# Patient Record
Sex: FEMALE | Race: WHITE | NOT HISPANIC OR LATINO | Employment: OTHER | URBAN - METROPOLITAN AREA
[De-identification: names, ages, dates, MRNs, and addresses within clinical notes are randomized per-mention and may not be internally consistent; named-entity substitution may affect disease eponyms.]

---

## 2021-12-13 ENCOUNTER — HOSPITAL ENCOUNTER (OUTPATIENT)
Dept: RADIOLOGY | Facility: MEDICAL CENTER | Age: 71
DRG: 460 | End: 2021-12-13
Attending: NEUROLOGICAL SURGERY | Admitting: NEUROLOGICAL SURGERY
Payer: MEDICARE

## 2021-12-13 ENCOUNTER — PRE-ADMISSION TESTING (OUTPATIENT)
Dept: ADMISSIONS | Facility: MEDICAL CENTER | Age: 71
DRG: 460 | End: 2021-12-13
Attending: NEUROLOGICAL SURGERY
Payer: MEDICARE

## 2021-12-13 ENCOUNTER — HOSPITAL ENCOUNTER (OUTPATIENT)
Dept: RADIOLOGY | Facility: MEDICAL CENTER | Age: 71
DRG: 460 | End: 2021-12-13
Attending: NEUROLOGICAL SURGERY
Payer: MEDICARE

## 2021-12-13 DIAGNOSIS — Z01.812 PRE-OPERATIVE LABORATORY EXAMINATION: ICD-10-CM

## 2021-12-13 DIAGNOSIS — Z01.810 PRE-OPERATIVE CARDIOVASCULAR EXAMINATION: ICD-10-CM

## 2021-12-13 DIAGNOSIS — M48.062 SPINAL STENOSIS, LUMBAR REGION, WITH NEUROGENIC CLAUDICATION: ICD-10-CM

## 2021-12-13 DIAGNOSIS — Z01.811 PRE-OPERATIVE RESPIRATORY EXAMINATION: ICD-10-CM

## 2021-12-13 LAB
ANION GAP SERPL CALC-SCNC: 12 MMOL/L (ref 7–16)
APTT PPP: 32.8 SEC (ref 24.7–36)
BASOPHILS # BLD AUTO: 0.6 % (ref 0–1.8)
BASOPHILS # BLD: 0.06 K/UL (ref 0–0.12)
BUN SERPL-MCNC: 16 MG/DL (ref 8–22)
CALCIUM SERPL-MCNC: 10.1 MG/DL (ref 8.5–10.5)
CHLORIDE SERPL-SCNC: 100 MMOL/L (ref 96–112)
CO2 SERPL-SCNC: 27 MMOL/L (ref 20–33)
CREAT SERPL-MCNC: 1 MG/DL (ref 0.5–1.4)
EKG IMPRESSION: NORMAL
EOSINOPHIL # BLD AUTO: 0.63 K/UL (ref 0–0.51)
EOSINOPHIL NFR BLD: 6.8 % (ref 0–6.9)
ERYTHROCYTE [DISTWIDTH] IN BLOOD BY AUTOMATED COUNT: 44.8 FL (ref 35.9–50)
GLUCOSE SERPL-MCNC: 110 MG/DL (ref 65–99)
HCT VFR BLD AUTO: 41.7 % (ref 37–47)
HGB BLD-MCNC: 14 G/DL (ref 12–16)
IMM GRANULOCYTES # BLD AUTO: 0.02 K/UL (ref 0–0.11)
IMM GRANULOCYTES NFR BLD AUTO: 0.2 % (ref 0–0.9)
INR PPP: 0.96 (ref 0.87–1.13)
LYMPHOCYTES # BLD AUTO: 3.02 K/UL (ref 1–4.8)
LYMPHOCYTES NFR BLD: 32.6 % (ref 22–41)
MCH RBC QN AUTO: 30.4 PG (ref 27–33)
MCHC RBC AUTO-ENTMCNC: 33.6 G/DL (ref 33.6–35)
MCV RBC AUTO: 90.7 FL (ref 81.4–97.8)
MONOCYTES # BLD AUTO: 0.57 K/UL (ref 0–0.85)
MONOCYTES NFR BLD AUTO: 6.1 % (ref 0–13.4)
NEUTROPHILS # BLD AUTO: 4.97 K/UL (ref 2–7.15)
NEUTROPHILS NFR BLD: 53.7 % (ref 44–72)
NRBC # BLD AUTO: 0 K/UL
NRBC BLD-RTO: 0 /100 WBC
PLATELET # BLD AUTO: 304 K/UL (ref 164–446)
PMV BLD AUTO: 9.7 FL (ref 9–12.9)
POTASSIUM SERPL-SCNC: 3.2 MMOL/L (ref 3.6–5.5)
PROTHROMBIN TIME: 12.5 SEC (ref 12–14.6)
RBC # BLD AUTO: 4.6 M/UL (ref 4.2–5.4)
SARS-COV-2 RNA RESP QL NAA+PROBE: NOTDETECTED
SODIUM SERPL-SCNC: 139 MMOL/L (ref 135–145)
SPECIMEN SOURCE: NORMAL
WBC # BLD AUTO: 9.3 K/UL (ref 4.8–10.8)

## 2021-12-13 PROCEDURE — U0003 INFECTIOUS AGENT DETECTION BY NUCLEIC ACID (DNA OR RNA); SEVERE ACUTE RESPIRATORY SYNDROME CORONAVIRUS 2 (SARS-COV-2) (CORONAVIRUS DISEASE [COVID-19]), AMPLIFIED PROBE TECHNIQUE, MAKING USE OF HIGH THROUGHPUT TECHNOLOGIES AS DESCRIBED BY CMS-2020-01-R: HCPCS

## 2021-12-13 PROCEDURE — 36415 COLL VENOUS BLD VENIPUNCTURE: CPT

## 2021-12-13 PROCEDURE — 80048 BASIC METABOLIC PNL TOTAL CA: CPT

## 2021-12-13 PROCEDURE — 85610 PROTHROMBIN TIME: CPT

## 2021-12-13 PROCEDURE — 71046 X-RAY EXAM CHEST 2 VIEWS: CPT

## 2021-12-13 PROCEDURE — 85730 THROMBOPLASTIN TIME PARTIAL: CPT

## 2021-12-13 PROCEDURE — 85025 COMPLETE CBC W/AUTO DIFF WBC: CPT

## 2021-12-13 PROCEDURE — 93010 ELECTROCARDIOGRAM REPORT: CPT | Performed by: INTERNAL MEDICINE

## 2021-12-13 PROCEDURE — 72131 CT LUMBAR SPINE W/O DYE: CPT | Mod: MH

## 2021-12-13 PROCEDURE — 93005 ELECTROCARDIOGRAM TRACING: CPT

## 2021-12-13 PROCEDURE — C9803 HOPD COVID-19 SPEC COLLECT: HCPCS

## 2021-12-13 PROCEDURE — U0005 INFEC AGEN DETEC AMPLI PROBE: HCPCS

## 2021-12-13 RX ORDER — GABAPENTIN 300 MG/1
300 CAPSULE ORAL 2 TIMES DAILY
Status: ON HOLD | COMMUNITY
Start: 2021-12-01 | End: 2023-02-07 | Stop reason: SDUPTHER

## 2021-12-13 RX ORDER — ATORVASTATIN CALCIUM 10 MG/1
10 TABLET, FILM COATED ORAL EVERY MORNING
Status: ON HOLD | COMMUNITY
Start: 2021-11-24 | End: 2023-08-11 | Stop reason: SDUPTHER

## 2021-12-13 RX ORDER — OMEPRAZOLE 40 MG/1
40 CAPSULE, DELAYED RELEASE ORAL EVERY MORNING
Status: ON HOLD | COMMUNITY
Start: 2021-10-23 | End: 2023-08-11 | Stop reason: SDUPTHER

## 2021-12-13 RX ORDER — HYDROCHLOROTHIAZIDE 25 MG/1
25 TABLET ORAL EVERY MORNING
Status: ON HOLD | COMMUNITY
Start: 2021-10-17 | End: 2023-08-11

## 2021-12-20 ENCOUNTER — APPOINTMENT (OUTPATIENT)
Dept: RADIOLOGY | Facility: MEDICAL CENTER | Age: 71
DRG: 460 | End: 2021-12-20
Attending: NEUROLOGICAL SURGERY
Payer: MEDICARE

## 2021-12-20 ENCOUNTER — HOSPITAL ENCOUNTER (INPATIENT)
Facility: MEDICAL CENTER | Age: 71
LOS: 1 days | DRG: 460 | End: 2021-12-28
Attending: NEUROLOGICAL SURGERY | Admitting: HOSPITALIST
Payer: MEDICARE

## 2021-12-20 ENCOUNTER — ANESTHESIA (OUTPATIENT)
Dept: SURGERY | Facility: MEDICAL CENTER | Age: 71
DRG: 460 | End: 2021-12-20
Payer: MEDICARE

## 2021-12-20 ENCOUNTER — ANESTHESIA EVENT (OUTPATIENT)
Dept: SURGERY | Facility: MEDICAL CENTER | Age: 71
DRG: 460 | End: 2021-12-20
Payer: MEDICARE

## 2021-12-20 DIAGNOSIS — M48.061 LUMBAR STENOSIS WITHOUT NEUROGENIC CLAUDICATION: ICD-10-CM

## 2021-12-20 DIAGNOSIS — G89.18 POSTOPERATIVE PAIN: ICD-10-CM

## 2021-12-20 PROCEDURE — 0QB20ZZ EXCISION OF RIGHT PELVIC BONE, OPEN APPROACH: ICD-10-PCS | Performed by: NEUROLOGICAL SURGERY

## 2021-12-20 PROCEDURE — 72100 X-RAY EXAM L-S SPINE 2/3 VWS: CPT

## 2021-12-20 PROCEDURE — 700105 HCHG RX REV CODE 258: Performed by: NEUROLOGICAL SURGERY

## 2021-12-20 PROCEDURE — 110454 HCHG SHELL REV 250: Performed by: NEUROLOGICAL SURGERY

## 2021-12-20 PROCEDURE — 01NB0ZZ RELEASE LUMBAR NERVE, OPEN APPROACH: ICD-10-PCS | Performed by: NEUROLOGICAL SURGERY

## 2021-12-20 PROCEDURE — 501838 HCHG SUTURE GENERAL: Performed by: NEUROLOGICAL SURGERY

## 2021-12-20 PROCEDURE — 700102 HCHG RX REV CODE 250 W/ 637 OVERRIDE(OP): Performed by: ANESTHESIOLOGY

## 2021-12-20 PROCEDURE — 700101 HCHG RX REV CODE 250: Performed by: ANESTHESIOLOGY

## 2021-12-20 PROCEDURE — C1713 ANCHOR/SCREW BN/BN,TIS/BN: HCPCS | Performed by: NEUROLOGICAL SURGERY

## 2021-12-20 PROCEDURE — 700111 HCHG RX REV CODE 636 W/ 250 OVERRIDE (IP): Performed by: NEUROLOGICAL SURGERY

## 2021-12-20 PROCEDURE — 700102 HCHG RX REV CODE 250 W/ 637 OVERRIDE(OP): Performed by: NURSE PRACTITIONER

## 2021-12-20 PROCEDURE — 160042 HCHG SURGERY MINUTES - EA ADDL 1 MIN LEVEL 5: Performed by: NEUROLOGICAL SURGERY

## 2021-12-20 PROCEDURE — 160035 HCHG PACU - 1ST 60 MINS PHASE I: Performed by: NEUROLOGICAL SURGERY

## 2021-12-20 PROCEDURE — 160009 HCHG ANES TIME/MIN: Performed by: NEUROLOGICAL SURGERY

## 2021-12-20 PROCEDURE — A9270 NON-COVERED ITEM OR SERVICE: HCPCS | Performed by: ANESTHESIOLOGY

## 2021-12-20 PROCEDURE — 700101 HCHG RX REV CODE 250: Performed by: NURSE PRACTITIONER

## 2021-12-20 PROCEDURE — A9270 NON-COVERED ITEM OR SERVICE: HCPCS | Performed by: NURSE PRACTITIONER

## 2021-12-20 PROCEDURE — 700111 HCHG RX REV CODE 636 W/ 250 OVERRIDE (IP): Performed by: NURSE PRACTITIONER

## 2021-12-20 PROCEDURE — 700111 HCHG RX REV CODE 636 W/ 250 OVERRIDE (IP): Performed by: ANESTHESIOLOGY

## 2021-12-20 PROCEDURE — 160002 HCHG RECOVERY MINUTES (STAT): Performed by: NEUROLOGICAL SURGERY

## 2021-12-20 PROCEDURE — 502714 HCHG ROBOTIC SURGERY SERVICES: Performed by: NEUROLOGICAL SURGERY

## 2021-12-20 PROCEDURE — 160036 HCHG PACU - EA ADDL 30 MINS PHASE I: Performed by: NEUROLOGICAL SURGERY

## 2021-12-20 PROCEDURE — 700105 HCHG RX REV CODE 258: Performed by: ANESTHESIOLOGY

## 2021-12-20 PROCEDURE — 0SG10AJ FUSION OF 2 OR MORE LUMBAR VERTEBRAL JOINTS WITH INTERBODY FUSION DEVICE, POSTERIOR APPROACH, ANTERIOR COLUMN, OPEN APPROACH: ICD-10-PCS | Performed by: NEUROLOGICAL SURGERY

## 2021-12-20 PROCEDURE — 160048 HCHG OR STATISTICAL LEVEL 1-5: Performed by: NEUROLOGICAL SURGERY

## 2021-12-20 PROCEDURE — 110371 HCHG SHELL REV 272: Performed by: NEUROLOGICAL SURGERY

## 2021-12-20 PROCEDURE — 160031 HCHG SURGERY MINUTES - 1ST 30 MINS LEVEL 5: Performed by: NEUROLOGICAL SURGERY

## 2021-12-20 PROCEDURE — G0378 HOSPITAL OBSERVATION PER HR: HCPCS

## 2021-12-20 PROCEDURE — 500367 HCHG DRAIN KIT, HEMOVAC: Performed by: NEUROLOGICAL SURGERY

## 2021-12-20 PROCEDURE — 0SB20ZZ EXCISION OF LUMBAR VERTEBRAL DISC, OPEN APPROACH: ICD-10-PCS | Performed by: NEUROLOGICAL SURGERY

## 2021-12-20 PROCEDURE — 700101 HCHG RX REV CODE 250: Performed by: NEUROLOGICAL SURGERY

## 2021-12-20 DEVICE — SCREW SOLERA SET SCREW (1TCX40+3TCX21+2TCX10=123): Type: IMPLANTABLE DEVICE | Site: BACK | Status: FUNCTIONAL

## 2021-12-20 DEVICE — CAGE EXPANDABLE ELEVATE STANDARD 10X28: Type: IMPLANTABLE DEVICE | Site: BACK | Status: FUNCTIONAL

## 2021-12-20 DEVICE — SCREW MAS  SOLERA 5.5 X 40MM (1TCX8+3TCX8=32): Type: IMPLANTABLE DEVICE | Site: BACK | Status: FUNCTIONAL

## 2021-12-20 DEVICE — SCREW MAS  SOLERA 6.5 X 45MM (1TCX16+3TCX8=40): Type: IMPLANTABLE DEVICE | Site: BACK | Status: FUNCTIONAL

## 2021-12-20 DEVICE — SCREW MAS  SOLERA 6.5 X 40MM (1TCX16+3TCX8=40): Type: IMPLANTABLE DEVICE | Site: BACK | Status: FUNCTIONAL

## 2021-12-20 DEVICE — DBM PUTTY PROGENIX 10.CC: Type: IMPLANTABLE DEVICE | Site: BACK | Status: FUNCTIONAL

## 2021-12-20 DEVICE — ORTHOBLEND 5CC: Type: IMPLANTABLE DEVICE | Site: BACK | Status: FUNCTIONAL

## 2021-12-20 DEVICE — CAGE EXPANDABLE ELEVATE STANDARD 9X28: Type: IMPLANTABLE DEVICE | Site: BACK | Status: FUNCTIONAL

## 2021-12-20 DEVICE — ROD PREBENT TITANIUM 5.5 X 100MM (2TCONX2=4): Type: IMPLANTABLE DEVICE | Site: BACK | Status: FUNCTIONAL

## 2021-12-20 RX ORDER — HYDRALAZINE HYDROCHLORIDE 20 MG/ML
10 INJECTION INTRAMUSCULAR; INTRAVENOUS
Status: DISCONTINUED | OUTPATIENT
Start: 2021-12-20 | End: 2021-12-28 | Stop reason: HOSPADM

## 2021-12-20 RX ORDER — DOCUSATE SODIUM 100 MG/1
100 CAPSULE, LIQUID FILLED ORAL 2 TIMES DAILY
Status: DISCONTINUED | OUTPATIENT
Start: 2021-12-20 | End: 2021-12-28 | Stop reason: HOSPADM

## 2021-12-20 RX ORDER — ACETAMINOPHEN 500 MG
1000 TABLET ORAL ONCE
Status: DISCONTINUED | OUTPATIENT
Start: 2021-12-20 | End: 2021-12-20 | Stop reason: HOSPADM

## 2021-12-20 RX ORDER — BUPIVACAINE HYDROCHLORIDE AND EPINEPHRINE 5; 5 MG/ML; UG/ML
INJECTION, SOLUTION PERINEURAL
Status: DISCONTINUED | OUTPATIENT
Start: 2021-12-20 | End: 2021-12-20 | Stop reason: HOSPADM

## 2021-12-20 RX ORDER — CYCLOBENZAPRINE HCL 10 MG
10 TABLET ORAL EVERY 8 HOURS PRN
Status: DISCONTINUED | OUTPATIENT
Start: 2021-12-20 | End: 2021-12-28 | Stop reason: HOSPADM

## 2021-12-20 RX ORDER — BISACODYL 10 MG
10 SUPPOSITORY, RECTAL RECTAL
Status: DISCONTINUED | OUTPATIENT
Start: 2021-12-20 | End: 2021-12-28 | Stop reason: HOSPADM

## 2021-12-20 RX ORDER — CEFAZOLIN SODIUM 1 G/3ML
INJECTION, POWDER, FOR SOLUTION INTRAMUSCULAR; INTRAVENOUS PRN
Status: DISCONTINUED | OUTPATIENT
Start: 2021-12-20 | End: 2021-12-20 | Stop reason: SURG

## 2021-12-20 RX ORDER — HALOPERIDOL 5 MG/ML
1 INJECTION INTRAMUSCULAR
Status: DISCONTINUED | OUTPATIENT
Start: 2021-12-20 | End: 2021-12-20 | Stop reason: HOSPADM

## 2021-12-20 RX ORDER — AMOXICILLIN 250 MG
1 CAPSULE ORAL
Status: DISCONTINUED | OUTPATIENT
Start: 2021-12-20 | End: 2021-12-28 | Stop reason: HOSPADM

## 2021-12-20 RX ORDER — ROCURONIUM BROMIDE 10 MG/ML
INJECTION, SOLUTION INTRAVENOUS PRN
Status: DISCONTINUED | OUTPATIENT
Start: 2021-12-20 | End: 2021-12-20 | Stop reason: SURG

## 2021-12-20 RX ORDER — HYDROMORPHONE HYDROCHLORIDE 1 MG/ML
0.1 INJECTION, SOLUTION INTRAMUSCULAR; INTRAVENOUS; SUBCUTANEOUS
Status: DISCONTINUED | OUTPATIENT
Start: 2021-12-20 | End: 2021-12-20 | Stop reason: HOSPADM

## 2021-12-20 RX ORDER — IBUPROFEN, ACETAMINOPHEN 125; 250 MG/1; MG/1
1 TABLET, FILM COATED ORAL EVERY 6 HOURS PRN
Status: ON HOLD | COMMUNITY
End: 2021-12-22

## 2021-12-20 RX ORDER — ACETAMINOPHEN 325 MG/1
TABLET ORAL PRN
Status: DISCONTINUED | OUTPATIENT
Start: 2021-12-20 | End: 2021-12-20 | Stop reason: SURG

## 2021-12-20 RX ORDER — ONDANSETRON 2 MG/ML
INJECTION INTRAMUSCULAR; INTRAVENOUS PRN
Status: DISCONTINUED | OUTPATIENT
Start: 2021-12-20 | End: 2021-12-20 | Stop reason: SURG

## 2021-12-20 RX ORDER — SODIUM CHLORIDE, SODIUM LACTATE, POTASSIUM CHLORIDE, CALCIUM CHLORIDE 600; 310; 30; 20 MG/100ML; MG/100ML; MG/100ML; MG/100ML
INJECTION, SOLUTION INTRAVENOUS CONTINUOUS
Status: DISCONTINUED | OUTPATIENT
Start: 2021-12-20 | End: 2021-12-20 | Stop reason: HOSPADM

## 2021-12-20 RX ORDER — SODIUM CHLORIDE, SODIUM LACTATE, POTASSIUM CHLORIDE, CALCIUM CHLORIDE 600; 310; 30; 20 MG/100ML; MG/100ML; MG/100ML; MG/100ML
INJECTION, SOLUTION INTRAVENOUS CONTINUOUS
Status: ACTIVE | OUTPATIENT
Start: 2021-12-20 | End: 2021-12-20

## 2021-12-20 RX ORDER — HYDROMORPHONE HYDROCHLORIDE 1 MG/ML
0.2 INJECTION, SOLUTION INTRAMUSCULAR; INTRAVENOUS; SUBCUTANEOUS
Status: DISCONTINUED | OUTPATIENT
Start: 2021-12-20 | End: 2021-12-20 | Stop reason: HOSPADM

## 2021-12-20 RX ORDER — ATORVASTATIN CALCIUM 10 MG/1
10 TABLET, FILM COATED ORAL DAILY
Status: DISCONTINUED | OUTPATIENT
Start: 2021-12-20 | End: 2021-12-28 | Stop reason: HOSPADM

## 2021-12-20 RX ORDER — AMOXICILLIN 250 MG
1 CAPSULE ORAL NIGHTLY
Status: DISCONTINUED | OUTPATIENT
Start: 2021-12-20 | End: 2021-12-28 | Stop reason: HOSPADM

## 2021-12-20 RX ORDER — LACTOBACILLUS RHAMNOSUS GG 10B CELL
1 CAPSULE ORAL DAILY
Status: DISCONTINUED | OUTPATIENT
Start: 2021-12-21 | End: 2021-12-28 | Stop reason: HOSPADM

## 2021-12-20 RX ORDER — ONDANSETRON 4 MG/1
4 TABLET, ORALLY DISINTEGRATING ORAL EVERY 4 HOURS PRN
Status: DISCONTINUED | OUTPATIENT
Start: 2021-12-20 | End: 2021-12-28 | Stop reason: HOSPADM

## 2021-12-20 RX ORDER — HYDROMORPHONE HYDROCHLORIDE 1 MG/ML
0.4 INJECTION, SOLUTION INTRAMUSCULAR; INTRAVENOUS; SUBCUTANEOUS
Status: DISCONTINUED | OUTPATIENT
Start: 2021-12-20 | End: 2021-12-20 | Stop reason: HOSPADM

## 2021-12-20 RX ORDER — POLYETHYLENE GLYCOL 3350 17 G/17G
1 POWDER, FOR SOLUTION ORAL 2 TIMES DAILY PRN
Status: DISCONTINUED | OUTPATIENT
Start: 2021-12-20 | End: 2021-12-28 | Stop reason: HOSPADM

## 2021-12-20 RX ORDER — SODIUM CHLORIDE AND POTASSIUM CHLORIDE 150; 900 MG/100ML; MG/100ML
INJECTION, SOLUTION INTRAVENOUS CONTINUOUS
Status: DISCONTINUED | OUTPATIENT
Start: 2021-12-20 | End: 2021-12-21

## 2021-12-20 RX ORDER — CEFAZOLIN SODIUM 1 G/3ML
INJECTION, POWDER, FOR SOLUTION INTRAMUSCULAR; INTRAVENOUS
Status: DISCONTINUED | OUTPATIENT
Start: 2021-12-20 | End: 2021-12-20 | Stop reason: HOSPADM

## 2021-12-20 RX ORDER — METHOCARBAMOL 750 MG/1
750 TABLET, FILM COATED ORAL EVERY 8 HOURS PRN
Status: DISCONTINUED | OUTPATIENT
Start: 2021-12-20 | End: 2021-12-28 | Stop reason: HOSPADM

## 2021-12-20 RX ORDER — VANCOMYCIN HYDROCHLORIDE 1 G/20ML
INJECTION, POWDER, LYOPHILIZED, FOR SOLUTION INTRAVENOUS
Status: COMPLETED | OUTPATIENT
Start: 2021-12-20 | End: 2021-12-20

## 2021-12-20 RX ORDER — ONDANSETRON 2 MG/ML
4 INJECTION INTRAMUSCULAR; INTRAVENOUS
Status: DISCONTINUED | OUTPATIENT
Start: 2021-12-20 | End: 2021-12-20 | Stop reason: HOSPADM

## 2021-12-20 RX ORDER — OXYCODONE HCL 10 MG/1
10 TABLET, FILM COATED, EXTENDED RELEASE ORAL ONCE
Status: COMPLETED | OUTPATIENT
Start: 2021-12-20 | End: 2021-12-20

## 2021-12-20 RX ORDER — OMEPRAZOLE 20 MG/1
40 CAPSULE, DELAYED RELEASE ORAL DAILY
Status: DISCONTINUED | OUTPATIENT
Start: 2021-12-21 | End: 2021-12-28 | Stop reason: HOSPADM

## 2021-12-20 RX ORDER — DEXAMETHASONE SODIUM PHOSPHATE 4 MG/ML
INJECTION, SOLUTION INTRA-ARTICULAR; INTRALESIONAL; INTRAMUSCULAR; INTRAVENOUS; SOFT TISSUE PRN
Status: DISCONTINUED | OUTPATIENT
Start: 2021-12-20 | End: 2021-12-20 | Stop reason: SURG

## 2021-12-20 RX ORDER — LABETALOL HYDROCHLORIDE 5 MG/ML
10 INJECTION, SOLUTION INTRAVENOUS
Status: DISCONTINUED | OUTPATIENT
Start: 2021-12-20 | End: 2021-12-28 | Stop reason: HOSPADM

## 2021-12-20 RX ORDER — ACETAMINOPHEN 500 MG
500-1000 TABLET ORAL EVERY 6 HOURS PRN
Status: ON HOLD | COMMUNITY
End: 2023-01-19

## 2021-12-20 RX ORDER — DIPHENHYDRAMINE HYDROCHLORIDE 50 MG/ML
12.5 INJECTION INTRAMUSCULAR; INTRAVENOUS
Status: DISCONTINUED | OUTPATIENT
Start: 2021-12-20 | End: 2021-12-20 | Stop reason: HOSPADM

## 2021-12-20 RX ORDER — GABAPENTIN 300 MG/1
300 CAPSULE ORAL 3 TIMES DAILY
Status: DISCONTINUED | OUTPATIENT
Start: 2021-12-20 | End: 2021-12-22

## 2021-12-20 RX ORDER — DIAZEPAM 5 MG/1
5 TABLET ORAL EVERY 4 HOURS PRN
Status: DISCONTINUED | OUTPATIENT
Start: 2021-12-20 | End: 2021-12-28 | Stop reason: HOSPADM

## 2021-12-20 RX ORDER — CEFAZOLIN SODIUM 2 G/100ML
2 INJECTION, SOLUTION INTRAVENOUS EVERY 8 HOURS
Status: COMPLETED | OUTPATIENT
Start: 2021-12-20 | End: 2021-12-21

## 2021-12-20 RX ORDER — ONDANSETRON 2 MG/ML
4 INJECTION INTRAMUSCULAR; INTRAVENOUS EVERY 4 HOURS PRN
Status: DISCONTINUED | OUTPATIENT
Start: 2021-12-20 | End: 2021-12-28 | Stop reason: HOSPADM

## 2021-12-20 RX ORDER — ENEMA 19; 7 G/133ML; G/133ML
1 ENEMA RECTAL
Status: DISCONTINUED | OUTPATIENT
Start: 2021-12-20 | End: 2021-12-28 | Stop reason: HOSPADM

## 2021-12-20 RX ORDER — HYDROCHLOROTHIAZIDE 25 MG/1
25 TABLET ORAL DAILY
Status: DISCONTINUED | OUTPATIENT
Start: 2021-12-21 | End: 2021-12-28 | Stop reason: HOSPADM

## 2021-12-20 RX ADMIN — PROPOFOL 200 MG: 10 INJECTION, EMULSION INTRAVENOUS at 11:33

## 2021-12-20 RX ADMIN — CEFAZOLIN SODIUM 2 G: 2 INJECTION, SOLUTION INTRAVENOUS at 23:00

## 2021-12-20 RX ADMIN — LIDOCAINE HYDROCHLORIDE 0.5 ML: 10 INJECTION, SOLUTION EPIDURAL; INFILTRATION; INTRACAUDAL; PERINEURAL at 09:48

## 2021-12-20 RX ADMIN — ROCURONIUM BROMIDE 10 MG: 10 INJECTION, SOLUTION INTRAVENOUS at 13:50

## 2021-12-20 RX ADMIN — ROCURONIUM BROMIDE 10 MG: 10 INJECTION, SOLUTION INTRAVENOUS at 13:18

## 2021-12-20 RX ADMIN — ATORVASTATIN CALCIUM 10 MG: 10 TABLET, FILM COATED ORAL at 22:05

## 2021-12-20 RX ADMIN — DOCUSATE SODIUM 100 MG: 100 CAPSULE ORAL at 22:06

## 2021-12-20 RX ADMIN — HYDROMORPHONE HYDROCHLORIDE 0.4 MG: 1 INJECTION, SOLUTION INTRAMUSCULAR; INTRAVENOUS; SUBCUTANEOUS at 20:59

## 2021-12-20 RX ADMIN — FENTANYL CITRATE 150 MCG: 50 INJECTION, SOLUTION INTRAMUSCULAR; INTRAVENOUS at 12:56

## 2021-12-20 RX ADMIN — FENTANYL CITRATE 50 MCG: 50 INJECTION, SOLUTION INTRAMUSCULAR; INTRAVENOUS at 16:46

## 2021-12-20 RX ADMIN — FENTANYL CITRATE 100 MCG: 50 INJECTION, SOLUTION INTRAMUSCULAR; INTRAVENOUS at 11:33

## 2021-12-20 RX ADMIN — HYDROMORPHONE HYDROCHLORIDE 0.2 MG: 1 INJECTION, SOLUTION INTRAMUSCULAR; INTRAVENOUS; SUBCUTANEOUS at 17:13

## 2021-12-20 RX ADMIN — HYDROMORPHONE HYDROCHLORIDE 0.4 MG: 1 INJECTION, SOLUTION INTRAMUSCULAR; INTRAVENOUS; SUBCUTANEOUS at 17:09

## 2021-12-20 RX ADMIN — POTASSIUM CHLORIDE AND SODIUM CHLORIDE: 900; 150 INJECTION, SOLUTION INTRAVENOUS at 22:19

## 2021-12-20 RX ADMIN — Medication: at 22:25

## 2021-12-20 RX ADMIN — SODIUM CHLORIDE, POTASSIUM CHLORIDE, SODIUM LACTATE AND CALCIUM CHLORIDE: 600; 310; 30; 20 INJECTION, SOLUTION INTRAVENOUS at 17:03

## 2021-12-20 RX ADMIN — ONDANSETRON 4 MG: 2 INJECTION INTRAMUSCULAR; INTRAVENOUS at 22:04

## 2021-12-20 RX ADMIN — OXYCODONE HYDROCHLORIDE 10 MG: 10 TABLET, FILM COATED, EXTENDED RELEASE ORAL at 10:53

## 2021-12-20 RX ADMIN — HYDROMORPHONE HYDROCHLORIDE 0.4 MG: 1 INJECTION, SOLUTION INTRAMUSCULAR; INTRAVENOUS; SUBCUTANEOUS at 16:58

## 2021-12-20 RX ADMIN — GABAPENTIN 300 MG: 300 CAPSULE ORAL at 22:05

## 2021-12-20 RX ADMIN — SODIUM CHLORIDE, POTASSIUM CHLORIDE, SODIUM LACTATE AND CALCIUM CHLORIDE: 600; 310; 30; 20 INJECTION, SOLUTION INTRAVENOUS at 09:48

## 2021-12-20 RX ADMIN — EPHEDRINE SULFATE 10 MG: 50 INJECTION, SOLUTION INTRAVENOUS at 11:46

## 2021-12-20 RX ADMIN — CYCLOBENZAPRINE 10 MG: 10 TABLET, FILM COATED ORAL at 18:29

## 2021-12-20 RX ADMIN — ROCURONIUM BROMIDE 10 MG: 10 INJECTION, SOLUTION INTRAVENOUS at 14:52

## 2021-12-20 RX ADMIN — ROCURONIUM BROMIDE 40 MG: 10 INJECTION, SOLUTION INTRAVENOUS at 11:33

## 2021-12-20 RX ADMIN — DEXAMETHASONE SODIUM PHOSPHATE 8 MG: 4 INJECTION, SOLUTION INTRA-ARTICULAR; INTRALESIONAL; INTRAMUSCULAR; INTRAVENOUS; SOFT TISSUE at 11:33

## 2021-12-20 RX ADMIN — ROCURONIUM BROMIDE 10 MG: 10 INJECTION, SOLUTION INTRAVENOUS at 14:22

## 2021-12-20 RX ADMIN — ACETAMINOPHEN 1000 MG: 325 TABLET, FILM COATED ORAL at 10:53

## 2021-12-20 RX ADMIN — CEFAZOLIN 2 G: 330 INJECTION, POWDER, FOR SOLUTION INTRAMUSCULAR; INTRAVENOUS at 15:23

## 2021-12-20 RX ADMIN — METHOCARBAMOL 750 MG: 750 TABLET ORAL at 17:10

## 2021-12-20 RX ADMIN — CEFAZOLIN 2 G: 330 INJECTION, POWDER, FOR SOLUTION INTRAMUSCULAR; INTRAVENOUS at 11:37

## 2021-12-20 RX ADMIN — ONDANSETRON 4 MG: 2 INJECTION INTRAMUSCULAR; INTRAVENOUS at 11:33

## 2021-12-20 RX ADMIN — DOCUSATE SODIUM 50 MG AND SENNOSIDES 8.6 MG 1 TABLET: 8.6; 5 TABLET, FILM COATED ORAL at 22:05

## 2021-12-20 RX ADMIN — FENTANYL CITRATE 50 MCG: 50 INJECTION, SOLUTION INTRAMUSCULAR; INTRAVENOUS at 16:30

## 2021-12-20 RX ADMIN — HYDROMORPHONE HYDROCHLORIDE 0.4 MG: 1 INJECTION, SOLUTION INTRAMUSCULAR; INTRAVENOUS; SUBCUTANEOUS at 18:31

## 2021-12-20 ASSESSMENT — PAIN DESCRIPTION - PAIN TYPE
TYPE: SURGICAL PAIN

## 2021-12-20 ASSESSMENT — PAIN SCALES - GENERAL: PAIN_LEVEL: 3

## 2021-12-20 NOTE — ANESTHESIA PREPROCEDURE EVALUATION
Case: [id number] Date/Time: 12/20/21 1045    Procedure: FUSION, SPINE, LUMBAR, ROBOT-ASSISTED WITH IMAGING GUIDANCE - L1-4 TRANSFORAMINAL LUMBAR INTERBODY FUSION WITH TOTAL FACTECTOMY (Back)    Pre-op diagnosis: SPINAL STENOSIS OF LUMBAR REGION    Location: [facility] OR 08 / SURGERY [facility]    Surgeons: Aman Cottrell M.D.          Relevant Problems   No relevant active problems       Physical Exam    Airway   Mallampati: II  TM distance: >3 FB  Neck ROM: full       Cardiovascular   Rhythm: regular  Rate: normal     Dental - normal exam           Pulmonary   Breath sounds clear to auscultation     Abdominal    Neurological - normal exam                 Anesthesia Plan    ASA 2       Plan - general       Airway plan will be ETT        Plan Factors:   Patient was previously instructed to abstain from smoking on day of procedure.  Patient smoked on day of procedure.      Induction: intravenous          Informed Consent:    Anesthetic plan and risks discussed with patient.    Use of blood products discussed with: patient whom.

## 2021-12-20 NOTE — ANESTHESIA PROCEDURE NOTES
Airway    Date/Time: 12/20/2021 11:35 AM  Performed by: Bandar Charles M.D.  Authorized by: Bandar Charles M.D.     Location:  OR  Urgency:  Elective  Difficult Airway: No    Indications for Airway Management:  Anesthesia      Spontaneous Ventilation: present    Sedation Level:  Deep  Preoxygenated: Yes    Patient Position:  Sniffing  MILS Maintained Throughout: No    Mask Difficulty Assessment:  0 - not attempted  Final Airway Type:  Endotracheal airway  Final Endotracheal Airway:  ETT  Cuffed: Yes    Technique Used for Successful ETT Placement:  Direct laryngoscopy  Devices/Methods Used in Placement:  Cricoid pressure and intubating stylet    Blade Type:  Akbar  Laryngoscope Blade/Videolaryngoscope Blade Size:  4  ETT Size (mm):  7.0  Placement Verified by: auscultation and capnometry    Cormack-Lehane Classification:  Grade IIb - view of arytenoids or posterior of glottis only  Number of Attempts at Approach:  1

## 2021-12-21 LAB
ANION GAP SERPL CALC-SCNC: 8 MMOL/L (ref 7–16)
BUN SERPL-MCNC: 17 MG/DL (ref 8–22)
CALCIUM SERPL-MCNC: 8.4 MG/DL (ref 8.5–10.5)
CHLORIDE SERPL-SCNC: 104 MMOL/L (ref 96–112)
CO2 SERPL-SCNC: 28 MMOL/L (ref 20–33)
CREAT SERPL-MCNC: 0.78 MG/DL (ref 0.5–1.4)
ERYTHROCYTE [DISTWIDTH] IN BLOOD BY AUTOMATED COUNT: 47.5 FL (ref 35.9–50)
GLUCOSE SERPL-MCNC: 119 MG/DL (ref 65–99)
HCT VFR BLD AUTO: 30.9 % (ref 37–47)
HGB BLD-MCNC: 10.1 G/DL (ref 12–16)
MCH RBC QN AUTO: 30.9 PG (ref 27–33)
MCHC RBC AUTO-ENTMCNC: 32.7 G/DL (ref 33.6–35)
MCV RBC AUTO: 94.5 FL (ref 81.4–97.8)
PLATELET # BLD AUTO: 240 K/UL (ref 164–446)
PMV BLD AUTO: 9.7 FL (ref 9–12.9)
POTASSIUM SERPL-SCNC: 4.2 MMOL/L (ref 3.6–5.5)
RBC # BLD AUTO: 3.27 M/UL (ref 4.2–5.4)
SODIUM SERPL-SCNC: 140 MMOL/L (ref 135–145)
WBC # BLD AUTO: 11.9 K/UL (ref 4.8–10.8)

## 2021-12-21 PROCEDURE — 97535 SELF CARE MNGMENT TRAINING: CPT

## 2021-12-21 PROCEDURE — 80048 BASIC METABOLIC PNL TOTAL CA: CPT

## 2021-12-21 PROCEDURE — 97166 OT EVAL MOD COMPLEX 45 MIN: CPT

## 2021-12-21 PROCEDURE — A9270 NON-COVERED ITEM OR SERVICE: HCPCS | Performed by: NEUROLOGICAL SURGERY

## 2021-12-21 PROCEDURE — 700111 HCHG RX REV CODE 636 W/ 250 OVERRIDE (IP): Performed by: NURSE PRACTITIONER

## 2021-12-21 PROCEDURE — 700111 HCHG RX REV CODE 636 W/ 250 OVERRIDE (IP): Performed by: NEUROLOGICAL SURGERY

## 2021-12-21 PROCEDURE — 700102 HCHG RX REV CODE 250 W/ 637 OVERRIDE(OP): Performed by: NURSE PRACTITIONER

## 2021-12-21 PROCEDURE — 51798 US URINE CAPACITY MEASURE: CPT

## 2021-12-21 PROCEDURE — 97162 PT EVAL MOD COMPLEX 30 MIN: CPT

## 2021-12-21 PROCEDURE — G0378 HOSPITAL OBSERVATION PER HR: HCPCS

## 2021-12-21 PROCEDURE — 700101 HCHG RX REV CODE 250: Performed by: NURSE PRACTITIONER

## 2021-12-21 PROCEDURE — 700102 HCHG RX REV CODE 250 W/ 637 OVERRIDE(OP): Performed by: NEUROLOGICAL SURGERY

## 2021-12-21 PROCEDURE — 36415 COLL VENOUS BLD VENIPUNCTURE: CPT

## 2021-12-21 PROCEDURE — 85027 COMPLETE CBC AUTOMATED: CPT

## 2021-12-21 PROCEDURE — A9270 NON-COVERED ITEM OR SERVICE: HCPCS | Performed by: NURSE PRACTITIONER

## 2021-12-21 RX ORDER — MORPHINE SULFATE 4 MG/ML
2-4 INJECTION INTRAVENOUS
Status: DISCONTINUED | OUTPATIENT
Start: 2021-12-21 | End: 2021-12-28 | Stop reason: HOSPADM

## 2021-12-21 RX ORDER — OXYCODONE HYDROCHLORIDE 5 MG/1
5 TABLET ORAL EVERY 4 HOURS PRN
Status: DISCONTINUED | OUTPATIENT
Start: 2021-12-21 | End: 2021-12-22

## 2021-12-21 RX ORDER — ACETAMINOPHEN 325 MG/1
650 TABLET ORAL EVERY 4 HOURS PRN
Status: DISCONTINUED | OUTPATIENT
Start: 2021-12-21 | End: 2021-12-28 | Stop reason: HOSPADM

## 2021-12-21 RX ADMIN — POTASSIUM CHLORIDE AND SODIUM CHLORIDE: 900; 150 INJECTION, SOLUTION INTRAVENOUS at 08:49

## 2021-12-21 RX ADMIN — CYCLOBENZAPRINE 10 MG: 10 TABLET, FILM COATED ORAL at 22:18

## 2021-12-21 RX ADMIN — GABAPENTIN 300 MG: 300 CAPSULE ORAL at 22:16

## 2021-12-21 RX ADMIN — MORPHINE SULFATE 2 MG: 4 INJECTION INTRAVENOUS at 23:17

## 2021-12-21 RX ADMIN — GABAPENTIN 300 MG: 300 CAPSULE ORAL at 15:12

## 2021-12-21 RX ADMIN — Medication 1 CAPSULE: at 05:05

## 2021-12-21 RX ADMIN — HYDROCHLOROTHIAZIDE 25 MG: 25 TABLET ORAL at 05:05

## 2021-12-21 RX ADMIN — OMEPRAZOLE 40 MG: 20 CAPSULE, DELAYED RELEASE ORAL at 05:05

## 2021-12-21 RX ADMIN — OXYCODONE 5 MG: 5 TABLET ORAL at 15:12

## 2021-12-21 RX ADMIN — GABAPENTIN 300 MG: 300 CAPSULE ORAL at 05:04

## 2021-12-21 RX ADMIN — DOCUSATE SODIUM 100 MG: 100 CAPSULE ORAL at 16:58

## 2021-12-21 RX ADMIN — DOCUSATE SODIUM 50 MG AND SENNOSIDES 8.6 MG 1 TABLET: 8.6; 5 TABLET, FILM COATED ORAL at 20:02

## 2021-12-21 RX ADMIN — DOCUSATE SODIUM 100 MG: 100 CAPSULE ORAL at 05:05

## 2021-12-21 RX ADMIN — OXYCODONE 5 MG: 5 TABLET ORAL at 20:11

## 2021-12-21 RX ADMIN — CEFAZOLIN SODIUM 2 G: 2 INJECTION, SOLUTION INTRAVENOUS at 08:43

## 2021-12-21 ASSESSMENT — COGNITIVE AND FUNCTIONAL STATUS - GENERAL
CLIMB 3 TO 5 STEPS WITH RAILING: A LITTLE
SUGGESTED CMS G CODE MODIFIER DAILY ACTIVITY: CK
TOILETING: A LOT
SUGGESTED CMS G CODE MODIFIER MOBILITY: CL
DRESSING REGULAR UPPER BODY CLOTHING: A LITTLE
MOVING TO AND FROM BED TO CHAIR: UNABLE
DRESSING REGULAR LOWER BODY CLOTHING: A LOT
MOBILITY SCORE: 12
PERSONAL GROOMING: A LITTLE
WALKING IN HOSPITAL ROOM: A LITTLE
TURNING FROM BACK TO SIDE WHILE IN FLAT BAD: UNABLE
STANDING UP FROM CHAIR USING ARMS: A LITTLE
HELP NEEDED FOR BATHING: A LOT
DAILY ACTIVITIY SCORE: 15
EATING MEALS: A LITTLE
MOVING FROM LYING ON BACK TO SITTING ON SIDE OF FLAT BED: UNABLE

## 2021-12-21 ASSESSMENT — PAIN DESCRIPTION - PAIN TYPE
TYPE: SURGICAL PAIN

## 2021-12-21 ASSESSMENT — ACTIVITIES OF DAILY LIVING (ADL): TOILETING: INDEPENDENT

## 2021-12-21 ASSESSMENT — GAIT ASSESSMENTS: GAIT LEVEL OF ASSIST: UNABLE TO PARTICIPATE

## 2021-12-21 NOTE — DISCHARGE PLANNING
Current documentation reveals a potential for acute inpatient rehabilitation, please consider a PMR referral to assist with discharge planning. Msg placed to MONICO Padilla.

## 2021-12-21 NOTE — PROGRESS NOTES
4 Eyes Skin Assessment Completed by Sanju RN and Maris RN.    Head WDL  Ears WDL  Nose WDL  Mouth WDL  Neck WDL  Breast/Chest WDL  Shoulder Blades WDL  Spine WDL, surgical site  (R) Arm/Elbow/Hand WDL  (L) Arm/Elbow/Hand WDL  Abdomen WDL  Groin WDL  Scrotum/Coccyx/Buttocks WDL  (R) Leg WDL  (L) Leg WDL  (R) Heel/Foot/Toe WDL  (L) Heel/Foot/Toe WDL          Devices In Places Pulse Ox, SCD's and Nasal Cannula      Interventions In Place Gray Ear Foams, Pillows and Pressure Redistribution Mattress    Possible Skin Injury No    Pictures Uploaded Into Epic N/A  Wound Consult Placed N/A  RN Wound Prevention Protocol Ordered No

## 2021-12-21 NOTE — PROGRESS NOTES
Neurosurgery Progress Note    Subjective:  No events overnight   Sleeping, easily arousable  Improved c/o of right LBP, radicular pain since surgery     Exam:  AAOx3, fluent speech   PERRL, EOMI   BUE 5/5, sensation intact  RLE IP/quad 5/5 pf, df 5/5 sensation intact   LLE IP/quad/pf/df 5/5 Sensation intact   Voiding   Pain control, PCA   HVAC 110/8hr dark serosang     BP  Min: 95/48  Max: 130/55  Pulse  Av.8  Min: 79  Max: 105  Resp  Avg: 15.7  Min: 12  Max: 20  Temp  Av.4 °C (97.5 °F)  Min: 36.1 °C (97 °F)  Max: 37.1 °C (98.8 °F)  SpO2  Av %  Min: 93 %  Max: 98 %    No data recorded    Recent Labs     21  0445   WBC 11.9*   RBC 3.27*   HEMOGLOBIN 10.1*   HEMATOCRIT 30.9*   MCV 94.5   MCH 30.9   MCHC 32.7*   RDW 47.5   PLATELETCT 240   MPV 9.7     Recent Labs     21  0445   SODIUM 140   POTASSIUM 4.2   CHLORIDE 104   CO2 28   GLUCOSE 119*   BUN 17   CREATININE 0.78   CALCIUM 8.4*               Intake/Output                       21 0700 - 21 0659 21 07 - 21 0659     [id number] Total [id number] 3309-7440 Total                 Intake    I.V.  2500  -- 2500  --  -- --    Volume (mL) (lactated ringers infusion) 2500 -- 2500 -- -- --    Total Intake 2500 -- 2500 -- -- --       Output    Urine  100  300 400  --  -- --    Urine 100 -- 100 -- -- --    Output (mL) ([REMOVED] Urethral Catheter) -- 300 300 -- -- --    Drains  100  110 210  --  -- --    Output (mL) (Closed/Suction Drain 1 Back Hemovac 10 Fr.) 100 110 210 -- -- --    Blood  50  -- 50  --  -- --    Est. Blood Loss 50 -- 50 -- -- --    Total Output 250 410 660 -- -- --       Net I/O     2250 -410 1840 -- -- --            Intake/Output Summary (Last 24 hours) at 2021 0938  Last data filed at 2021 0500  Gross per 24 hour   Intake 2500 ml   Output 660 ml   Net 1840 ml            • atorvastatin  10 mg DAILY   • gabapentin  300 mg TID   • hydroCHLOROthiazide  25 mg DAILY   • lactobacillus  rhamnosus  1 Capsule DAILY   • omeprazole  40 mg DAILY   • Pharmacy Consult Request  1 Each PHARMACY TO DOSE   • MD ALERT...DO NOT ADMINISTER NSAIDS or ASPIRIN unless ORDERED By Neurosurgery  1 Each PRN   • docusate sodium  100 mg BID   • senna-docusate  1 Tablet Nightly   • senna-docusate  1 Tablet Q24HRS PRN   • polyethylene glycol/lytes  1 Packet BID PRN   • magnesium hydroxide  30 mL QDAY PRN   • bisacodyl  10 mg Q24HRS PRN   • sodium phosphate  1 Each Once PRN   • 0.9 % NaCl with KCl 20 mEq 1,000 mL   Continuous   • morphine   Continuous   • ondansetron  4 mg Q4HRS PRN   • ondansetron  4 mg Q4HRS PRN   • methocarbamol  750 mg Q8HRS PRN    Or   • cyclobenzaprine  10 mg Q8HRS PRN    Or   • diazePAM  5 mg Q4HRS PRN   • labetalol  10 mg Q HOUR PRN   • hydrALAZINE  10 mg Q HOUR PRN       Assessment and Plan:  Hospital day #1  POD #1 Right L1-L4 partial lami, L1-4 fusion   Prophylactic anticoagulation: No       Start date/time: TBD     Plan:  Neuro stable   HVAC remains, record output Q8   DC PCA, transition to oral pain meds (oxy 5, MS IV breakthrough)  PT/OT eval treat  Chair for all meals, mobilize

## 2021-12-21 NOTE — CARE PLAN
The patient is Stable - Low risk of patient condition declining or worsening    Shift Goals  Clinical Goals: PT/OT  Patient Goals: rest, comfort  Family Goals: not present    Progress made toward(s) clinical / shift goals: Yes      Problem: Pain - Standard  Goal: Alleviation of pain or a reduction in pain to the patient’s comfort goal  Outcome: Progressing     Problem: Fall Risk  Goal: Patient will remain free from falls  Outcome: Progressing     Problem: Knowledge Deficit - Standard  Goal: Patient and family/care givers will demonstrate understanding of plan of care, disease process/condition, diagnostic tests and medications  Outcome: Progressing

## 2021-12-21 NOTE — DISCHARGE PLANNING
Received Choice form at [phone]  Agency/Facility Name: [facility]  Referral sent per Choice form @ [phone]

## 2021-12-21 NOTE — ANESTHESIA POSTPROCEDURE EVALUATION
Patient: Ariana Drake    Procedure Summary     Date: 12/20/21 Room / Location: [facility] 08 / SURGERY [facility]    Anesthesia Start: 1130 Anesthesia Stop: 1609    Procedure: FUSION, SPINE, LUMBAR, ROBOT-ASSISTED WITH IMAGING GUIDANCE - L1-4 TRANSFORAMINAL LUMBAR INTERBODY FUSION WITH TOTAL FACTECTOMY (N/A Back) Diagnosis: (SPINAL STENOSIS OF LUMBAR REGION)    Surgeons: Aman Cottrell M.D. Responsible Provider: Bandar Charles M.D.    Anesthesia Type: general ASA Status: 2          Final Anesthesia Type: general  Last vitals  BP   Blood Pressure : 132/69    Temp   36.4 °C (97.6 °F)    Pulse   78   Resp   16    SpO2   96 %      Anesthesia Post Evaluation    Patient location during evaluation: PACU  Patient participation: complete - patient participated  Level of consciousness: awake and alert  Pain score: 3    Airway patency: patent  Anesthetic complications: no  Cardiovascular status: adequate  Respiratory status: acceptable and oral airway  Hydration status: acceptable    PONV: none          No complications documented.     Nurse Pain Score: 4 (NPRS)

## 2021-12-21 NOTE — FACE TO FACE
Face to Face Supporting Documentation - Home Health    The encounter with this patient was in whole or in part the primary reason for home health admission.    Date of encounter:   Patient:                    MRN:                       YOB: 2022  Ariana Drake  [id number]  1950     Home health to see patient for:  Physical Therapy evaluation and treatment    Skilled need for:  Surgical aftercare     Skilled nursing interventions to include:  Comment: na    Homebound status evidenced by:  Needs the assistance of another person in order to leave the home. Leaving home requires a considerable and taxing effort. There is a normal inability to leave the home.    Community Physician to provide follow up care: Felton Waldrop M.D.     Optional Interventions? No      I certify the face to face encounter for this home health care referral meets the CMS requirements and the encounter/clinical assessment with the patient was, in whole, or in part, for the medical condition(s) listed above, which is the primary reason for home health care. Based on my clinical findings: the service(s) are medically necessary, support the need for home health care, and the homebound criteria are met.  I certify that this patient has had a face to face encounter by myself.  KARIME Milian. - NPI: [id number]

## 2021-12-21 NOTE — DISCHARGE PLANNING
Anticipated Discharge Disposition: Home with home health.    Action: Patient will require HH for safe discharge home. LSW met with patient at bedside to discuss discharge plan. Patient agreeable to sending HH referral to [facility]. DPA tasked with sending out HH referral.    Barriers to Discharge: Medical clearance; HH acceptance.    Plan: Home with [facility]; CM to continue to follow for discharge needs.

## 2021-12-21 NOTE — CARE PLAN
The patient is Stable - Low risk of patient condition declining or worsening    Shift Goals  Clinical Goals: Pain Control, comfort  Patient Goals: Comfort  Family Goals: N/A    Progress made toward(s) clinical / shift goals:    Problem: Pain - Standard  Goal: Alleviation of pain or a reduction in pain to the patient’s comfort goal  Outcome: Progressing     Problem: Fall Risk  Goal: Patient will remain free from falls  Outcome: Progressing     Problem: Knowledge Deficit - Standard  Goal: Patient and family/care givers will demonstrate understanding of plan of care, disease process/condition, diagnostic tests and medications  Outcome: Progressing     Patient is able to communicate needs effectively. Uses call light appropriately. Updated patient on plan of care and medications. Fall precautions in place. All patient's questions answered at this time. Call light in reach.     Patient is not progressing towards the following goals:

## 2021-12-21 NOTE — THERAPY
"Physical Therapy   Initial Evaluation     Patient Name: Ariana Drake  Age:  71 y.o., Sex:  female  Medical Record #: [id number]  Today's Date: 12/21/2021     Precautions  Precautions: Fall Risk;Spinal / Back Precautions   Comments: no brace    Assessment  Patient is 71 y.o. female s/p L1-L4 lami and fusion. Pt presents with pain, decreased activity tolerance, balance, and BLE weakness. Pt required Cedrick for bed mobility, and CGA to stand with FWW. Pt with lightheadedness/dizziness with slight drop in BP (see vitals below), only able to tolerate standing marching before requesting to return to supine. Anticipate pt will be able to return home with HH in 1-2 more PT visits once dizziness with OOB mobility improves as pt with good family support and 1SH with a ramp to enter. Will continue to follow.     Plan    Recommend Physical Therapy 4 times per week until therapy goals are met for the following treatments:  Bed Mobility, Equipment, Gait Training, Manual Therapy, Neuro Re-Education / Balance, Self Care/Home Evaluation, Stair Training, Therapeutic Activities and Therapeutic Exercises    DC Equipment Recommendations: None  Discharge Recommendations: Recommend home health for continued physical therapy services (likely in 1-2 more visits)       Subjective    \"Oh cira, keep coming to see me.\"      Objective     12/21/21 1149   Total Time Spent   Total Time Spent (Mins) 25   Charge Group   PT Evaluation PT Evaluation Mod   PT Self Care / Home Evaluation  1   Initial Contact Note    Initial Contact Note Order Received and Verified, Physical Therapy Evaluation in Progress with Full Report to Follow.   Precautions   Precautions Fall Risk;Spinal / Back Precautions    Comments no brace   Vitals   O2 (LPM) 3   O2 Delivery Device Nasal Cannula   Vitals Comments Pt with s/s of OH with sitting/standing. BP only slightly dropped with standing (100/50 to 91/54), however, unable to get supine resting BP so unsure of baseline. Pt " returned to supine with resolution of sxs - RN updated   Pain 0 - 10 Group   Therapist Pain Assessment Post Activity Pain Same as Prior to Activity;Nurse Notified  (back pain not rated, agreeable to mobility)   Prior Living Situation   Prior Services Home-Independent   Housing / Facility 1 Story House   Steps Into Home   (ramp)   Bathroom Set up Walk In Shower;Grab Bars;Shower Chair   Equipment Owned 4-Wheel Walker   Lives with - Patient's Self Care Capacity Spouse   Prior Level of Functional Mobility   Bed Mobility Independent   Transfer Status Independent   Ambulation Independent   Distance Ambulation (Feet)   (community)   Assistive Devices Used 4-Wheel Walker   Stairs Independent   Cognition    Cognition / Consciousness WDL   Level of Consciousness Alert   Comments pleasant and cooperative   Passive ROM Lower Body   Passive ROM Lower Body WDL   Active ROM Lower Body    Active ROM Lower Body  WDL   Strength Lower Body   Lower Body Strength  X   Comments grossly 3+/5 BLE   Sensation Lower Body   Lower Extremity Sensation   WDL   Comments denies N/T   Strength Upper Body   Upper Body Strength  WDL   Coordination Upper Body   Coordination WDL   Coordination Lower Body    Coordination Lower Body  WDL   Balance Assessment   Sitting Balance (Static) Fair   Sitting Balance (Dynamic) Fair -   Standing Balance (Static) Fair -   Standing Balance (Dynamic) Fair -   Weight Shift Sitting Fair   Weight Shift Standing Fair   Comments w/ FWW, standing marching only   Gait Analysis   Gait Level Of Assist Unable to Participate   Weight Bearing Status no restrictions   Comments Standing marching in place only w/ FWW. Limited 2/2 s/s of OH   Bed Mobility    Supine to Sit Minimal Assist   Sit to Supine Minimal Assist   Scooting Supervised   Rolling Minimum Assist to Lt.   Comments HOB raised, has adjustable bed frame at home   Functional Mobility   Sit to Stand   (CGA)   Bed, Chair, Wheelchair Transfer Unable to Participate   How  much difficulty does the patient currently have...   Turning over in bed (including adjusting bedclothes, sheets and blankets)? 1   Sitting down on and standing up from a chair with arms (e.g., wheelchair, bedside commode, etc.) 1   Moving from lying on back to sitting on the side of the bed? 1   How much help from another person does the patient currently need...   Moving to and from a bed to a chair (including a wheelchair)? 3   Need to walk in a hospital room? 3   Climbing 3-5 steps with a railing? 3   6 clicks Mobility Score 12   Activity Tolerance   Sitting in Chair unable    Sitting Edge of Bed 5 min   Standing 2 min   Comments limited 2/2 s/s OH   Edema / Skin Assessment   Edema / Skin  WDL   Patient / Family Goals    Patient / Family Goal #1 To get better   Short Term Goals    Short Term Goal # 1 Pt will perform supine <> sit with SPV in 6 visits in order to improve functinoal independence   Short Term Goal # 2 Pt will perform STS with FWW and SPV in 6 visits in order to initiate OOB mobility   Short Term Goal # 3 Pt will ambulate 150ft with FWW and SPV in 6 visits in order to ambulate household distances   Education Group   Education Provided Role of Physical Therapist;Spine Precautions;Use of Assistive Device   Spine Precautions Patient Response Patient;Acceptance;Explanation;Action Demonstration   Role of Physical Therapist Patient Response Patient;Acceptance;Explanation;Verbal Demonstration   Use of Assistive Device Patient Response Patient;Acceptance;Explanation;Action Demonstration   Additional Comments Pt and 2 family members present and receptive to edu   Problem List    Problems Pain;Impaired Bed Mobility;Impaired Transfers;Impaired Ambulation;Functional Strength Deficit;Impaired Balance;Decreased Activity Tolerance   Anticipated Discharge Equipment and Recommendations   DC Equipment Recommendations None   Discharge Recommendations Recommend home health for continued physical therapy  services  (likely in 1-2 more visits)   Interdisciplinary Plan of Care Collaboration   IDT Collaboration with  Family / Caregiver;Nursing;Occupational Therapist   Patient Position at End of Therapy In Bed;Call Light within Reach;Tray Table within Reach;Phone within Reach;Family / Friend in Room   Collaboration Comments RN updated   Session Information   Date / Session Number  12/21 1 (1/4, 12/27)

## 2021-12-21 NOTE — THERAPY
"Occupational Therapy   Initial Evaluation     Patient Name: Ariana Drake  Age:  71 y.o., Sex:  female  Medical Record #: [id number]  Today's Date: 12/21/2021     Precautions  Precautions: Fall Risk,Spinal / Back Precautions   Comments: no brace    Assessment  Patient is 71 y.o. female s/p elective lumbar spine surgery. Full eval limited by slight drop in BP and dizziness/nausea. Began edu on spinal precautions, compensatory strategies during ADLs as well as appropriate AE/DME to maximize independence and safety.   Additional factors influencing patient status / progress: weakness, fatigue, impaired balance, pain.      Plan    Recommend Occupational Therapy 4 times per week until therapy goals are met for the following treatments:  Adaptive Equipment, Neuro Re-Education / Balance, Self Care/Activities of Daily Living, Therapeutic Activities and Therapeutic Exercises.    DC Equipment Recommendations: Unable to determine at this time  Discharge Recommendations: Likely Recommend home health for continued occupational therapy services in 1-2 days     Subjective    \"I think I'm going to throw up if I sit in that chair.\"     Objective       12/21/21 1150   Prior Living Situation   Prior Services Home-Independent   Housing / Facility 1 Story House   Bathroom Set up Walk In Shower;Grab Bars;Shower Chair   Equipment Owned 4-Wheel Walker;Tub / Shower Seat;Grab Bar(s) In Tub / Shower   Lives with - Patient's Self Care Capacity Spouse   Comments  is home to assist if needed   Prior Level of ADL Function   Self Feeding Independent   Grooming / Hygiene Independent   Bathing Independent   Dressing Independent   Toileting Independent   Prior Level of IADL Function   Medication Management Independent   Laundry Independent   Kitchen Mobility Independent   Finances Independent   Home Management Independent   Shopping Independent   Prior Level Of Mobility Independent With Device in Community;Independent With Device in Home "   Driving / Transportation Driving Independent   Occupation (Pre-Hospital Vocational) Retired Due To Age   Precautions   Precautions Fall Risk;Spinal / Back Precautions    Comments no brace   Pain 0 - 10 Group   Therapist Pain Assessment Nurse Notified;During Activity  (min c/o back pain)   Cognition    Cognition / Consciousness WDL   Level of Consciousness Alert   Comments pleasant and cooperative   Active ROM Upper Body   Active ROM Upper Body  WDL   Strength Upper Body   Upper Body Strength  WDL   Balance Assessment   Sitting Balance (Static) Fair   Sitting Balance (Dynamic) Fair -   Standing Balance (Static) Fair -   Standing Balance (Dynamic) Fair -   Weight Shift Sitting Fair   Weight Shift Standing Fair   Comments w/ fww   Bed Mobility    Supine to Sit Minimal Assist   Sit to Supine Minimal Assist   Scooting Supervised   Rolling Minimum Assist to Lt.   ADL Assessment   Grooming Supervision;Seated   Lower Body Dressing Maximal Assist  (don underwear)   Comments further ADL assessment limited by dizziness/drop in BP   How much help from another person does the patient currently need...   Putting on and taking off regular lower body clothing? 2   Bathing (including washing, rinsing, and drying)? 2   Toileting, which includes using a toilet, bedpan, or urinal? 2   Putting on and taking off regular upper body clothing? 3   Taking care of personal grooming such as brushing teeth? 3   Eating meals? 3   6 Clicks Daily Activity Score 15   Functional Mobility   Sit to Stand Minimal Assist   Bed, Chair, Wheelchair Transfer Unable to Participate   Mobility EOB>STS x2>BTB   Comments w/ FWW, pt started to feel nauseous, did not think she could tolerate the chair   Patient / Family Goals   Patient / Family Goal #1 to go home   Short Term Goals   Short Term Goal # 1 pt will demo toilet txf with supv   Short Term Goal # 2 pt will dress LB with supv and AE prn   Short Term Goal # 3 pt will groom in stance at the sink with supv

## 2021-12-21 NOTE — OP REPORT
DATE OF SERVICE:  12/20/2021     PREOPERATIVE DIAGNOSES:  L1-L4 foraminal stenosis right-sided with right-sided   radiculopathy, scoliosis concave to the right.     POSTOPERATIVE DIAGNOSES:  L1-L4 foraminal stenosis right-sided with   right-sided radiculopathy, scoliosis concave to the right.     OPERATIONS:  1.  Right L1-L2 laminectomy, total facetectomy, extensive decompression of   right L1 nerve root.  2.  Right L2-L3 partial laminectomy, total facetectomy, extensive   decompression of right L2 nerve root.  3.  Right L3-L4 partial laminectomy, total facetectomy, extensive   decompression of right L3 nerve root.  4.  Fusion L1-L2, L2-L3 and L3-L4 with bilateral posterolateral fusion plus   translumbar interbody fusions.  5.  Translumbar interbody cages x3 (Medtronic Elevate cage).  6.  Harvesting of right iliac crest autograft.  7.  Harvesting and preparation, local bone graft.  8.  Placement of right-sided L1 through L4 segmental pedicle screw   instrumentation, (Medtronic Solera system).  9.  Mazor robotic stereotaxy.     SURGEON:  Aman Cottrell MD     ASSISTANT:  VINCE Whalen     ANESTHESIA:  General endotracheal.     ANESTHESIOLOGIST:  Bandar Charles MD     PREPARATION:  ChloraPrep.     MEDICATIONS:  The patient given Ancef prior to incision.     INDICATIONS:  This woman with right-sided radicular pain in her thigh.  It did   not go below the knee.  She had significant foraminal narrowing for the right   L1, L2 and L3 nerve roots.  Due to foraminal stenosis, collapsed   interpedicular distance on the concave side of the scoliosis.  The patient has   not had significant problems with back pain over the years.  The patient was   felt to be a candidate for the proposed procedure to attempt to improve her   radicular pain.  The patient understood major risks and complications,   paralysis exceedingly rare, biggest risk of surgery is nonresponse, which is   10%, the chance to require another operation  in rest of her life 15%, small   risk of wound infection, spinal fluid leak.  The patient is understanding of   these and signed consent.     NEED FOR SURGICAL ASSISTANT:  Surgical assistant required for retraction,   suction, irrigation, cleaning of instruments, keep the case moving forward to   minimize operative time.     PROCEDURE:  The patient was brought to the operating room.  Peripheral venous   lines in place.  General anesthesia was induced.  The patient was intubated.    Brandt catheter was placed.  The patient laid prone on the OSI table using 6   posts.  Pressure points were carefully padded.  I doubly prepped the back with   ChloraPrep by myself and draped.  Planned incision was marked from L1-L4.    Skin was infiltrated with local and incised with scalpel using subperiosteal   dissection.  Paravertebral muscles dissected off spinous processes, lamina of   1 through 4.  Levels confirmed with intraoperative fluoroscopy.  We removed   the spinous processes of 2 and 3, superior 4, did a laminectomy just off the   midline and all the way to the left to the medial aspect of the pedicles.  I   drilled across the pars interarticularis, removed the inferior facets of L1, 2   and 3, tips of the superior facets of 2, 3 and 4, doing total facetectomies   with extensive decompression of the foraminal nerve roots of 1, 2 and 3 on the   right side.  All local bone graft was collected using the Hensler Bone Press.    Larger piece was cleansed of soft tissue and placed through the bone mill.    Next, through a separate skin and fascial incision, I harvested bone from the   right posterior superior iliac spine.  Cortical cancellous bone was obtained   until sufficient quantity was obtained and this was back filled with 10 mL of   Medtronic allograft putty.  Retractors were placed.  The patient was then   registered with the pin in the left posterior superior iliac spine and fixed   to the [employer] robot.  Once registration  was completed, pedicle screws were   directed through the right-sided pedicles L1, 2, 3 and 4.  Initial hole begun   with the specialized Midas drill, the 5.0 tap.  We placed 6.5x45 mm screws at   the 3 lower levels, 5.5x40 mm screw at L1.  A 100 mm lordosis fatemeh was placed   in the heads of the screws.  Partial tightening was carried out inferiorly   started at 3-4 for wide distraction.  The thecal sac was retracted medially.    The disk was incised with the 15 blade.  Disk space prepared with the disk   space scrapers, serrated curettes, pituitary rongeurs.  Once the disk was   debrided, bony endplates lightly decorticated.  The cancellous bone was   obtained from the iliac crest was divided into 3.  A portion was placed in the   Elevate cage.  At the L3-L4 level, I placed a 10x28 mm cage.  This was tamped   into place after bone was placed down into the depths of the disk space and   then fully expanded.  We then went up to the 2-3 level and then the 1-2 level   repeating the same procedure.  At the upper 2 levels, I placed 9x28 mm cages,   carefully placed with the thecal sac and nerve root being protected and then   the cages fully expanded.  Local bone graft was placed along the decorticated   transverse processes of lateral facet joints on the right side L1-L4.  Then, 5   mL of MasterGraft putty most posterolaterally remaining bone was placed along   the posterior aspect of the lamina on the left side and the decorticated   facet joints L1-L2, L2-L3 and L3-L4 on the left side.  Throughout the case,   the wound was irrigated with antibiotic irrigation.  X-ray showed satisfactory   appearance of the hardware and cages.  A medium Hemovac drain was placed in   depth of the wound, brought out through separate stab incision.  Fascial   layers were closed with #1 Vicryl, subcutaneous fascia with 2-0 Vicryl,   subcuticular closed with 3-0 Vicryl.  Drain was sutured in place at the exit   site with a 2-0 Vicryl, the  pin site on the left posterior superior iliac   spine was closed with a single 3-0 Vicryl, and skin edges approximated with   quarter-inch Steri-Strips.  Sterile dressing was placed.  The patient laid   supine on the bed, extubated, and taken to recovery room in satisfactory   condition.  The patient tolerated the procedure well without apparent   complication.     ESTIMATED BLOOD LOSS:  500 mL.        ______________________________  MD TALHA DURÁN/BRIDGER    DD:  12/20/2021 16:26  DT:  12/20/2021 18:11    Job#:  [id number]    CC:Bandar Charles MD(User)

## 2021-12-21 NOTE — OR NURSING
1610- Pt arrives to PACU from OR on 8L of oxygen via mask. Report received. Dressing CDI. OPA in place.     1617- OPA removed.     1642- Pt friend Kei called and updated on pt status and POC.     2006- Attempt to call Kei for update, no answer at this time. Pt resting comfortably at this time.     2036- Report called to braeden, RN to call back.     2100- Report called to Sanju HANSON. POC discussed.     2116- Pt taken to room by transport on 3L of oxygen (tank 3/4 full). Dressing CDI and pt comfortable upon leaving PACU.

## 2021-12-21 NOTE — ANESTHESIA TIME REPORT
Anesthesia Start and Stop Event Times     Date Time Event    12/20/2021 1025 Ready for Procedure     1130 Anesthesia Start     1609 Anesthesia Stop        Responsible Staff  12/20/21    Name Role Begin End    Bandar Charles M.D. Anesth 1130 1609        Preop Diagnosis (Free Text):  Pre-op Diagnosis     SPINAL STENOSIS OF LUMBAR REGION        Preop Diagnosis (Codes):    Premium Reason  A. 3PM - 7AM    Comments:

## 2021-12-22 ENCOUNTER — HOSPITAL ENCOUNTER (INPATIENT)
Facility: REHABILITATION | Age: 71
End: 2021-12-22
Attending: PHYSICAL MEDICINE & REHABILITATION | Admitting: PHYSICAL MEDICINE & REHABILITATION
Payer: MEDICARE

## 2021-12-22 ENCOUNTER — APPOINTMENT (OUTPATIENT)
Dept: RADIOLOGY | Facility: MEDICAL CENTER | Age: 71
DRG: 460 | End: 2021-12-22
Attending: PHYSICAL MEDICINE & REHABILITATION
Payer: MEDICARE

## 2021-12-22 LAB
ANION GAP SERPL CALC-SCNC: 8 MMOL/L (ref 7–16)
BUN SERPL-MCNC: 11 MG/DL (ref 8–22)
CALCIUM SERPL-MCNC: 8.3 MG/DL (ref 8.5–10.5)
CHLORIDE SERPL-SCNC: 98 MMOL/L (ref 96–112)
CO2 SERPL-SCNC: 28 MMOL/L (ref 20–33)
CREAT SERPL-MCNC: 0.63 MG/DL (ref 0.5–1.4)
ERYTHROCYTE [DISTWIDTH] IN BLOOD BY AUTOMATED COUNT: 45.9 FL (ref 35.9–50)
GLUCOSE SERPL-MCNC: 133 MG/DL (ref 65–99)
HCT VFR BLD AUTO: 27.3 % (ref 37–47)
HGB BLD-MCNC: 9.1 G/DL (ref 12–16)
MCH RBC QN AUTO: 31.1 PG (ref 27–33)
MCHC RBC AUTO-ENTMCNC: 33.3 G/DL (ref 33.6–35)
MCV RBC AUTO: 93.2 FL (ref 81.4–97.8)
PLATELET # BLD AUTO: 210 K/UL (ref 164–446)
PMV BLD AUTO: 9.5 FL (ref 9–12.9)
POTASSIUM SERPL-SCNC: 3.3 MMOL/L (ref 3.6–5.5)
RBC # BLD AUTO: 2.93 M/UL (ref 4.2–5.4)
SODIUM SERPL-SCNC: 134 MMOL/L (ref 135–145)
WBC # BLD AUTO: 10.9 K/UL (ref 4.8–10.8)

## 2021-12-22 PROCEDURE — 80048 BASIC METABOLIC PNL TOTAL CA: CPT

## 2021-12-22 PROCEDURE — 700102 HCHG RX REV CODE 250 W/ 637 OVERRIDE(OP): Performed by: NURSE PRACTITIONER

## 2021-12-22 PROCEDURE — 36415 COLL VENOUS BLD VENIPUNCTURE: CPT

## 2021-12-22 PROCEDURE — 74018 RADEX ABDOMEN 1 VIEW: CPT

## 2021-12-22 PROCEDURE — G0378 HOSPITAL OBSERVATION PER HR: HCPCS

## 2021-12-22 PROCEDURE — 700102 HCHG RX REV CODE 250 W/ 637 OVERRIDE(OP): Performed by: NEUROLOGICAL SURGERY

## 2021-12-22 PROCEDURE — A9270 NON-COVERED ITEM OR SERVICE: HCPCS | Performed by: PHYSICAL MEDICINE & REHABILITATION

## 2021-12-22 PROCEDURE — A9270 NON-COVERED ITEM OR SERVICE: HCPCS | Performed by: NEUROLOGICAL SURGERY

## 2021-12-22 PROCEDURE — A9270 NON-COVERED ITEM OR SERVICE: HCPCS | Performed by: NURSE PRACTITIONER

## 2021-12-22 PROCEDURE — 99223 1ST HOSP IP/OBS HIGH 75: CPT | Performed by: PHYSICAL MEDICINE & REHABILITATION

## 2021-12-22 PROCEDURE — 85027 COMPLETE CBC AUTOMATED: CPT

## 2021-12-22 PROCEDURE — 700102 HCHG RX REV CODE 250 W/ 637 OVERRIDE(OP): Performed by: PHYSICAL MEDICINE & REHABILITATION

## 2021-12-22 RX ORDER — TIZANIDINE 4 MG/1
4 TABLET ORAL EVERY 6 HOURS PRN
Qty: 30 TABLET | Refills: 3
Start: 2021-12-22 | End: 2021-12-23 | Stop reason: SDUPTHER

## 2021-12-22 RX ORDER — OXYCODONE HYDROCHLORIDE 5 MG/1
5-10 TABLET ORAL EVERY 4 HOURS PRN
Status: DISCONTINUED | OUTPATIENT
Start: 2021-12-22 | End: 2021-12-28 | Stop reason: HOSPADM

## 2021-12-22 RX ORDER — OXYCODONE HYDROCHLORIDE AND ACETAMINOPHEN 5; 325 MG/1; MG/1
1 TABLET ORAL EVERY 4 HOURS PRN
Qty: 15 TABLET | Refills: 0
Start: 2021-12-22 | End: 2021-12-23 | Stop reason: SDUPTHER

## 2021-12-22 RX ORDER — TAMSULOSIN HYDROCHLORIDE 0.4 MG/1
0.4 CAPSULE ORAL
Status: DISCONTINUED | OUTPATIENT
Start: 2021-12-22 | End: 2021-12-28 | Stop reason: HOSPADM

## 2021-12-22 RX ORDER — GABAPENTIN 400 MG/1
400 CAPSULE ORAL 3 TIMES DAILY
Status: DISCONTINUED | OUTPATIENT
Start: 2021-12-22 | End: 2021-12-23

## 2021-12-22 RX ADMIN — OXYCODONE 5 MG: 5 TABLET ORAL at 00:12

## 2021-12-22 RX ADMIN — Medication 1 CAPSULE: at 04:28

## 2021-12-22 RX ADMIN — OXYCODONE 5 MG: 5 TABLET ORAL at 04:28

## 2021-12-22 RX ADMIN — ATORVASTATIN CALCIUM 10 MG: 10 TABLET, FILM COATED ORAL at 04:28

## 2021-12-22 RX ADMIN — DOCUSATE SODIUM 100 MG: 100 CAPSULE ORAL at 06:00

## 2021-12-22 RX ADMIN — OXYCODONE 5 MG: 5 TABLET ORAL at 11:06

## 2021-12-22 RX ADMIN — OXYCODONE 10 MG: 5 TABLET ORAL at 16:12

## 2021-12-22 RX ADMIN — DOCUSATE SODIUM 50 MG AND SENNOSIDES 8.6 MG 1 TABLET: 8.6; 5 TABLET, FILM COATED ORAL at 20:30

## 2021-12-22 RX ADMIN — GABAPENTIN 400 MG: 400 CAPSULE ORAL at 18:32

## 2021-12-22 RX ADMIN — GABAPENTIN 300 MG: 300 CAPSULE ORAL at 11:06

## 2021-12-22 RX ADMIN — OMEPRAZOLE 40 MG: 20 CAPSULE, DELAYED RELEASE ORAL at 04:27

## 2021-12-22 RX ADMIN — OXYCODONE 10 MG: 5 TABLET ORAL at 20:30

## 2021-12-22 RX ADMIN — HYDROCHLOROTHIAZIDE 25 MG: 25 TABLET ORAL at 04:28

## 2021-12-22 RX ADMIN — GABAPENTIN 300 MG: 300 CAPSULE ORAL at 04:28

## 2021-12-22 ASSESSMENT — PAIN DESCRIPTION - PAIN TYPE
TYPE: ACUTE PAIN;SURGICAL PAIN
TYPE: ACUTE PAIN;SURGICAL PAIN
TYPE: SURGICAL PAIN
TYPE: ACUTE PAIN;SURGICAL PAIN
TYPE: SURGICAL PAIN

## 2021-12-22 ASSESSMENT — COGNITIVE AND FUNCTIONAL STATUS - GENERAL
MOVING TO AND FROM BED TO CHAIR: A LOT
SUGGESTED CMS G CODE MODIFIER MOBILITY: CK
WALKING IN HOSPITAL ROOM: A LITTLE
CLIMB 3 TO 5 STEPS WITH RAILING: A LITTLE
STANDING UP FROM CHAIR USING ARMS: A LITTLE
TURNING FROM BACK TO SIDE WHILE IN FLAT BAD: A LOT
MOBILITY SCORE: 15
MOVING FROM LYING ON BACK TO SITTING ON SIDE OF FLAT BED: A LOT

## 2021-12-22 NOTE — DISCHARGE PLANNING
[facility] Transitional Care Coordination    Referral from:  MONICO Padilla  Insurance Provider on Facesheet: Medicare  Potential Rehab Diagnosis: Ortho    Chart review indicates patient may need on going medical management and may have therapy needs to possibly meet inpatient rehab facility criteria with the goal of returning to community.    D/C support: Spouse     Physiatry consultation forwarded per protocol.     Last Covid test:  12/13 Not detected    S/p elective lumbar spine surgery - physiatry to consult, TCC will continue to follow.     Thank you for the referral.

## 2021-12-22 NOTE — PROGRESS NOTES
Neurosurgery Progress Note    Subjective:  No acute events  Persistent right ant thigh pain  Mobilizing better  Straight cath'd yest x1, voiding independently now    Exam:  AAOx3, fluent speech   PERRL, EOMI   BUE 5/5, sensation intact  RLE IP/quad 5/5 pf, df 5/5 sensation intact   LLE IP/quad/pf/df 5/5 Sensation intact   hvac removed    BP  Min: 105/63  Max: 121/51  Pulse  Av.5  Min: 83  Max: 92  Resp  Av  Min: 15  Max: 17  Temp  Av.9 °C (98.5 °F)  Min: 36.4 °C (97.5 °F)  Max: 37.6 °C (99.6 °F)  SpO2  Av.6 %  Min: 91 %  Max: 96 %    No data recorded    Recent Labs     21  0445 21  0257   WBC 11.9* 10.9*   RBC 3.27* 2.93*   HEMOGLOBIN 10.1* 9.1*   HEMATOCRIT 30.9* 27.3*   MCV 94.5 93.2   MCH 30.9 31.1   MCHC 32.7* 33.3*   RDW 47.5 45.9   PLATELETCT 240 210   MPV 9.7 9.5     Recent Labs     21  0445 21  0257   SODIUM 140 134*   POTASSIUM 4.2 3.3*   CHLORIDE 104 98   CO2 28 28   GLUCOSE 119* 133*   BUN 17 11   CREATININE 0.78 0.63   CALCIUM 8.4* 8.3*               Intake/Output                       21 07 - 21 0659 21 07 - 21 0659      Total  Total                 Intake    Total Intake -- -- -- -- -- --       Output    Urine  550  -- 550  --  -- --    Number of Times Voided 1 x 4 x 5 x 1 x -- 1 x    Straight Catheter 550 -- 550 -- -- --    Drains  90  50 140  --  -- --    Output (mL) ([REMOVED] Closed/Suction Drain 1 Back Hemovac 10 Fr.) 90 50 140 -- -- --    Total Output 640 27 690 -- -- --       Net I/O     -640 -50 -690 -- -- --            Intake/Output Summary (Last 24 hours) at 2021 0813  Last data filed at 2021  Gross per 24 hour   Intake --   Output 690 ml   Net -690 ml       $ Bladder Scan Results (mL): 637    • acetaminophen  650 mg Q4HRS PRN   • oxyCODONE immediate-release  5 mg Q4HRS PRN   • morphine injection  2-4 mg Q3HRS PRN   • atorvastatin  10 mg DAILY   • gabapentin  300 mg  TID   • hydroCHLOROthiazide  25 mg DAILY   • lactobacillus rhamnosus  1 Capsule DAILY   • omeprazole  40 mg DAILY   • Pharmacy Consult Request  1 Each PHARMACY TO DOSE   • MD ALERT...DO NOT ADMINISTER NSAIDS or ASPIRIN unless ORDERED By Neurosurgery  1 Each PRN   • docusate sodium  100 mg BID   • senna-docusate  1 Tablet Nightly   • senna-docusate  1 Tablet Q24HRS PRN   • polyethylene glycol/lytes  1 Packet BID PRN   • magnesium hydroxide  30 mL QDAY PRN   • bisacodyl  10 mg Q24HRS PRN   • sodium phosphate  1 Each Once PRN   • ondansetron  4 mg Q4HRS PRN   • ondansetron  4 mg Q4HRS PRN   • methocarbamol  750 mg Q8HRS PRN    Or   • cyclobenzaprine  10 mg Q8HRS PRN    Or   • diazePAM  5 mg Q4HRS PRN   • labetalol  10 mg Q HOUR PRN   • hydrALAZINE  10 mg Q HOUR PRN       Assessment and Plan:  Hospital day #2  POD #2 Right L1-L4 partial lami, L1-4 fusion   Prophylactic anticoagulation: No       Start date/time: TBD     Plan:  Neuro stable   Ok to shower starting 24hr after drain removed  PT/OT eval treat  Chair for all meals, mobilize    HH in the works, PMR consult placed for inpt rehab eligibility  Anticipate that pt will be cleared for DC home with HH vs  rehab tomorrow

## 2021-12-22 NOTE — CARE PLAN
The patient is Stable - Low risk of patient condition declining or worsening    Shift Goals  Clinical Goals: Pain management, mobilize, monitor drain output  Patient Goals: Pain control, rest  Family Goals: not present    Progress made toward(s) clinical / shift goals:  pain control, ambulate     Patient is not progressing towards the following goals:

## 2021-12-22 NOTE — DISCHARGE SUMMARY
Discharge Summary    CHIEF COMPLAINT ON ADMISSION  No chief complaint on file.      Reason for Admission  SPINAL STENOSIS OF LUMBAR REGION     Admission Date  12/20/2021    CODE STATUS  Full Code    HPI & HOSPITAL COURSE  This is a 71 y.o. female here with neurogenic claudication, lumbar radiculopathy. Elective spine surgery proceeded without complication.  Postoperatively she was mobilized with therapy & PMR, who recommended SNF. She will be discharged to SNF when accepted.   No notes on file    Therefore, she is discharged in good and stable condition SNF.     The patient met 2-midnight criteria for an inpatient stay at the time of discharge.    Discharge Date  12/24/21 if accepted    FOLLOW UP ITEMS POST DISCHARGE  Keep scheduled apt at [facility] neurosurgery    DISCHARGE DIAGNOSES  Principal Problem:    Lumbar stenosis without neurogenic claudication POA: Yes  Resolved Problems:    * No resolved hospital problems. *      FOLLOW UP  No future appointments.  No follow-up provider specified.    MEDICATIONS ON DISCHARGE     Medication List      START taking these medications      Instructions   oxyCODONE-acetaminophen 5-325 MG Tabs  Commonly known as: PERCOCET   Take 1 Tablet by mouth every four hours as needed for up to 7 days.  Dose: 1 Tablet     tizanidine 4 MG Tabs  Commonly known as: ZANAFLEX   Take 1 Tablet by mouth every 6 hours as needed.  Dose: 4 mg        CONTINUE taking these medications      Instructions   acetaminophen 500 MG Tabs  Commonly known as: TYLENOL   Take 500-1,000 mg by mouth every 6 hours as needed. Indications: Pain  Dose: 500-1,000 mg     atorvastatin 10 MG Tabs  Commonly known as: LIPITOR   Take 10 mg by mouth every day.  Dose: 10 mg     CALCIUM-VITAMIN D PO   Take 1 Tablet by mouth every day.  Dose: 1 Tablet     gabapentin 300 MG Caps  Commonly known as: NEURONTIN   Take 300 mg by mouth in the morning, at noon, and at bedtime.  Dose: 300 mg     hydroCHLOROthiazide 25 MG Tabs  Commonly known  as: HYDRODIURIL   Take 25 mg by mouth every day.  Dose: 25 mg     omeprazole 40 MG delayed-release capsule  Commonly known as: PRILOSEC   Take 40 mg by mouth every day.  Dose: 40 mg     PROBIOTIC PO   Take 1 Tablet by mouth every day.  Dose: 1 Tablet     CONRADO-C PO   Take 1 Tablet by mouth every day.  Dose: 1 Tablet        STOP taking these medications    Advil Dual Action 125-250 MG Tabs  Generic drug: Ibuprofen-Acetaminophen     WOMENS 50+ MULTI VITAMIN/MIN PO          Rx for percocet 5/325mg 1 tab q4-6hr prn x7d & tizanidine 4mg TID prn x7d were e-[facility] [address] in [place]    Addendum: Rx for percocet, tizanidine, gabapentin were provided to charge RN for SNF transfer     reviewed. Low risk per ort, informed consent obtained    Allergies  No Known Allergies    DIET  Orders Placed This Encounter   Procedures   • Diet Order Diet: Regular     Standing Status:   Standing     Number of Occurrences:   1     Order Specific Question:   Diet:     Answer:   Regular [1]       ACTIVITY  Light duty.  Weight bearing as tolerated    CONSULTATIONS       PROCEDURES   OPERATIONS:  1.  Right L1-L2 laminectomy, total facetectomy, extensive decompression of   right L1 nerve root.  2.  Right L2-L3 partial laminectomy, total facetectomy, extensive   decompression of right L2 nerve root.  3.  Right L3-L4 partial laminectomy, total facetectomy, extensive   decompression of right L3 nerve root.  4.  Fusion L1-L2, L2-L3 and L3-L4 with bilateral posterolateral fusion plus   translumbar interbody fusions.  5.  Translumbar interbody cages x3 (Medtronic Elevate cage).  6.  Harvesting of right iliac crest autograft.  7.  Harvesting and preparation, local bone graft.  8.  Placement of right-sided L1 through L4 segmental pedicle screw   instrumentation, (Medtronic Solera system).  9.  Mazor robotic stereotaxy.     SURGEON:  Aman Cottrell MD       12/20/21    LABORATORY  Lab Results   Component Value Date    SODIUM 134 (L)  12/22/2021    POTASSIUM 3.3 (L) 12/22/2021    CHLORIDE 98 12/22/2021    CO2 28 12/22/2021    GLUCOSE 133 (H) 12/22/2021    BUN 11 12/22/2021    CREATININE 0.63 12/22/2021        Lab Results   Component Value Date    WBC 10.9 (H) 12/22/2021    HEMOGLOBIN 9.1 (L) 12/22/2021    HEMATOCRIT 27.3 (L) 12/22/2021    PLATELETCT 210 12/22/2021        Total time of the discharge process exceeds 30 minutes.

## 2021-12-22 NOTE — CONSULTS
Physical Medicine and Rehabilitation Consultation              Date of initial consultation: 12/22/2021  Consulting provider: VINCE Yin   Reason for consultation: assess for acute inpatient rehab appropriateness  LOS: 0 Day(s)    Chief complaint: LBP    HPI: The patient is a 71 y.o. right hand dominant female with a past medical history of low back pain, hypertension, hypercholesterolemia;  who presented on 12/20/2021  7:52 AM for a planned procedure with Dr. Ronit ROSS who performed an L1-4 laminectomy and fusion for right-sided foraminal stenosis and radiculopathy.    The patient currently reports extreme back pain radiating into the legs.  She is concerned she has a UTI, endorses incomplete bladder emptying, endorses constipation, endorses nausea, endorses shortness of breath currently on 1 L.  Patient states that she does not think she is safe to return home because her boyfriend is Manoj and is enrolled in cardiac rehab.  Patient will need to be 100% to return home as her boyfriend Kei can only provide supervision.    ROS  Pertinent positives are mentioned in the HPI, all others reviewed and are negative.    Social Hx:  1 SH  Ramp  With: BoyfriendKei, 89.     THERAPY:  Restrictions: Spinal/back  PT: Functional mobility   12/21: Unable to participate in gait, contact-guard assist for sit to stand    OT: ADLs  12/21: Max assist lower body dressing    SLP:   None    IMAGING:  CT L-spine 12/13/2021  1.  Moderate lumbar levoconvex scoliosis.  2.  Multilevel disc and facet degeneration and mild degenerative subluxations as detailed above.    PROCEDURES:  Aman Cottrell MD 12/20/2021  L1-4 laminectomy and fusion    PMH:  Past Medical History:   Diagnosis Date   • Asthma    • Bowel habit changes     diarrhea   • Heart burn    • High cholesterol    • Hypertension    • Other acute back pain        PSH:  Past Surgical History:   Procedure Laterality Date   • FUSION, SPINE, LUMBAR, ROBOT-ASSISTED WITH IMAGING  GUIDANCE N/A 12/20/2021    Procedure: FUSION, SPINE, LUMBAR, ROBOT-ASSISTED WITH IMAGING GUIDANCE - L1-4 TRANSFORAMINAL LUMBAR INTERBODY FUSION WITH TOTAL FACTECTOMY;  Surgeon: Aman Cottrell M.D.;  Location: SURGERY [facility];  Service: Neurosurgery   • VAGINAL HYSTERECTOMY TOTAL  1985       FHX:  Non-pertinent to today's issues    Medications:  Current Facility-Administered Medications   Medication Dose   • acetaminophen (Tylenol) tablet 650 mg  650 mg   • oxyCODONE immediate-release (ROXICODONE) tablet 5 mg  5 mg   • morphine 4 MG/ML injection 2-4 mg  2-4 mg   • atorvastatin (LIPITOR) tablet 10 mg  10 mg   • gabapentin (NEURONTIN) capsule 300 mg  300 mg   • hydroCHLOROthiazide (HYDRODIURIL) tablet 25 mg  25 mg   • lactobacillus rhamnosus (CULTURELLE) capsule 1 Capsule  1 Capsule   • omeprazole (PRILOSEC) capsule 40 mg  40 mg   • Pharmacy Consult Request ...Pain Management Review 1 Each  1 Each   • MD ALERT...DO NOT ADMINISTER NSAIDS or ASPIRIN unless ORDERED By Neurosurgery 1 Each  1 Each   • docusate sodium (COLACE) capsule 100 mg  100 mg   • senna-docusate (PERICOLACE or SENOKOT S) 8.6-50 MG per tablet 1 Tablet  1 Tablet   • senna-docusate (PERICOLACE or SENOKOT S) 8.6-50 MG per tablet 1 Tablet  1 Tablet   • polyethylene glycol/lytes (MIRALAX) PACKET 1 Packet  1 Packet   • magnesium hydroxide (MILK OF MAGNESIA) suspension 30 mL  30 mL   • bisacodyl (DULCOLAX) suppository 10 mg  10 mg   • sodium phosphate (Fleet) enema 133 mL  1 Each   • ondansetron (ZOFRAN) syringe/vial injection 4 mg  4 mg   • ondansetron (ZOFRAN ODT) dispertab 4 mg  4 mg   • methocarbamol (ROBAXIN) tablet 750 mg  750 mg    Or   • cyclobenzaprine (Flexeril) tablet 10 mg  10 mg    Or   • diazePAM (VALIUM) tablet 5 mg  5 mg   • labetalol (NORMODYNE/TRANDATE) injection 10 mg  10 mg   • hydrALAZINE (APRESOLINE) injection 10 mg  10 mg       Allergies:  No Known Allergies      Physical Exam:  Vitals: /51   Pulse 86   Temp 36.7 °C (98.1 °F)  remainder of the review of systems was reviewed and negative.        PAST MEDICAL HISTORY     Past Medical History:   Diagnosis Date    Arthritis     Back pain     bulging disc    CAD (coronary artery disease)     BMS to RCA    Cerebral artery occlusion with cerebral infarction ([facility] [address]     small strokes,balance issues    Cystitis     Depression     Dizziness     Dysuria     Edema     ankles,mild pedal edema    Falls frequently     H/O blood clots     left leg and lungs    Head injury     Headache     Heartburn     [place] (hard of hearing[place]     Hyperlipidemia     Hypertension     Hypothyroidism     Hypothyroidism     Memory problem     Neuropathy     Shingles     TIA (transient ischemic attack)     Urinary incontinence     UTI (urinary tract infection)          SURGICAL HISTORY       Past Surgical History:   Procedure Laterality Date    APPENDECTOMY  1947    CHOLECYSTECTOMY      EYE SURGERY  2009    bilateral cataract     HYSTERECTOMY, TOTAL ABDOMINAL      JOINT REPLACEMENT      Bilateral Knee    KNEE ARTHROPLASTY Right     LOBECTOMY  10/2015    sx--bilateral    PTCA  2002    with stent placement/bms to rca         CURRENT MEDICATIONS       Discharge Medication List as of 4/24/2020  9:33 PM      CONTINUE these medications which have NOT CHANGED    Details   memantine (NAMENDA) 10 MG tablet Take 1 tablet by mouth 2 times daily, Disp-60 tablet, R-2Normal      warfarin (COUMADIN) 5 MG tablet Take 1 tablet by mouth daily, Disp-30 tablet, R-5Normal      pantoprazole (PROTONIX) 20 MG tablet Take 1 tablet by mouth daily, Disp-30 tablet, R-3Normal      amLODIPine (NORVASC) 5 MG tablet Take 1 tablet by mouth daily, Disp-90 tablet, R-3Normal      phenytoin (DILANTIN) 100 MG ER capsule Take 2 capsules by mouth 2 times daily, Disp-180 capsule, R-3Normal      sertraline (ZOLOFT) 100 MG tablet Take 1 tablet by mouth daily, Disp-90 tablet, R-3Normal      nitrofurantoin (MACRODANTIN) 100 MG capsule Take 1 "(Temporal)   Resp 16   Ht 1.575 m (5' 2\")   Wt 60.5 kg (133 lb 6.1 oz)   SpO2 91%   Gen: NAD  Head: NC/AT  Eyes/ Nose/ Mouth: moist mucous membranes  Cardio: RRR, good distal perfusion, warm extremities  Pulm: normal respiratory effort, no cyanosis   Abd: Soft NTND, negative borborygmi   Ext: No peripheral edema. No calf tenderness. No clubbing.    Mental status: answers questions appropriately  Speech: fluent, no aphasia or dysarthria    Motor:      Upper Extremity  Myotome R L   Shoulder flexion C5 5 5   Elbow flexion C5 5 5   Wrist extension C6 5 5   Elbow extension C7 5 5   Finger flexion C8 5 5   Finger abduction T1 5 5     Lower Extremity Myotome R L   Hip flexion L2 2/5 3/5   Knee extension L3 3/5 3/5   Ankle dorsiflexion L4 5 5   Toe extension L5 5 5   Ankle plantarflexion S1 5 5     Sensory:   intact to light touch through out    Labs: Reviewed and significant for   Recent Labs     12/21/21  0445 12/22/21  0257   RBC 3.27* 2.93*   HEMOGLOBIN 10.1* 9.1*   HEMATOCRIT 30.9* 27.3*   PLATELETCT 240 210     Recent Labs     12/21/21  0445 12/22/21  0257   SODIUM 140 134*   POTASSIUM 4.2 3.3*   CHLORIDE 104 98   CO2 28 28   GLUCOSE 119* 133*   BUN 17 11   CREATININE 0.78 0.63   CALCIUM 8.4* 8.3*     Recent Results (from the past 24 hour(s))   Basic Metabolic Panel (BMP)    Collection Time: 12/22/21  2:57 AM   Result Value Ref Range    Sodium 134 (L) 135 - 145 mmol/L    Potassium 3.3 (L) 3.6 - 5.5 mmol/L    Chloride 98 96 - 112 mmol/L    Co2 28 20 - 33 mmol/L    Glucose 133 (H) 65 - 99 mg/dL    Bun 11 8 - 22 mg/dL    Creatinine 0.63 0.50 - 1.40 mg/dL    Calcium 8.3 (L) 8.5 - 10.5 mg/dL    Anion Gap 8.0 7.0 - 16.0   CBC without Differential    Collection Time: 12/22/21  2:57 AM   Result Value Ref Range    WBC 10.9 (H) 4.8 - 10.8 K/uL    RBC 2.93 (L) 4.20 - 5.40 M/uL    Hemoglobin 9.1 (L) 12.0 - 16.0 g/dL    Hematocrit 27.3 (L) 37.0 - 47.0 %    MCV 93.2 81.4 - 97.8 fL    MCH 31.1 27.0 - 33.0 pg    MCHC 33.3 (L) " 33.6 - 35.0 g/dL    RDW 45.9 35.9 - 50.0 fL    Platelet Count 210 164 - 446 K/uL    MPV 9.5 9.0 - 12.9 fL   ESTIMATED GFR    Collection Time: 12/22/21  2:57 AM   Result Value Ref Range    GFR If African American >60 >60 mL/min/1.73 m 2    GFR If Non African American >60 >60 mL/min/1.73 m 2         ASSESSMENT:  Patient is a 71 y.o. female admitted with radiculopathy now s/p L1-4 laminectomy and fusion    [facility] Code / Diagnosis to Support: 0008.9 - Orthopaedic Disorders: Other Orthopaedic    Rehabilitation: Impaired ADLs and mobility  Patient is preferring SNF placement due to limited support at home      Additional Recommendations:  -Recommend SNF placement in accordance with patient's concerns of lack of discharge support.  Patient's boyfriend is 91 years old, and will only be able to provide supervision.  Currently patient is unable to participate in gait and is requiring max assist for lower body dressing.  -Patient is currently having a lot of pain, changes will be made today, then recommend repeating therapy evaluations to see if patient still needs SNF placement or could be a better candidate for IPR or home with home health.  -Continue PT OT while in-house    Neurogenic bowel  - Patient is constipated, recommend KUB (ordered today) then aggressive stool management  -Increase use of as needed stool softeners to achieve 1 soft stool per day     Neurogenic bladder  -Bladder scan every 4 hours after voiding trial with intermittent cath as needed for volumes over 250 mL  - Starting Flomax today    Pain Management  -Patient's weakness is at least in part contributed to her pain  -Increasing Roxicodone to 5-10 mg every 3 hours as needed  -Increasing gabapentin to 400 mg 3 times daily.  High risk medication, labs reviewed, creatinine clearance 69.2, GFR greater than 60.  -Continue with methocarbamol/cyclobenzaprine/diazepam for muscle relaxer  -Continue with Tylenol 650 mg every 4 hours as needed      -PMR will sign  off, please reconsult or reach out via Voalte if further evaluation or medical management is requested      Medical Complexity:  Neurogenic bowel and bladder  Neuropathic pain      DVT PPX: SCDs      Thank you for allowing us to participate in the care of this patient.       Lan Briceño, DO   Physical Medicine and Rehabilitation     Please note that this dictation was created using voice recognition software. I have made every reasonable attempt to correct obvious errors, but there may be errors of grammar and possibly content that I did not discover before finalizing the note.         98

## 2021-12-22 NOTE — CARE PLAN
The patient is Stable - Low risk of patient condition declining or worsening    Shift Goals  Clinical Goals: Pain management, mobilize, monitor drain output  Patient Goals: Pain control, rest  Family Goals: not present    Progress made toward(s) clinical / shift goals:    Problem: Fall Risk  Goal: Patient will remain free from falls  Outcome: Progressing   Fall precautions in place, including bed alarm. Patient calls appropriately for assistance.     Problem: Lumbar/Thoracic Spine Surgery  Goal: Post-Operative Lumbar/Thoracic Spine Surgery: Patient will achieve optimal post-surgical outcomes  Outcome: Progressing  Discussed POC with patient, including multimodal pain management, drain removal in the morning and monitoring of urinary output. The patient indicated understanding and has been voiding successfully.

## 2021-12-23 PROCEDURE — 700105 HCHG RX REV CODE 258: Performed by: PHYSICIAN ASSISTANT

## 2021-12-23 PROCEDURE — 700111 HCHG RX REV CODE 636 W/ 250 OVERRIDE (IP): Performed by: NURSE PRACTITIONER

## 2021-12-23 PROCEDURE — A9270 NON-COVERED ITEM OR SERVICE: HCPCS | Performed by: NURSE PRACTITIONER

## 2021-12-23 PROCEDURE — 97530 THERAPEUTIC ACTIVITIES: CPT

## 2021-12-23 PROCEDURE — G0378 HOSPITAL OBSERVATION PER HR: HCPCS

## 2021-12-23 PROCEDURE — A9270 NON-COVERED ITEM OR SERVICE: HCPCS | Performed by: PHYSICIAN ASSISTANT

## 2021-12-23 PROCEDURE — A9270 NON-COVERED ITEM OR SERVICE: HCPCS | Performed by: PHYSICAL MEDICINE & REHABILITATION

## 2021-12-23 PROCEDURE — 97116 GAIT TRAINING THERAPY: CPT

## 2021-12-23 PROCEDURE — 700102 HCHG RX REV CODE 250 W/ 637 OVERRIDE(OP): Performed by: NURSE PRACTITIONER

## 2021-12-23 PROCEDURE — 700102 HCHG RX REV CODE 250 W/ 637 OVERRIDE(OP): Performed by: PHYSICIAN ASSISTANT

## 2021-12-23 PROCEDURE — 97535 SELF CARE MNGMENT TRAINING: CPT

## 2021-12-23 PROCEDURE — 700102 HCHG RX REV CODE 250 W/ 637 OVERRIDE(OP): Performed by: PHYSICAL MEDICINE & REHABILITATION

## 2021-12-23 RX ORDER — OXYCODONE HYDROCHLORIDE AND ACETAMINOPHEN 5; 325 MG/1; MG/1
1 TABLET ORAL EVERY 4 HOURS PRN
Qty: 15 TABLET | Refills: 0 | Status: SHIPPED | OUTPATIENT
Start: 2021-12-23 | End: 2021-12-30

## 2021-12-23 RX ORDER — TIZANIDINE 4 MG/1
4 TABLET ORAL EVERY 6 HOURS PRN
Qty: 30 TABLET | Refills: 3 | Status: ON HOLD | OUTPATIENT
Start: 2021-12-23 | End: 2023-01-19

## 2021-12-23 RX ORDER — SODIUM CHLORIDE 9 MG/ML
1000 INJECTION, SOLUTION INTRAVENOUS ONCE
Status: COMPLETED | OUTPATIENT
Start: 2021-12-23 | End: 2021-12-23

## 2021-12-23 RX ORDER — GABAPENTIN 400 MG/1
400 CAPSULE ORAL 4 TIMES DAILY
Qty: 120 CAPSULE | Refills: 0 | Status: SHIPPED | OUTPATIENT
Start: 2021-12-23 | End: 2022-01-22

## 2021-12-23 RX ORDER — GABAPENTIN 400 MG/1
400 CAPSULE ORAL 4 TIMES DAILY
Status: DISCONTINUED | OUTPATIENT
Start: 2021-12-23 | End: 2021-12-28 | Stop reason: HOSPADM

## 2021-12-23 RX ADMIN — OXYCODONE 10 MG: 5 TABLET ORAL at 01:47

## 2021-12-23 RX ADMIN — GABAPENTIN 400 MG: 400 CAPSULE ORAL at 08:30

## 2021-12-23 RX ADMIN — OXYCODONE 5 MG: 5 TABLET ORAL at 16:39

## 2021-12-23 RX ADMIN — OXYCODONE 5 MG: 5 TABLET ORAL at 10:32

## 2021-12-23 RX ADMIN — ONDANSETRON 4 MG: 2 INJECTION INTRAMUSCULAR; INTRAVENOUS at 08:38

## 2021-12-23 RX ADMIN — DOCUSATE SODIUM 100 MG: 100 CAPSULE ORAL at 16:39

## 2021-12-23 RX ADMIN — GABAPENTIN 400 MG: 400 CAPSULE ORAL at 16:40

## 2021-12-23 RX ADMIN — POLYETHYLENE GLYCOL 3350 1 PACKET: 17 POWDER, FOR SOLUTION ORAL at 04:17

## 2021-12-23 RX ADMIN — ATORVASTATIN CALCIUM 10 MG: 10 TABLET, FILM COATED ORAL at 04:17

## 2021-12-23 RX ADMIN — DOCUSATE SODIUM 50 MG AND SENNOSIDES 8.6 MG 1 TABLET: 8.6; 5 TABLET, FILM COATED ORAL at 20:18

## 2021-12-23 RX ADMIN — HYDROCHLOROTHIAZIDE 25 MG: 25 TABLET ORAL at 04:17

## 2021-12-23 RX ADMIN — GABAPENTIN 400 MG: 400 CAPSULE ORAL at 20:18

## 2021-12-23 RX ADMIN — OMEPRAZOLE 40 MG: 20 CAPSULE, DELAYED RELEASE ORAL at 04:17

## 2021-12-23 RX ADMIN — DOCUSATE SODIUM 100 MG: 100 CAPSULE ORAL at 04:17

## 2021-12-23 RX ADMIN — MAGNESIUM HYDROXIDE 30 ML: 400 SUSPENSION ORAL at 10:32

## 2021-12-23 RX ADMIN — SODIUM CHLORIDE 1000 ML: 9 INJECTION, SOLUTION INTRAVENOUS at 08:44

## 2021-12-23 RX ADMIN — Medication 1 CAPSULE: at 04:17

## 2021-12-23 RX ADMIN — GABAPENTIN 400 MG: 400 CAPSULE ORAL at 12:52

## 2021-12-23 RX ADMIN — GABAPENTIN 400 MG: 400 CAPSULE ORAL at 04:17

## 2021-12-23 ASSESSMENT — COGNITIVE AND FUNCTIONAL STATUS - GENERAL
SUGGESTED CMS G CODE MODIFIER DAILY ACTIVITY: CK
MOVING TO AND FROM BED TO CHAIR: A LOT
STANDING UP FROM CHAIR USING ARMS: A LITTLE
MOVING TO AND FROM BED TO CHAIR: UNABLE
STANDING UP FROM CHAIR USING ARMS: A LITTLE
SUGGESTED CMS G CODE MODIFIER MOBILITY: CK
WALKING IN HOSPITAL ROOM: A LITTLE
TURNING FROM BACK TO SIDE WHILE IN FLAT BAD: A LOT
EATING MEALS: A LITTLE
DAILY ACTIVITIY SCORE: 15
DRESSING REGULAR UPPER BODY CLOTHING: A LITTLE
MOVING FROM LYING ON BACK TO SITTING ON SIDE OF FLAT BED: A LOT
DRESSING REGULAR LOWER BODY CLOTHING: A LOT
CLIMB 3 TO 5 STEPS WITH RAILING: A LITTLE
MOVING FROM LYING ON BACK TO SITTING ON SIDE OF FLAT BED: UNABLE
MOBILITY SCORE: 15
TURNING FROM BACK TO SIDE WHILE IN FLAT BAD: A LITTLE
TOILETING: A LOT
CLIMB 3 TO 5 STEPS WITH RAILING: A LOT
SUGGESTED CMS G CODE MODIFIER MOBILITY: CL
HELP NEEDED FOR BATHING: A LOT
WALKING IN HOSPITAL ROOM: A LITTLE
MOBILITY SCORE: 13
PERSONAL GROOMING: A LITTLE

## 2021-12-23 ASSESSMENT — PAIN DESCRIPTION - PAIN TYPE
TYPE: ACUTE PAIN;SURGICAL PAIN
TYPE: SURGICAL PAIN
TYPE: ACUTE PAIN;SURGICAL PAIN

## 2021-12-23 ASSESSMENT — GAIT ASSESSMENTS
ASSISTIVE DEVICE: FRONT WHEEL WALKER
DISTANCE (FEET): 45
DEVIATION: BRADYKINETIC;SHUFFLED GAIT;DECREASED BASE OF SUPPORT
GAIT LEVEL OF ASSIST: MINIMAL ASSIST

## 2021-12-23 NOTE — PROGRESS NOTES
Pt c/o dizziness and nausea while trying to work with therapy this AM. +orhtostatic hypotension per OT. Notified PA. Order received for 1L fluid bolus. Zofran also given. +results.

## 2021-12-23 NOTE — CARE PLAN
The patient is Watcher - Medium risk of patient condition declining or worsening    Shift Goals  Clinical Goals: improve nausea, dizziness, mobilize  Patient Goals: pain control  Family Goals: not present    Progress made toward(s) clinical / shift goals:    Problem: Pain - Standard  Goal: Alleviation of pain or a reduction in pain to the patient’s comfort goal  Outcome: Progressing     Problem: Fall Risk  Goal: Patient will remain free from falls  Outcome: Progressing     Problem: Knowledge Deficit - Standard  Goal: Patient and family/care givers will demonstrate understanding of plan of care, disease process/condition, diagnostic tests and medications  Outcome: Progressing     Problem: Lumbar/Thoracic Spine Surgery  Goal: Post-Operative Lumbar/Thoracic Spine Surgery: Patient will achieve optimal post-surgical outcomes  Outcome: Progressing       Patient is not progressing towards the following goals:

## 2021-12-23 NOTE — PROGRESS NOTES
Neurosurgery Progress Note    Subjective:  Evaluated by PMR, rec SNF  Persistent right ant thigh pain, slightly improved  Mobilizing better   voiding independently now    Exam:  AAOx3, fluent speech   PERRL, EOMI   BUE 5/5, sensation intact  RLE IP/quad 5/5 pf, df 5/5 sensation intact   LLE IP/quad/pf/df 5/5 Sensation intact   hvac removed    BP  Min: 111/58  Max: 125/59  Pulse  Av  Min: 86  Max: 92  Resp  Av.3  Min: 16  Max: 18  Temp  Av.8 °C (98.3 °F)  Min: 36.7 °C (98 °F)  Max: 37.1 °C (98.7 °F)  SpO2  Av %  Min: 91 %  Max: 95 %    No data recorded    Recent Labs     21   WBC 11.9* 10.9*   RBC 3.27* 2.93*   HEMOGLOBIN 10.1* 9.1*   HEMATOCRIT 30.9* 27.3*   MCV 94.5 93.2   MCH 30.9 31.1   MCHC 32.7* 33.3*   RDW 47.5 45.9   PLATELETCT 240 210   MPV 9.7 9.5     Recent Labs     215 21   SODIUM 140 134*   POTASSIUM 4.2 3.3*   CHLORIDE 104 98   CO2 28 28   GLUCOSE 119* 133*   BUN 17 11   CREATININE 0.78 0.63   CALCIUM 8.4* 8.3*               Intake/Output                       21 - 2159 21 - 21 0659      Total  Total                 Intake    Total Intake -- -- -- -- -- --       Output    Urine  --  -- --  --  -- --    Number of Times Voided 5 x 2 x 7 x -- -- --    Total Output -- -- -- -- -- --       Net I/O     -- -- -- -- -- --          No intake or output data in the 24 hours ending 2133         • tamsulosin  0.4 mg AFTER BREAKFAST   • oxyCODONE immediate-release  5-10 mg Q4HRS PRN   • gabapentin  400 mg TID   • acetaminophen  650 mg Q4HRS PRN   • morphine injection  2-4 mg Q3HRS PRN   • atorvastatin  10 mg DAILY   • hydroCHLOROthiazide  25 mg DAILY   • lactobacillus rhamnosus  1 Capsule DAILY   • omeprazole  40 mg DAILY   • Pharmacy Consult Request  1 Each PHARMACY TO DOSE   • MD ALERT...DO NOT ADMINISTER NSAIDS or ASPIRIN unless ORDERED By Neurosurgery  1 Each  PRN   • docusate sodium  100 mg BID   • senna-docusate  1 Tablet Nightly   • senna-docusate  1 Tablet Q24HRS PRN   • polyethylene glycol/lytes  1 Packet BID PRN   • magnesium hydroxide  30 mL QDAY PRN   • bisacodyl  10 mg Q24HRS PRN   • sodium phosphate  1 Each Once PRN   • ondansetron  4 mg Q4HRS PRN   • ondansetron  4 mg Q4HRS PRN   • methocarbamol  750 mg Q8HRS PRN    Or   • cyclobenzaprine  10 mg Q8HRS PRN    Or   • diazePAM  5 mg Q4HRS PRN   • labetalol  10 mg Q HOUR PRN   • hydrALAZINE  10 mg Q HOUR PRN       Assessment and Plan:  Hospital day #3  POD #3 Right L1-L4 partial lami, L1-4 fusion   Prophylactic anticoagulation: No       Start date/time: TBD     Plan:  Neuro stable   Ok to shower starting 12/23  PT/OT eval treat  Gabapentin increased, continue pain control, bowel protocol  Chair for all meals, mobilize   Appreciate assistance from CM/SW team re: SNF  Pt is cleared to DC to SNF when accepted, Rx with charge RN

## 2021-12-23 NOTE — CARE PLAN
The patient is Stable - Low risk of patient condition declining or worsening    Shift Goals  Clinical Goals: Pain management, mobilize, monitor urine output  Patient Goals: Pain control, rest  Family Goals: not present    Progress made toward(s) clinical / shift goals:    Problem: Pain - Standard  Goal: Alleviation of pain or a reduction in pain to the patient’s comfort goal  Outcome: Progressing   The patient stated that her change in pain regimen today has improved her pain management, and she is experiencing less pain overall.     Problem: Lumbar/Thoracic Spine Surgery  Goal: Post-Operative Lumbar/Thoracic Spine Surgery: Patient will achieve optimal post-surgical outcomes  Outcome: Progressing   Discussed POC with patient, including pain management, spinal precautions, continued mobilization, and collaboration with case management regarding potential rehab. The patient indicated understanding    Problem: Fall Risk  Goal: Patient will remain free from falls  Outcome: Progressing   Fall precautions in place, patient calls appropriately for assistance.

## 2021-12-23 NOTE — DISCHARGE PLANNING
Please review the consult from Dr. Briceño regarding post acute recommendations.  [facility] will no longer follow.  Please reach out to myself @ [phone] with any questions.

## 2021-12-23 NOTE — DISCHARGE PLANNING
ANTICIPATED DATE OF DISCHARGE: 1-2 days     ANTICIPATED DISCHARGE DISPOSITION: SNF    INSURANCE: MEDICARE     BARRIERS TO DISCHARGE: IVF Bolus, oxygen dependent, not medically cleared by Neuro Surgery     ACTION: CM followed up with patient today to discuss discharge options. PT/OT rec C on 12/21 but OT note 12/23 indicated post acute. Patient chooses to discharge to SNF and signed choice form for:  1. [facility]   2. [facility]  3. [facility]  4. [facility]   Faxed choice to [facility] for follow up     PLAN: CM to follow for dc planning needs

## 2021-12-23 NOTE — DISCHARGE PLANNING
Agency/Facility Name: [facility]  Spoke To: Nathalie   Outcome: Per Nathalie, he will have intake call this [facility] back     -1204  Agency/Facility Name: [facility]  Spoke To: Abiola   Outcome: Per Abiola, Pt is accepted but the earliest Pt can be seen is Sunday or Monday    Received Choice form at 0854  Agency/Facility Name: [facility]  Referral sent per Choice form @ [phone]

## 2021-12-23 NOTE — THERAPY
Occupational Therapy  Daily Treatment     Patient Name: Ariana Drake  Age:  71 y.o., Sex:  female  Medical Record #: [id number]  Today's Date: 12/23/2021     Precautions  Precautions: Fall Risk,Spinal / Back Precautions   Comments: no brace; orthostatic hypotension    Assessment    Session today limited by orthostatic hypotension. Able to participate in STS and seated ADLs, but unable to try ambulating to the bathroom due to BP going from 99/50 to 75/38 w/ standing marching.    Plan    Continue current treatment plan.    DC Equipment Recommendations: Unable to determine at this time  Discharge Recommendations: Recommend post-acute placement for additional occupational therapy services prior to discharge home       Objective       12/23/21 0823   Cognition    Cognition / Consciousness WDL   Level of Consciousness Alert   Balance   Sitting Balance (Static) Fair   Sitting Balance (Dynamic) Fair   Standing Balance (Static) Fair -   Standing Balance (Dynamic) Fair -   Weight Shift Sitting Fair   Weight Shift Standing Fair   Skilled Intervention Verbal Cuing;Sequencing;Tactile Cuing   Comments w/ fww   Bed Mobility    Scooting Supervised   Skilled Intervention Verbal Cuing   Comments seated EOB upon entry   Activities of Daily Living   Eating Supervision   Grooming Supervision;Seated   Lower Body Dressing Maximal Assist   Skilled Intervention Verbal Cuing;Tactile Cuing;Sequencing   Comments further ADLs limited by drop in BP   How much help from another person does the patient currently need...   Putting on and taking off regular lower body clothing? 2   Bathing (including washing, rinsing, and drying)? 2   Toileting, which includes using a toilet, bedpan, or urinal? 2   Putting on and taking off regular upper body clothing? 3   Taking care of personal grooming such as brushing teeth? 3   Eating meals? 3   6 Clicks Daily Activity Score 15   Functional Mobility   Sit to Stand Minimal Assist   Bed, Chair, Wheelchair  Transfer Unable to Participate   Mobility EOB>STS>EOB   Skilled Intervention Verbal Cuing;Tactile Cuing;Sequencing   Comments w/ FWW, further functional mobility limited by drop in BP   Activity Tolerance   Comments BP went from 99/50 sitting to 75/38 standing w/ marching, back up to 100/60 after sitting back down   Patient / Family Goals   Patient / Family Goal #1 to go home   Goal #1 Outcome Goal not met   Short Term Goals   Short Term Goal # 1 pt will demo toilet txf with supv   Goal Outcome # 1 Goal not met   Short Term Goal # 2 pt will dress LB with supv and AE prn   Goal Outcome # 2 Goal not met   Short Term Goal # 3 pt will groom in stance at the sink with supv   Goal Outcome # 3 Goal not met

## 2021-12-24 PROCEDURE — 97535 SELF CARE MNGMENT TRAINING: CPT

## 2021-12-24 PROCEDURE — 700102 HCHG RX REV CODE 250 W/ 637 OVERRIDE(OP): Performed by: NURSE PRACTITIONER

## 2021-12-24 PROCEDURE — A9270 NON-COVERED ITEM OR SERVICE: HCPCS | Performed by: PHYSICIAN ASSISTANT

## 2021-12-24 PROCEDURE — A9270 NON-COVERED ITEM OR SERVICE: HCPCS | Performed by: PHYSICAL MEDICINE & REHABILITATION

## 2021-12-24 PROCEDURE — A9270 NON-COVERED ITEM OR SERVICE: HCPCS | Performed by: NURSE PRACTITIONER

## 2021-12-24 PROCEDURE — G0378 HOSPITAL OBSERVATION PER HR: HCPCS

## 2021-12-24 PROCEDURE — 700102 HCHG RX REV CODE 250 W/ 637 OVERRIDE(OP): Performed by: PHYSICAL MEDICINE & REHABILITATION

## 2021-12-24 PROCEDURE — 700102 HCHG RX REV CODE 250 W/ 637 OVERRIDE(OP): Performed by: PHYSICIAN ASSISTANT

## 2021-12-24 RX ADMIN — GABAPENTIN 400 MG: 400 CAPSULE ORAL at 19:34

## 2021-12-24 RX ADMIN — GABAPENTIN 400 MG: 400 CAPSULE ORAL at 16:27

## 2021-12-24 RX ADMIN — HYDROCHLOROTHIAZIDE 25 MG: 25 TABLET ORAL at 04:32

## 2021-12-24 RX ADMIN — GABAPENTIN 400 MG: 400 CAPSULE ORAL at 08:33

## 2021-12-24 RX ADMIN — DOCUSATE SODIUM 50 MG AND SENNOSIDES 8.6 MG 1 TABLET: 8.6; 5 TABLET, FILM COATED ORAL at 19:34

## 2021-12-24 RX ADMIN — DOCUSATE SODIUM 100 MG: 100 CAPSULE ORAL at 16:27

## 2021-12-24 RX ADMIN — DOCUSATE SODIUM 100 MG: 100 CAPSULE ORAL at 04:32

## 2021-12-24 RX ADMIN — OXYCODONE 5 MG: 5 TABLET ORAL at 06:21

## 2021-12-24 RX ADMIN — OXYCODONE 5 MG: 5 TABLET ORAL at 01:33

## 2021-12-24 RX ADMIN — OXYCODONE 5 MG: 5 TABLET ORAL at 10:32

## 2021-12-24 RX ADMIN — OMEPRAZOLE 40 MG: 20 CAPSULE, DELAYED RELEASE ORAL at 04:32

## 2021-12-24 RX ADMIN — POLYETHYLENE GLYCOL 3350 1 PACKET: 17 POWDER, FOR SOLUTION ORAL at 04:32

## 2021-12-24 RX ADMIN — GABAPENTIN 400 MG: 400 CAPSULE ORAL at 13:19

## 2021-12-24 RX ADMIN — Medication 1 CAPSULE: at 04:32

## 2021-12-24 RX ADMIN — OXYCODONE 5 MG: 5 TABLET ORAL at 14:37

## 2021-12-24 RX ADMIN — ATORVASTATIN CALCIUM 10 MG: 10 TABLET, FILM COATED ORAL at 04:32

## 2021-12-24 RX ADMIN — BISACODYL 10 MG: 10 SUPPOSITORY RECTAL at 06:21

## 2021-12-24 ASSESSMENT — PAIN DESCRIPTION - PAIN TYPE
TYPE: ACUTE PAIN;SURGICAL PAIN
TYPE: SURGICAL PAIN
TYPE: ACUTE PAIN
TYPE: SURGICAL PAIN
TYPE: SURGICAL PAIN

## 2021-12-24 ASSESSMENT — COGNITIVE AND FUNCTIONAL STATUS - GENERAL
TOILETING: A LITTLE
DAILY ACTIVITIY SCORE: 21
HELP NEEDED FOR BATHING: A LITTLE
DRESSING REGULAR LOWER BODY CLOTHING: A LITTLE
SUGGESTED CMS G CODE MODIFIER DAILY ACTIVITY: CJ

## 2021-12-24 NOTE — THERAPY
"Occupational Therapy  Daily Treatment     Patient Name: Ariana Draek  Age:  71 y.o., Sex:  female  Medical Record #: [id number]  Today's Date: 12/24/2021     Precautions  Precautions: Fall Risk,Spinal / Back Precautions   Comments: no brace    Assessment    Today, pt was able to complete basic ADLs, functional mobility, and functional transfers with supv w/ FWW. Pt edu on compensatory strategies during ADLs as well as appropriate AE/DME to maximize independence and safety. She still remains with poor activity tolerance, only able to ambulate to/from bathroom before feeling nauseous and tired. She really needs to mobilize more during the day w/ staff in order to return home. However at this time she has met acute OT goals.      Plan    Patient will not be actively followed for occupational therapy services at this time, however may be seen if requested by physician for 1 more visit within 30 days to address any discharge or equipment needs.       DC Equipment Recommendations: Front-Wheel Walker,Tub / Shower Seat,Raised Toilet Seat with Arms (Reacher)  Discharge Recommendations: Recommend home health for continued occupational therapy services (when activity tolerance is better)    Subjective    \"I'm getting nauseous\"  \"I need to get back to the bed\"     Objective       12/24/21 1114   Cognition    Cognition / Consciousness WDL   Level of Consciousness Alert   Active ROM Upper Body   Active ROM Upper Body  WDL   Strength Upper Body   Upper Body Strength  WDL   Balance   Sitting Balance (Static) Fair   Sitting Balance (Dynamic) Fair   Standing Balance (Static) Fair   Standing Balance (Dynamic) Fair -   Weight Shift Sitting Fair   Weight Shift Standing Fair   Skilled Intervention Verbal Cuing   Comments w/ fww   Bed Mobility    Supine to Sit Supervised   Sit to Supine Minimal Assist   Scooting Supervised   Rolling Supervised   Skilled Intervention Verbal Cuing;Tactile Cuing   Comments barely needed assist to get right " foot onto bed for BTB   Activities of Daily Living   Lower Body Dressing Supervision   Toileting Supervision   Skilled Intervention Verbal Cuing;Sequencing   Comments trained in use of AE for LB dressing. pt unable to tolerate standing at the sink to wash hands due to nausea   How much help from another person does the patient currently need...   Putting on and taking off regular lower body clothing? 3   Bathing (including washing, rinsing, and drying)? 3   Toileting, which includes using a toilet, bedpan, or urinal? 3   Putting on and taking off regular upper body clothing? 4   Taking care of personal grooming such as brushing teeth? 4   Eating meals? 4   6 Clicks Daily Activity Score 21   Functional Mobility   Sit to Stand Supervised   Bed, Chair, Wheelchair Transfer Supervised   Toilet Transfers Supervised   Mobility EOB>BR>BTB   Skilled Intervention Verbal Cuing;Sequencing   Comments w/ fww utilized BSC over toilet   Activity Tolerance   Comments remains w/ poor activity tolerance   Patient / Family Goals   Patient / Family Goal #1 to go home   Goal #1 Outcome Goal not met   Short Term Goals   Short Term Goal # 1 pt will demo toilet txf with supv   Goal Outcome # 1 Goal met   Short Term Goal # 2 pt will dress LB with supv and AE prn   Goal Outcome # 2 Goal met   Short Term Goal # 3 pt will groom in stance at the sink with supv   Goal Outcome # 3 Goal not met  (functionally capable, limited by nausea/dizziness)

## 2021-12-24 NOTE — CARE PLAN
The patient is Stable - Low risk of patient condition declining or worsening    Shift Goals  Clinical Goals: Wean O2; Bowel Movement; Safety  Patient Goals: Pain Management  Family Goals: not present    Progress made toward(s) clinical / shift goals:    Problem: Pain - Standard  Goal: Alleviation of pain or a reduction in pain to the patient’s comfort goal  Outcome: Progressing   Available pain medications reviewed with patient. Pain managed by PRN and scheduled medications. Encouraged to call if pain is no longer managed.     Problem: Fall Risk  Goal: Patient will remain free from falls  Outcome: Progressing   Fall precautions in place. Patient rounded on hourly. Clutter-free environment maintained. Bed alarm on, bed locked and in lowest position. Call light in reach.     Problem: Knowledge Deficit - Standard  Goal: Patient and family/care givers will demonstrate understanding of plan of care, disease process/condition, diagnostic tests and medications  Outcome: Progressing   Plan of care discussed with patient, verbalizes understanding. Encouraged to voice feelings or concerns.

## 2021-12-24 NOTE — PROGRESS NOTES
Assessment complete, pt A&Ox4, denies numbness/tingling.    Pt up x1 assist with FWW to the bathroom. + void. LBM 12/19, received suppository this am.    C/o 4/10 pain to back, denies medication at this time.    Tolerating diet. O2>90 on 0.5 L to RA.     Incision to midline back with steri strips, CDI.     Reeducated on falls prevention, call bell within reach.

## 2021-12-24 NOTE — PROGRESS NOTES
Neurosurgery Progress Note    Subjective:  No acute events over night   Persistent right ant thigh pain, slightly improved  Mobilizing better      Exam:  AAOx3, fluent speech   PERRL, EOMI   BUE 5/5, sensation intact  RLE IP/quad 5/5 pf, df 5/5 sensation intact   LLE IP/quad/pf/df 5/5 Sensation intact   Incision CDI and flat    BP  Min: 96/53  Max: 120/55  Pulse  Av.9  Min: 82  Max: 98  Resp  Av.2  Min: 15  Max: 17  Temp  Av.7 °C (98 °F)  Min: 36 °C (96.8 °F)  Max: 37.1 °C (98.7 °F)  SpO2  Av.7 %  Min: 91 %  Max: 94 %    No data recorded    Recent Labs     21  025   WBC 10.9*   RBC 2.93*   HEMOGLOBIN 9.1*   HEMATOCRIT 27.3*   MCV 93.2   MCH 31.1   MCHC 33.3*   RDW 45.9   PLATELETCT 210   MPV 9.5     Recent Labs     21  0257   SODIUM 134*   POTASSIUM 3.3*   CHLORIDE 98   CO2 28   GLUCOSE 133*   BUN 11   CREATININE 0.63   CALCIUM 8.3*               Intake/Output                       21 0700 - 21 0659 21 0700 - 21 0659     1017-0102 4106-7364 Total 8325-6167 9320-3359 Total                 Intake    P.O.  480  -- 480  --  -- --    P.O. 480 -- 480 -- -- --    IV Piggyback  1000  -- 1000  --  -- --    Volume (mL) (NS (BOLUS) infusion 1,000 mL) 1000 -- 1000 -- -- --    Total Intake 1480 -- 1480 -- -- --       Output    Urine  --  -- --  --  -- --    Number of Times Voided 3 x 2 x 5 x 1 x -- 1 x    Stool  --  -- --  --  -- --    Number of Times Stooled -- -- -- 1 x -- 1 x    Total Output -- -- -- -- -- --       Net I/O     1480 -- 1480 -- -- --            Intake/Output Summary (Last 24 hours) at 2021 0927  Last data filed at 2021 1800  Gross per 24 hour   Intake 1360 ml   Output --   Net 1360 ml            • gabapentin  400 mg 4X/DAY   • tamsulosin  0.4 mg AFTER BREAKFAST   • oxyCODONE immediate-release  5-10 mg Q4HRS PRN   • acetaminophen  650 mg Q4HRS PRN   • morphine injection  2-4 mg Q3HRS PRN   • atorvastatin  10 mg DAILY   • hydroCHLOROthiazide  25  mg DAILY   • lactobacillus rhamnosus  1 Capsule DAILY   • omeprazole  40 mg DAILY   • Pharmacy Consult Request  1 Each PHARMACY TO DOSE   • MD ALERT...DO NOT ADMINISTER NSAIDS or ASPIRIN unless ORDERED By Neurosurgery  1 Each PRN   • docusate sodium  100 mg BID   • senna-docusate  1 Tablet Nightly   • senna-docusate  1 Tablet Q24HRS PRN   • polyethylene glycol/lytes  1 Packet BID PRN   • magnesium hydroxide  30 mL QDAY PRN   • bisacodyl  10 mg Q24HRS PRN   • sodium phosphate  1 Each Once PRN   • ondansetron  4 mg Q4HRS PRN   • ondansetron  4 mg Q4HRS PRN   • methocarbamol  750 mg Q8HRS PRN    Or   • cyclobenzaprine  10 mg Q8HRS PRN    Or   • diazePAM  5 mg Q4HRS PRN   • labetalol  10 mg Q HOUR PRN   • hydrALAZINE  10 mg Q HOUR PRN       Assessment and Plan:  Hospital day #4  POD 4 Right L1-L4 partial lami, L1-4 fusion   Prophylactic anticoagulation: No       Start date/time: TBD     Plan:  Neuro stable   VSS  Ok to shower starting 12/23  PT/OT eval treat  Gabapentin increased, continue pain control, bowel protocol  Chair for all meals, mobilize   Appreciate assistance from CM/SW team re: SNF  Pt is cleared to DC to SNF when accepted, Rx with charge RN

## 2021-12-24 NOTE — DISCHARGE PLANNING
Anticipated Discharge Disposition: SNF vs home with HH    Action: Patient is still observation status, SNFs will not be able to accept the patient unless she is inpatient.  Patient does not currently meet criteria for inpatient.  VALENTIN GUTIERRES updated MONICO Maldonado asked that we reach out to PT/OT.  RN CM sent a voalte message to Jacquie PT and Veronica OT to see if the patient could be seen again today.  Patient has been accepted by [facility] for [facility].    Barriers to Discharge: Observation status     Plan: Case coordination to continue to follow up with medical team to discuss discharge barriers.

## 2021-12-24 NOTE — THERAPY
"Physical Therapy   Daily Treatment     Patient Name: Ariana Drake  Age:  71 y.o., Sex:  female  Medical Record #: [id number]  Today's Date: 12/23/2021     Precautions  Precautions: Fall Risk;Spinal / Back Precautions   Comments: no brace    Assessment    Pt found attempting to get self out of chair, reported \"I've been in the chair forever and they won't come\". Confirmed with RN, had only been in chair for 10 min. Attempted to educate her on importance of being out of bed to improve her BP and pain. Was agreeable to short gait with therapist if she could return to bed after. Very narrow WANDER with tiny step to gait. Cues for glute contraction to improve posture, pt did say this improved pain. Impulsive when returning to supine, did not follow commands for log roll as provided by therapist and twisted spine. Extensive education on importance of regular (hourly) mobility to decrease her pain and improve overall function. Pt asked \"what regimen do you want\". Requested she sit up in chair for all meals, then stated she hasn't been eating. Again educated on importance of nutrition and whole body health to expedite her healing. Pt voiced understanding but seems to have depressed affect and decreased desire to mobilize. Recommend post acute placement.    Plan    Continue current treatment plan.    DC Equipment Recommendations: Unable to determine at this time  Discharge Recommendations: Recommend post-acute placement for additional physical therapy services prior to discharge home           Objective       12/23/21 1410   Cognition    Level of Consciousness Alert   Comments depressed mood   Balance   Sitting Balance (Static) Fair   Sitting Balance (Dynamic) Fair   Standing Balance (Static) Fair -   Standing Balance (Dynamic) Fair -   Weight Shift Sitting Fair   Weight Shift Standing Fair   Skilled Intervention Tactile Cuing;Verbal Cuing   Comments FWW   Gait Analysis   Gait Level Of Assist Minimal Assist   Assistive Device " Front Wheel Walker   Distance (Feet) 45   # of Times Distance was Traveled 1   Deviation Bradykinetic;Shuffled Gait;Decreased Base Of Support   Weight Bearing Status no restrictions   Skilled Intervention Verbal Cuing;Tactile Cuing;Sequencing   Comments no swing through phase of gait, very tiny steps   Bed Mobility    Supine to Sit   (up in chair)   Sit to Supine Moderate Assist   Skilled Intervention Verbal Cuing;Tactile Cuing;Sequencing   Functional Mobility   Sit to Stand Minimal Assist   Bed, Chair, Wheelchair Transfer Minimal Assist   Skilled Intervention Verbal Cuing;Tactile Cuing;Sequencing   How much difficulty does the patient currently have...   Turning over in bed (including adjusting bedclothes, sheets and blankets)? 3   Sitting down on and standing up from a chair with arms (e.g., wheelchair, bedside commode, etc.) 1   Moving from lying on back to sitting on the side of the bed? 1   How much help from another person does the patient currently need...   Moving to and from a bed to a chair (including a wheelchair)? 3   Need to walk in a hospital room? 3   Climbing 3-5 steps with a railing? 2   6 clicks Mobility Score 13   Short Term Goals    Short Term Goal # 1 Pt will perform supine <> sit with SPV in 6 visits in order to improve functinoal independence   Goal Outcome # 1 goal not met   Short Term Goal # 2 Pt will perform STS with FWW and SPV in 6 visits in order to initiate OOB mobility   Goal Outcome # 2 Goal not met   Short Term Goal # 3 Pt will ambulate 150ft with FWW and SPV in 6 visits in order to ambulate household distances   Goal Outcome # 3 Goal not met

## 2021-12-25 PROCEDURE — G0378 HOSPITAL OBSERVATION PER HR: HCPCS

## 2021-12-25 PROCEDURE — A9270 NON-COVERED ITEM OR SERVICE: HCPCS | Performed by: PHYSICIAN ASSISTANT

## 2021-12-25 PROCEDURE — A9270 NON-COVERED ITEM OR SERVICE: HCPCS | Performed by: NURSE PRACTITIONER

## 2021-12-25 PROCEDURE — 700102 HCHG RX REV CODE 250 W/ 637 OVERRIDE(OP): Performed by: NURSE PRACTITIONER

## 2021-12-25 PROCEDURE — 700102 HCHG RX REV CODE 250 W/ 637 OVERRIDE(OP): Performed by: PHYSICAL MEDICINE & REHABILITATION

## 2021-12-25 PROCEDURE — A9270 NON-COVERED ITEM OR SERVICE: HCPCS | Performed by: PHYSICAL MEDICINE & REHABILITATION

## 2021-12-25 PROCEDURE — 700102 HCHG RX REV CODE 250 W/ 637 OVERRIDE(OP): Performed by: PHYSICIAN ASSISTANT

## 2021-12-25 RX ADMIN — OXYCODONE 5 MG: 5 TABLET ORAL at 16:41

## 2021-12-25 RX ADMIN — OXYCODONE 5 MG: 5 TABLET ORAL at 11:38

## 2021-12-25 RX ADMIN — DOCUSATE SODIUM 100 MG: 100 CAPSULE ORAL at 04:09

## 2021-12-25 RX ADMIN — TAMSULOSIN HYDROCHLORIDE 0.4 MG: 0.4 CAPSULE ORAL at 09:02

## 2021-12-25 RX ADMIN — GABAPENTIN 400 MG: 400 CAPSULE ORAL at 09:02

## 2021-12-25 RX ADMIN — GABAPENTIN 400 MG: 400 CAPSULE ORAL at 13:22

## 2021-12-25 RX ADMIN — MAGNESIUM HYDROXIDE 30 ML: 400 SUSPENSION ORAL at 04:09

## 2021-12-25 RX ADMIN — Medication 1 CAPSULE: at 04:08

## 2021-12-25 RX ADMIN — DOCUSATE SODIUM 100 MG: 100 CAPSULE ORAL at 16:37

## 2021-12-25 RX ADMIN — GABAPENTIN 400 MG: 400 CAPSULE ORAL at 16:37

## 2021-12-25 RX ADMIN — HYDROCHLOROTHIAZIDE 25 MG: 25 TABLET ORAL at 04:09

## 2021-12-25 RX ADMIN — ATORVASTATIN CALCIUM 10 MG: 10 TABLET, FILM COATED ORAL at 04:09

## 2021-12-25 RX ADMIN — OXYCODONE 5 MG: 5 TABLET ORAL at 01:32

## 2021-12-25 RX ADMIN — OMEPRAZOLE 40 MG: 20 CAPSULE, DELAYED RELEASE ORAL at 04:08

## 2021-12-25 RX ADMIN — GABAPENTIN 400 MG: 400 CAPSULE ORAL at 21:27

## 2021-12-25 RX ADMIN — OXYCODONE 5 MG: 5 TABLET ORAL at 06:31

## 2021-12-25 RX ADMIN — OXYCODONE 5 MG: 5 TABLET ORAL at 21:27

## 2021-12-25 ASSESSMENT — PAIN SCALES - PAIN ASSESSMENT IN ADVANCED DEMENTIA (PAINAD)
CONSOLABILITY: NO NEED TO CONSOLE
BREATHING: NORMAL
BODYLANGUAGE: RELAXED
TOTALSCORE: 0
FACIALEXPRESSION: SMILING OR INEXPRESSIVE

## 2021-12-25 ASSESSMENT — PAIN DESCRIPTION - PAIN TYPE
TYPE: SURGICAL PAIN
TYPE: ACUTE PAIN
TYPE: SURGICAL PAIN;ACUTE PAIN
TYPE: SURGICAL PAIN
TYPE: SURGICAL PAIN

## 2021-12-25 NOTE — DISCHARGE INSTRUCTIONS
Leave incision open to air.   OK to shower & pat dry starting 24hr after drain has been removed  No soaking in hot tubs, baths, pools, etc.  Avoid repetitive bending, lifting over 10lbs, twisting. We encourage you to take frequent walks as tolerated  Do not drive or consume alcohol while taking narcotic pain medications. Do not take additional acetaminophen (tylenol) without consulting our office.   Do not take NSAIDs (such as ibuprofen or naproxen) or Aspirin unless you have been told otherwise by Dr Cottrell's team  Follow up at [facility] Neurosurgery office in 4 weeks. Call with questions or concerns @ [phone]    Discharge Instructions    Discharged to home by car with relative. Discharged via wheelchair, hospital escort: Yes.  Special equipment needed: Not Applicable    Be sure to schedule a follow-up appointment with your primary care doctor or any specialists as instructed.     Discharge Plan:   Diet Plan: Discussed  Activity Level: Discussed  Confirmed Follow up Appointment: Appointment Scheduled  Confirmed Symptoms Management: Discussed  Medication Reconciliation Updated: Yes  Influenza Vaccine Indication: Patient Refuses    I understand that a diet low in cholesterol, fat, and sodium is recommended for good health. Unless I have been given specific instructions below for another diet, I accept this instruction as my diet prescription.   Other diet: regular    Special Instructions: None    · Is patient discharged on Warfarin / Coumadin?   No     Depression / Suicide Risk    As you are discharged from this [facility], it is important to learn how to keep safe from harming yourself.    Recognize the warning signs:  · Abrupt changes in personality, positive or negative- including increase in energy   · Giving away possessions  · Change in eating patterns- significant weight changes-  positive or negative  · Change in sleeping patterns- unable to sleep or sleeping all the time   · Unwillingness or  inability to communicate  · Depression  · Unusual sadness, discouragement and loneliness  · Talk of wanting to die  · Neglect of personal appearance   · Rebelliousness- reckless behavior  · Withdrawal from people/activities they love  · Confusion- inability to concentrate     If you or a loved one observes any of these behaviors or has concerns about self-harm, here's what you can do:  · Talk about it- your feelings and reasons for harming yourself  · Remove any means that you might use to hurt yourself (examples: pills, rope, extension cords, firearm)  · Get professional help from the community (Mental Health, Substance Abuse, psychological counseling)  · Do not be alone:Call your Safe Contact- someone whom you trust who will be there for you.  · Call your local CRISIS HOTLINE 491-0736 or 023-057-3149  · Call your local Children's Mobile Crisis Response Team [place] (188) 452-8102 or www.[employer]  · Call the toll free National Suicide Prevention Hotlines   · National Suicide Prevention Lifeline 488-969-QXDC (7891)  · National Hope Line Network 800-SUICIDE (025-9158)      Spinal Fusion, Adult, Care After  This sheet gives you information about how to care for yourself after your procedure. Your health care provider may also give you more specific instructions. If you have problems or questions, contact your health care provider.  What can I expect after the procedure?  After the procedure, it is common to have:  · Back pain and stiffness.  · Pain in the incision area.  Follow these instructions at home:  Medicines  · Take over-the-counter and prescription medicines only as told by your health care provider. These include any pain medicines or blood thinning medicines (anticoagulants).  · If you were prescribed an antibiotic medicine, take it as told by your health care provider. Do not stop taking the antibiotic even if you start to feel better.  · Do not drive for 24 hours if you received a medicine  to help you relax (sedative).  · Do not drive or use heavy machinery while taking prescription pain medicine.  If you have a brace:  · Wear the brace as told by your health care provider. Remove it only as told by your health care provider.  · Keep the brace clean.  Managing pain, stiffness, and swelling  · If directed, put ice on the injured area:  ? If you have a removable brace, remove it as told by your health care provider.  ? Put ice in a plastic bag.  ? Place a towel between your skin and the bag.  ? Leave the ice on for 20 minutes, 2-3 times a day.  Incision care    · Follow instructions from your health care provider about how to take care of your incision. Make sure you:  ? Wash your hands with soap and water before you change your bandage (dressing). If soap and water are not available, use hand .  ? Change your dressing as told by your health care provider.  ? Leave stitches (sutures), skin glue, or adhesive strips in place. These skin closures may need to be in place for 2 weeks or longer. If adhesive strip edges start to loosen and curl up, you may trim the loose edges. Do not remove adhesive strips completely unless your health care provider tells you to do that.  · Keep your incision clean and dry. Do not take baths, swim, or use a hot tub until your health care provider approves.  · Check your incision area every day for signs of infection. Check for:  ? More redness, swelling, or pain.  ? Fluid or blood.  ? Warmth.  ? Pus or a bad smell.  Physical activity  · Rest and protect your back as much as possible.  · Follow instructions from your health care provider about how to move and use good posture to help your spine heal.  · Do not lift anything that is heavier than 8 lb (3.6 kg) or as told by your health care provider.  · Do not twist or bend at the waist until your health care provider approves.  · Avoid:  ? Pushing and pulling motions.  ? Lifting anything over your head.  ? Sitting or  lying down in the same position for long periods of time.  · Do not exercise until your health care provider approves. Once your health care provider has approved exercise, ask him or her what kinds of exercises you can do to make your back stronger (physical therapy).  General instructions  · Wear compression stockings and walk at least every few hours as told by your health care provider. Doing this will help to prevent blood clots and reduce swelling in your legs.  · Do not use any products that contain nicotine or tobacco, such as cigarettes and e-cigarettes. These can delay bone healing. If you need help quitting, ask your health care provider.  · To prevent or treat constipation while you are taking prescription pain medicine, your health care provider may recommend that you:  ? Drink enough fluid to keep your urine clear or pale yellow.  ? Take over-the-counter or prescription medicines.  ? Eat foods that are high in fiber, such as fresh fruits and vegetables, whole grains, and beans.  ? Limit foods that are high in fat and processed sugars, such as fried and sweet foods.  · Keep all follow-up visits as told by your health care provider. This is important.  Contact a health care provider if:  · You have pain that gets worse or does not get better with medicine.  · Your legs or feet become painful or swollen.  · You have more redness, swelling, or pain around your incision.  · You have fluid or blood coming from your incision.  · Your incision feels warm to the touch.  · You have pus or a bad smell coming from your incision.  · You have a fever.  · You vomit or feel nausea.  · You have weakness or numbness in your legs that is new or getting worse.  · You have trouble controlling urination or bowel movements.  Get help right away if:  · You have severe pain.  · You have chest pain.  · You have trouble breathing.  · You develop a cough.  These symptoms may represent a serious problem that is an emergency. Do  not wait to see if the symptoms will go away. Get medical help right away. Call your local emergency services (911 in the [place] Do not drive yourself to the hospital.  Summary  · It is common to have pain at the back and incision area.  · Icing and pain medicines may help to control the pain. Follow directions from your health care provider.  · Rest and protect your back as much as possible. Do not twist or bend at the waist. Get up and walk at least every few hours as told by your health care provider.  This information is not intended to replace advice given to you by your health care provider. Make sure you discuss any questions you have with your health care provider.  Document Released: 07/07/2006 Document Revised: 09/07/2019 Document Reviewed: 12/06/2017  Elsevier Patient Education © 2020 [employer]

## 2021-12-25 NOTE — CARE PLAN
The patient is Stable - Low risk of patient condition declining or worsening    Shift Goals  Clinical Goals: Wean O2; Safety  Patient Goals: Pain Management; Mobility  Family Goals: not present    Progress made toward(s) clinical / shift goals:    Problem: Pain - Standard  Goal: Alleviation of pain or a reduction in pain to the patient’s comfort goal  Outcome: Progressing   Available pain medications reviewed with patient. Pain managed by PRN and scheduled medications. Encouraged to call if pain is no longer managed.     Problem: Fall Risk  Goal: Patient will remain free from falls  Outcome: Progressing   Fall precautions in place. Patient rounded on hourly. Clutter-free environment maintained. Bed alarm on, bed locked and in lowest position. Call light in reach.     Problem: Knowledge Deficit - Standard  Goal: Patient and family/care givers will demonstrate understanding of plan of care, disease process/condition, diagnostic tests and medications  Outcome: Progressing   Plan of care discussed with patient, verbalizes understanding. Encouraged to voice feelings or concerns.

## 2021-12-25 NOTE — DISCHARGE SUMMARY
Discharge Summary    CHIEF COMPLAINT ON ADMISSION  No chief complaint on file.      Reason for Admission  SPINAL STENOSIS OF LUMBAR REGION     Admission Date  12/20/2021    CODE STATUS  Full Code    HPI & HOSPITAL COURSE  This is a 71 y.o. female here with neurogenic claudication, lumbar radiculopathy. Elective spine surgery proceeded without complication.  Postoperatively she was mobilized with therapy & PMR, who recommended SNF. However, SNF unable to accept pt under observation status, per CM pt did not meet inpt criteria. Therefore, pt will be discharged home with home health.   No notes on file    Therefore, she is discharged in good and stable condition to home with home health.     The patient met 2-midnight criteria for an inpatient stay at the time of discharge.    Discharge Date  12/26/21     FOLLOW UP ITEMS POST DISCHARGE  Keep scheduled apt at [facility] neurosurgery    DISCHARGE DIAGNOSES  Principal Problem:    Lumbar stenosis without neurogenic claudication POA: Yes  Resolved Problems:    * No resolved hospital problems. *      FOLLOW UP  No future appointments.  [facility]  [address] [address]  [phone]          MEDICATIONS ON DISCHARGE     Medication List      START taking these medications      Instructions   oxyCODONE-acetaminophen 5-325 MG Tabs  Commonly known as: PERCOCET   Take 1 Tablet by mouth every four hours as needed for up to 7 days.  Dose: 1 Tablet     tizanidine 4 MG Tabs  Commonly known as: ZANAFLEX   Take 1 Tablet by mouth every 6 hours as needed.  Dose: 4 mg        CHANGE how you take these medications      Instructions   * gabapentin 300 MG Caps  What changed: Another medication with the same name was added. Make sure you understand how and when to take each.  Commonly known as: NEURONTIN   Take 300 mg by mouth in the morning, at noon, and at bedtime.  Dose: 300 mg     * gabapentin 400 MG Caps  What changed: You were already taking a  medication with the same name, and this prescription was added. Make sure you understand how and when to take each.  Commonly known as: NEURONTIN   Take 1 Capsule by mouth 4 times a day for 30 days.  Dose: 400 mg         * This list has 2 medication(s) that are the same as other medications prescribed for you. Read the directions carefully, and ask your doctor or other care provider to review them with you.            CONTINUE taking these medications      Instructions   acetaminophen 500 MG Tabs  Commonly known as: TYLENOL   Take 500-1,000 mg by mouth every 6 hours as needed. Indications: Pain  Dose: 500-1,000 mg     atorvastatin 10 MG Tabs  Commonly known as: LIPITOR   Take 10 mg by mouth every day.  Dose: 10 mg     CALCIUM-VITAMIN D PO   Take 1 Tablet by mouth every day.  Dose: 1 Tablet     hydroCHLOROthiazide 25 MG Tabs  Commonly known as: HYDRODIURIL   Take 25 mg by mouth every day.  Dose: 25 mg     omeprazole 40 MG delayed-release capsule  Commonly known as: PRILOSEC   Take 40 mg by mouth every day.  Dose: 40 mg     PROBIOTIC PO   Take 1 Tablet by mouth every day.  Dose: 1 Tablet     CONRADO-C PO   Take 1 Tablet by mouth every day.  Dose: 1 Tablet        STOP taking these medications    Advil Dual Action 125-250 MG Tabs  Generic drug: Ibuprofen-Acetaminophen     WOMENS 50+ MULTI VITAMIN/MIN PO          Rx for percocet 5/325mg 1 tab q4-6hr prn x7d & tizanidine 4mg TID prn x7d [facility] [address] [facility] [place]     reviewed. Low risk per ort, informed consent obtained    Allergies  No Known Allergies    DIET  Orders Placed This Encounter   Procedures   • Diet Order Diet: Regular     Standing Status:   Standing     Number of Occurrences:   1     Order Specific Question:   Diet:     Answer:   Regular [1]       ACTIVITY  Light duty.  Weight bearing as tolerated    CONSULTATIONS       PROCEDURES   OPERATIONS:  1.  Right L1-L2 laminectomy, total facetectomy, extensive decompression of   right L1 nerve  root.  2.  Right L2-L3 partial laminectomy, total facetectomy, extensive   decompression of right L2 nerve root.  3.  Right L3-L4 partial laminectomy, total facetectomy, extensive   decompression of right L3 nerve root.  4.  Fusion L1-L2, L2-L3 and L3-L4 with bilateral posterolateral fusion plus   translumbar interbody fusions.  5.  Translumbar interbody cages x3 (Medtronic Elevate cage).  6.  Harvesting of right iliac crest autograft.  7.  Harvesting and preparation, local bone graft.  8.  Placement of right-sided L1 through L4 segmental pedicle screw   instrumentation, (Medtronic Solera system).  9.  Mazor robotic stereotaxy.     SURGEON:  Aman Cottrell MD       12/20/21    LABORATORY  Lab Results   Component Value Date    SODIUM 134 (L) 12/22/2021    POTASSIUM 3.3 (L) 12/22/2021    CHLORIDE 98 12/22/2021    CO2 28 12/22/2021    GLUCOSE 133 (H) 12/22/2021    BUN 11 12/22/2021    CREATININE 0.63 12/22/2021        Lab Results   Component Value Date    WBC 10.9 (H) 12/22/2021    HEMOGLOBIN 9.1 (L) 12/22/2021    HEMATOCRIT 27.3 (L) 12/22/2021    PLATELETCT 210 12/22/2021        Total time of the discharge process exceeds 30 minutes.

## 2021-12-25 NOTE — PROGRESS NOTES
Neurosurgery Progress Note    Subjective:  No acute events over night   Persistent right ant thigh pain, slightly improved  Mobilizing better      Exam:  AAOx3, fluent speech   PERRL, EOMI   BUE 5/5, sensation intact  RLE IP/quad 5/5 pf, df 5/5 sensation intact   LLE IP/quad/pf/df 5/5 Sensation intact   Incision CDI and flat    BP  Min: 104/51  Max: 115/62  Pulse  Av.3  Min: 77  Max: 87  Resp  Av.3  Min: 15  Max: 17  Temp  Av.9 °C (98.5 °F)  Min: 36.5 °C (97.7 °F)  Max: 37.7 °C (99.9 °F)  SpO2  Av.5 %  Min: 91 %  Max: 94 %    No data recorded                      Intake/Output                       21 07 - 2159 21 07 - 21 0659      7396-1206 Total  6086-0814 Total                 Intake    Total Intake -- -- -- -- -- --       Output    Urine  --  -- --  --  -- --    Number of Times Voided 3 x 1 x 4 x -- -- --    Stool  --  -- --  --  -- --    Number of Times Stooled 1 x -- 1 x -- -- --    Total Output -- -- -- -- -- --       Net I/O     -- -- -- -- -- --          No intake or output data in the 24 hours ending 21 0802         • gabapentin  400 mg 4X/DAY   • tamsulosin  0.4 mg AFTER BREAKFAST   • oxyCODONE immediate-release  5-10 mg Q4HRS PRN   • acetaminophen  650 mg Q4HRS PRN   • morphine injection  2-4 mg Q3HRS PRN   • atorvastatin  10 mg DAILY   • hydroCHLOROthiazide  25 mg DAILY   • lactobacillus rhamnosus  1 Capsule DAILY   • omeprazole  40 mg DAILY   • Pharmacy Consult Request  1 Each PHARMACY TO DOSE   • MD ALERT...DO NOT ADMINISTER NSAIDS or ASPIRIN unless ORDERED By Neurosurgery  1 Each PRN   • docusate sodium  100 mg BID   • senna-docusate  1 Tablet Nightly   • senna-docusate  1 Tablet Q24HRS PRN   • polyethylene glycol/lytes  1 Packet BID PRN   • magnesium hydroxide  30 mL QDAY PRN   • bisacodyl  10 mg Q24HRS PRN   • sodium phosphate  1 Each Once PRN   • ondansetron  4 mg Q4HRS PRN   • ondansetron  4 mg Q4HRS PRN   • methocarbamol   750 mg Q8HRS PRN    Or   • cyclobenzaprine  10 mg Q8HRS PRN    Or   • diazePAM  5 mg Q4HRS PRN   • labetalol  10 mg Q HOUR PRN   • hydrALAZINE  10 mg Q HOUR PRN       Assessment and Plan:  Hospital day #5  POD 5 Right L1-L4 partial lami, L1-4 fusion   Prophylactic anticoagulation: No       Start date/time: TBD     Plan:  Neuro stable   VSS  Ok to shower starting 12/23  PT/OT eval treat  Gabapentin increased, continue pain control, bowel protocol  Chair for all meals, mobilize   Appreciate assistance from CM/SW team with discharge planning    Wean O2    Discussed SNF qualifications vs home with HH. Pt prefers SNF but is agreeable to home with HH PT/OT/RN  HH accepted, orders placed  Rx e-scribed to pharmacy. However, pharmacy hours may vary for the holiday. Anticipate discharge home tomorrow am when she is able to  Rx & HH is set up

## 2021-12-25 NOTE — CARE PLAN
The patient is Stable - Low risk of patient condition declining or worsening    Shift Goals  Clinical Goals: Wean O2  Patient Goals: Safe discharge home  Family Goals: not present    Progress made toward(s) clinical / shift goals:    Problem: Pain - Standard  Goal: Alleviation of pain or a reduction in pain to the patient’s comfort goal  Outcome: Progressing  Note: Patient aimee to rate pain on a 0/10 scale. Pain being controlled with prn pain medication and rest.       Problem: Knowledge Deficit - Standard  Goal: Patient and family/care givers will demonstrate understanding of plan of care, disease process/condition, diagnostic tests and medications  Outcome: Progressing  Note: Patient able to communicate needs effectively. Plan of care updated to patient and on whiteboard. All questions answered at this time.        Patient is not progressing towards the following goals:

## 2021-12-26 PROCEDURE — 700102 HCHG RX REV CODE 250 W/ 637 OVERRIDE(OP): Performed by: PHYSICIAN ASSISTANT

## 2021-12-26 PROCEDURE — 97530 THERAPEUTIC ACTIVITIES: CPT

## 2021-12-26 PROCEDURE — 97535 SELF CARE MNGMENT TRAINING: CPT

## 2021-12-26 PROCEDURE — A9270 NON-COVERED ITEM OR SERVICE: HCPCS | Performed by: PHYSICIAN ASSISTANT

## 2021-12-26 PROCEDURE — A9270 NON-COVERED ITEM OR SERVICE: HCPCS | Performed by: PHYSICAL MEDICINE & REHABILITATION

## 2021-12-26 PROCEDURE — 97116 GAIT TRAINING THERAPY: CPT

## 2021-12-26 PROCEDURE — 700102 HCHG RX REV CODE 250 W/ 637 OVERRIDE(OP): Performed by: NURSE PRACTITIONER

## 2021-12-26 PROCEDURE — G0378 HOSPITAL OBSERVATION PER HR: HCPCS

## 2021-12-26 PROCEDURE — A9270 NON-COVERED ITEM OR SERVICE: HCPCS | Performed by: NURSE PRACTITIONER

## 2021-12-26 PROCEDURE — 700102 HCHG RX REV CODE 250 W/ 637 OVERRIDE(OP): Performed by: PHYSICAL MEDICINE & REHABILITATION

## 2021-12-26 RX ADMIN — GABAPENTIN 400 MG: 400 CAPSULE ORAL at 20:36

## 2021-12-26 RX ADMIN — Medication 1 CAPSULE: at 04:44

## 2021-12-26 RX ADMIN — OXYCODONE 5 MG: 5 TABLET ORAL at 20:36

## 2021-12-26 RX ADMIN — GABAPENTIN 400 MG: 400 CAPSULE ORAL at 09:08

## 2021-12-26 RX ADMIN — OXYCODONE 5 MG: 5 TABLET ORAL at 14:20

## 2021-12-26 RX ADMIN — OXYCODONE 5 MG: 5 TABLET ORAL at 03:31

## 2021-12-26 RX ADMIN — GABAPENTIN 400 MG: 400 CAPSULE ORAL at 14:20

## 2021-12-26 RX ADMIN — GABAPENTIN 400 MG: 400 CAPSULE ORAL at 16:57

## 2021-12-26 RX ADMIN — HYDROCHLOROTHIAZIDE 25 MG: 25 TABLET ORAL at 04:44

## 2021-12-26 RX ADMIN — OXYCODONE 5 MG: 5 TABLET ORAL at 09:08

## 2021-12-26 RX ADMIN — DOCUSATE SODIUM 100 MG: 100 CAPSULE ORAL at 04:44

## 2021-12-26 RX ADMIN — DOCUSATE SODIUM 100 MG: 100 CAPSULE ORAL at 16:56

## 2021-12-26 RX ADMIN — ATORVASTATIN CALCIUM 10 MG: 10 TABLET, FILM COATED ORAL at 04:44

## 2021-12-26 RX ADMIN — OMEPRAZOLE 40 MG: 20 CAPSULE, DELAYED RELEASE ORAL at 04:44

## 2021-12-26 ASSESSMENT — COGNITIVE AND FUNCTIONAL STATUS - GENERAL
HELP NEEDED FOR BATHING: A LITTLE
MOVING TO AND FROM BED TO CHAIR: A LITTLE
SUGGESTED CMS G CODE MODIFIER MOBILITY: CK
CLIMB 3 TO 5 STEPS WITH RAILING: A LOT
MOVING FROM LYING ON BACK TO SITTING ON SIDE OF FLAT BED: A LITTLE
DRESSING REGULAR LOWER BODY CLOTHING: A LITTLE
TOILETING: A LITTLE
SUGGESTED CMS G CODE MODIFIER DAILY ACTIVITY: CJ
DAILY ACTIVITIY SCORE: 21
MOBILITY SCORE: 19
TURNING FROM BACK TO SIDE WHILE IN FLAT BAD: A LITTLE

## 2021-12-26 ASSESSMENT — PAIN SCALES - PAIN ASSESSMENT IN ADVANCED DEMENTIA (PAINAD)
BODYLANGUAGE: RELAXED
BREATHING: NORMAL
CONSOLABILITY: NO NEED TO CONSOLE
FACIALEXPRESSION: SMILING OR INEXPRESSIVE
TOTALSCORE: 0
TOTALSCORE: 0
FACIALEXPRESSION: SMILING OR INEXPRESSIVE
BREATHING: NORMAL
CONSOLABILITY: NO NEED TO CONSOLE
BODYLANGUAGE: RELAXED

## 2021-12-26 ASSESSMENT — PAIN DESCRIPTION - PAIN TYPE
TYPE: SURGICAL PAIN
TYPE: ACUTE PAIN;SURGICAL PAIN
TYPE: SURGICAL PAIN

## 2021-12-26 ASSESSMENT — GAIT ASSESSMENTS
DEVIATION: BRADYKINETIC;DECREASED TOE OFF
DISTANCE (FEET): 80
ASSISTIVE DEVICE: FRONT WHEEL WALKER

## 2021-12-26 NOTE — THERAPY
"Occupational Therapy  Daily Treatment     Patient Name: Ariana Drake  Age:  71 y.o., Sex:  female  Medical Record #: [id number]  Today's Date: 12/26/2021     Precautions  Precautions: Fall Risk,Spinal / Back Precautions   Comments: no brace    Assessment    Pt requested to be seen again by OT as the patient reports she doesn't feel ready to return home independently. Pt with limited standing tolerance and heavy reliance on raised-toilet seat with arm rests during toilet transfers. Pt completed LB dressing, brief grooming in stance and toileting without physical assist. Pt verbalized understanding of spinal precautions. Pt limited by pain and decreased activity tolerance.     Patient has achieved highest practical level of function in an acute care setting and will not be actively followed by acute occupational therapy services. Patient would still benefit from post-acute placement for additional rehabilitation services to focus on IADL and community reintegration that is not available in the acute care setting. Pt performs all IADLs independently at baseline and lives alone.     Plan    Patient will not be actively followed for occupational therapy services at this time, however may be seen if requested by physician for 1 more visit within 30 days to address any discharge or equipment needs.     DC Equipment Recommendations: Tub / Shower Seat,Raised Toilet Seat with Arms (reacher )  Discharge Recommendations: Recommend post-acute placement for additional occupational therapy services prior to discharge home    Subjective    \"I don't feel confident going home without someone checking on me.\"      Objective       12/26/21 1301   Vitals   O2 Delivery Device None - Room Air   Pain 0 - 10 Group   Therapist Pain Assessment During Activity;Nurse Notified  (mild to moderate pain with activity)   Balance   Sitting Balance (Static) Fair   Sitting Balance (Dynamic) Fair   Standing Balance (Static) Fair   Standing Balance " (Dynamic) Fair -   Weight Shift Sitting Fair   Weight Shift Standing Fair   Skilled Intervention Verbal Cuing   Comments standing with heavy reliance on FWW   Bed Mobility    Supine to Sit Supervised   Scooting Supervised   Rolling Supervised   Skilled Intervention Verbal Cuing   Comments pt used bed rail to assist with log roll    Activities of Daily Living   Grooming Supervision;Standing  (brush teeth in standing, reports increased pain )   Lower Body Dressing Supervision  (socks, doffed in sitting and donned in supine )   Toileting Supervision   Skilled Intervention Verbal Cuing   Comments pt with limited standing tolerance    How much help from another person does the patient currently need...   Putting on and taking off regular lower body clothing? 3   Bathing (including washing, rinsing, and drying)? 3   Toileting, which includes using a toilet, bedpan, or urinal? 3   Putting on and taking off regular upper body clothing? 4   Taking care of personal grooming such as brushing teeth? 4   Eating meals? 4   6 Clicks Daily Activity Score 21   Functional Mobility   Sit to Stand Supervised   Bed, Chair, Wheelchair Transfer Supervised   Toilet Transfers Supervised  (needed to use arm rests on BSC to assist with standing )   Mobility walked in room with FWW   Patient / Family Goals   Patient / Family Goal #1 to be more confident before going home    Short Term Goals   Short Term Goal # 1 pt will demo toilet txf with supv   Goal Outcome # 1 Goal met  (with use of BSC with arm rests over the toilet )   Short Term Goal # 2 pt will dress LB with supv and AE prn   Goal Outcome # 2 Goal met   Short Term Goal # 3 pt will groom in stance at the sink with supv   Goal Outcome # 3 Goal met  (brief grooming )   Education Group   Education Provided Back Safety   Role of Occupational Therapist Patient Response Patient;Acceptance;Explanation;Verbal Demonstration   Back Safety Patient Response  Patient;Acceptance;Explanation;Demonstration;Verbal Demonstration;Action Demonstration   ADL Patient Response Patient;Acceptance;Explanation;Demonstration;Verbal Demonstration;Action Demonstration

## 2021-12-26 NOTE — CARE PLAN
Problem: Pain - Standard  Goal: Alleviation of pain or a reduction in pain to the patient’s comfort goal  Outcome: Progressing  Note:  Patient educated on 0-10 pain scale, and non-pharmacological pain control. Encouraged to verbalize and contact staff when in pain.  Administered PRN medication as needed.       Problem: Knowledge Deficit - Standard  Goal: Patient and family/care givers will demonstrate understanding of plan of care, disease process/condition, diagnostic tests and medications  Outcome: Progressing  Note: Discussed POC with patient   The patient is Stable - Low risk of patient condition declining or worsening    Shift Goals  Clinical Goals: Wean O2  Patient Goals: Rest, discharge  Family Goals: not present    Progress made toward(s) clinical / shift goals:  Patient able to rest through the night, patient able to verbalize understanding of POC, all questions and concerns answered at this time.

## 2021-12-26 NOTE — PROGRESS NOTES
Neurosurgery Progress Note    Subjective:  No acute events over night   Persistent right ant thigh pain, slightly improved  Mobilizing better, but still lightheaded and low activity toleratnce        Exam:  AAOx3, fluent speech   PERRL, EOMI   BUE 5/5, sensation intact  RLE IP 5/5 quad 4++/5, poor effort pf, df 5/5 sensation intact   LLE IP/quad/pf/df 5/5 Sensation intact   Incision CDI and flat    BP  Min: 105/65  Max: 108/59  Pulse  Av.8  Min: 76  Max: 84  Resp  Av.3  Min: 16  Max: 18  Temp  Av.9 °C (98.4 °F)  Min: 36.4 °C (97.5 °F)  Max: 37.5 °C (99.5 °F)  SpO2  Av.3 %  Min: 90 %  Max: 95 %    No data recorded                      Intake/Output                       21 0700 - 21 0659 21 07 - 21 0659      Total  Total                 Intake    P.O.  --  -- --  360  -- 360    P.O. -- -- -- 360 -- 360    Total Intake -- -- -- 360 -- 360       Output    Urine  --  -- --  --  -- --    Number of Times Voided 3 x -- 3 x -- -- --    Stool  --  -- --  --  -- --    Number of Times Stooled 1 x 1 x 2 x -- -- --    Total Output -- -- -- -- -- --       Net I/O     -- -- -- 360 -- 360            Intake/Output Summary (Last 24 hours) at 2021 0922  Last data filed at 2021 0843  Gross per 24 hour   Intake 360 ml   Output --   Net 360 ml            • gabapentin  400 mg 4X/DAY   • tamsulosin  0.4 mg AFTER BREAKFAST   • oxyCODONE immediate-release  5-10 mg Q4HRS PRN   • acetaminophen  650 mg Q4HRS PRN   • morphine injection  2-4 mg Q3HRS PRN   • atorvastatin  10 mg DAILY   • hydroCHLOROthiazide  25 mg DAILY   • lactobacillus rhamnosus  1 Capsule DAILY   • omeprazole  40 mg DAILY   • Pharmacy Consult Request  1 Each PHARMACY TO DOSE   • MD ALERT...DO NOT ADMINISTER NSAIDS or ASPIRIN unless ORDERED By Neurosurgery  1 Each PRN   • docusate sodium  100 mg BID   • senna-docusate  1 Tablet Nightly   • senna-docusate  1 Tablet Q24HRS PRN   •  polyethylene glycol/lytes  1 Packet BID PRN   • magnesium hydroxide  30 mL QDAY PRN   • bisacodyl  10 mg Q24HRS PRN   • sodium phosphate  1 Each Once PRN   • ondansetron  4 mg Q4HRS PRN   • ondansetron  4 mg Q4HRS PRN   • methocarbamol  750 mg Q8HRS PRN    Or   • cyclobenzaprine  10 mg Q8HRS PRN    Or   • diazePAM  5 mg Q4HRS PRN   • labetalol  10 mg Q HOUR PRN   • hydrALAZINE  10 mg Q HOUR PRN       Assessment and Plan:  Hospital day #6  POD 6 Right L1-L4 partial lami, L1-4 fusion   Prophylactic anticoagulation: No       Start date/time: TBD     Plan:  Neuro stable   VSS  PT/OT eval treat for HH  Last BM 12/25  Chair for all meals, mobilize   Appreciate assistance from CM/SW team with discharge planning    Discussed discharge planning with the patient.  She is hesitant to go home today with home health.  She states that she lives by herself and she has to take care of 91-year-old boyfriend.  Also she is not sure if she has a ride today.    We are going to have physical therapy reevaluate her today to make sure she is okay to go home safely today.  I have asked her to contact her pharmacy to make sure they are open for her to  prescriptions.    I also discussed with BHAVIK.  Per her, pt might have better chance of getting accepted to SNF if OT also recommends post acute care.  Will hv OT re-eval today    Rx e-scribed to pharmacy [facility] [address]. However, pharmacy hours may vary for the holiday.

## 2021-12-26 NOTE — CARE PLAN
The patient is Stable - Low risk of patient condition declining or worsening    Shift Goals  Clinical Goals: Pain management; D/C Home   Patient Goals: Pain management   Family Goals: n/a    Problem: Pain - Standard  Goal: Alleviation of pain or a reduction in pain to the patient’s comfort goal  Outcome: Progressing     Problem: Fall Risk  Goal: Patient will remain free from falls  Outcome: Progressing       Patient is not progressing towards the following goals:

## 2021-12-26 NOTE — THERAPY
Physical Therapy   Daily Treatment     Patient Name: Ariana Drake  Age:  71 y.o., Sex:  female  Medical Record #: [id number]  Today's Date: 12/26/2021     Precautions  Precautions: Fall Risk;Spinal / Back Precautions   Comments: no brace    Assessment    The pt presents today motivated to participate in therapy, however somewhat anxious regarding dc planning issues.  Pt is currently wishing to go to SNF prior to return home, and concerned about completing functional mobility tasks at home w/ no assist available.  Reviewed spinal precautions w/ the pt, and good carryover was demo'd.  Pt able to perform bed mobility w/ the hob slightly elevated spv w/ extended amount of time to complete the task (pt reports she owns adjustable bed frame).  The pt was instructed on seated LAQ's, ankle pumps, and marching in place in stance w/ fww being able to demo each exercise correctly.  She then performed gait distance 80' using fww CGA via slow marianne and limited to clearance, no LOB observed, distance limited by pt c/o dizziness and requiring 1 seated rest break.  Discussed modifications for pt to make at home for reduced activity tolerance, and she agrees.  Recommend placement at this time given pt's high risk of future falls and limited assist available at dc if this is an option, otherwise recommend pt dc home w/ HH for home safety and further PT needs.  Will continue to follow for increased gait distance and stability.     Plan    Continue current treatment plan.    DC Equipment Recommendations: Unable to determine at this time  Discharge Recommendations: Recommend post-acute placement for additional physical therapy services prior to discharge home      Subjective/Objective       12/26/21 0941   Cognition    Cognition / Consciousness X   Level of Consciousness Alert   Comments pt cooperative, however w/ heightened anxiety concerning dc plan   Passive ROM Lower Body   Passive ROM Lower Body WDL   Active ROM Lower Body     Active ROM Lower Body  WDL   Strength Lower Body   Lower Body Strength  X   Comments generalized weakness BLE, functional for mobility   Sensation Lower Body   Lower Extremity Sensation   WDL   Sitting Lower Body Exercises   Sitting Lower Body Exercises Yes   Ankle Pumps 1 set of 10;Bilateral   Long Arc Quad 1 set of 10;Bilateral   Standing Lower Body Exercises   Standing Lower Body Exercises Yes   Marching 1 set of 10   Neuro-Muscular Treatments   Neuro-Muscular Treatments Anterior weight shift;Weight Shift Right;Weight Shift Left   Comments stand w/ fww   Other Treatments   Other Treatments Provided gait and edu re: home modifications for reduced activity tolerance   Balance   Sitting Balance (Static) Fair +   Sitting Balance (Dynamic) Fair +   Standing Balance (Static) Fair   Standing Balance (Dynamic) Fair   Weight Shift Sitting Good   Weight Shift Standing Fair   Skilled Intervention Verbal Cuing;Tactile Cuing   Comments stand w/ fww   Gait Analysis   Gait Level Of Assist   (CGA)   Assistive Device Front Wheel Walker   Distance (Feet) 80  (1 seated rest break)   # of Times Distance was Traveled 1   Deviation Bradykinetic;Decreased Toe Off   # of Stairs Climbed 0   Weight Bearing Status no restrictions   Skilled Intervention Verbal Cuing;Tactile Cuing   Comments distance limited by pt c/o dizziness   Bed Mobility    Supine to Sit Supervised   Sit to Supine   (pt position up in chair post-)   Scooting Supervised   Rolling Supervised   Skilled Intervention Verbal Cuing   Comments hob elevated (pt owns adjustable bed frame); extended amount of time to complete task   Functional Mobility   Sit to Stand Supervised   Bed, Chair, Wheelchair Transfer Supervised   Transfer Method Stand Step   Mobility STS eob w/ fww   Skilled Intervention Verbal Cuing   Activity Tolerance   Sitting in Chair >20min   Sitting Edge of Bed 5min   Standing 15min total   Patient / Family Goals    Patient / Family Goal #1 To get better   Goal  #1 Outcome Goal not met   Short Term Goals    Short Term Goal # 1 Pt will perform supine <> sit with SPV in 6 visits in order to improve functinoal independence   Goal Outcome # 1 Goal met   Short Term Goal # 2 Pt will perform STS with FWW and SPV in 6 visits in order to initiate OOB mobility   Goal Outcome # 2 Goal met   Short Term Goal # 3 Pt will ambulate 150ft with FWW and SPV in 6 visits in order to ambulate household distances   Goal Outcome # 3 Goal not met   Education Group   Education Provided Role of Physical Therapist;Exercises - Seated;Exercises - Standing   Role of Physical Therapist Patient Response Patient;Acceptance;Explanation;Demonstration;Action Demonstration   Use of Assistive Device Patient Response Patient;Acceptance;Explanation;Demonstration;Action Demonstration   Exercises - Seated Patient Response Patient;Acceptance;Explanation;Demonstration;Action Demonstration   Exercise - Standing Patient Response Patient;Acceptance;Explanation;Demonstration;Action Demonstration   Additional Comments pt receptive of edu provided   Session Information   Date / Session Number  12/26- 3 (3/4, 12/27)

## 2021-12-27 PROCEDURE — A9270 NON-COVERED ITEM OR SERVICE: HCPCS | Performed by: NURSE PRACTITIONER

## 2021-12-27 PROCEDURE — 700102 HCHG RX REV CODE 250 W/ 637 OVERRIDE(OP): Performed by: PHYSICIAN ASSISTANT

## 2021-12-27 PROCEDURE — A9270 NON-COVERED ITEM OR SERVICE: HCPCS | Performed by: PHYSICIAN ASSISTANT

## 2021-12-27 PROCEDURE — 700102 HCHG RX REV CODE 250 W/ 637 OVERRIDE(OP): Performed by: PHYSICAL MEDICINE & REHABILITATION

## 2021-12-27 PROCEDURE — 770001 HCHG ROOM/CARE - MED/SURG/GYN PRIV*

## 2021-12-27 PROCEDURE — A9270 NON-COVERED ITEM OR SERVICE: HCPCS | Performed by: PHYSICAL MEDICINE & REHABILITATION

## 2021-12-27 PROCEDURE — 700102 HCHG RX REV CODE 250 W/ 637 OVERRIDE(OP): Performed by: NURSE PRACTITIONER

## 2021-12-27 RX ADMIN — OXYCODONE 5 MG: 5 TABLET ORAL at 17:16

## 2021-12-27 RX ADMIN — Medication 1 CAPSULE: at 05:47

## 2021-12-27 RX ADMIN — DOCUSATE SODIUM 50 MG AND SENNOSIDES 8.6 MG 1 TABLET: 8.6; 5 TABLET, FILM COATED ORAL at 20:24

## 2021-12-27 RX ADMIN — OXYCODONE 5 MG: 5 TABLET ORAL at 02:50

## 2021-12-27 RX ADMIN — GABAPENTIN 400 MG: 400 CAPSULE ORAL at 08:40

## 2021-12-27 RX ADMIN — TAMSULOSIN HYDROCHLORIDE 0.4 MG: 0.4 CAPSULE ORAL at 08:40

## 2021-12-27 RX ADMIN — DOCUSATE SODIUM 100 MG: 100 CAPSULE ORAL at 17:16

## 2021-12-27 RX ADMIN — GABAPENTIN 400 MG: 400 CAPSULE ORAL at 12:38

## 2021-12-27 RX ADMIN — ATORVASTATIN CALCIUM 10 MG: 10 TABLET, FILM COATED ORAL at 05:47

## 2021-12-27 RX ADMIN — HYDROCHLOROTHIAZIDE 25 MG: 25 TABLET ORAL at 05:46

## 2021-12-27 RX ADMIN — OXYCODONE 5 MG: 5 TABLET ORAL at 12:38

## 2021-12-27 RX ADMIN — GABAPENTIN 400 MG: 400 CAPSULE ORAL at 17:16

## 2021-12-27 RX ADMIN — OMEPRAZOLE 40 MG: 20 CAPSULE, DELAYED RELEASE ORAL at 05:47

## 2021-12-27 RX ADMIN — GABAPENTIN 400 MG: 400 CAPSULE ORAL at 20:24

## 2021-12-27 RX ADMIN — OXYCODONE 5 MG: 5 TABLET ORAL at 22:29

## 2021-12-27 RX ADMIN — OXYCODONE 5 MG: 5 TABLET ORAL at 07:30

## 2021-12-27 ASSESSMENT — PAIN DESCRIPTION - PAIN TYPE
TYPE: SURGICAL PAIN;CHRONIC PAIN
TYPE: ACUTE PAIN
TYPE: ACUTE PAIN
TYPE: SURGICAL PAIN;CHRONIC PAIN
TYPE: ACUTE PAIN
TYPE: CHRONIC PAIN;SURGICAL PAIN

## 2021-12-27 ASSESSMENT — PAIN SCALES - PAIN ASSESSMENT IN ADVANCED DEMENTIA (PAINAD)
CONSOLABILITY: NO NEED TO CONSOLE
TOTALSCORE: 0
BODYLANGUAGE: RELAXED
FACIALEXPRESSION: SMILING OR INEXPRESSIVE
BREATHING: NORMAL

## 2021-12-27 NOTE — DISCHARGE PLANNING
Agency/Facility Name: [facility]  Spoke To: Mauricio  Outcome: Advised of pt DC for 12/28. Will see pt within 48 hours of DC      CM informed

## 2021-12-27 NOTE — PROGRESS NOTES
Neurosurgery Progress Note    Subjective:  No acute events over night         Exam:  BLE grossly intact some giveaway to R IP/Quad  CDI  BP  Min: 98/62  Max: 114/62  Pulse  Av.8  Min: 72  Max: 84  Resp  Av  Min: 15  Max: 18  Temp  Av.5 °C (97.7 °F)  Min: 36.3 °C (97.3 °F)  Max: 36.9 °C (98.5 °F)  SpO2  Av %  Min: 92 %  Max: 95 %    No data recorded                      Intake/Output                       21 0700 - 21 0659 21 0700 - 21 0659     1900-0659 Total 1900-0659 Total                 Intake    P.O.  1160  -- 1160  --  -- --    P.O. 1160 -- 1160 -- -- --    Total Intake 1160 -- 1160 -- -- --       Output    Urine  --  -- --  --  -- --    Number of Times Voided 1 x -- 1 x -- -- --    Stool  --  -- --  --  -- --    Number of Times Stooled -- 1 x 1 x -- -- --    Total Output -- -- -- -- -- --       Net I/O     1160 -- 1160 -- -- --            Intake/Output Summary (Last 24 hours) at 2021 0958  Last data filed at 2021 1756  Gross per 24 hour   Intake 800 ml   Output --   Net 800 ml            • gabapentin  400 mg 4X/DAY   • tamsulosin  0.4 mg AFTER BREAKFAST   • oxyCODONE immediate-release  5-10 mg Q4HRS PRN   • acetaminophen  650 mg Q4HRS PRN   • morphine injection  2-4 mg Q3HRS PRN   • atorvastatin  10 mg DAILY   • hydroCHLOROthiazide  25 mg DAILY   • lactobacillus rhamnosus  1 Capsule DAILY   • omeprazole  40 mg DAILY   • Pharmacy Consult Request  1 Each PHARMACY TO DOSE   • MD ALERT...DO NOT ADMINISTER NSAIDS or ASPIRIN unless ORDERED By Neurosurgery  1 Each PRN   • docusate sodium  100 mg BID   • senna-docusate  1 Tablet Nightly   • senna-docusate  1 Tablet Q24HRS PRN   • polyethylene glycol/lytes  1 Packet BID PRN   • magnesium hydroxide  30 mL QDAY PRN   • bisacodyl  10 mg Q24HRS PRN   • sodium phosphate  1 Each Once PRN   • ondansetron  4 mg Q4HRS PRN   • ondansetron  4 mg Q4HRS PRN   • methocarbamol  750 mg Q8HRS PRN    Or   •  cyclobenzaprine  10 mg Q8HRS PRN    Or   • diazePAM  5 mg Q4HRS PRN   • labetalol  10 mg Q HOUR PRN   • hydrALAZINE  10 mg Q HOUR PRN       Assessment and Plan:  Hospital day #7  POD 7 Right L1-L4 partial lami, L1-4 fusion   Prophylactic anticoagulation: No       Start date/time: TBD     Plan:  [facility] SNF not an option as patient not meeting inpt criteria, beyond that would need to be IP for 3 days prior to admission to SNF. Unsure how this was presented as an option.  Discussed with patient, will have PTOT clear for home today- if cleared home rayo    Rx [facility]scribed to [facility] [address]. However, pharmacy hours may vary for the holiday.

## 2021-12-27 NOTE — CARE PLAN
Problem: Pain - Standard  Goal: Alleviation of pain or a reduction in pain to the patient’s comfort goal  Outcome: Progressing     Problem: Fall Risk  Goal: Patient will remain free from falls  Outcome: Progressing   Treaded socks in place, bed in the lowest position, call light and belongings within reach, pt call for assistance appropriately   Problem: Knowledge Deficit - Standard  Goal: Patient and family/care givers will demonstrate understanding of plan of care, disease process/condition, diagnostic tests and medications  Outcome: Progressing  Updated pt. On POC, no questions or concerns at this time   The patient is Stable - Low risk of patient condition declining or worsening    Shift Goals  Clinical Goals: Pain control  Patient Goals: Pain control, rest  Family Goals: n/a    Progress made toward(s) clinical / shift goals:  Mobility    Patient is not progressing towards the following goals:

## 2021-12-27 NOTE — CARE PLAN
Problem: Pain - Standard  Goal: Alleviation of pain or a reduction in pain to the patient’s comfort goal  Outcome: Progressing  Note:  Patient educated on 0-10 pain scale, and non-pharmacological pain control. Encouraged to verbalize and contact staff when in pain.  Administered PRN medication as needed.       Problem: Fall Risk  Goal: Patient will remain free from falls  Outcome: Progressing  Note:  Verified bed is locked and in lowest position, hourly rounding in place, bed alarm on, call light with in reach, slip socks on, educated patient on use of call light for assistance.     The patient is Stable - Low risk of patient condition declining or worsening    Shift Goals  Clinical Goals: Pain control  Patient Goals: Pain control, rest  Family Goals: n/a    Progress made toward(s) clinical / shift goals: Patient using call light appropriately through the night for assistance, patient able to rest through the night with current medication regimen.

## 2021-12-27 NOTE — DISCHARGE PLANNING
Anticipated Discharge Disposition: Likely home with home health.     Action: Patient remains under observation status; SNFs will not be able to accept the patient unless she is inpatient. Patient does not currently meet criteria for inpatient. PT/OT continue to recommend post acute placement. Patient declined by acute rehab. Patient has been accepted by [facility] for . Discussed barriers with medical providers with plan to keep patient in-house until safe to discharge home.     Barriers to Discharge: Observation status.     Plan: CM to continue to follow for discharge needs.

## 2021-12-28 VITALS
HEART RATE: 75 BPM | BODY MASS INDEX: 24.54 KG/M2 | RESPIRATION RATE: 15 BRPM | HEIGHT: 62 IN | DIASTOLIC BLOOD PRESSURE: 70 MMHG | SYSTOLIC BLOOD PRESSURE: 107 MMHG | TEMPERATURE: 98 F | WEIGHT: 133.38 LBS | OXYGEN SATURATION: 93 %

## 2021-12-28 PROCEDURE — A9270 NON-COVERED ITEM OR SERVICE: HCPCS | Performed by: PHYSICIAN ASSISTANT

## 2021-12-28 PROCEDURE — A9270 NON-COVERED ITEM OR SERVICE: HCPCS | Performed by: PHYSICAL MEDICINE & REHABILITATION

## 2021-12-28 PROCEDURE — 700102 HCHG RX REV CODE 250 W/ 637 OVERRIDE(OP): Performed by: PHYSICAL MEDICINE & REHABILITATION

## 2021-12-28 PROCEDURE — 700102 HCHG RX REV CODE 250 W/ 637 OVERRIDE(OP): Performed by: PHYSICIAN ASSISTANT

## 2021-12-28 PROCEDURE — A9270 NON-COVERED ITEM OR SERVICE: HCPCS | Performed by: NURSE PRACTITIONER

## 2021-12-28 PROCEDURE — 700102 HCHG RX REV CODE 250 W/ 637 OVERRIDE(OP): Performed by: NURSE PRACTITIONER

## 2021-12-28 RX ADMIN — OMEPRAZOLE 40 MG: 20 CAPSULE, DELAYED RELEASE ORAL at 04:19

## 2021-12-28 RX ADMIN — OXYCODONE 5 MG: 5 TABLET ORAL at 09:19

## 2021-12-28 RX ADMIN — HYDROCHLOROTHIAZIDE 25 MG: 25 TABLET ORAL at 04:19

## 2021-12-28 RX ADMIN — DOCUSATE SODIUM 100 MG: 100 CAPSULE ORAL at 04:19

## 2021-12-28 RX ADMIN — GABAPENTIN 400 MG: 400 CAPSULE ORAL at 09:19

## 2021-12-28 RX ADMIN — ATORVASTATIN CALCIUM 10 MG: 10 TABLET, FILM COATED ORAL at 04:19

## 2021-12-28 RX ADMIN — POLYETHYLENE GLYCOL 3350 1 PACKET: 17 POWDER, FOR SOLUTION ORAL at 04:19

## 2021-12-28 RX ADMIN — Medication 1 CAPSULE: at 04:19

## 2021-12-28 RX ADMIN — METHOCARBAMOL 750 MG: 750 TABLET ORAL at 04:21

## 2021-12-28 RX ADMIN — OXYCODONE 5 MG: 5 TABLET ORAL at 03:35

## 2021-12-28 ASSESSMENT — PAIN DESCRIPTION - PAIN TYPE
TYPE: SURGICAL PAIN;CHRONIC PAIN
TYPE: SURGICAL PAIN;CHRONIC PAIN
TYPE: SURGICAL PAIN

## 2021-12-28 NOTE — PROGRESS NOTES
Neurosurgery Progress Note    Subjective:  No acute events over night         Exam:  Ambulating halls with FWW  CDI  BP  Min: 91/53  Max: 121/64  Pulse  Av.3  Min: 70  Max: 95  Resp  Avg: 15.8  Min: 15  Max: 16  Temp  Av.5 °C (97.7 °F)  Min: 36.3 °C (97.4 °F)  Max: 36.7 °C (98 °F)  SpO2  Av.8 %  Min: 91 %  Max: 96 %    No data recorded                      Intake/Output                       21 07 - 21 0659 21 - 21 0659      Total  Total                 Intake    Total Intake -- -- -- -- -- --       Output    Urine  --  -- --  --  -- --    Number of Times Voided -- 2 x 2 x -- -- --    Stool  --  -- --  --  -- --    Number of Times Stooled 0 x -- 0 x -- -- --    Total Output -- -- -- -- -- --       Net I/O     -- -- -- -- -- --          No intake or output data in the 24 hours ending 21 0938         • gabapentin  400 mg 4X/DAY   • tamsulosin  0.4 mg AFTER BREAKFAST   • oxyCODONE immediate-release  5-10 mg Q4HRS PRN   • acetaminophen  650 mg Q4HRS PRN   • morphine injection  2-4 mg Q3HRS PRN   • atorvastatin  10 mg DAILY   • hydroCHLOROthiazide  25 mg DAILY   • lactobacillus rhamnosus  1 Capsule DAILY   • omeprazole  40 mg DAILY   • Pharmacy Consult Request  1 Each PHARMACY TO DOSE   • MD ALERT...DO NOT ADMINISTER NSAIDS or ASPIRIN unless ORDERED By Neurosurgery  1 Each PRN   • docusate sodium  100 mg BID   • senna-docusate  1 Tablet Nightly   • senna-docusate  1 Tablet Q24HRS PRN   • polyethylene glycol/lytes  1 Packet BID PRN   • magnesium hydroxide  30 mL QDAY PRN   • bisacodyl  10 mg Q24HRS PRN   • sodium phosphate  1 Each Once PRN   • ondansetron  4 mg Q4HRS PRN   • ondansetron  4 mg Q4HRS PRN   • methocarbamol  750 mg Q8HRS PRN    Or   • cyclobenzaprine  10 mg Q8HRS PRN    Or   • diazePAM  5 mg Q4HRS PRN   • labetalol  10 mg Q HOUR PRN   • hydrALAZINE  10 mg Q HOUR PRN       Assessment and Plan:  Hospital day #8  POD 7  Right L1-L4 partial lami, L1-4 fusion   Prophylactic anticoagulation: No       Start date/time: TBD     Plan:  DC home with HH

## 2021-12-28 NOTE — CARE PLAN
The patient is Stable - Low risk of patient condition declining or worsening    Shift Goals  Clinical Goals: mobility, BM, pain management  Patient Goals: pain control, comfort  Family Goals: n/a    Progress made toward(s) clinical / shift goals:    Problem: Pain - Standard  Goal: Alleviation of pain or a reduction in pain to the patient’s comfort goal  Outcome: Progressing   Medicated per MAR with relief. Non pharmacological comfort measure include rest, repositioning.    Problem: Bowel Elimination  Goal: Establish and maintain regular bowel function  Outcome: Progressing   Last BM 12/25, has been refusing scheduled stool softener. Educated on importance of taking these medications. Verbalized understanding, took scheduled stool softener tonight.    Problem: Mobility  Goal: Patient's capacity to carry out activities will improve  Outcome: Progressing   Able to mobilize with FWW, stand by assist, tolerated activity well.    Patient is not progressing towards the following goals:

## 2021-12-28 NOTE — CARE PLAN
The patient is Stable - Low risk of patient condition declining or worsening    Shift Goals  Clinical Goals: pain control, mobilize  Patient Goals: go home  Family Goals: n/a    Progress made toward(s) clinical / shift goals:    Problem: Pain - Standard  Goal: Alleviation of pain or a reduction in pain to the patient’s comfort goal  Outcome: Progressing     Problem: Fall Risk  Goal: Patient will remain free from falls  Outcome: Progressing     Problem: Knowledge Deficit - Standard  Goal: Patient and family/care givers will demonstrate understanding of plan of care, disease process/condition, diagnostic tests and medications  Outcome: Progressing     Problem: Lumbar/Thoracic Spine Surgery  Goal: Post-Operative Lumbar/Thoracic Spine Surgery: Patient will achieve optimal post-surgical outcomes  Outcome: Progressing     Problem: Discharge Barriers/Planning  Goal: Patient's continuum of care needs are met  Outcome: Progressing     Problem: Urinary Elimination  Goal: Establish and maintain regular urinary output  Outcome: Progressing     Problem: Mobility  Goal: Patient's capacity to carry out activities will improve  Outcome: Progressing     Problem: Self Care  Goal: Patient will have the ability to perform ADLs independently or with assistance (bathe, groom, dress, toilet and feed)  Outcome: Progressing     Problem: Wound/ / Incision Healing  Goal: Patient's wound/surgical incision will decrease in size and heals properly  Outcome: Progressing       Patient is not progressing towards the following goals:

## 2022-08-30 ENCOUNTER — OFFICE VISIT (OUTPATIENT)
Dept: URGENT CARE | Facility: PHYSICIAN GROUP | Age: 72
End: 2022-08-30
Payer: MEDICARE

## 2022-08-30 ENCOUNTER — HOSPITAL ENCOUNTER (OUTPATIENT)
Facility: MEDICAL CENTER | Age: 72
End: 2022-08-30
Attending: FAMILY MEDICINE
Payer: MEDICARE

## 2022-08-30 VITALS
WEIGHT: 135 LBS | RESPIRATION RATE: 18 BRPM | BODY MASS INDEX: 24.84 KG/M2 | HEART RATE: 107 BPM | OXYGEN SATURATION: 98 % | TEMPERATURE: 98.1 F | HEIGHT: 62 IN | SYSTOLIC BLOOD PRESSURE: 120 MMHG | DIASTOLIC BLOOD PRESSURE: 64 MMHG

## 2022-08-30 DIAGNOSIS — R31.0 GROSS HEMATURIA: ICD-10-CM

## 2022-08-30 LAB
APPEARANCE UR: NORMAL
BILIRUB UR STRIP-MCNC: NORMAL MG/DL
COLOR UR AUTO: NORMAL
GLUCOSE UR STRIP.AUTO-MCNC: NEGATIVE MG/DL
KETONES UR STRIP.AUTO-MCNC: NORMAL MG/DL
LEUKOCYTE ESTERASE UR QL STRIP.AUTO: NORMAL
NITRITE UR QL STRIP.AUTO: POSITIVE
PH UR STRIP.AUTO: 6 [PH] (ref 5–8)
PROT UR QL STRIP: >=300 MG/DL
RBC UR QL AUTO: NORMAL
SP GR UR STRIP.AUTO: >=1.03
UROBILINOGEN UR STRIP-MCNC: 1 MG/DL

## 2022-08-30 PROCEDURE — 99203 OFFICE O/P NEW LOW 30 MIN: CPT | Performed by: FAMILY MEDICINE

## 2022-08-30 PROCEDURE — 87086 URINE CULTURE/COLONY COUNT: CPT

## 2022-08-30 PROCEDURE — 87077 CULTURE AEROBIC IDENTIFY: CPT

## 2022-08-30 PROCEDURE — 81002 URINALYSIS NONAUTO W/O SCOPE: CPT | Performed by: FAMILY MEDICINE

## 2022-08-30 PROCEDURE — 87186 SC STD MICRODIL/AGAR DIL: CPT

## 2022-08-30 RX ORDER — CEFDINIR 300 MG/1
300 CAPSULE ORAL 2 TIMES DAILY
Qty: 10 CAPSULE | Refills: 0 | Status: SHIPPED | OUTPATIENT
Start: 2022-08-30 | End: 2022-09-04

## 2022-08-30 NOTE — PROGRESS NOTES
Subjective     Ariana Drake is a 72 y.o. female who presents with UTI (Blood in urine)    - This is a pleasant and nontoxic appearing 72 y.o. female who has come to the walk-in clinic today for:    #1) 1 day w/ blood in urine. Some dysuria/freq as well. No NVFC      ALLERGIES:  Patient has no known allergies.     PMH:  Past Medical History:   Diagnosis Date    Asthma     Bowel habit changes     diarrhea    Heart burn     High cholesterol     Hypertension     Other acute back pain         PSH:  Past Surgical History:   Procedure Laterality Date    FUSION, SPINE, LUMBAR, ROBOT-ASSISTED WITH IMAGING GUIDANCE N/A 12/20/2021    Procedure: FUSION, SPINE, LUMBAR, ROBOT-ASSISTED WITH IMAGING GUIDANCE - L1-4 TRANSFORAMINAL LUMBAR INTERBODY FUSION WITH TOTAL FACTECTOMY;  Surgeon: Aman Cottrell M.D.;  Location: SURGERY [facility];  Service: Neurosurgery    VAGINAL HYSTERECTOMY TOTAL  1985       MEDS:    Current Outpatient Medications:     cefdinir (OMNICEF) 300 MG Cap, Take 1 Capsule by mouth 2 times a day for 5 days., Disp: 10 Capsule, Rfl: 0    Ascorbic Acid (CONRADO-C PO), Take 1 Tablet by mouth every day., Disp: , Rfl:     Probiotic Product (PROBIOTIC PO), Take 1 Tablet by mouth every day., Disp: , Rfl:     CALCIUM-VITAMIN D PO, Take 1 Tablet by mouth every day., Disp: , Rfl:     atorvastatin (LIPITOR) 10 MG Tab, Take 10 mg by mouth every day., Disp: , Rfl:     gabapentin (NEURONTIN) 300 MG Cap, Take 300 mg by mouth in the morning, at noon, and at bedtime., Disp: , Rfl:     hydroCHLOROthiazide (HYDRODIURIL) 25 MG Tab, Take 25 mg by mouth every day., Disp: , Rfl:     omeprazole (PRILOSEC) 40 MG delayed-release capsule, Take 40 mg by mouth every day., Disp: , Rfl:     tizanidine (ZANAFLEX) 4 MG Tab, Take 1 Tablet by mouth every 6 hours as needed. (Patient not taking: Reported on 8/30/2022), Disp: 30 Tablet, Rfl: 3    acetaminophen (TYLENOL) 500 MG Tab, Take 500-1,000 mg by mouth every 6 hours as needed. Indications: Pain  "(Patient not taking: Reported on 8/30/2022), Disp: , Rfl:     ** I have documented what I find to be significant in regards to past medical, social, family and surgical history  in my HPI or under PMH/PSH/FH review section, otherwise it is noncontributory **           HPI    Review of Systems   Genitourinary:  Positive for dysuria, frequency and hematuria.   All other systems reviewed and are negative.           Objective     /64   Pulse (!) 107   Temp 36.7 °C (98.1 °F) (Temporal)   Resp 18   Ht 1.581 m (5' 2.25\")   Wt 61.2 kg (135 lb)   SpO2 98%   BMI 24.49 kg/m²      Physical Exam  Vitals and nursing note reviewed.   Constitutional:       General: She is not in acute distress.     Appearance: Normal appearance. She is well-developed.   HENT:      Head: Normocephalic.   Cardiovascular:      Heart sounds: Normal heart sounds. No murmur heard.  Pulmonary:      Effort: Pulmonary effort is normal. No respiratory distress.   Abdominal:      Palpations: Abdomen is soft.      Tenderness: There is no abdominal tenderness.   Neurological:      Mental Status: She is alert.      Motor: No abnormal muscle tone.   Psychiatric:         Mood and Affect: Mood normal.         Behavior: Behavior normal.         Assessment & Plan     1. Gross hematuria  POCT Urinalysis    URINE CULTURE(NEW)    cefdinir (OMNICEF) 300 MG Cap          - Dx, plan & d/c instructions discussed   - Rest, stay hydrated, OTC Motrin and/or Tylenol as needed  - E.R. precautions discussed     Asked to kindly follow up with their PCP's office for a recheck in 2 weeks on today's visit, ER if not improving in 2-3 days or if feeling/getting worse. If you do not have a primary care doctor and need to schedule one you may call [facility] at [phone] to do this.    Patient left in stable condition     POCT results reviewed/discussed          "

## 2022-08-31 DIAGNOSIS — R31.0 GROSS HEMATURIA: ICD-10-CM

## 2022-08-31 LAB — AMBIGUOUS DTTM AMBI4: NORMAL

## 2022-09-02 LAB
BACTERIA UR CULT: ABNORMAL
BACTERIA UR CULT: ABNORMAL
SIGNIFICANT IND 70042: ABNORMAL
SITE SITE: ABNORMAL
SOURCE SOURCE: ABNORMAL

## 2022-09-13 ENCOUNTER — OFFICE VISIT (OUTPATIENT)
Dept: URGENT CARE | Facility: PHYSICIAN GROUP | Age: 72
End: 2022-09-13
Payer: MEDICARE

## 2022-09-13 ENCOUNTER — HOSPITAL ENCOUNTER (OUTPATIENT)
Facility: MEDICAL CENTER | Age: 72
End: 2022-09-13
Attending: PHYSICIAN ASSISTANT
Payer: MEDICARE

## 2022-09-13 VITALS
TEMPERATURE: 98.7 F | HEART RATE: 89 BPM | SYSTOLIC BLOOD PRESSURE: 128 MMHG | RESPIRATION RATE: 16 BRPM | BODY MASS INDEX: 25.21 KG/M2 | DIASTOLIC BLOOD PRESSURE: 66 MMHG | HEIGHT: 62 IN | OXYGEN SATURATION: 98 % | WEIGHT: 137 LBS

## 2022-09-13 DIAGNOSIS — N30.01 ACUTE CYSTITIS WITH HEMATURIA: ICD-10-CM

## 2022-09-13 LAB
APPEARANCE UR: NORMAL
BILIRUB UR STRIP-MCNC: NEGATIVE MG/DL
COLOR UR AUTO: NORMAL
GLUCOSE UR STRIP.AUTO-MCNC: NEGATIVE MG/DL
KETONES UR STRIP.AUTO-MCNC: 15 MG/DL
LEUKOCYTE ESTERASE UR QL STRIP.AUTO: NORMAL
NITRITE UR QL STRIP.AUTO: NEGATIVE
PH UR STRIP.AUTO: 6.5 [PH] (ref 5–8)
PROT UR QL STRIP: 100 MG/DL
RBC UR QL AUTO: NORMAL
SP GR UR STRIP.AUTO: 1.02
UROBILINOGEN UR STRIP-MCNC: 1 MG/DL

## 2022-09-13 PROCEDURE — 99213 OFFICE O/P EST LOW 20 MIN: CPT | Performed by: PHYSICIAN ASSISTANT

## 2022-09-13 PROCEDURE — 87186 SC STD MICRODIL/AGAR DIL: CPT

## 2022-09-13 PROCEDURE — 81002 URINALYSIS NONAUTO W/O SCOPE: CPT | Performed by: PHYSICIAN ASSISTANT

## 2022-09-13 PROCEDURE — 87086 URINE CULTURE/COLONY COUNT: CPT

## 2022-09-13 PROCEDURE — 87077 CULTURE AEROBIC IDENTIFY: CPT

## 2022-09-13 RX ORDER — SULFAMETHOXAZOLE AND TRIMETHOPRIM 800; 160 MG/1; MG/1
1 TABLET ORAL EVERY 12 HOURS
Qty: 6 TABLET | Refills: 0 | Status: SHIPPED | OUTPATIENT
Start: 2022-09-13 | End: 2022-09-16

## 2022-09-13 ASSESSMENT — ENCOUNTER SYMPTOMS
VOMITING: 0
FLANK PAIN: 0
CHILLS: 0
FEVER: 0
NAUSEA: 0
ABDOMINAL PAIN: 0

## 2022-09-13 NOTE — PROGRESS NOTES
"Subjective:   Ariana Drake  is a 72 y.o. female who presents for UTI      UTI  Pertinent negatives include no abdominal pain, chills, fever, nausea, rash or vomiting.     Patient presents urgent care noting symptoms of UTI for the last 48 hours.  SHe was treated for UTI around 10 days ago with cefdinir.  She reports possible incomplete resolution of prior symptoms.  At that time she had hematuria.  Over the last 48 hours she notes dysuria frequency urgency.  She did note slight hematuria this morning.  Denies fevers chills or nausea vomiting.  Denies flank pain or abdominal pain.  Denies history of pyelonephritis but does have remote history of renal lithiasis.  Feels as though symptoms may have been incompletely treated following last course of antibiotics.  Review of chart does show urine culture with Klebsiella with sensitivity to cephalosporins.    Review of Systems   Constitutional:  Negative for chills and fever.   Gastrointestinal:  Negative for abdominal pain, nausea and vomiting.   Genitourinary:  Positive for dysuria, frequency, hematuria and urgency. Negative for flank pain.   Skin:  Negative for rash.     No Known Allergies     Objective:   /66   Pulse 89   Temp 37.1 °C (98.7 °F)   Resp 16   Ht 1.581 m (5' 2.25\")   Wt 62.1 kg (137 lb)   SpO2 98%   BMI 24.86 kg/m²     Physical Exam  Vitals and nursing note reviewed.   Constitutional:       General: She is not in acute distress.     Appearance: She is well-developed. She is not diaphoretic.   HENT:      Head: Normocephalic and atraumatic.      Right Ear: External ear normal.      Left Ear: External ear normal.      Nose: Nose normal.   Eyes:      General: Lids are normal. No scleral icterus.        Right eye: No discharge.         Left eye: No discharge.      Conjunctiva/sclera: Conjunctivae normal.   Pulmonary:      Effort: Pulmonary effort is normal. No respiratory distress.   Musculoskeletal:         General: Normal range of motion.     "  Cervical back: Neck supple.   Skin:     General: Skin is warm and dry.      Coloration: Skin is not pale.      Findings: No erythema.   Neurological:      Mental Status: She is alert and oriented to person, place, and time. She is not disoriented.   Psychiatric:         Speech: Speech normal.         Behavior: Behavior normal.     POCT UA - ++ (see chart)    Urine cx pending    Assessment/Plan:   1. Acute cystitis with hematuria  - sulfamethoxazole-trimethoprim (BACTRIM DS) 800-160 MG tablet; Take 1 Tablet by mouth every 12 hours for 3 days.  Dispense: 6 Tablet; Refill: 0  - URINE CULTURE(NEW); Future  - POCT Urinalysis  Supportive care is reviewed with patient/caregiver - recommend to push PO fluids and electrolytes, nsaids/tylenol, rest, full course of abx, probiotics w/ abx, azo/cran, observe for resolution  Return to clinic with lack of resolution or progression of symptoms.  Will call if change of abx is indicated by urine cx    I have worn an N95 mask, gloves and eye protection for the entire encounter with this patient.     Differential diagnosis, natural history, supportive care, and indications for immediate follow-up discussed.

## 2022-09-15 DIAGNOSIS — N30.01 ACUTE CYSTITIS WITH HEMATURIA: ICD-10-CM

## 2022-10-11 ENCOUNTER — OFFICE VISIT (OUTPATIENT)
Dept: URGENT CARE | Facility: PHYSICIAN GROUP | Age: 72
End: 2022-10-11
Payer: MEDICARE

## 2022-10-11 ENCOUNTER — HOSPITAL ENCOUNTER (OUTPATIENT)
Facility: MEDICAL CENTER | Age: 72
End: 2022-10-11
Payer: MEDICARE

## 2022-10-11 VITALS
RESPIRATION RATE: 14 BRPM | TEMPERATURE: 98.7 F | OXYGEN SATURATION: 95 % | WEIGHT: 134 LBS | HEART RATE: 83 BPM | HEIGHT: 62 IN | BODY MASS INDEX: 24.66 KG/M2 | DIASTOLIC BLOOD PRESSURE: 68 MMHG | SYSTOLIC BLOOD PRESSURE: 142 MMHG

## 2022-10-11 DIAGNOSIS — N30.90 CYSTITIS: ICD-10-CM

## 2022-10-11 LAB
APPEARANCE UR: CLEAR
BILIRUB UR STRIP-MCNC: NEGATIVE MG/DL
COLOR UR AUTO: YELLOW
GLUCOSE UR STRIP.AUTO-MCNC: NEGATIVE MG/DL
KETONES UR STRIP.AUTO-MCNC: NORMAL MG/DL
LEUKOCYTE ESTERASE UR QL STRIP.AUTO: NORMAL
NITRITE UR QL STRIP.AUTO: NEGATIVE
PH UR STRIP.AUTO: 5.5 [PH] (ref 5–8)
PROT UR QL STRIP: NEGATIVE MG/DL
RBC UR QL AUTO: NORMAL
SP GR UR STRIP.AUTO: 1.02
UROBILINOGEN UR STRIP-MCNC: 0.2 MG/DL

## 2022-10-11 PROCEDURE — 99213 OFFICE O/P EST LOW 20 MIN: CPT

## 2022-10-11 PROCEDURE — 87077 CULTURE AEROBIC IDENTIFY: CPT

## 2022-10-11 PROCEDURE — 87086 URINE CULTURE/COLONY COUNT: CPT

## 2022-10-11 PROCEDURE — 81002 URINALYSIS NONAUTO W/O SCOPE: CPT

## 2022-10-11 RX ORDER — SULFAMETHOXAZOLE AND TRIMETHOPRIM 800; 160 MG/1; MG/1
1 TABLET ORAL 2 TIMES DAILY
Qty: 10 TABLET | Refills: 0 | Status: SHIPPED | OUTPATIENT
Start: 2022-10-11 | End: 2022-10-16

## 2022-10-12 DIAGNOSIS — N30.90 CYSTITIS: ICD-10-CM

## 2022-10-12 LAB — AMBIGUOUS DTTM AMBI4: NORMAL

## 2022-10-12 NOTE — PROGRESS NOTES
Subjective:   Ariana Drake is a 72 y.o. female who presents for UTI (Blood in urine, consistent burning x4 days )      HPI:  Patient presents to urgent care with complaints of dysuria, hematuria, urinary frequency. Symptoms started four days ago. She has increased her clear fluid intake without much improvement in her symptoms. Denies fever, chills, night sweats, nausea, vomiting, diarrhea.    ROS as above     Medications:    Current Outpatient Medications on File Prior to Visit   Medication Sig Dispense Refill    Ascorbic Acid (CONRADO-C PO) Take 1 Tablet by mouth every day.      Probiotic Product (PROBIOTIC PO) Take 1 Tablet by mouth every day.      CALCIUM-VITAMIN D PO Take 1 Tablet by mouth every day.      atorvastatin (LIPITOR) 10 MG Tab Take 10 mg by mouth every day.      gabapentin (NEURONTIN) 300 MG Cap Take 300 mg by mouth in the morning, at noon, and at bedtime.      hydroCHLOROthiazide (HYDRODIURIL) 25 MG Tab Take 25 mg by mouth every day.      omeprazole (PRILOSEC) 40 MG delayed-release capsule Take 40 mg by mouth every day.      tizanidine (ZANAFLEX) 4 MG Tab Take 1 Tablet by mouth every 6 hours as needed. (Patient not taking: No sig reported) 30 Tablet 3    acetaminophen (TYLENOL) 500 MG Tab Take 500-1,000 mg by mouth every 6 hours as needed. Indications: Pain (Patient not taking: Reported on 8/30/2022)       No current facility-administered medications on file prior to visit.        Allergies:   Patient has no known allergies.    Problem List:   Patient Active Problem List   Diagnosis    Lumbar stenosis without neurogenic claudication        Surgical History:  Past Surgical History:   Procedure Laterality Date    FUSION, SPINE, LUMBAR, ROBOT-ASSISTED WITH IMAGING GUIDANCE N/A 12/20/2021    Procedure: FUSION, SPINE, LUMBAR, ROBOT-ASSISTED WITH IMAGING GUIDANCE - L1-4 TRANSFORAMINAL LUMBAR INTERBODY FUSION WITH TOTAL FACTECTOMY;  Surgeon: Aman Cottrell M.D.;  Location: SURGERY [facility];  Service:  "Neurosurgery    VAGINAL HYSTERECTOMY TOTAL  1985       Past Social Hx:   Social History     Tobacco Use    Smoking status: Never    Smokeless tobacco: Never   Vaping Use    Vaping Use: Never used   Substance Use Topics    Alcohol use: Yes     Alcohol/week: 0.6 oz     Types: 1 Glasses of wine per week     Comment: 1 per night     Drug use: Never          Problem list, medications, and allergies reviewed by myself today in Epic.     Objective:     BP (!) 142/68   Pulse 83   Temp 37.1 °C (98.7 °F) (Temporal)   Resp 14   Ht 1.575 m (5' 2\")   Wt 60.8 kg (134 lb)   SpO2 95%   BMI 24.51 kg/m²     Physical Exam  Vitals and nursing note reviewed.   Constitutional:       Appearance: Normal appearance.   HENT:      Head: Normocephalic and atraumatic.   Eyes:      Conjunctiva/sclera: Conjunctivae normal.      Pupils: Pupils are equal, round, and reactive to light.   Cardiovascular:      Rate and Rhythm: Normal rate and regular rhythm.      Heart sounds: Normal heart sounds.   Pulmonary:      Effort: Pulmonary effort is normal.      Breath sounds: Normal breath sounds.   Abdominal:      General: Bowel sounds are normal.      Palpations: Abdomen is soft.      Tenderness: There is no right CVA tenderness or left CVA tenderness.   Genitourinary:     Vagina: No vaginal discharge.   Neurological:      Mental Status: She is alert and oriented to person, place, and time.     Results for orders placed or performed in visit on 10/11/22   POCT Urinalysis   Result Value Ref Range    POC Color yellow Negative    POC Appearance clear Negative    POC Leukocyte Esterase small Negative    POC Nitrites negative Negative    POC Urobiligen 0.2 Negative (0.2) mg/dL    POC Protein negative Negative mg/dL    POC Urine PH 5.5 5.0 - 8.0    POC Blood moderate Negative    POC Specific Gravity 1.020 <1.005 - >1.030    POC Ketones trace Negative mg/dL    POC Bilirubin negative Negative mg/dL    POC Glucose negative Negative mg/dL "         Assessment/Plan:     Diagnosis and associated orders:   1. Cystitis  - sulfamethoxazole-trimethoprim (BACTRIM DS) 800-160 MG tablet; Take 1 Tablet by mouth 2 times a day for 5 days.  Dispense: 10 Tablet; Refill: 0  - URINE CULTURE(NEW); Future  - POCT Urinalysis        Comments/MDM:     UA abnormal, positive for blood and leukocytes. Will send urine out for culture.   Patient started on Bactrim, reports medication was well tolerated previously.  Will discuss results with patient and modify plan of care accordingly.   She is to increase clear fluid intake, Tylenol for pain/discomfort per package directions  Patient verbalized understanding and consented to plan of care.       Pt is clinically stable at today's acute urgent care visit.  No acute distress noted. Appropriate for outpatient management at this time.       Discussed DDx, management options (risks,benefits, and alternatives to planned treatment), natural progression and supportive care.  Expressed understanding and the treatment plan was agreed upon. Questions were encouraged and answered   Return to urgent care prn if new or worsening sx or if there is no improvement in condition prn.    Educated in Red flags and indications to immediately call 911 or present to the Emergency Department.   Advised the patient to follow-up with the primary care physician for recheck, reevaluation, and consideration of further management.      Please note that this dictation was created using voice recognition software. I have made a reasonable attempt to correct obvious errors, but I expect that there are errors of grammar and possibly content that I did not discover before finalizing the note.    This note was electronically signed by Abiola Foster DNP

## 2022-10-15 LAB
BACTERIA UR CULT: NORMAL
SIGNIFICANT IND 70042: NORMAL
SITE SITE: NORMAL
SOURCE SOURCE: NORMAL

## 2022-10-25 ENCOUNTER — APPOINTMENT (RX ONLY)
Dept: URBAN - METROPOLITAN AREA CLINIC 22 | Facility: CLINIC | Age: 72
Setting detail: DERMATOLOGY
End: 2022-10-25

## 2022-10-25 DIAGNOSIS — L43.8 OTHER LICHEN PLANUS: ICD-10-CM

## 2022-10-25 DIAGNOSIS — Z71.89 OTHER SPECIFIED COUNSELING: ICD-10-CM

## 2022-10-25 PROBLEM — L30.9 DERMATITIS, UNSPECIFIED: Status: ACTIVE | Noted: 2022-10-25

## 2022-10-25 PROCEDURE — ? SUNSCREEN RECOMMENDATIONS

## 2022-10-25 PROCEDURE — 99204 OFFICE O/P NEW MOD 45 MIN: CPT | Mod: 25

## 2022-10-25 PROCEDURE — ? COUNSELING

## 2022-10-25 PROCEDURE — 11102 TANGNTL BX SKIN SINGLE LES: CPT

## 2022-10-25 PROCEDURE — ? PRESCRIPTION

## 2022-10-25 PROCEDURE — ? BIOPSY BY SHAVE METHOD

## 2022-10-25 RX ORDER — HYDROCORTISONE 25 MG/G
CREAM TOPICAL
Qty: 30 | Refills: 0 | Status: ERX | COMMUNITY
Start: 2022-10-25

## 2022-10-25 RX ADMIN — HYDROCORTISONE 1: 25 CREAM TOPICAL at 00:00

## 2022-10-25 ASSESSMENT — LOCATION DETAILED DESCRIPTION DERM: LOCATION DETAILED: RIGHT INFERIOR LATERAL BUCCAL CHEEK

## 2022-10-25 ASSESSMENT — LOCATION SIMPLE DESCRIPTION DERM: LOCATION SIMPLE: RIGHT CHEEK

## 2022-10-25 ASSESSMENT — LOCATION ZONE DERM: LOCATION ZONE: FACE

## 2022-11-03 ENCOUNTER — PATIENT MESSAGE (OUTPATIENT)
Dept: HEALTH INFORMATION MANAGEMENT | Facility: OTHER | Age: 72
End: 2022-11-03

## 2022-11-10 ENCOUNTER — APPOINTMENT (RX ONLY)
Dept: URBAN - METROPOLITAN AREA CLINIC 22 | Facility: CLINIC | Age: 72
Setting detail: DERMATOLOGY
End: 2022-11-10

## 2022-11-10 DIAGNOSIS — L81.4 OTHER MELANIN HYPERPIGMENTATION: ICD-10-CM

## 2022-11-10 DIAGNOSIS — B07.8 OTHER VIRAL WARTS: ICD-10-CM | Status: INADEQUATELY CONTROLLED

## 2022-11-10 DIAGNOSIS — Z71.89 OTHER SPECIFIED COUNSELING: ICD-10-CM

## 2022-11-10 DIAGNOSIS — L82.1 OTHER SEBORRHEIC KERATOSIS: ICD-10-CM

## 2022-11-10 DIAGNOSIS — D22 MELANOCYTIC NEVI: ICD-10-CM

## 2022-11-10 PROBLEM — D22.5 MELANOCYTIC NEVI OF TRUNK: Status: ACTIVE | Noted: 2022-11-10

## 2022-11-10 PROCEDURE — ? PHOTO-DOCUMENTATION

## 2022-11-10 PROCEDURE — ? OBSERVATION

## 2022-11-10 PROCEDURE — 99213 OFFICE O/P EST LOW 20 MIN: CPT | Mod: 25

## 2022-11-10 PROCEDURE — ? TREATMENT REGIMEN

## 2022-11-10 PROCEDURE — ? LIQUID NITROGEN

## 2022-11-10 PROCEDURE — 17110 DESTRUCTION B9 LES UP TO 14: CPT

## 2022-11-10 PROCEDURE — ? COUNSELING

## 2022-11-10 PROCEDURE — ? SUNSCREEN TREATMENT REGIMEN

## 2022-11-10 ASSESSMENT — LOCATION SIMPLE DESCRIPTION DERM
LOCATION SIMPLE: LEFT WRIST
LOCATION SIMPLE: LEFT HAND
LOCATION SIMPLE: RIGHT FOREARM
LOCATION SIMPLE: LEFT FOREARM
LOCATION SIMPLE: LEFT CHEEK
LOCATION SIMPLE: RIGHT CHEEK
LOCATION SIMPLE: RIGHT EYEBROW
LOCATION SIMPLE: LEFT EYEBROW
LOCATION SIMPLE: RIGHT EYELID
LOCATION SIMPLE: RIGHT TEMPLE
LOCATION SIMPLE: RIGHT FOREHEAD
LOCATION SIMPLE: INFERIOR FOREHEAD
LOCATION SIMPLE: LEFT TEMPLE
LOCATION SIMPLE: UPPER BACK
LOCATION SIMPLE: ABDOMEN
LOCATION SIMPLE: RIGHT SUPERIOR EYELID

## 2022-11-10 ASSESSMENT — LOCATION DETAILED DESCRIPTION DERM
LOCATION DETAILED: LEFT VENTRAL PROXIMAL FOREARM
LOCATION DETAILED: LEFT MID TEMPLE
LOCATION DETAILED: LEFT RIB CAGE
LOCATION DETAILED: RIGHT LATERAL SUPERIOR EYELID
LOCATION DETAILED: LEFT VENTRAL DISTAL FOREARM
LOCATION DETAILED: LEFT RADIAL DORSAL HAND
LOCATION DETAILED: LEFT VENTRAL WRIST
LOCATION DETAILED: RIGHT MID TEMPLE
LOCATION DETAILED: INFERIOR THORACIC SPINE
LOCATION DETAILED: RIGHT LATERAL CANTHUS
LOCATION DETAILED: RIGHT VENTRAL PROXIMAL FOREARM
LOCATION DETAILED: PERIUMBILICAL SKIN
LOCATION DETAILED: SUPERIOR THORACIC SPINE
LOCATION DETAILED: LEFT INFERIOR CENTRAL MALAR CHEEK
LOCATION DETAILED: RIGHT FOREHEAD
LOCATION DETAILED: LEFT LATERAL EYEBROW
LOCATION DETAILED: RIGHT CENTRAL EYEBROW
LOCATION DETAILED: RIGHT CENTRAL BUCCAL CHEEK
LOCATION DETAILED: RIGHT LATERAL EYEBROW
LOCATION DETAILED: INFERIOR MID FOREHEAD

## 2022-11-10 ASSESSMENT — LOCATION ZONE DERM
LOCATION ZONE: EYELID
LOCATION ZONE: ARM
LOCATION ZONE: HAND
LOCATION ZONE: TRUNK
LOCATION ZONE: FACE

## 2022-11-10 ASSESSMENT — TOTAL NUMBER OF LESIONS: # OF LESIONS?: 20

## 2022-11-10 NOTE — HPI: WARTS (VERRUCA)
How Severe Are Your Warts?: moderate
Is This A New Presentation, Or A Follow-Up?: Follow Up Margaret
Treatment Number (Optional): 2
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

## 2022-11-10 NOTE — PROCEDURE: TREATMENT REGIMEN
Detail Level: Simple
Continue Regimen: Aldara 3 nights a week to left and right cheek warts, spot treat only

## 2022-12-07 ENCOUNTER — APPOINTMENT (RX ONLY)
Dept: URBAN - METROPOLITAN AREA CLINIC 22 | Facility: CLINIC | Age: 72
Setting detail: DERMATOLOGY
End: 2022-12-07

## 2022-12-07 DIAGNOSIS — B07.8 OTHER VIRAL WARTS: ICD-10-CM

## 2022-12-07 PROCEDURE — ? COUNSELING

## 2022-12-07 PROCEDURE — 17110 DESTRUCTION B9 LES UP TO 14: CPT

## 2022-12-07 PROCEDURE — ? LIQUID NITROGEN

## 2022-12-07 PROCEDURE — ? TREATMENT REGIMEN

## 2022-12-07 ASSESSMENT — LOCATION ZONE DERM: LOCATION ZONE: FACE

## 2022-12-07 ASSESSMENT — LOCATION SIMPLE DESCRIPTION DERM
LOCATION SIMPLE: RIGHT FOREHEAD
LOCATION SIMPLE: RIGHT CHEEK

## 2022-12-07 ASSESSMENT — LOCATION DETAILED DESCRIPTION DERM
LOCATION DETAILED: RIGHT CENTRAL MANDIBULAR CHEEK
LOCATION DETAILED: RIGHT SUPERIOR FOREHEAD
LOCATION DETAILED: RIGHT INFERIOR CENTRAL MALAR CHEEK
LOCATION DETAILED: RIGHT SUPERIOR MEDIAL BUCCAL CHEEK
LOCATION DETAILED: RIGHT FOREHEAD
LOCATION DETAILED: RIGHT CENTRAL BUCCAL CHEEK

## 2022-12-07 NOTE — PROCEDURE: LIQUID NITROGEN
Render Note In Bullet Format When Appropriate: No
Spray Paint Text: The liquid nitrogen was applied to the skin utilizing a spray paint frosting technique.
Show Applicator Variable?: Yes
Medical Necessity Information: It is in your best interest to select a reason for this procedure from the list below. All of these items fulfill various CMS LCD requirements except the new and changing color options.
Medical Necessity Clause: This procedure was medically necessary because the lesions that were treated were:
Post-Care Instructions: I reviewed with the patient in detail post-care instructions. Patient is to wear sunprotection, and avoid picking at any of the treated lesions. Pt may apply Vaseline to crusted or scabbing areas.
Consent: The patient's consent was obtained including but not limited to risks of crusting, scabbing, blistering, scarring, darker or lighter pigmentary change, recurrence, incomplete removal and infection.
Detail Level: Detailed

## 2022-12-07 NOTE — PROCEDURE: MIPS QUALITY
Quality 111:Pneumonia Vaccination Status For Older Adults: Pneumococcal vaccine (PPSV23) was not administered on or after patient’s 60th birthday and before the end of the measurement period, reason not otherwise specified
Quality 226: Preventive Care And Screening: Tobacco Use: Screening And Cessation Intervention: Patient screened for tobacco use and is an ex/non-smoker
Detail Level: Detailed
Quality 130: Documentation Of Current Medications In The Medical Record: Current Medications Documented

## 2023-01-13 ENCOUNTER — PRE-ADMISSION TESTING (OUTPATIENT)
Dept: ADMISSIONS | Facility: MEDICAL CENTER | Age: 73
End: 2023-01-13
Attending: NEUROLOGICAL SURGERY
Payer: MEDICARE

## 2023-01-13 ENCOUNTER — HOSPITAL ENCOUNTER (OUTPATIENT)
Dept: RADIOLOGY | Facility: MEDICAL CENTER | Age: 73
End: 2023-01-13
Attending: NEUROLOGICAL SURGERY | Admitting: NEUROLOGICAL SURGERY
Payer: MEDICARE

## 2023-01-13 DIAGNOSIS — Z01.812 PRE-OPERATIVE LABORATORY EXAMINATION: ICD-10-CM

## 2023-01-13 DIAGNOSIS — Z01.810 PRE-OPERATIVE CARDIOVASCULAR EXAMINATION: ICD-10-CM

## 2023-01-13 DIAGNOSIS — Z01.811 PRE-OPERATIVE RESPIRATORY EXAMINATION: ICD-10-CM

## 2023-01-13 LAB
ABO GROUP BLD: NORMAL
ANION GAP SERPL CALC-SCNC: 11 MMOL/L (ref 7–16)
APTT PPP: 31 SEC (ref 24.7–36)
BASOPHILS # BLD AUTO: 0.7 % (ref 0–1.8)
BASOPHILS # BLD: 0.05 K/UL (ref 0–0.12)
BLD GP AB SCN SERPL QL: NORMAL
BUN SERPL-MCNC: 16 MG/DL (ref 8–22)
CALCIUM SERPL-MCNC: 9.9 MG/DL (ref 8.5–10.5)
CHLORIDE SERPL-SCNC: 98 MMOL/L (ref 96–112)
CO2 SERPL-SCNC: 28 MMOL/L (ref 20–33)
CREAT SERPL-MCNC: 0.77 MG/DL (ref 0.5–1.4)
EKG IMPRESSION: NORMAL
EOSINOPHIL # BLD AUTO: 0.27 K/UL (ref 0–0.51)
EOSINOPHIL NFR BLD: 4 % (ref 0–6.9)
ERYTHROCYTE [DISTWIDTH] IN BLOOD BY AUTOMATED COUNT: 45.1 FL (ref 35.9–50)
GFR SERPLBLD CREATININE-BSD FMLA CKD-EPI: 81 ML/MIN/1.73 M 2
GLUCOSE SERPL-MCNC: 89 MG/DL (ref 65–99)
HCT VFR BLD AUTO: 42.2 % (ref 37–47)
HGB BLD-MCNC: 14.2 G/DL (ref 12–16)
IMM GRANULOCYTES # BLD AUTO: 0.02 K/UL (ref 0–0.11)
IMM GRANULOCYTES NFR BLD AUTO: 0.3 % (ref 0–0.9)
INR PPP: 0.97 (ref 0.87–1.13)
LYMPHOCYTES # BLD AUTO: 2.32 K/UL (ref 1–4.8)
LYMPHOCYTES NFR BLD: 34.5 % (ref 22–41)
MCH RBC QN AUTO: 31.6 PG (ref 27–33)
MCHC RBC AUTO-ENTMCNC: 33.6 G/DL (ref 33.6–35)
MCV RBC AUTO: 93.8 FL (ref 81.4–97.8)
MONOCYTES # BLD AUTO: 0.58 K/UL (ref 0–0.85)
MONOCYTES NFR BLD AUTO: 8.6 % (ref 0–13.4)
NEUTROPHILS # BLD AUTO: 3.48 K/UL (ref 2–7.15)
NEUTROPHILS NFR BLD: 51.9 % (ref 44–72)
NRBC # BLD AUTO: 0 K/UL
NRBC BLD-RTO: 0 /100 WBC
PLATELET # BLD AUTO: 337 K/UL (ref 164–446)
PMV BLD AUTO: 9.4 FL (ref 9–12.9)
POTASSIUM SERPL-SCNC: 3.2 MMOL/L (ref 3.6–5.5)
PROTHROMBIN TIME: 12.8 SEC (ref 12–14.6)
RBC # BLD AUTO: 4.5 M/UL (ref 4.2–5.4)
RH BLD: NORMAL
SODIUM SERPL-SCNC: 137 MMOL/L (ref 135–145)
WBC # BLD AUTO: 6.7 K/UL (ref 4.8–10.8)

## 2023-01-13 PROCEDURE — 93005 ELECTROCARDIOGRAM TRACING: CPT

## 2023-01-13 PROCEDURE — 86850 RBC ANTIBODY SCREEN: CPT

## 2023-01-13 PROCEDURE — 85025 COMPLETE CBC W/AUTO DIFF WBC: CPT

## 2023-01-13 PROCEDURE — 85610 PROTHROMBIN TIME: CPT

## 2023-01-13 PROCEDURE — 85730 THROMBOPLASTIN TIME PARTIAL: CPT

## 2023-01-13 PROCEDURE — 36415 COLL VENOUS BLD VENIPUNCTURE: CPT

## 2023-01-13 PROCEDURE — 80048 BASIC METABOLIC PNL TOTAL CA: CPT

## 2023-01-13 PROCEDURE — 93010 ELECTROCARDIOGRAM REPORT: CPT | Performed by: INTERNAL MEDICINE

## 2023-01-13 PROCEDURE — 86900 BLOOD TYPING SEROLOGIC ABO: CPT

## 2023-01-13 PROCEDURE — 71046 X-RAY EXAM CHEST 2 VIEWS: CPT

## 2023-01-13 PROCEDURE — 86901 BLOOD TYPING SEROLOGIC RH(D): CPT

## 2023-01-13 ASSESSMENT — FIBROSIS 4 INDEX: FIB4 SCORE: 1.31

## 2023-01-18 ENCOUNTER — ANESTHESIA EVENT (OUTPATIENT)
Dept: SURGERY | Facility: MEDICAL CENTER | Age: 73
End: 2023-01-18
Payer: MEDICARE

## 2023-01-19 ENCOUNTER — HOSPITAL ENCOUNTER (OUTPATIENT)
Facility: MEDICAL CENTER | Age: 73
End: 2023-01-20
Attending: NEUROLOGICAL SURGERY | Admitting: NEUROLOGICAL SURGERY
Payer: MEDICARE

## 2023-01-19 ENCOUNTER — ANESTHESIA (OUTPATIENT)
Dept: SURGERY | Facility: MEDICAL CENTER | Age: 73
End: 2023-01-19
Payer: MEDICARE

## 2023-01-19 ENCOUNTER — APPOINTMENT (OUTPATIENT)
Dept: RADIOLOGY | Facility: MEDICAL CENTER | Age: 73
End: 2023-01-19
Attending: NEUROLOGICAL SURGERY
Payer: MEDICARE

## 2023-01-19 DIAGNOSIS — M48.061 LUMBAR STENOSIS WITHOUT NEUROGENIC CLAUDICATION: ICD-10-CM

## 2023-01-19 LAB — ABO + RH BLD: NORMAL

## 2023-01-19 PROCEDURE — 700105 HCHG RX REV CODE 258: Performed by: NURSE PRACTITIONER

## 2023-01-19 PROCEDURE — G0378 HOSPITAL OBSERVATION PER HR: HCPCS

## 2023-01-19 PROCEDURE — 160041 HCHG SURGERY MINUTES - EA ADDL 1 MIN LEVEL 4: Performed by: NEUROLOGICAL SURGERY

## 2023-01-19 PROCEDURE — 160029 HCHG SURGERY MINUTES - 1ST 30 MINS LEVEL 4: Performed by: NEUROLOGICAL SURGERY

## 2023-01-19 PROCEDURE — 160009 HCHG ANES TIME/MIN: Performed by: NEUROLOGICAL SURGERY

## 2023-01-19 PROCEDURE — 160036 HCHG PACU - EA ADDL 30 MINS PHASE I: Performed by: NEUROLOGICAL SURGERY

## 2023-01-19 PROCEDURE — 110371 HCHG SHELL REV 272: Performed by: NEUROLOGICAL SURGERY

## 2023-01-19 PROCEDURE — 700101 HCHG RX REV CODE 250: Performed by: NEUROLOGICAL SURGERY

## 2023-01-19 PROCEDURE — 99100 ANES PT EXTEME AGE<1 YR&>70: CPT | Performed by: STUDENT IN AN ORGANIZED HEALTH CARE EDUCATION/TRAINING PROGRAM

## 2023-01-19 PROCEDURE — 160035 HCHG PACU - 1ST 60 MINS PHASE I: Performed by: NEUROLOGICAL SURGERY

## 2023-01-19 PROCEDURE — 700102 HCHG RX REV CODE 250 W/ 637 OVERRIDE(OP): Performed by: NURSE PRACTITIONER

## 2023-01-19 PROCEDURE — 700111 HCHG RX REV CODE 636 W/ 250 OVERRIDE (IP): Performed by: STUDENT IN AN ORGANIZED HEALTH CARE EDUCATION/TRAINING PROGRAM

## 2023-01-19 PROCEDURE — A9270 NON-COVERED ITEM OR SERVICE: HCPCS | Performed by: NURSE PRACTITIONER

## 2023-01-19 PROCEDURE — 160048 HCHG OR STATISTICAL LEVEL 1-5: Performed by: NEUROLOGICAL SURGERY

## 2023-01-19 PROCEDURE — 700105 HCHG RX REV CODE 258: Performed by: NEUROLOGICAL SURGERY

## 2023-01-19 PROCEDURE — 700102 HCHG RX REV CODE 250 W/ 637 OVERRIDE(OP): Performed by: STUDENT IN AN ORGANIZED HEALTH CARE EDUCATION/TRAINING PROGRAM

## 2023-01-19 PROCEDURE — 00670 ANES XTNSV SP&SPI CORD PX: CPT | Performed by: STUDENT IN AN ORGANIZED HEALTH CARE EDUCATION/TRAINING PROGRAM

## 2023-01-19 PROCEDURE — 96365 THER/PROPH/DIAG IV INF INIT: CPT

## 2023-01-19 PROCEDURE — 700111 HCHG RX REV CODE 636 W/ 250 OVERRIDE (IP): Performed by: NEUROLOGICAL SURGERY

## 2023-01-19 PROCEDURE — 700101 HCHG RX REV CODE 250: Performed by: STUDENT IN AN ORGANIZED HEALTH CARE EDUCATION/TRAINING PROGRAM

## 2023-01-19 PROCEDURE — 700111 HCHG RX REV CODE 636 W/ 250 OVERRIDE (IP): Performed by: NURSE PRACTITIONER

## 2023-01-19 PROCEDURE — 110454 HCHG SHELL REV 250: Performed by: NEUROLOGICAL SURGERY

## 2023-01-19 PROCEDURE — 160002 HCHG RECOVERY MINUTES (STAT): Performed by: NEUROLOGICAL SURGERY

## 2023-01-19 PROCEDURE — A9270 NON-COVERED ITEM OR SERVICE: HCPCS | Performed by: STUDENT IN AN ORGANIZED HEALTH CARE EDUCATION/TRAINING PROGRAM

## 2023-01-19 RX ORDER — AMOXICILLIN 250 MG
1 CAPSULE ORAL NIGHTLY
Status: DISCONTINUED | OUTPATIENT
Start: 2023-01-19 | End: 2023-01-20 | Stop reason: HOSPADM

## 2023-01-19 RX ORDER — DOCUSATE SODIUM 100 MG/1
100 CAPSULE, LIQUID FILLED ORAL 2 TIMES DAILY
Status: DISCONTINUED | OUTPATIENT
Start: 2023-01-19 | End: 2023-01-20 | Stop reason: HOSPADM

## 2023-01-19 RX ORDER — HALOPERIDOL 5 MG/ML
1 INJECTION INTRAMUSCULAR
Status: DISCONTINUED | OUTPATIENT
Start: 2023-01-19 | End: 2023-01-19 | Stop reason: HOSPADM

## 2023-01-19 RX ORDER — HYDROCHLOROTHIAZIDE 25 MG/1
25 TABLET ORAL DAILY
Status: DISCONTINUED | OUTPATIENT
Start: 2023-01-19 | End: 2023-01-20 | Stop reason: HOSPADM

## 2023-01-19 RX ORDER — OXYCODONE HCL 5 MG/5 ML
10 SOLUTION, ORAL ORAL
Status: COMPLETED | OUTPATIENT
Start: 2023-01-19 | End: 2023-01-19

## 2023-01-19 RX ORDER — LIDOCAINE HYDROCHLORIDE 40 MG/ML
SOLUTION TOPICAL PRN
Status: DISCONTINUED | OUTPATIENT
Start: 2023-01-19 | End: 2023-01-19 | Stop reason: SURG

## 2023-01-19 RX ORDER — DIPHENHYDRAMINE HCL 25 MG
25 TABLET ORAL EVERY 6 HOURS PRN
Status: DISCONTINUED | OUTPATIENT
Start: 2023-01-19 | End: 2023-01-20 | Stop reason: HOSPADM

## 2023-01-19 RX ORDER — HYDROMORPHONE HYDROCHLORIDE 1 MG/ML
0.4 INJECTION, SOLUTION INTRAMUSCULAR; INTRAVENOUS; SUBCUTANEOUS
Status: DISCONTINUED | OUTPATIENT
Start: 2023-01-19 | End: 2023-01-19 | Stop reason: HOSPADM

## 2023-01-19 RX ORDER — HYDROMORPHONE HYDROCHLORIDE 1 MG/ML
0.2 INJECTION, SOLUTION INTRAMUSCULAR; INTRAVENOUS; SUBCUTANEOUS
Status: DISCONTINUED | OUTPATIENT
Start: 2023-01-19 | End: 2023-01-19 | Stop reason: HOSPADM

## 2023-01-19 RX ORDER — DEXMEDETOMIDINE HYDROCHLORIDE 100 UG/ML
INJECTION, SOLUTION INTRAVENOUS PRN
Status: DISCONTINUED | OUTPATIENT
Start: 2023-01-19 | End: 2023-01-19 | Stop reason: SURG

## 2023-01-19 RX ORDER — CEFAZOLIN SODIUM 1 G/3ML
INJECTION, POWDER, FOR SOLUTION INTRAMUSCULAR; INTRAVENOUS PRN
Status: DISCONTINUED | OUTPATIENT
Start: 2023-01-19 | End: 2023-01-19 | Stop reason: SURG

## 2023-01-19 RX ORDER — LABETALOL HYDROCHLORIDE 5 MG/ML
10 INJECTION, SOLUTION INTRAVENOUS
Status: DISCONTINUED | OUTPATIENT
Start: 2023-01-19 | End: 2023-01-20 | Stop reason: HOSPADM

## 2023-01-19 RX ORDER — HYDRALAZINE HYDROCHLORIDE 20 MG/ML
10 INJECTION INTRAMUSCULAR; INTRAVENOUS
Status: DISCONTINUED | OUTPATIENT
Start: 2023-01-19 | End: 2023-01-20 | Stop reason: HOSPADM

## 2023-01-19 RX ORDER — BISACODYL 10 MG
10 SUPPOSITORY, RECTAL RECTAL
Status: DISCONTINUED | OUTPATIENT
Start: 2023-01-19 | End: 2023-01-20 | Stop reason: HOSPADM

## 2023-01-19 RX ORDER — ONDANSETRON 2 MG/ML
INJECTION INTRAMUSCULAR; INTRAVENOUS PRN
Status: DISCONTINUED | OUTPATIENT
Start: 2023-01-19 | End: 2023-01-19 | Stop reason: SURG

## 2023-01-19 RX ORDER — ROCURONIUM BROMIDE 10 MG/ML
INJECTION, SOLUTION INTRAVENOUS PRN
Status: DISCONTINUED | OUTPATIENT
Start: 2023-01-19 | End: 2023-01-19 | Stop reason: SURG

## 2023-01-19 RX ORDER — BUPIVACAINE HYDROCHLORIDE AND EPINEPHRINE 5; 5 MG/ML; UG/ML
INJECTION, SOLUTION PERINEURAL
Status: DISCONTINUED | OUTPATIENT
Start: 2023-01-19 | End: 2023-01-19 | Stop reason: HOSPADM

## 2023-01-19 RX ORDER — OXYCODONE HCL 5 MG/5 ML
5 SOLUTION, ORAL ORAL
Status: COMPLETED | OUTPATIENT
Start: 2023-01-19 | End: 2023-01-19

## 2023-01-19 RX ORDER — ENEMA 19; 7 G/133ML; G/133ML
1 ENEMA RECTAL
Status: DISCONTINUED | OUTPATIENT
Start: 2023-01-19 | End: 2023-01-20 | Stop reason: HOSPADM

## 2023-01-19 RX ORDER — CEFAZOLIN SODIUM 1 G/3ML
INJECTION, POWDER, FOR SOLUTION INTRAMUSCULAR; INTRAVENOUS
Status: DISCONTINUED | OUTPATIENT
Start: 2023-01-19 | End: 2023-01-19 | Stop reason: HOSPADM

## 2023-01-19 RX ORDER — ATORVASTATIN CALCIUM 10 MG/1
10 TABLET, FILM COATED ORAL DAILY
Status: DISCONTINUED | OUTPATIENT
Start: 2023-01-20 | End: 2023-01-20 | Stop reason: HOSPADM

## 2023-01-19 RX ORDER — ONDANSETRON 4 MG/1
4 TABLET, ORALLY DISINTEGRATING ORAL EVERY 4 HOURS PRN
Status: DISCONTINUED | OUTPATIENT
Start: 2023-01-19 | End: 2023-01-20 | Stop reason: HOSPADM

## 2023-01-19 RX ORDER — DEXAMETHASONE SODIUM PHOSPHATE 4 MG/ML
INJECTION, SOLUTION INTRA-ARTICULAR; INTRALESIONAL; INTRAMUSCULAR; INTRAVENOUS; SOFT TISSUE PRN
Status: DISCONTINUED | OUTPATIENT
Start: 2023-01-19 | End: 2023-01-19 | Stop reason: SURG

## 2023-01-19 RX ORDER — OMEPRAZOLE 20 MG/1
40 CAPSULE, DELAYED RELEASE ORAL DAILY
Status: DISCONTINUED | OUTPATIENT
Start: 2023-01-20 | End: 2023-01-20 | Stop reason: HOSPADM

## 2023-01-19 RX ORDER — DIPHENHYDRAMINE HYDROCHLORIDE 50 MG/ML
25 INJECTION INTRAMUSCULAR; INTRAVENOUS EVERY 6 HOURS PRN
Status: DISCONTINUED | OUTPATIENT
Start: 2023-01-19 | End: 2023-01-20 | Stop reason: HOSPADM

## 2023-01-19 RX ORDER — HYDROMORPHONE HYDROCHLORIDE 1 MG/ML
0.1 INJECTION, SOLUTION INTRAMUSCULAR; INTRAVENOUS; SUBCUTANEOUS
Status: DISCONTINUED | OUTPATIENT
Start: 2023-01-19 | End: 2023-01-19 | Stop reason: HOSPADM

## 2023-01-19 RX ORDER — DIAZEPAM 5 MG/1
5 TABLET ORAL EVERY 4 HOURS PRN
Status: DISCONTINUED | OUTPATIENT
Start: 2023-01-19 | End: 2023-01-20 | Stop reason: HOSPADM

## 2023-01-19 RX ORDER — OXYCODONE HYDROCHLORIDE AND ACETAMINOPHEN 5; 325 MG/1; MG/1
1-2 TABLET ORAL EVERY 6 HOURS PRN
Status: DISCONTINUED | OUTPATIENT
Start: 2023-01-19 | End: 2023-01-19

## 2023-01-19 RX ORDER — CYCLOBENZAPRINE HCL 10 MG
10 TABLET ORAL EVERY 8 HOURS PRN
Status: DISCONTINUED | OUTPATIENT
Start: 2023-01-19 | End: 2023-01-20 | Stop reason: HOSPADM

## 2023-01-19 RX ORDER — METHOCARBAMOL 750 MG/1
750 TABLET, FILM COATED ORAL EVERY 8 HOURS PRN
Status: DISCONTINUED | OUTPATIENT
Start: 2023-01-19 | End: 2023-01-20 | Stop reason: HOSPADM

## 2023-01-19 RX ORDER — AMOXICILLIN 250 MG
1 CAPSULE ORAL
Status: DISCONTINUED | OUTPATIENT
Start: 2023-01-19 | End: 2023-01-20 | Stop reason: HOSPADM

## 2023-01-19 RX ORDER — MAGNESIUM HYDROXIDE 1200 MG/15ML
LIQUID ORAL
Status: COMPLETED | OUTPATIENT
Start: 2023-01-19 | End: 2023-01-19

## 2023-01-19 RX ORDER — SODIUM CHLORIDE AND POTASSIUM CHLORIDE 150; 900 MG/100ML; MG/100ML
INJECTION, SOLUTION INTRAVENOUS CONTINUOUS
Status: DISCONTINUED | OUTPATIENT
Start: 2023-01-19 | End: 2023-01-20 | Stop reason: HOSPADM

## 2023-01-19 RX ORDER — OXYCODONE HYDROCHLORIDE AND ACETAMINOPHEN 5; 325 MG/1; MG/1
2 TABLET ORAL EVERY 6 HOURS PRN
Status: DISCONTINUED | OUTPATIENT
Start: 2023-01-19 | End: 2023-01-20 | Stop reason: HOSPADM

## 2023-01-19 RX ORDER — GABAPENTIN 300 MG/1
300 CAPSULE ORAL 3 TIMES DAILY
Status: DISCONTINUED | OUTPATIENT
Start: 2023-01-19 | End: 2023-01-20 | Stop reason: HOSPADM

## 2023-01-19 RX ORDER — OXYCODONE HYDROCHLORIDE AND ACETAMINOPHEN 5; 325 MG/1; MG/1
1 TABLET ORAL EVERY 6 HOURS PRN
Status: DISCONTINUED | OUTPATIENT
Start: 2023-01-19 | End: 2023-01-20 | Stop reason: HOSPADM

## 2023-01-19 RX ORDER — POLYETHYLENE GLYCOL 3350 17 G/17G
1 POWDER, FOR SOLUTION ORAL 2 TIMES DAILY PRN
Status: DISCONTINUED | OUTPATIENT
Start: 2023-01-19 | End: 2023-01-20 | Stop reason: HOSPADM

## 2023-01-19 RX ORDER — ONDANSETRON 2 MG/ML
4 INJECTION INTRAMUSCULAR; INTRAVENOUS EVERY 4 HOURS PRN
Status: DISCONTINUED | OUTPATIENT
Start: 2023-01-19 | End: 2023-01-20 | Stop reason: HOSPADM

## 2023-01-19 RX ORDER — ONDANSETRON 2 MG/ML
4 INJECTION INTRAMUSCULAR; INTRAVENOUS
Status: DISCONTINUED | OUTPATIENT
Start: 2023-01-19 | End: 2023-01-19 | Stop reason: HOSPADM

## 2023-01-19 RX ORDER — SODIUM CHLORIDE, SODIUM LACTATE, POTASSIUM CHLORIDE, CALCIUM CHLORIDE 600; 310; 30; 20 MG/100ML; MG/100ML; MG/100ML; MG/100ML
INJECTION, SOLUTION INTRAVENOUS CONTINUOUS
Status: DISCONTINUED | OUTPATIENT
Start: 2023-01-19 | End: 2023-01-19

## 2023-01-19 RX ADMIN — DEXMEDETOMIDINE 10 MCG: 200 INJECTION, SOLUTION INTRAVENOUS at 10:57

## 2023-01-19 RX ADMIN — OXYCODONE HYDROCHLORIDE 5 MG: 5 SOLUTION ORAL at 12:18

## 2023-01-19 RX ADMIN — CEFAZOLIN 2 G: 330 INJECTION, POWDER, FOR SOLUTION INTRAMUSCULAR; INTRAVENOUS at 09:33

## 2023-01-19 RX ADMIN — GABAPENTIN 300 MG: 300 CAPSULE ORAL at 20:50

## 2023-01-19 RX ADMIN — FENTANYL CITRATE 50 MCG: 50 INJECTION, SOLUTION INTRAMUSCULAR; INTRAVENOUS at 10:36

## 2023-01-19 RX ADMIN — DEXAMETHASONE SODIUM PHOSPHATE 8 MG: 4 INJECTION, SOLUTION INTRA-ARTICULAR; INTRALESIONAL; INTRAMUSCULAR; INTRAVENOUS; SOFT TISSUE at 09:44

## 2023-01-19 RX ADMIN — SODIUM CHLORIDE, POTASSIUM CHLORIDE, SODIUM LACTATE AND CALCIUM CHLORIDE: 600; 310; 30; 20 INJECTION, SOLUTION INTRAVENOUS at 09:29

## 2023-01-19 RX ADMIN — DEXMEDETOMIDINE 10 MCG: 200 INJECTION, SOLUTION INTRAVENOUS at 10:50

## 2023-01-19 RX ADMIN — DEXMEDETOMIDINE 10 MCG: 200 INJECTION, SOLUTION INTRAVENOUS at 11:03

## 2023-01-19 RX ADMIN — GABAPENTIN 300 MG: 300 CAPSULE ORAL at 15:57

## 2023-01-19 RX ADMIN — PROPOFOL 80 MG: 10 INJECTION, EMULSION INTRAVENOUS at 09:33

## 2023-01-19 RX ADMIN — FENTANYL CITRATE 50 MCG: 50 INJECTION, SOLUTION INTRAMUSCULAR; INTRAVENOUS at 11:09

## 2023-01-19 RX ADMIN — LIDOCAINE HYDROCHLORIDE 2 ML: 40 SOLUTION TOPICAL at 09:36

## 2023-01-19 RX ADMIN — FENTANYL CITRATE 25 MCG: 50 INJECTION, SOLUTION INTRAMUSCULAR; INTRAVENOUS at 12:21

## 2023-01-19 RX ADMIN — HYDROCHLOROTHIAZIDE 25 MG: 25 TABLET ORAL at 15:57

## 2023-01-19 RX ADMIN — SUGAMMADEX 200 MG: 100 INJECTION, SOLUTION INTRAVENOUS at 11:05

## 2023-01-19 RX ADMIN — FENTANYL CITRATE 100 MCG: 50 INJECTION, SOLUTION INTRAMUSCULAR; INTRAVENOUS at 09:33

## 2023-01-19 RX ADMIN — CEFAZOLIN 2 G: 2 INJECTION, POWDER, FOR SOLUTION INTRAMUSCULAR; INTRAVENOUS at 17:25

## 2023-01-19 RX ADMIN — EPHEDRINE SULFATE 10 MG: 50 INJECTION, SOLUTION INTRAVENOUS at 10:16

## 2023-01-19 RX ADMIN — ROCURONIUM BROMIDE 50 MG: 10 INJECTION, SOLUTION INTRAVENOUS at 09:33

## 2023-01-19 RX ADMIN — ROCURONIUM BROMIDE 10 MG: 10 INJECTION, SOLUTION INTRAVENOUS at 10:37

## 2023-01-19 RX ADMIN — FENTANYL CITRATE 50 MCG: 50 INJECTION, SOLUTION INTRAMUSCULAR; INTRAVENOUS at 10:21

## 2023-01-19 RX ADMIN — ONDANSETRON 4 MG: 2 INJECTION INTRAMUSCULAR; INTRAVENOUS at 10:50

## 2023-01-19 ASSESSMENT — PAIN DESCRIPTION - PAIN TYPE
TYPE: SURGICAL PAIN

## 2023-01-19 ASSESSMENT — FIBROSIS 4 INDEX: FIB4 SCORE: 1.16

## 2023-01-19 NOTE — ANESTHESIA PROCEDURE NOTES
Airway    Date/Time: 1/19/2023 9:36 AM  Performed by: Vaughn Stratton M.D.  Authorized by: Vaughn Stratton M.D.     Location:  OR  Urgency:  Elective  Indications for Airway Management:  Anesthesia      Spontaneous Ventilation: absent    Sedation Level:  Deep  Preoxygenated: Yes    Patient Position:  Sniffing  Mask Difficulty Assessment:  1 - vent by mask  Final Airway Type:  Endotracheal airway  Final Endotracheal Airway:  ETT  Cuffed: Yes    Technique Used for Successful ETT Placement:  Direct laryngoscopy  Devices/Methods Used in Placement:  Intubating stylet    Insertion Site:  Oral  Blade Type:  Akbar  Laryngoscope Blade/Videolaryngoscope Blade Size:  3  ETT Size (mm):  6.5  Measured from:  Teeth  ETT to Teeth (cm):  21  Placement Verified by: capnometry    Cormack-Lehane Classification:  Grade IIb - view of arytenoids or posterior of glottis only  Number of Attempts at Approach:  1

## 2023-01-19 NOTE — OR NURSING
Arrived to PACU in stable condition. Medicated with IV and oral pain medications for Right leg pain. She is doing well and pain is tolerable. IS instructed for deep breathing. She remains on 2L NC. Belongings at bedside.

## 2023-01-19 NOTE — OP REPORT
DATE OF SERVICE:  01/19/2023     PREOPERATIVE DIAGNOSIS:  Right far lateral L4-L5 degenerated disk with right   L4 radiculopathy.     POSTOPERATIVE DIAGNOSES:  1.  Right far lateral L4-L5 degenerated disk with right L4 radiculopathy.  2.  Obstructing L4 hardware.     OPERATIONS:  1.  Partial removal of nonsegmental instrumentation at L4.  2.  Microscopic right L4-L5 transforaminal excision of lateral disk,   decompression of right L4 nerve root.     SURGEON:  Aman Cottrell MD     ASSISTANT:  VINCE Hatch     ANESTHESIA:  General endotracheal.     ANESTHESIOLOGIST:  Vaughn Stratton MD     PREPARATION:  ChloraPrep.     MEDICATIONS:  The patient given Ancef prior to incision.     INDICATIONS:  The patient with a right L4 radiculopathy secondary to far   lateral L4-L5 disk.  The patient had a previous L1-L4 fusion.  This was a   level below.  The patient failed to respond to nonoperative therapies.  She   felt to be a candidate for operative decompression to relieve radicular pain.    Major risks and complications, such as paralysis felt to be rare, biggest   risk of surgery is nonresponse, which is 10%, small risk of wound infection,   spinal fluid leak, the chance to require another operation rest of her life   15-20%.  The patient is understanding, agreed to proceed and signed the   consent.     NEED FOR SURGICAL ASSISTANCE:  Surgical assistance required under both loupe   and microscopic magnification, retraction, suction, irrigation, cleaning of   instruments, keep the case moving forward to minimize operative time.     DESCRIPTION OF PROCEDURE:  The patient was brought to the operating room.    Peripheral venous line was in place.  General anesthesia was induced.  The   patient intubated.  No Brandt catheter was placed.  The patient laid prone on   the OSI table using 6 posts, pressure points carefully padded.  The back was   doubly prepped with ChloraPrep by myself and draped.  Planned incision was    marked in midline over the inferior aspect of the previous incision.  Skin was   infiltrated with local and incised with scalpel using electrocautery,   dissected deep in the midline down to and through deep fashion using a   subperiosteal dissection, dissected out the bone off the spinous process,   lamina of L4 and L5.  We exposed the pedicle screw at L4 on the right.  No   x-ray was required as the vertical fatemeh inferior aspect was providing some   obstruction of my working site to the pars interarticular of L4. Using the   metal cutting bur, I cut the inferior aspect of the fatemeh away until it was   flushed with the pedicle screw. Metal shavings were irrigated away.  Under the   operating microscope, the lateral pars of 4, the superior aspect of the facet   joint at 4-5 was drilled to paper thin shelf of bone.  Lateral ligamentum   flavum was removed with a straight and angled 2 mm Kerrisons.  The 4 root was   identified.  Working in the axilla of the 4 root, the disk was identified.    This was incised with the 11 blade and free fragment and some degenerated disk   was removed in a piecemeal fashion with the small pituitary rongeurs and   micro cup forceps.  Once this was accomplished with posterior bone and   ligament, removal of the 4 root was completely mobile and well decompressed.    I could slide the nerve probe medially, laterally into soft tissues without   compromise.  Bleeders controlled with bipolar electrocautery and Gelfoam   powder mixed with thrombin.  Wound was irrigated with antibiotic irrigation.    Small fat graft was placed over the 4 root.  Medium Hemovac drain was placed   in depth of the wound, brought out through separate stab incision.  Deep   fascia was closed with 0 Vicryl, subcutaneous fascia with 2-0 Vicryl,   subcuticular closed with 2-0 and 3-0 Vicryl, and skin edges approximated with   quarter-inch Steri-Strips.  Drain was sutured in place with a 2-0 Vicryl,   connected to closed  drainage system.  Sterile dressings were placed.  The   patient laid supine on the bed, extubated, taken to recovery room in   satisfactory condition.  The patient tolerated the procedure well without   apparent complication.        ______________________________  MD TALHA DURÁN/FRANCOIS/JOSE LUIS    DD:  01/19/2023 11:37  DT:  01/19/2023 12:28    Job#:  [id number]    CC:Vaughn Stratton MD

## 2023-01-19 NOTE — PROGRESS NOTES
4 Eyes Skin Assessment Completed by Katelyn RN and Alex RN.    Head WDL  Ears Redness and Blanching  Nose WDL  Mouth WDL  Neck WDL  Breast/Chest WDL  Shoulder Blades WDL  Spine Incision  (R) Arm/Elbow/Hand WDL  (L) Arm/Elbow/Hand Bruise  Abdomen WDL  Groin WDL  Scrotum/Coccyx/Buttocks WDL  (R) Leg WDL  (L) Leg WDL  (R) Heel/Foot/Toe WDL  (L) Heel/Foot/Toe WDL          Devices In Places Pulse Ox, SCD's, and Nasal Cannula      Interventions In Place Gray Ear Foams, Waffle Overlay, and Pressure Redistribution Mattress    Possible Skin Injury No    Pictures Uploaded Into Epic N/A  Wound Consult Placed N/A  RN Wound Prevention Protocol Ordered No  eyes

## 2023-01-19 NOTE — ANESTHESIA TIME REPORT
Anesthesia Start and Stop Event Times     Date Time Event    1/19/2023 0908 Ready for Procedure     0929 Anesthesia Start     1116 Anesthesia Stop        Responsible Staff  01/19/23    Name Role Begin End    Min GREER Stratton M.D. Anesth 0929 1116        Overtime Reason:  no overtime (within assigned shift)    Comments:

## 2023-01-19 NOTE — ANESTHESIA POSTPROCEDURE EVALUATION
Patient: Ariana Drake    Procedure Summary     Date: 01/19/23 Room / Location: [place] [address] / SURGERY [facility]    Anesthesia Start: 0929 Anesthesia Stop: 1116    Procedures:       POSTERIOR RIGHT L4-5 TRANSPEDICULAR DECOMPRESSION, DISCECTOMY (Right: Spine Lumbar)      DECOMPRESSION, SPINE, LUMBAR (Right: Spine Lumbar) Diagnosis: (PROLAPSED LUMBAR INTERVERTEBRAL DISC)    Surgeons: Aman Cottrell M.D. Responsible Provider: Vaughn Stratton M.D.    Anesthesia Type: general ASA Status: 2          Final Anesthesia Type: general  Last vitals  BP   Blood Pressure : 111/57    Temp   (!) 35.7 °C (96.3 °F)    Pulse   78   Resp   16    SpO2   94 %      Anesthesia Post Evaluation    Patient location during evaluation: PACU  Patient participation: complete - patient participated  Level of consciousness: awake and alert    Airway patency: patent  Anesthetic complications: no  Cardiovascular status: hemodynamically stable  Respiratory status: acceptable  Hydration status: euvolemic    PONV: none          No notable events documented.     Nurse Pain Score: 0 (NPRS)

## 2023-01-19 NOTE — ANESTHESIA PREPROCEDURE EVALUATION
Case: [id number] Date/Time: 01/19/23 0915    Procedures:       POSTERIOR RIGHT L4-5 TRANSPEDICULAR DECOMPRESSION, DISCECTOMY      DECOMPRESSION, SPINE, LUMBAR    Pre-op diagnosis: PROLAPSED LUMBAR INTERVERTEBRAL DISC    Location: [facility] OR 05 / SURGERY [facility]    Surgeons: Aman Cottrell M.D.      73 yo F w/ hx of HTN, HLD, GERD,, s/p TLIF in 12/2021. NPO. METS >4. Pt had chest pain and 4/2022; stress test was neg. Took tylenol 1000mg this AM.    ECHO (4/2022): EF 65-70%    Relevant Problems   No relevant active problems       Physical Exam    Airway   Mallampati: I  TM distance: >3 FB  Neck ROM: full       Cardiovascular - normal exam  Rhythm: regular  Rate: normal  (-) murmur     Dental - normal exam           Pulmonary - normal exam  Breath sounds clear to auscultation     Abdominal    Neurological - normal exam                 Anesthesia Plan    ASA 2       Plan - general       Airway plan will be ETT          Induction: intravenous    Postoperative Plan: Postoperative administration of opioids is intended.    Pertinent diagnostic labs and testing reviewed    Informed Consent:    Anesthetic plan and risks discussed with patient.    Use of blood products discussed with: patient whom consented to blood products.

## 2023-01-19 NOTE — OR NURSING
Patient instructed on IS and remains on 2L NC. Transferred to floor with transport VSS and tank at 98%. Belonging are with her.

## 2023-01-20 VITALS
OXYGEN SATURATION: 95 % | TEMPERATURE: 98 F | RESPIRATION RATE: 18 BRPM | DIASTOLIC BLOOD PRESSURE: 48 MMHG | HEART RATE: 74 BPM | WEIGHT: 135.14 LBS | HEIGHT: 62 IN | SYSTOLIC BLOOD PRESSURE: 108 MMHG | BODY MASS INDEX: 24.87 KG/M2

## 2023-01-20 PROCEDURE — A9270 NON-COVERED ITEM OR SERVICE: HCPCS | Performed by: NURSE PRACTITIONER

## 2023-01-20 PROCEDURE — 700105 HCHG RX REV CODE 258: Performed by: NURSE PRACTITIONER

## 2023-01-20 PROCEDURE — G0378 HOSPITAL OBSERVATION PER HR: HCPCS

## 2023-01-20 PROCEDURE — 700111 HCHG RX REV CODE 636 W/ 250 OVERRIDE (IP): Performed by: NURSE PRACTITIONER

## 2023-01-20 PROCEDURE — 97162 PT EVAL MOD COMPLEX 30 MIN: CPT

## 2023-01-20 PROCEDURE — 700102 HCHG RX REV CODE 250 W/ 637 OVERRIDE(OP): Performed by: NURSE PRACTITIONER

## 2023-01-20 RX ADMIN — GABAPENTIN 300 MG: 300 CAPSULE ORAL at 08:26

## 2023-01-20 RX ADMIN — CEFAZOLIN 2 G: 2 INJECTION, POWDER, FOR SOLUTION INTRAMUSCULAR; INTRAVENOUS at 01:33

## 2023-01-20 RX ADMIN — OXYCODONE AND ACETAMINOPHEN 1 TABLET: 5; 325 TABLET ORAL at 01:29

## 2023-01-20 RX ADMIN — OXYCODONE AND ACETAMINOPHEN 1 TABLET: 5; 325 TABLET ORAL at 10:27

## 2023-01-20 RX ADMIN — ATORVASTATIN CALCIUM 10 MG: 10 TABLET, FILM COATED ORAL at 05:05

## 2023-01-20 RX ADMIN — HYDROCHLOROTHIAZIDE 25 MG: 25 TABLET ORAL at 05:05

## 2023-01-20 RX ADMIN — OMEPRAZOLE 40 MG: 20 CAPSULE, DELAYED RELEASE ORAL at 05:05

## 2023-01-20 ASSESSMENT — GAIT ASSESSMENTS
DEVIATION: ANTALGIC;BRADYKINETIC
DISTANCE (FEET): 50
GAIT LEVEL OF ASSIST: CONTACT GUARD ASSIST
ASSISTIVE DEVICE: FRONT WHEEL WALKER

## 2023-01-20 ASSESSMENT — PAIN DESCRIPTION - PAIN TYPE
TYPE: SURGICAL PAIN
TYPE: SURGICAL PAIN

## 2023-01-20 ASSESSMENT — COGNITIVE AND FUNCTIONAL STATUS - GENERAL
STANDING UP FROM CHAIR USING ARMS: A LITTLE
MOVING FROM LYING ON BACK TO SITTING ON SIDE OF FLAT BED: A LITTLE
CLIMB 3 TO 5 STEPS WITH RAILING: A LITTLE
SUGGESTED CMS G CODE MODIFIER MOBILITY: CL
TURNING FROM BACK TO SIDE WHILE IN FLAT BAD: UNABLE
MOVING TO AND FROM BED TO CHAIR: UNABLE
WALKING IN HOSPITAL ROOM: A LITTLE
MOBILITY SCORE: 14

## 2023-01-20 NOTE — DISCHARGE INSTRUCTIONS
No lifting anything >10 pounds until cleared by outpatient provider.   Avoid repetitive bending, lifting, twisting.  No wound submersion until cleared by outpatient provider.

## 2023-01-20 NOTE — THERAPY
"Physical Therapy   Initial Evaluation     Patient Name: Ariana Drake  Age:  72 y.o., Sex:  female  Medical Record #: [id number]  Today's Date: 1/20/2023     Precautions  Precautions: (P) Spinal / Back Precautions ;Fall Risk    Assessment  Patient is 72 y.o. female with a diagnosis of  lumbar stenosis, s/p Partial removal of nonsegmental instrumentation at L4 and Microscopic right L4-L5 transforaminal excision of lateral disk, decompression of right L4 nerve root. PMHx includes R leg pain.   Pt demonstrates good understanding of spinal precautions and log roll technique for bed mobility using bed rails, sit to stand from EOB/toilet and ambulation with FWW with CGA and good safety awareness/pacing. Pt does not require further acute PT services at this time, is appropriate for DC home with HHPT and community support when medically cleared.     Plan    Physical Therapy Initial Treatment Plan   Duration: (P) Evaluation only    DC Equipment Recommendations: (P) Front-Wheel Walker  Discharge Recommendations: (P) Recommend home health for continued physical therapy services       Subjective    \"My boyfriend doesn't live with me but he can help if I need it. So can my neighbors.\"     Objective       01/20/23 0935   Charge Group   PT Evaluation PT Evaluation Mod   Total Time Spent   PT Total Time Yes   PT Evaluation Time Spent (Mins) 25    Services   Is patient using  services for this encounter? No   Initial Contact Note    Initial Contact Note Order Received and Verified, Evaluation Only - Patient Does Not Require Further Acute Physical Therapy at this Time.  However, May Benefit from Post Acute Therapy for Higher Level Functional Deficits.   Precautions   Precautions Spinal / Back Precautions ;Fall Risk   Vitals   O2 Delivery Device None - Room Air   Pain 0 - 10 Group   Location Back   Pain Rating Scale (NPRS) 6  (3 at rest, 6 at worst with ambulation)   Prior Living Situation   Prior Services " Home-Independent   Housing / Facility 1 Story House   Steps Into Home 0   Steps In Home 0   Bathroom Set up Walk In Shower;Grab Bars   Equipment Owned Single Point Cane   Lives with - Patient's Self Care Capacity Alone and Able to Care For Self   Prior Level of Functional Mobility   Bed Mobility Independent   Transfer Status Independent   Ambulation Independent   History of Falls   History of Falls No   Cognition    Cognition / Consciousness WDL   Level of Consciousness Alert   Comments pleasant and cooperative   Active ROM Upper Body   Active ROM Upper Body  WDL   Strength Upper Body   Upper Body Strength  WDL   Active ROM Lower Body    Active ROM Lower Body  X   Comments L LE decreased due to pain   Strength Lower Body   Lower Body Strength  X   Comments decreased bilaterally due to pain s/p   Balance Assessment   Sitting Balance (Static) Fair +   Sitting Balance (Dynamic) Fair   Standing Balance (Static) Fair   Standing Balance (Dynamic) Fair -   Weight Shift Sitting Fair   Weight Shift Standing Fair   Bed Mobility    Supine to Sit Standby Assist   Scooting Standby Assist   Gait Analysis   Gait Level Of Assist Contact Guard Assist   Assistive Device Front Wheel Walker   Distance (Feet) 50   # of Times Distance was Traveled 1   Deviation Antalgic;Bradykinetic   Functional Mobility   Sit to Stand Contact Guard Assist   Toilet Transfers Contact Guard Assist   Mobility bed mobility; ambulation in hallway, toilet transfer, up to eob   How much difficulty does the patient currently have...   Turning over in bed (including adjusting bedclothes, sheets and blankets)? 1   Sitting down on and standing up from a chair with arms (e.g., wheelchair, bedside commode, etc.) 3   Moving from lying on back to sitting on the side of the bed? 1   How much help from another person does the patient currently need...   Moving to and from a bed to a chair (including a wheelchair)? 3   Need to walk in a hospital room? 3   Climbing 3-5  steps with a railing? 3   6 clicks Mobility Score 14   Activity Tolerance   Sitting Edge of Bed 10 min + left EOB   Standing 6 min total   Education Group   Education Provided Spine Precautions;Role of Physical Therapist;Gait Training   Spine Precautions Patient Response Patient;Acceptance;Explanation;Verbal Demonstration   Role of Physical Therapist Patient Response Patient;Acceptance;Explanation;Demonstration;Verbal Demonstration;Action Demonstration   Gait Training Patient Response Patient;Acceptance;Explanation;Demonstration;Verbal Demonstration;Action Demonstration   Physical Therapy Initial Treatment Plan    Duration Evaluation only   Problem List    Problems Pain;Impaired Transfers;Impaired Bed Mobility;Impaired Ambulation;Impaired Balance;Decreased Activity Tolerance   Anticipated Discharge Equipment and Recommendations   DC Equipment Recommendations Front-Wheel Walker   Discharge Recommendations Recommend home health for continued physical therapy services   Interdisciplinary Plan of Care Collaboration   IDT Collaboration with  ;Nursing   Patient Position at End of Therapy Edge of Bed;Bed Alarm On;Tray Table within Reach;Call Light within Reach;Phone within Reach   Collaboration Comments RN aware; processing DC currently   Session Information   Date / Session Number  1/20; evaluation only   Hazel Mixon DPT

## 2023-01-20 NOTE — CARE PLAN
The patient is Stable - Low risk of patient condition declining or worsening    Shift Goals  Clinical Goals: increase mobility, pain management  Patient Goals: increase mobility    Progress made toward(s) clinical / shift goals:    Problem: Pain - Standard  Goal: Alleviation of pain or a reduction in pain to the patient’s comfort goal  Outcome: Progressing   Patient received PRN medication as ordered.   Problem: Fall Risk  Goal: Patient will remain free from falls  Outcome: Progressing       Patient is not progressing towards the following goals:

## 2023-01-20 NOTE — DISCHARGE PLANNING
Case Management Discharge Planning    Admission Date: 1/19/2023  GMLOS:    ALOS: 0    6-Clicks ADL Score:    6-Clicks Mobility Score: 14  PT and/or OT Eval ordered: Yes  Post-acute Referrals Ordered: Yes  Post-acute Choice Obtained: Yes  Has referral(s) been sent to post-acute provider:  Yes      Anticipated Discharge Dispo: Discharge Disposition: D/T to home under HHA care in anticipation of covered skilled care (06)  Discharge Address: [address] [place] [address]   [place] [address]  Discharge Contact Phone Number: [phone]      DME Needed: Yes, FWW    DME Ordered: Yes    Action(s) Taken: CM met with patient at bedside to complete initial assessment and discuss DCP.  Patient, AAOx4, hard of hearing (no hearing aids), reported living alone in a single level house at a senior living community (55+).  Prior to hospitalization patient was independent with ADLs, driving, and ambulating with a cane.  No other DME used and not active with [facility].    PT recommendation of FWW and HHPT discussed with patient who is agreeable to both and selected following agencies/companies:    DME:  [facility]  HHC:  [facility]    Choice forms faxed to [facility].    Return transportation to home will be provided by patient's boyfriend, Kei, who will also stay with her for a couple of days post DC.    Escalations Completed: None    Medically Clear: Yes    Next Steps: CM will f/u on HH referral    Barriers to Discharge: None    Is the patient up for discharge tomorrow:  Medically cleared      **1035 Hrs - Per bedside nurse[facility]ita [facility] no longer needed.      Care Transition Team Assessment    Information Source  Orientation Level: Oriented X4  Information Given By: Patient  Who is responsible for making decisions for patient? : Patient     Elopement Risk  Legal Hold: No  Ambulatory or Self Mobile in Wheelchair: No-Not an Elopement Risk  Elopement Risk: Not at Risk for Elopement    Interdisciplinary Discharge Planning  Lives with - Patient's  Self Care Capacity: Alone and Able to Care For Self  Housing / Facility: [address] [facility]  Prior Services: Home-Independent    Discharge Preparedness  What is your plan after discharge?: Home health care  What are your discharge supports?: Other (comment) (Boyfriend, Kei)  Prior Functional Level: Ambulatory, Drives Self, Independent with Activities of Daily Living, Uses Cane    Functional Assesment  Prior Functional Level: Ambulatory, Drives Self, Independent with Activities of Daily Living, Uses Cane     Vision / Hearing Impairment  Right Eye Vision: Impaired, Wears Glasses  Left Eye Vision: Impaired, Wears Glasses     Advance Directive  Advance Directive?: Living Will    Domestic Abuse  Physical Abuse or Sexual Abuse: No  Verbal Abuse or Emotional Abuse: No    Psychological Assessment  History of Substance Abuse: None  History of Psychiatric Problems: No  Non-compliant with Treatment: No  Newly Diagnosed Illness: No    Discharge Risks or Barriers  Discharge risks or barriers?: No    Anticipated Discharge Information  Anticipated Discharge Disposition: D/T to home under HHA care in anticipation of covered skilled care (06)  Discharge Address: [address] [place] [address] [address] [place] [address]  Discharge Contact Phone Number: [phone]

## 2023-01-20 NOTE — FACE TO FACE
Face to Face Note  -  Durable Medical Equipment    LUZ Perez - NPI: [id number]  I certify that this patient is under my care and that they had a durable medical equipment(DME)face to face encounter by myself that meets the physician DME face-to-face encounter requirements with this patient on:    Date of encounter:   Patient:                    MRN:                       YOB: 2024  Ariana Drake  [id number]  1950     The encounter with the patient was in whole, or in part, for the following medical condition, which is the primary reason for durable medical equipment:  Post-Op Surgery    I certify that, based on my findings, the following durable medical equipment is medically necessary:    Walkers.    My Clinical findings support the need for the above equipment due to:  Abnormal Gait

## 2023-01-20 NOTE — CARE PLAN
The patient is Stable - Low risk of patient condition declining or worsening    Shift Goals  Clinical Goals: Pain control and mobility  Patient Goals: rest    Progress made toward(s) clinical / shift goals:  Pain is controlled from PACU medications at this time. Pt declined morphine PCA. Message sent to MONICO Valentine about getting PO medication orders. Pt week post up and said she is having trouble standing. Will continue to try.       Problem: Pain - Standard  Goal: Alleviation of pain or a reduction in pain to the patient’s comfort goal  Outcome: Progressing     Problem: Fall Risk  Goal: Patient will remain free from falls  Outcome: Progressing     Problem: Knowledge Deficit - Standard  Goal: Patient and family/care givers will demonstrate understanding of plan of care, disease process/condition, diagnostic tests and medications  Outcome: Progressing

## 2023-01-20 NOTE — DISCHARGE PLANNING
Received Choice Form @: 1008  Agency/ Facility Name: [facility]   Referral Sent per Choice Form @: 1021      Received Choice Form @: 1011  Agency/ Facility Name: [facility]  Referral Sent per Choice Form @: Not sent as there are no orders or F2F

## 2023-01-21 NOTE — DISCHARGE SUMMARY
DATE OF ADMISSION:  01/19/2023   DATE OF DISCHARGE:  01/20/2023     OPERATION PERFORMED:  Right L4-L5 transpedicular decompression with Dr. Cottrell   on 01/19/2023.     HOSPITAL COURSE:  On date of admission, operation was performed.    Postoperatively, she states her leg is significantly better.  She is eating,   ambulating and voiding.  She will be discharged home in stable condition.    Bilateral lower extremities are grossly intact with the exception of some   giveaway weakness in the right IP and quad.     DISCHARGE INSTRUCTIONS:  Given to the patient in paper format.     DISCHARGE MEDICATIONS:  She already has her prescription of Percocet at home.    She has filled out med agreement and consent.  We did also discuss this   verbally.     ASSESSMENT AND PLAN:  1.  Status post above delineated surgery with Dr. Cottrell on 01/19/2023.  2.  The patient will follow up in our office in 4 weeks.     Should the patient have further questions or concerns, they will not hesitate   to give our office a call.        ______________________________  VINCE Hatch        ______________________________  MD OLESYA DURÁN/CHEMA    DD:  01/20/2023 13:55  DT:  01/20/2023 18:27    Job#:  [id number]

## 2023-02-02 ENCOUNTER — APPOINTMENT (OUTPATIENT)
Dept: RADIOLOGY | Facility: MEDICAL CENTER | Age: 73
End: 2023-02-02
Attending: EMERGENCY MEDICINE
Payer: MEDICARE

## 2023-02-02 ENCOUNTER — HOSPITAL ENCOUNTER (OUTPATIENT)
Facility: MEDICAL CENTER | Age: 73
End: 2023-02-03
Attending: EMERGENCY MEDICINE | Admitting: STUDENT IN AN ORGANIZED HEALTH CARE EDUCATION/TRAINING PROGRAM
Payer: MEDICARE

## 2023-02-02 ENCOUNTER — APPOINTMENT (OUTPATIENT)
Dept: RADIOLOGY | Facility: MEDICAL CENTER | Age: 73
End: 2023-02-02
Attending: NEUROLOGICAL SURGERY
Payer: MEDICARE

## 2023-02-02 DIAGNOSIS — S32.040A COMPRESSION FRACTURE OF L4 LUMBAR VERTEBRA, CLOSED, INITIAL ENCOUNTER (HCC): ICD-10-CM

## 2023-02-02 DIAGNOSIS — M54.59 INTRACTABLE LOW BACK PAIN: ICD-10-CM

## 2023-02-02 DIAGNOSIS — K21.9 GASTROESOPHAGEAL REFLUX DISEASE WITHOUT ESOPHAGITIS: ICD-10-CM

## 2023-02-02 PROBLEM — Z98.890 STATUS POST LUMBAR SURGERY: Status: ACTIVE | Noted: 2023-02-02

## 2023-02-02 PROBLEM — E87.6 HYPOKALEMIA: Status: ACTIVE | Noted: 2023-02-02

## 2023-02-02 PROBLEM — E78.5 HYPERLIPIDEMIA: Status: ACTIVE | Noted: 2023-02-02

## 2023-02-02 PROBLEM — I10 HYPERTENSION: Status: ACTIVE | Noted: 2023-02-02

## 2023-02-02 LAB
ALBUMIN SERPL BCP-MCNC: 4 G/DL (ref 3.2–4.9)
ALBUMIN/GLOB SERPL: 1.5 G/DL
ALP SERPL-CCNC: 75 U/L (ref 30–99)
ALT SERPL-CCNC: 16 U/L (ref 2–50)
ANION GAP SERPL CALC-SCNC: 10 MMOL/L (ref 7–16)
AST SERPL-CCNC: 21 U/L (ref 12–45)
BASOPHILS # BLD AUTO: 0.4 % (ref 0–1.8)
BASOPHILS # BLD: 0.04 K/UL (ref 0–0.12)
BILIRUB SERPL-MCNC: 0.4 MG/DL (ref 0.1–1.5)
BUN SERPL-MCNC: 11 MG/DL (ref 8–22)
CALCIUM ALBUM COR SERPL-MCNC: 9 MG/DL (ref 8.5–10.5)
CALCIUM SERPL-MCNC: 9 MG/DL (ref 8.5–10.5)
CHLORIDE SERPL-SCNC: 96 MMOL/L (ref 96–112)
CO2 SERPL-SCNC: 27 MMOL/L (ref 20–33)
CREAT SERPL-MCNC: 0.62 MG/DL (ref 0.5–1.4)
EOSINOPHIL # BLD AUTO: 0.19 K/UL (ref 0–0.51)
EOSINOPHIL NFR BLD: 1.9 % (ref 0–6.9)
ERYTHROCYTE [DISTWIDTH] IN BLOOD BY AUTOMATED COUNT: 41.1 FL (ref 35.9–50)
GFR SERPLBLD CREATININE-BSD FMLA CKD-EPI: 93 ML/MIN/1.73 M 2
GLOBULIN SER CALC-MCNC: 2.6 G/DL (ref 1.9–3.5)
GLUCOSE SERPL-MCNC: 118 MG/DL (ref 65–99)
HCT VFR BLD AUTO: 37.3 % (ref 37–47)
HGB BLD-MCNC: 12.9 G/DL (ref 12–16)
IMM GRANULOCYTES # BLD AUTO: 0.03 K/UL (ref 0–0.11)
IMM GRANULOCYTES NFR BLD AUTO: 0.3 % (ref 0–0.9)
LYMPHOCYTES # BLD AUTO: 1.88 K/UL (ref 1–4.8)
LYMPHOCYTES NFR BLD: 18.6 % (ref 22–41)
MCH RBC QN AUTO: 31 PG (ref 27–33)
MCHC RBC AUTO-ENTMCNC: 34.6 G/DL (ref 33.6–35)
MCV RBC AUTO: 89.7 FL (ref 81.4–97.8)
MONOCYTES # BLD AUTO: 0.62 K/UL (ref 0–0.85)
MONOCYTES NFR BLD AUTO: 6.1 % (ref 0–13.4)
NEUTROPHILS # BLD AUTO: 7.36 K/UL (ref 2–7.15)
NEUTROPHILS NFR BLD: 72.7 % (ref 44–72)
NRBC # BLD AUTO: 0 K/UL
NRBC BLD-RTO: 0 /100 WBC
PLATELET # BLD AUTO: 305 K/UL (ref 164–446)
PMV BLD AUTO: 9 FL (ref 9–12.9)
POTASSIUM SERPL-SCNC: 3.3 MMOL/L (ref 3.6–5.5)
PROT SERPL-MCNC: 6.6 G/DL (ref 6–8.2)
RBC # BLD AUTO: 4.16 M/UL (ref 4.2–5.4)
SODIUM SERPL-SCNC: 133 MMOL/L (ref 135–145)
WBC # BLD AUTO: 10.1 K/UL (ref 4.8–10.8)

## 2023-02-02 PROCEDURE — 99285 EMERGENCY DEPT VISIT HI MDM: CPT

## 2023-02-02 PROCEDURE — A9270 NON-COVERED ITEM OR SERVICE: HCPCS | Performed by: STUDENT IN AN ORGANIZED HEALTH CARE EDUCATION/TRAINING PROGRAM

## 2023-02-02 PROCEDURE — 96376 TX/PRO/DX INJ SAME DRUG ADON: CPT

## 2023-02-02 PROCEDURE — 700111 HCHG RX REV CODE 636 W/ 250 OVERRIDE (IP): Performed by: STUDENT IN AN ORGANIZED HEALTH CARE EDUCATION/TRAINING PROGRAM

## 2023-02-02 PROCEDURE — 36415 COLL VENOUS BLD VENIPUNCTURE: CPT

## 2023-02-02 PROCEDURE — G0378 HOSPITAL OBSERVATION PER HR: HCPCS

## 2023-02-02 PROCEDURE — 85025 COMPLETE CBC W/AUTO DIFF WBC: CPT

## 2023-02-02 PROCEDURE — 99222 1ST HOSP IP/OBS MODERATE 55: CPT | Performed by: STUDENT IN AN ORGANIZED HEALTH CARE EDUCATION/TRAINING PROGRAM

## 2023-02-02 PROCEDURE — 700111 HCHG RX REV CODE 636 W/ 250 OVERRIDE (IP): Performed by: NURSE PRACTITIONER

## 2023-02-02 PROCEDURE — 96374 THER/PROPH/DIAG INJ IV PUSH: CPT

## 2023-02-02 PROCEDURE — 80053 COMPREHEN METABOLIC PANEL: CPT

## 2023-02-02 PROCEDURE — 96375 TX/PRO/DX INJ NEW DRUG ADDON: CPT

## 2023-02-02 PROCEDURE — 96372 THER/PROPH/DIAG INJ SC/IM: CPT

## 2023-02-02 PROCEDURE — 700102 HCHG RX REV CODE 250 W/ 637 OVERRIDE(OP): Performed by: STUDENT IN AN ORGANIZED HEALTH CARE EDUCATION/TRAINING PROGRAM

## 2023-02-02 PROCEDURE — 72148 MRI LUMBAR SPINE W/O DYE: CPT

## 2023-02-02 PROCEDURE — 72100 X-RAY EXAM L-S SPINE 2/3 VWS: CPT

## 2023-02-02 PROCEDURE — 700111 HCHG RX REV CODE 636 W/ 250 OVERRIDE (IP): Performed by: EMERGENCY MEDICINE

## 2023-02-02 RX ORDER — CYCLOBENZAPRINE HCL 5 MG
10 TABLET ORAL 3 TIMES DAILY PRN
Status: DISCONTINUED | OUTPATIENT
Start: 2023-02-02 | End: 2023-02-03 | Stop reason: HOSPADM

## 2023-02-02 RX ORDER — MORPHINE SULFATE 4 MG/ML
4 INJECTION INTRAVENOUS ONCE
Status: COMPLETED | OUTPATIENT
Start: 2023-02-02 | End: 2023-02-02

## 2023-02-02 RX ORDER — BISACODYL 10 MG
10 SUPPOSITORY, RECTAL RECTAL
Status: DISCONTINUED | OUTPATIENT
Start: 2023-02-02 | End: 2023-02-03 | Stop reason: HOSPADM

## 2023-02-02 RX ORDER — AMOXICILLIN 250 MG
2 CAPSULE ORAL 2 TIMES DAILY
Status: DISCONTINUED | OUTPATIENT
Start: 2023-02-02 | End: 2023-02-03 | Stop reason: HOSPADM

## 2023-02-02 RX ORDER — DEXAMETHASONE SODIUM PHOSPHATE 4 MG/ML
8 INJECTION, SOLUTION INTRA-ARTICULAR; INTRALESIONAL; INTRAMUSCULAR; INTRAVENOUS; SOFT TISSUE EVERY 6 HOURS
Status: COMPLETED | OUTPATIENT
Start: 2023-02-02 | End: 2023-02-02

## 2023-02-02 RX ORDER — ACYCLOVIR 400 MG/1
400 TABLET ORAL 2 TIMES DAILY PRN
Status: ON HOLD | COMMUNITY
End: 2023-07-31

## 2023-02-02 RX ORDER — DEXAMETHASONE SODIUM PHOSPHATE 4 MG/ML
4 INJECTION, SOLUTION INTRA-ARTICULAR; INTRALESIONAL; INTRAMUSCULAR; INTRAVENOUS; SOFT TISSUE EVERY 6 HOURS
Status: COMPLETED | OUTPATIENT
Start: 2023-02-02 | End: 2023-02-02

## 2023-02-02 RX ORDER — GABAPENTIN 300 MG/1
300 CAPSULE ORAL 3 TIMES DAILY
Status: DISCONTINUED | OUTPATIENT
Start: 2023-02-02 | End: 2023-02-03 | Stop reason: HOSPADM

## 2023-02-02 RX ORDER — ACETAMINOPHEN 325 MG/1
650 TABLET ORAL EVERY 6 HOURS PRN
Status: DISCONTINUED | OUTPATIENT
Start: 2023-02-02 | End: 2023-02-03 | Stop reason: HOSPADM

## 2023-02-02 RX ORDER — HYDROCHLOROTHIAZIDE 25 MG/1
25 TABLET ORAL DAILY
Status: DISCONTINUED | OUTPATIENT
Start: 2023-02-02 | End: 2023-02-02

## 2023-02-02 RX ORDER — OMEPRAZOLE 20 MG/1
40 CAPSULE, DELAYED RELEASE ORAL DAILY
Status: DISCONTINUED | OUTPATIENT
Start: 2023-02-02 | End: 2023-02-03 | Stop reason: HOSPADM

## 2023-02-02 RX ORDER — HYDROCHLOROTHIAZIDE 25 MG/1
25 TABLET ORAL DAILY
Status: DISCONTINUED | OUTPATIENT
Start: 2023-02-02 | End: 2023-02-03 | Stop reason: HOSPADM

## 2023-02-02 RX ORDER — MORPHINE SULFATE 4 MG/ML
2 INJECTION INTRAVENOUS EVERY 4 HOURS PRN
Status: DISCONTINUED | OUTPATIENT
Start: 2023-02-02 | End: 2023-02-03 | Stop reason: HOSPADM

## 2023-02-02 RX ORDER — POLYETHYLENE GLYCOL 3350 17 G/17G
1 POWDER, FOR SOLUTION ORAL
Status: DISCONTINUED | OUTPATIENT
Start: 2023-02-02 | End: 2023-02-03 | Stop reason: HOSPADM

## 2023-02-02 RX ORDER — UBIDECARENONE 30 MG
1 CAPSULE ORAL EVERY MORNING
Status: ON HOLD | COMMUNITY
End: 2023-08-02

## 2023-02-02 RX ORDER — OXYCODONE HYDROCHLORIDE 5 MG/1
5 TABLET ORAL EVERY 4 HOURS PRN
Status: DISCONTINUED | OUTPATIENT
Start: 2023-02-02 | End: 2023-02-03 | Stop reason: HOSPADM

## 2023-02-02 RX ORDER — ALPRAZOLAM 0.25 MG/1
0.12 TABLET ORAL 3 TIMES DAILY PRN
Status: ON HOLD | COMMUNITY
End: 2023-08-02

## 2023-02-02 RX ORDER — ENOXAPARIN SODIUM 100 MG/ML
40 INJECTION SUBCUTANEOUS DAILY
Status: DISCONTINUED | OUTPATIENT
Start: 2023-02-02 | End: 2023-02-03 | Stop reason: HOSPADM

## 2023-02-02 RX ORDER — DEXAMETHASONE SODIUM PHOSPHATE 4 MG/ML
4 INJECTION, SOLUTION INTRA-ARTICULAR; INTRALESIONAL; INTRAMUSCULAR; INTRAVENOUS; SOFT TISSUE EVERY 6 HOURS
Status: DISCONTINUED | OUTPATIENT
Start: 2023-02-02 | End: 2023-02-02

## 2023-02-02 RX ORDER — ATORVASTATIN CALCIUM 10 MG/1
10 TABLET, FILM COATED ORAL EVERY EVENING
Status: DISCONTINUED | OUTPATIENT
Start: 2023-02-02 | End: 2023-02-03 | Stop reason: HOSPADM

## 2023-02-02 RX ADMIN — SENNOSIDES AND DOCUSATE SODIUM 2 TABLET: 50; 8.6 TABLET ORAL at 17:13

## 2023-02-02 RX ADMIN — FAMOTIDINE 20 MG: 10 INJECTION, SOLUTION INTRAVENOUS at 17:13

## 2023-02-02 RX ADMIN — OMEPRAZOLE 40 MG: 20 CAPSULE, DELAYED RELEASE ORAL at 09:20

## 2023-02-02 RX ADMIN — CYCLOBENZAPRINE HYDROCHLORIDE 10 MG: 5 TABLET, FILM COATED ORAL at 10:17

## 2023-02-02 RX ADMIN — FAMOTIDINE 20 MG: 10 INJECTION, SOLUTION INTRAVENOUS at 10:17

## 2023-02-02 RX ADMIN — GABAPENTIN 300 MG: 300 CAPSULE ORAL at 19:12

## 2023-02-02 RX ADMIN — DEXAMETHASONE SODIUM PHOSPHATE 4 MG: 4 INJECTION, SOLUTION INTRA-ARTICULAR; INTRALESIONAL; INTRAMUSCULAR; INTRAVENOUS; SOFT TISSUE at 17:13

## 2023-02-02 RX ADMIN — DEXAMETHASONE SODIUM PHOSPHATE 8 MG: 4 INJECTION, SOLUTION INTRA-ARTICULAR; INTRALESIONAL; INTRAMUSCULAR; INTRAVENOUS; SOFT TISSUE at 11:16

## 2023-02-02 RX ADMIN — MORPHINE SULFATE 4 MG: 4 INJECTION, SOLUTION INTRAMUSCULAR; INTRAVENOUS at 06:20

## 2023-02-02 RX ADMIN — GABAPENTIN 300 MG: 300 CAPSULE ORAL at 08:57

## 2023-02-02 RX ADMIN — ENOXAPARIN SODIUM 40 MG: 40 INJECTION SUBCUTANEOUS at 17:12

## 2023-02-02 RX ADMIN — ATORVASTATIN CALCIUM 10 MG: 10 TABLET, FILM COATED ORAL at 17:14

## 2023-02-02 RX ADMIN — GABAPENTIN 300 MG: 300 CAPSULE ORAL at 14:49

## 2023-02-02 RX ADMIN — HYDROCHLOROTHIAZIDE 25 MG: 25 TABLET ORAL at 08:57

## 2023-02-02 ASSESSMENT — ENCOUNTER SYMPTOMS
VOMITING: 0
HEMOPTYSIS: 0
FEVER: 0
HEARTBURN: 0
ABDOMINAL PAIN: 0
NAUSEA: 0
BLURRED VISION: 0
DOUBLE VISION: 0
CHILLS: 0
HEADACHES: 0
DEPRESSION: 0
NECK PAIN: 0
PALPITATIONS: 0
COUGH: 0
DIZZINESS: 0
DIARRHEA: 0
BACK PAIN: 1
BRUISES/BLEEDS EASILY: 0
MYALGIAS: 0
SHORTNESS OF BREATH: 0

## 2023-02-02 ASSESSMENT — LIFESTYLE VARIABLES
EVER HAD A DRINK FIRST THING IN THE MORNING TO STEADY YOUR NERVES TO GET RID OF A HANGOVER: NO
HAVE YOU EVER FELT YOU SHOULD CUT DOWN ON YOUR DRINKING: NO
EVER FELT BAD OR GUILTY ABOUT YOUR DRINKING: NO
TOTAL SCORE: 0
HOW MANY TIMES IN THE PAST YEAR HAVE YOU HAD 5 OR MORE DRINKS IN A DAY: 0
HAVE PEOPLE ANNOYED YOU BY CRITICIZING YOUR DRINKING: NO
ON A TYPICAL DAY WHEN YOU DRINK ALCOHOL HOW MANY DRINKS DO YOU HAVE: 0
AVERAGE NUMBER OF DAYS PER WEEK YOU HAVE A DRINK CONTAINING ALCOHOL: 0
CONSUMPTION TOTAL: NEGATIVE
DOES PATIENT WANT TO STOP DRINKING: NO
ALCOHOL_USE: NO
TOTAL SCORE: 0
TOTAL SCORE: 0

## 2023-02-02 ASSESSMENT — PAIN DESCRIPTION - PAIN TYPE
TYPE: ACUTE PAIN

## 2023-02-02 ASSESSMENT — FIBROSIS 4 INDEX
FIB4 SCORE: 1.26
FIB4 SCORE: 1.16

## 2023-02-02 ASSESSMENT — PATIENT HEALTH QUESTIONNAIRE - PHQ9
2. FEELING DOWN, DEPRESSED, IRRITABLE, OR HOPELESS: NOT AT ALL
1. LITTLE INTEREST OR PLEASURE IN DOING THINGS: NOT AT ALL
SUM OF ALL RESPONSES TO PHQ9 QUESTIONS 1 AND 2: 0

## 2023-02-02 NOTE — ED NOTES
Pharmacy Medication Reconciliation      ~Medication reconciliation updated and complete per patient at bedside with medication list. Reviewed list with patient and returned at bedside  ~Allergies have been verified   ~No oral ABX within the last 30 days  ~Patient home pharmacy :  [facility]

## 2023-02-02 NOTE — ED TRIAGE NOTES
"Chief Complaint   Patient presents with    Leg Pain     BIBA from home for R leg pain  Pain began 4 days ago and worsened significantly 2 days ago  Pain is a sharp shooting pain from R upper leg down to calf   Recent back surgery 2 wks ago  CMS intact of R leg  100 fent IN with EMS, pt took 10mg oxycodone at 0100     BP (!) 186/86   Pulse 73   Temp 36.2 °C (97.2 °F)   Resp 18   Ht 1.575 m (5' 2\")   Wt 61.2 kg (135 lb)   SpO2 96%   BMI 24.69 kg/m²     " Alert and oriented, no focal deficits, no motor or sensory deficits.

## 2023-02-02 NOTE — ED PROVIDER NOTES
"  ER Provider Note    Scribed for Jason Cartagena M.D. by Marcin Lopez. 2/2/2023  2:52 AM    Primary Care Provider: Felton Waldrop M.D.    CHIEF COMPLAINT  Chief Complaint   Patient presents with    Leg Pain     BIBA from home for R leg pain  Pain began 4 days ago and worsened significantly 2 days ago  Pain is a sharp shooting pain from R upper leg down to calf   Recent back surgery 2 wks ago  CMS intact of R leg  100 fent IN with EMS, pt took 10mg oxycodone at 0100     EXTERNAL RECORDS REVIEWED  Inpatient Notes Patient had a L4-L5 decompression performed on 1/19/23    HPI/ROS    LIMITATION TO HISTORY   Select: : None    Ariana Drake is a 72 y.o. female who presents to the ED via EMS complaining of right leg pain onset 4 days ago but worsening in the past 36 hours. She states that her pain is severe and starts from her hip and radiates down her leg. She states that it burns behind her knee, but states that her right calf pain has resolved currently. She notes that she cannot straighten her leg out due to the pain, or do any movements with her leg without pain. She states that when she does move her leg, her pain is mostly in her hip and not in her leg. She denies any left sided leg pain. She notes that these pains are similar to what she felt prior to her surgery, but notes that these are \"20x worse\", rating her pain at 9/10. She notes that she has been able to walk, but relies heavily on her walker. She denies any urinary or bowel incontinence, or abdominal pain. She endorses that she took 10 mg oxycodone for her pain at 1 AM, EMS provided 100 mcg fentanyl en route as well. She has a recent history of L4-L5 decompression surgery 2 weeks ago, and endorses that her pain was improving until the onset of these symptoms. She notes no allergies to medications.    PAST MEDICAL HISTORY  Past Medical History:   Diagnosis Date    Arthritis     Asthma     Bowel habit changes     diarrhea    Heart burn     High " "cholesterol     Hypertension     Other acute back pain        SURGICAL HISTORY  Past Surgical History:   Procedure Laterality Date    LUMBAR LAMINECTOMY DISKECTOMY Right 1/19/2023    Procedure: POSTERIOR RIGHT L4-5 TRANSPEDICULAR DECOMPRESSION, DISCECTOMY;  Surgeon: Aman Cottrell M.D.;  Location: SURGERY [facility];  Service: Neurosurgery    LUMBAR DECOMPRESSION Right 1/19/2023    Procedure: DECOMPRESSION, SPINE, LUMBAR;  Surgeon: Aman Cottrell M.D.;  Location: SURGERY [facility];  Service: Neurosurgery    FUSION, SPINE, LUMBAR, ROBOT-ASSISTED WITH IMAGING GUIDANCE N/A 12/20/2021    Procedure: FUSION, SPINE, LUMBAR, ROBOT-ASSISTED WITH IMAGING GUIDANCE - L1-4 TRANSFORAMINAL LUMBAR INTERBODY FUSION WITH TOTAL FACTECTOMY;  Surgeon: Aman Cottrell M.D.;  Location: SURGERY [facility];  Service: Neurosurgery    VAGINAL HYSTERECTOMY TOTAL  1985     FAMILY HISTORY  None noted    SOCIAL HISTORY   reports that she has never smoked. She has never used smokeless tobacco. She reports current alcohol use of about 0.6 oz per week. She reports that she does not currently use drugs.    CURRENT MEDICATIONS  Previous Medications    ATORVASTATIN (LIPITOR) 10 MG TAB    Take 10 mg by mouth every day.    GABAPENTIN (NEURONTIN) 300 MG CAP    Take 300 mg by mouth in the morning, at noon, and at bedtime.    HYDROCHLOROTHIAZIDE (HYDRODIURIL) 25 MG TAB    Take 25 mg by mouth every day.    OMEPRAZOLE (PRILOSEC) 40 MG DELAYED-RELEASE CAPSULE    Take 40 mg by mouth every day.    PROBIOTIC PRODUCT (PROBIOTIC PO)    Take 1 Tablet by mouth every day.       ALLERGIES  Patient has no known allergies.    PHYSICAL EXAM  BP (!) 186/86   Pulse 73   Temp 36.2 °C (97.2 °F)   Resp 18   Ht 1.575 m (5' 2\")   Wt 61.2 kg (135 lb)   SpO2 96%   BMI 24.69 kg/m²   Gen: Alert, no acute distress  HEENT: ATNC  Eyes: PERRL, EOMI, normal conjunctiva.   Neck: trachea midline  Resp: no respiratory distress  CV: No JVD  Abd: non-distended, soft, " non-tender  Ext: No deformities, hyperesthesia posterior and lateral right thigh and right calf. Normal sensation to the anterior and medial right thigh. 5/5 strength to toe and ankle dorsiflexion and plantarflexion. 5/5 strength to knee flexion. Unable to perform or hip extension due to pain. No pedal edema. 2+ dorsalis pedis pulses.  Psych: normal mood  Neuro: speech fluent      DIAGNOSTIC STUDIES    Labs:   Results for orders placed or performed during the hospital encounter of 02/02/23   CBC WITH DIFFERENTIAL   Result Value Ref Range    WBC 10.1 4.8 - 10.8 K/uL    RBC 4.16 (L) 4.20 - 5.40 M/uL    Hemoglobin 12.9 12.0 - 16.0 g/dL    Hematocrit 37.3 37.0 - 47.0 %    MCV 89.7 81.4 - 97.8 fL    MCH 31.0 27.0 - 33.0 pg    MCHC 34.6 33.6 - 35.0 g/dL    RDW 41.1 35.9 - 50.0 fL    Platelet Count 305 164 - 446 K/uL    MPV 9.0 9.0 - 12.9 fL    Neutrophils-Polys 72.70 (H) 44.00 - 72.00 %    Lymphocytes 18.60 (L) 22.00 - 41.00 %    Monocytes 6.10 0.00 - 13.40 %    Eosinophils 1.90 0.00 - 6.90 %    Basophils 0.40 0.00 - 1.80 %    Immature Granulocytes 0.30 0.00 - 0.90 %    Nucleated RBC 0.00 /100 WBC    Neutrophils (Absolute) 7.36 (H) 2.00 - 7.15 K/uL    Lymphs (Absolute) 1.88 1.00 - 4.80 K/uL    Monos (Absolute) 0.62 0.00 - 0.85 K/uL    Eos (Absolute) 0.19 0.00 - 0.51 K/uL    Baso (Absolute) 0.04 0.00 - 0.12 K/uL    Immature Granulocytes (abs) 0.03 0.00 - 0.11 K/uL    NRBC (Absolute) 0.00 K/uL   COMP METABOLIC PANEL   Result Value Ref Range    Sodium 133 (L) 135 - 145 mmol/L    Potassium 3.3 (L) 3.6 - 5.5 mmol/L    Chloride 96 96 - 112 mmol/L    Co2 27 20 - 33 mmol/L    Anion Gap 10.0 7.0 - 16.0    Glucose 118 (H) 65 - 99 mg/dL    Bun 11 8 - 22 mg/dL    Creatinine 0.62 0.50 - 1.40 mg/dL    Calcium 9.0 8.5 - 10.5 mg/dL    AST(SGOT) 21 12 - 45 U/L    ALT(SGPT) 16 2 - 50 U/L    Alkaline Phosphatase 75 30 - 99 U/L    Total Bilirubin 0.4 0.1 - 1.5 mg/dL    Albumin 4.0 3.2 - 4.9 g/dL    Total Protein 6.6 6.0 - 8.2 g/dL     Globulin 2.6 1.9 - 3.5 g/dL    A-G Ratio 1.5 g/dL   ESTIMATED GFR   Result Value Ref Range    GFR (CKD-EPI) 93 >60 mL/min/1.73 m 2   CORRECTED CALCIUM   Result Value Ref Range    Correct Calcium 9.0 8.5 - 10.5 mg/dL      All labs reviewed by me.     COURSE & MEDICAL DECISION MAKING     ED Observation Status? No; Patient does not meet criteria for ED Observation.     INITIAL ASSESSMENT, COURSE AND PLAN  Care Narrative: Patient presents with severe pain in the setting of recent surgery.  No signs of cauda equina syndrome but does appear to have neurologic impairment consistent with sciatic type pain.  Benign abdominal examination.  Patient reports severe pain on initial evaluation, ordered for opioid pain medications.    Patient reported that her pain has improved without the opioids, underwent MRI.  Verbal report demonstrates no significant features, however there is significant artifact, recommend x-ray to ensure correct location of hardware.    X-ray reassuring, however the patient's pain is increasing again, at this point, she has had multiple rounds of oral opioid medications at home, opioid medications by EMS, and no additional opioids in the emergency department.  We will plan for hospitalization with intractable pain.    3:00 AM - Patient seen and examined at bedside. Patient will be medicated with morphine 4 mg for her pain. Ordered CBC w/ diff and CMP to evaluate. Paged Neurosurgery.    3:18 AM - I discussed the patient's case and the above findings with Dr. Rebollar (Neurosurgery) who recommends the patient have an MRI to further evaluate, admit to medicine for pain control if needed.  Will consult    DISPOSITION AND DISCUSSIONS  I have discussed management of the patient with the following physicians and DEBBIE's:  Dr. Rebollar (Neurosurgery), Dr. Farley, radiology         Case discussed with Dr. Tinajero , who will evaluate the patient for hospitalization. Patient will be hospitalized in [facility]  condition.    FINAL DIAGNOSIS  1. Intractable low back pain        Marcin LEWIS (Scribe), am scribing for, and in the presence of, Jason Cartagena M.D..    Electronically signed by: Marcin Lopez (Scribcaridad), 2/2/2023    Jason LEWIS M.D. personally performed the services described in this documentation, as scribed by Marcin Lopez in my presence, and it is both accurate and complete.      The note accurately reflects work and decisions made by me.  Jason Cartagena M.D.  2/2/2023  6:19 AM

## 2023-02-02 NOTE — HOSPITAL COURSE
Ms. Ariana Drake is a 72 y.o. female past medical history of hypertension, hyperlipidemia, GERD, recent L4-L5 transpedicular decompression on 1/19/2023 who presented 2/2/2023 with lower back pain that started 4 days ago progressively worsened.      Patient reports the pain is sharp, burning radiating down her right lower extremity.  She reports taking oxycodone with no relief of pain yesterday.  Patient does report using her walker.  Denies any fevers, chills, stool or urinary incontinence.  She denies any perineal numbness.     In the ER, neurosurgery was consulted recommended stat MRI of the lumbar spine.  Hospital observation requested due to intractable pain requiring IV analgesics. Neurosurgery formally consult pending official MRI read.    MRI lumbar spine obtained noting postoperative changes in the lumbar spine described as widely decompressed spinal canal with no evidence of significant stenosis, there is a right paracentral disc protrusion at L4-L5 level causing moderate right foraminal narrowing with impingement, small fluid collection at the laminectomy site behind L3.  X-ray of the lumbar spine notes no acute traumatic bony injury of the lumbar spine with facet arthrosis with neural foraminal stenosis at L4-L5 and L5-S1.  CT imaging of the spine notes multilevel discectomy, laminectomy and posterior pedicle screw fixation and hardware appears intact with multilevel degenerative disc disease with moderate levoconvex curvature and no acute lumbar spine fracture.    Discussed case with neurosurgery team, Jade MARC.  Patient seen and examined prior to being discharged.  Discussed with patient imaging results along with recommendations from neurosurgery.  Neurosurgery had sent Medrol Dosepak for patient use.  Added muscle relaxant, Pepcid, and narcotics for breakthrough pain for patient to utilize at home.  Patient recommended to follow-up with neurosurgery team in approximately 4 weeks.  All  questions and concerns answered prior to being discharged.  Patient discharged home.

## 2023-02-02 NOTE — ASSESSMENT & PLAN NOTE
Patient is status post L4-L5 trans particular decompression on 1/19/2023 by Dr. Cottrell.  Now presents with shooting pain to right lower extremity.  MRI reveals postoperative changes, neurosurgery been consulted

## 2023-02-02 NOTE — ED NOTES
Pt resting, states she still has pain in her right knee / hip/ ankle pain, refuse meds at this time

## 2023-02-02 NOTE — PROGRESS NOTES
Post op MRI reviewed.  Rt L4 root is decompressed/nothing at this time that would need additional surgery unless CT reveals pars interarticularis Fx.  Will do L CT in am if still with significant pain in spite of steroids.  Suspect perineural post op inflammation as the etiology of her pain.

## 2023-02-02 NOTE — ED NOTES
Received fentanyl 100 mcg enroute to ER, says pain has improved and would like to hold off on the morphine for now. Pain scale 7/10 from 10/10.

## 2023-02-02 NOTE — CARE PLAN
The patient is Watcher - Medium risk of patient condition declining or worsening         Progress made toward(s) clinical / shift goals:  Started on steroids and flexeril. Pain managed.     Patient is not progressing towards the following goals:

## 2023-02-02 NOTE — ED NOTES
Report received from Thompson HANSON. Assumed care of patient. Pt assessed, AAO x 4 . Patient's concerns addressed. Patient aware of POC. Fall precautions in place.  Pt repositioned and comfortable.  Call light within reach. This RN masked and in appropriate PPE during encounter.

## 2023-02-02 NOTE — ASSESSMENT & PLAN NOTE
Patient is status post L4-L5 trans particular decompression on 1/19/2023 by Dr. Cottrell.  Now presents with shooting pain to right lower extremity.    Neurosurgery consulted recommended MRI of the lumbar spine stat.  MRI Reveals postoperative changes.  Neurosurgery to officially consult in a.m.  Analgesics, flexeril, gabapentin  PT/OT  Fall precautions

## 2023-02-02 NOTE — H&P
Hospital Medicine History & Physical Note    Date of Service  2/2/2023    Primary Care Physician  Felton Waldrop M.D.    Consultants  neurosurgery    Specialist Names: dr. Rebollar     Code Status  Full Code    Chief Complaint  Chief Complaint   Patient presents with    Leg Pain     BIBA from home for R leg pain  Pain began 4 days ago and worsened significantly 2 days ago  Pain is a sharp shooting pain from R upper leg down to calf   Recent back surgery 2 wks ago  CMS intact of R leg  100 fent IN with EMS, pt took 10mg oxycodone at 0100       History of Presenting Illness  Ariana Drake is a 72 y.o. female past medical history of hypertension, hyperlipidemia, GERD, recent L4-L5 transpedicular decompression on 1/19/2023 who presented 2/2/2023 with lower back pain that started 4 days ago progressively worsened.  Patient reports the pain is sharp, burning radiating down her right lower extremity.  She reports taking oxycodone with no relief meant of pain yesterday.  Patient does report using her walker.  Denies any fevers, chills, stool or urinary incontinence.  She denies any perineal numbness.    In the ER, neurosurgery was consulted recommended stat MRI of the lumbar spine.  Hospital observation requested due to intractable pain requiring IV analgesics.  Neurosurgery formally consult pending official MRI read.    I discussed the plan of care with patient and bedside RN.    Review of Systems  Review of Systems   Constitutional:  Negative for chills and fever.   HENT:  Negative for hearing loss and tinnitus.    Eyes:  Negative for blurred vision and double vision.   Respiratory:  Negative for cough and hemoptysis.    Cardiovascular:  Negative for chest pain and palpitations.   Gastrointestinal:  Negative for heartburn and nausea.   Genitourinary:  Negative for dysuria and urgency.   Musculoskeletal:  Positive for back pain. Negative for myalgias and neck pain.   Skin:  Negative for rash.   Neurological:   Negative for dizziness and headaches.   Endo/Heme/Allergies:  Does not bruise/bleed easily.   Psychiatric/Behavioral:  Negative for depression and suicidal ideas.      Past Medical History   has a past medical history of Arthritis, Asthma, Bowel habit changes, Heart burn, High cholesterol, Hypertension, and Other acute back pain.    Surgical History   has a past surgical history that includes vaginal hysterectomy total (1985); fusion, spine, lumbar, robot-assisted with imaging guidance (N/A, 12/20/2021); lumbar laminectomy diskectomy (Right, 1/19/2023); and lumbar decompression (Right, 1/19/2023).     Family History  Family history reviewed with patient. There is no family history that is pertinent to the chief complaint.     Social History   reports that she has never smoked. She has never used smokeless tobacco. She reports current alcohol use of about 0.6 oz per week. She reports that she does not currently use drugs.    Allergies  No Known Allergies    Medications  Prior to Admission Medications   Prescriptions Last Dose Informant Patient Reported? Taking?   Probiotic Product (PROBIOTIC PO)  Patient Yes No   Sig: Take 1 Tablet by mouth every day.   atorvastatin (LIPITOR) 10 MG Tab  Patient Yes No   Sig: Take 10 mg by mouth every day.   gabapentin (NEURONTIN) 300 MG Cap  Patient Yes No   Sig: Take 300 mg by mouth in the morning, at noon, and at bedtime.   hydroCHLOROthiazide (HYDRODIURIL) 25 MG Tab  Patient Yes No   Sig: Take 25 mg by mouth every day.   omeprazole (PRILOSEC) 40 MG delayed-release capsule  Patient Yes No   Sig: Take 40 mg by mouth every day.      Facility-Administered Medications: None       Physical Exam  Temp:  [36.2 °C (97.2 °F)] 36.2 °C (97.2 °F)  Pulse:  [64-73] 70  Resp:  [16-18] 18  BP: (122-186)/(58-92) 122/64  SpO2:  [88 %-96 %] 95 %  Blood Pressure : 122/64   Temperature: 36.2 °C (97.2 °F)   Pulse: 70   Respiration: 18   Pulse Oximetry: 95 %       Physical Exam  Vitals and nursing note  reviewed.   Constitutional:       Appearance: Normal appearance.   HENT:      Head: Normocephalic and atraumatic.      Right Ear: Tympanic membrane normal.      Left Ear: Tympanic membrane normal.      Nose: Nose normal.      Mouth/Throat:      Mouth: Mucous membranes are moist.      Pharynx: Oropharynx is clear.   Eyes:      Extraocular Movements: Extraocular movements intact.      Pupils: Pupils are equal, round, and reactive to light.   Cardiovascular:      Rate and Rhythm: Normal rate and regular rhythm.      Pulses: Normal pulses.      Heart sounds: Normal heart sounds.   Pulmonary:      Effort: Pulmonary effort is normal.      Breath sounds: Normal breath sounds.   Abdominal:      General: Bowel sounds are normal. There is no distension.      Palpations: Abdomen is soft. There is no mass.   Musculoskeletal:         General: Tenderness present.      Cervical back: Neck supple.      Comments: -TTP to lower back  -unable to do hip extension d/t pain   -5/5 dorsiflexion and plantar flexion RLE   Skin:     General: Skin is warm.      Capillary Refill: Capillary refill takes less than 2 seconds.      Coloration: Skin is not jaundiced or pale.   Neurological:      General: No focal deficit present.      Mental Status: She is alert and oriented to person, place, and time. Mental status is at baseline.   Psychiatric:         Mood and Affect: Mood normal.         Behavior: Behavior normal.       Laboratory:  Recent Labs     02/02/23  0431   WBC 10.1   RBC 4.16*   HEMOGLOBIN 12.9   HEMATOCRIT 37.3   MCV 89.7   MCH 31.0   MCHC 34.6   RDW 41.1   PLATELETCT 305   MPV 9.0     Recent Labs     02/02/23  0431   SODIUM 133*   POTASSIUM 3.3*   CHLORIDE 96   CO2 27   GLUCOSE 118*   BUN 11   CREATININE 0.62   CALCIUM 9.0     Recent Labs     02/02/23  0431   ALTSGPT 16   ASTSGOT 21   ALKPHOSPHAT 75   TBILIRUBIN 0.4   GLUCOSE 118*         No results for input(s): NTPROBNP in the last 72 hours.      No results for input(s): TROPONINT  in the last 72 hours.    Imaging:  DX-LUMBAR SPINE-2 OR 3 VIEWS   Final Result         1.  No acute traumatic bony injury of the lumbar spine.   2.  Facet arthrosis with neural foraminal stenosis at L4/L5 and L5/S1.      MR-LUMBAR SPINE-W/O    (Results Pending)       X-Ray:  I have personally reviewed the images and compared with prior images.    Assessment/Plan:  Justification for Admission Status  I anticipate this patient is appropriate for observation status at this time because intractable low back pain requiring IV analgesics and neurosurgery consultation pending official MRI lumbar spine read.    Patient will need a Med/Surg bed on MEDICAL service .  The need is secondary to see above.    * Intractable low back pain- (present on admission)  Assessment & Plan  Patient is status post L4-L5 trans particular decompression on 1/19/2023 by Dr. Cottrell.  Now presents with shooting pain to right lower extremity.    Neurosurgery consulted recommended MRI of the lumbar spine stat.  Prelim read negative for acute spinal cord compression.  Follow official MRI read.  Neurosurgery to officially consult in a.m.  Analgesics, flexeril, gabapentin  PT/OT  Fall precautions     Hypokalemia  Assessment & Plan  Potassium 20 meq x 1     GERD (gastroesophageal reflux disease)  Assessment & Plan  Continue with omeprazole 40 mg daily    Hyperlipidemia  Assessment & Plan  Continue with atorvastatin 10 mg daily    Hypertension  Assessment & Plan  Continue with hydrochlorothiazide 25 mg daily    Status post lumbar surgery  Assessment & Plan  Patient is status post L4-L5 trans particular decompression on 1/19/2023 by Dr. Cottrell.  Now presents with shooting pain to right lower extremity.        VTE prophylaxis: SCDs/TEDs

## 2023-02-02 NOTE — PROGRESS NOTES
Hospital Medicine Daily Progress Note    Date of Service  2/2/2023    Chief Complaint  Ariana Drake is a 72 y.o. female admitted 2/2/2023 with low back pain/leg pain.    Hospital Course  Ariana Drake is a 72 y.o. female past medical history of hypertension, hyperlipidemia, GERD, recent L4-L5 transpedicular decompression on 1/19/2023 who presented 2/2/2023 with lower back pain that started 4 days ago progressively worsened.  Patient reports the pain is sharp, burning radiating down her right lower extremity.  She reports taking oxycodone with no relief of pain yesterday.  Patient does report using her walker.  Denies any fevers, chills, stool or urinary incontinence.  She denies any perineal numbness.     In the ER, neurosurgery was consulted recommended stat MRI of the lumbar spine.  Hospital observation requested due to intractable pain requiring IV analgesics. Neurosurgery formally consult pending official MRI read.       Interval Problem Update  2/2 afebrile, vital stable, on room air.  Potassium low -has been replaced.  MRI lumbar spine reveals postoperative changes.  Neurosurgery has been consulted, and they are recommending steroids.  No further surgical management.  We will start steroids today and hopefully discharge home tomorrow.    I have discussed this patient's plan of care and discharge plan at IDT rounds today with Case Management, Nursing, Nursing leadership, and other members of the IDT team.    Consultants/Specialty  neurosurgery    Code Status  Full Code    Disposition  Patient is not medically cleared for discharge.   Anticipate discharge to to home with close outpatient follow-up.  I have placed the appropriate orders for post-discharge needs.    Review of Systems  Review of Systems   Constitutional:  Negative for chills, fever and malaise/fatigue.   Respiratory:  Negative for cough and shortness of breath.    Cardiovascular:  Negative for chest pain, palpitations and leg swelling.    Gastrointestinal:  Negative for abdominal pain, diarrhea, heartburn, nausea and vomiting.   Genitourinary:  Negative for dysuria, frequency and urgency.   Musculoskeletal:  Positive for back pain and joint pain.   Neurological:  Negative for dizziness and headaches.   All other systems reviewed and are negative.     Physical Exam  Temp:  [36.2 °C (97.2 °F)-36.3 °C (97.4 °F)] 36.3 °C (97.4 °F)  Pulse:  [64-75] 75  Resp:  [16-18] 18  BP: (118-186)/(49-92) 119/49  SpO2:  [88 %-96 %] 93 %    Physical Exam  Vitals and nursing note reviewed.   Constitutional:       Appearance: Normal appearance. She is not ill-appearing.   HENT:      Head: Normocephalic and atraumatic.      Jaw: There is normal jaw occlusion.      Right Ear: Hearing normal.      Left Ear: Hearing normal.      Nose: Nose normal.      Mouth/Throat:      Lips: Pink.      Mouth: Mucous membranes are moist.   Eyes:      Extraocular Movements: Extraocular movements intact.      Conjunctiva/sclera: Conjunctivae normal.      Pupils: Pupils are equal, round, and reactive to light.   Neck:      Vascular: No carotid bruit.   Cardiovascular:      Rate and Rhythm: Normal rate and regular rhythm.      Pulses: Normal pulses.      Heart sounds: Normal heart sounds, S1 normal and S2 normal.   Pulmonary:      Effort: Pulmonary effort is normal.      Breath sounds: Normal breath sounds and air entry. No stridor.   Musculoskeletal:      Cervical back: Normal range of motion and neck supple.      Lumbar back: Tenderness present.      Right lower leg: No edema.      Left lower leg: No edema.   Skin:     General: Skin is warm and dry.      Capillary Refill: Capillary refill takes less than 2 seconds.   Neurological:      General: No focal deficit present.      Mental Status: She is alert and oriented to person, place, and time. Mental status is at baseline.      Sensory: Sensation is intact.      Motor: Motor function is intact.   Psychiatric:         Attention and  Perception: Attention and perception normal.         Mood and Affect: Mood and affect normal.         Speech: Speech normal.         Behavior: Behavior normal. Behavior is cooperative.       Fluids  No intake or output data in the 24 hours ending 02/02/23 0959    Laboratory  Recent Labs     02/02/23  0431   WBC 10.1   RBC 4.16*   HEMOGLOBIN 12.9   HEMATOCRIT 37.3   MCV 89.7   MCH 31.0   MCHC 34.6   RDW 41.1   PLATELETCT 305   MPV 9.0     Recent Labs     02/02/23  0431   SODIUM 133*   POTASSIUM 3.3*   CHLORIDE 96   CO2 27   GLUCOSE 118*   BUN 11   CREATININE 0.62   CALCIUM 9.0                   Imaging  DX-LUMBAR SPINE-2 OR 3 VIEWS   Final Result         1.  No acute traumatic bony injury of the lumbar spine.   2.  Facet arthrosis with neural foraminal stenosis at L4/L5 and L5/S1.      MR-LUMBAR SPINE-W/O   Final Result         Postoperative changes in the lumbar spine as described above with a widely decompressed spinal canal and no evidence of significant stenosis.      There is a right paracentral disc protrusion at the L4-5 level causes moderate right foraminal narrowing with impingement upon exiting right L4 nerve, stable from prior study.      Small fluid collections at the laminectomy site behind L3 and in the subcutaneous plane most likely represent seromas.              Assessment/Plan  * Intractable low back pain- (present on admission)  Assessment & Plan  Patient is status post L4-L5 trans particular decompression on 1/19/2023 by Dr. Cottrell.  Now presents with shooting pain to right lower extremity.    Neurosurgery consulted recommended MRI of the lumbar spine stat.  MRI Reveals postoperative changes.  Neurosurgery to officially consult in a.m.  Analgesics, flexeril, gabapentin  PT/OT  Fall precautions     Status post lumbar surgery  Assessment & Plan  Patient is status post L4-L5 trans particular decompression on 1/19/2023 by Dr. Cottrell.  Now presents with shooting pain to right lower extremity.  MRI  reveals postoperative changes, neurosurgery been consulted    Hypokalemia  Assessment & Plan  Potassium 20 meq x 1     Hypertension  Assessment & Plan  Continue with hydrochlorothiazide 25 mg daily    GERD (gastroesophageal reflux disease)  Assessment & Plan  Continue with omeprazole 40 mg daily    Hyperlipidemia  Assessment & Plan  Continue with atorvastatin 10 mg daily         VTE prophylaxis: SCDs/TEDs    I have performed a physical exam and reviewed and updated ROS and Plan today (2/2/2023). In review of yesterday's note (2/1/2023), there are no changes except as documented above.

## 2023-02-02 NOTE — PROGRESS NOTES
Neurosurgery Progress Note    Subjective:  RLE pain, hip, knee and anterior shin  Ambulatory, worsened last day or two    Exam:  LLE intact- R IP/Quad giveaway- initially able to live against resitance.  DF/PF intact    BP  Min: 118/64  Max: 186/86  Pulse  Av.3  Min: 64  Max: 73  Resp  Av.5  Min: 16  Max: 18  Temp  Av.2 °C (97.2 °F)  Min: 36.2 °C (97.2 °F)  Max: 36.2 °C (97.2 °F)  SpO2  Av.9 %  Min: 88 %  Max: 96 %    No data recorded    Recent Labs     23  0431   WBC 10.1   RBC 4.16*   HEMOGLOBIN 12.9   HEMATOCRIT 37.3   MCV 89.7   MCH 31.0   MCHC 34.6   RDW 41.1   PLATELETCT 305   MPV 9.0     Recent Labs     23  0431   SODIUM 133*   POTASSIUM 3.3*   CHLORIDE 96   CO2 27   GLUCOSE 118*   BUN 11   CREATININE 0.62   CALCIUM 9.0               Intake/Output       None            No intake or output data in the 24 hours ending 23 0944          atorvastatin  10 mg Q EVENING    gabapentin  300 mg TID    omeprazole  40 mg DAILY    senna-docusate  2 Tablet BID    And    polyethylene glycol/lytes  1 Packet QDAY PRN    And    magnesium hydroxide  30 mL QDAY PRN    And    bisacodyl  10 mg QDAY PRN    enoxaparin (LOVENOX) injection  40 mg DAILY AT 1800    acetaminophen  650 mg Q6HRS PRN    oxyCODONE immediate-release  5 mg Q4HRS PRN    morphine injection  2 mg Q4HRS PRN    hydroCHLOROthiazide  25 mg DAILY    cyclobenzaprine  10 mg TID PRN       Assessment and Plan:    POD R L4-5 TP decompression 23    Steroids- Decadron 4q6- transition to MDP at DE  MRI reviewed by Dr Cottrell and Dr Rebollar- stable   Ok to floor for pain control  CT L spine in am if still in house

## 2023-02-03 ENCOUNTER — APPOINTMENT (OUTPATIENT)
Dept: RADIOLOGY | Facility: MEDICAL CENTER | Age: 73
End: 2023-02-03
Attending: NURSE PRACTITIONER
Payer: MEDICARE

## 2023-02-03 ENCOUNTER — PHARMACY VISIT (OUTPATIENT)
Dept: PHARMACY | Facility: MEDICAL CENTER | Age: 73
End: 2023-02-03
Payer: MEDICARE

## 2023-02-03 VITALS
SYSTOLIC BLOOD PRESSURE: 131 MMHG | TEMPERATURE: 96.7 F | RESPIRATION RATE: 18 BRPM | HEIGHT: 62 IN | OXYGEN SATURATION: 94 % | HEART RATE: 83 BPM | BODY MASS INDEX: 24.18 KG/M2 | WEIGHT: 131.39 LBS | DIASTOLIC BLOOD PRESSURE: 63 MMHG

## 2023-02-03 PROBLEM — E87.6 HYPOKALEMIA: Status: RESOLVED | Noted: 2023-02-02 | Resolved: 2023-02-03

## 2023-02-03 PROBLEM — S32.040A COMPRESSION FRACTURE OF L4 LUMBAR VERTEBRA, CLOSED, INITIAL ENCOUNTER (HCC): Status: ACTIVE | Noted: 2023-02-03

## 2023-02-03 PROBLEM — S32.040A COMPRESSION FRACTURE OF L4 LUMBAR VERTEBRA, CLOSED, INITIAL ENCOUNTER (HCC): Status: RESOLVED | Noted: 2023-02-03 | Resolved: 2023-02-03

## 2023-02-03 PROBLEM — M54.59 INTRACTABLE LOW BACK PAIN: Status: RESOLVED | Noted: 2023-02-02 | Resolved: 2023-02-03

## 2023-02-03 LAB
ANION GAP SERPL CALC-SCNC: 14 MMOL/L (ref 7–16)
BUN SERPL-MCNC: 22 MG/DL (ref 8–22)
CALCIUM SERPL-MCNC: 10 MG/DL (ref 8.5–10.5)
CHLORIDE SERPL-SCNC: 95 MMOL/L (ref 96–112)
CO2 SERPL-SCNC: 23 MMOL/L (ref 20–33)
CREAT SERPL-MCNC: 0.74 MG/DL (ref 0.5–1.4)
GFR SERPLBLD CREATININE-BSD FMLA CKD-EPI: 85 ML/MIN/1.73 M 2
GLUCOSE SERPL-MCNC: 173 MG/DL (ref 65–99)
POTASSIUM SERPL-SCNC: 3.4 MMOL/L (ref 3.6–5.5)
SODIUM SERPL-SCNC: 132 MMOL/L (ref 135–145)

## 2023-02-03 PROCEDURE — 700102 HCHG RX REV CODE 250 W/ 637 OVERRIDE(OP): Performed by: STUDENT IN AN ORGANIZED HEALTH CARE EDUCATION/TRAINING PROGRAM

## 2023-02-03 PROCEDURE — A9270 NON-COVERED ITEM OR SERVICE: HCPCS | Performed by: STUDENT IN AN ORGANIZED HEALTH CARE EDUCATION/TRAINING PROGRAM

## 2023-02-03 PROCEDURE — 72131 CT LUMBAR SPINE W/O DYE: CPT

## 2023-02-03 PROCEDURE — 99239 HOSP IP/OBS DSCHRG MGMT >30: CPT | Performed by: NURSE PRACTITIONER

## 2023-02-03 PROCEDURE — RXMED WILLOW AMBULATORY MEDICATION CHARGE: Performed by: NURSE PRACTITIONER

## 2023-02-03 PROCEDURE — G0378 HOSPITAL OBSERVATION PER HR: HCPCS

## 2023-02-03 PROCEDURE — 80048 BASIC METABOLIC PNL TOTAL CA: CPT

## 2023-02-03 PROCEDURE — 700111 HCHG RX REV CODE 636 W/ 250 OVERRIDE (IP): Performed by: NURSE PRACTITIONER

## 2023-02-03 PROCEDURE — 96376 TX/PRO/DX INJ SAME DRUG ADON: CPT

## 2023-02-03 RX ORDER — FAMOTIDINE 20 MG/1
20 TABLET, FILM COATED ORAL 2 TIMES DAILY
Qty: 20 TABLET | Refills: 0 | Status: SHIPPED | OUTPATIENT
Start: 2023-02-03 | End: 2023-02-13

## 2023-02-03 RX ORDER — OXYCODONE HYDROCHLORIDE 5 MG/1
5 TABLET ORAL EVERY 8 HOURS PRN
Qty: 10 TABLET | Refills: 0 | Status: ON HOLD | OUTPATIENT
Start: 2023-02-03 | End: 2023-02-07 | Stop reason: SDUPTHER

## 2023-02-03 RX ORDER — CYCLOBENZAPRINE HCL 10 MG
10 TABLET ORAL 3 TIMES DAILY PRN
Qty: 30 TABLET | Refills: 0 | Status: SHIPPED | OUTPATIENT
Start: 2023-02-03 | End: 2023-02-13

## 2023-02-03 RX ADMIN — ACETAMINOPHEN 650 MG: 325 TABLET, FILM COATED ORAL at 05:27

## 2023-02-03 RX ADMIN — FAMOTIDINE 20 MG: 10 INJECTION, SOLUTION INTRAVENOUS at 05:23

## 2023-02-03 RX ADMIN — GABAPENTIN 300 MG: 300 CAPSULE ORAL at 09:17

## 2023-02-03 RX ADMIN — OMEPRAZOLE 40 MG: 20 CAPSULE, DELAYED RELEASE ORAL at 05:23

## 2023-02-03 RX ADMIN — HYDROCHLOROTHIAZIDE 25 MG: 25 TABLET ORAL at 05:22

## 2023-02-03 ASSESSMENT — PAIN DESCRIPTION - PAIN TYPE
TYPE: ACUTE PAIN

## 2023-02-03 NOTE — PROGRESS NOTES
4 Eyes Skin Assessment Completed by Akanksha RN and Susan RN.    Head WDL  Ears WDL  Nose WDL  Mouth WDL  Neck WDL  Breast/Chest WDL  Shoulder Blades WDL  Spine Incision  (R) Arm/Elbow/Hand WDL  (L) Arm/Elbow/Hand WDL  Abdomen WDL  Groin WDL  Scrotum/Coccyx/Buttocks WDL  (R) Leg WDL  (L) Leg WDL  (R) Heel/Foot/Toe WDL  (L) Heel/Foot/Toe WDL          Devices In Places Blood Pressure Cuff and Pulse Ox      Interventions In Place Pillows    Possible Skin Injury No    Pictures Uploaded Into Epic N/A  Wound Consult Placed N/A  RN Wound Prevention Protocol Ordered No

## 2023-02-03 NOTE — PROGRESS NOTES
Patient aox4, vital sign stable. Patient updated on plan of care, all needs met at this time. Threaded socks in place. Call light and personal belongings within reach. Bed locked and in lowest position.

## 2023-02-03 NOTE — CARE PLAN
The patient is Stable - Low risk of patient condition declining or worsening    Shift Goals  Clinical Goals: pain control, pt/ot eval  Patient Goals: rest and pain control    Progress made toward(s) clinical / shift goals:      Problem: Pain - Standard  Goal: Alleviation of pain or a reduction in pain to the patient’s comfort goal  Outcome: Progressing  Note: Patient able to verbalize pain on a 0/10 scale. Patient will be medicated for pain scale per MAR.      Problem: Knowledge Deficit - Standard  Goal: Patient and family/care givers will demonstrate understanding of plan of care, disease process/condition, diagnostic tests and medications  Outcome: Progressing  Note: Plan of care: pain control, pt/ot eval       Patient is not progressing towards the following goals:

## 2023-02-03 NOTE — DISCHARGE INSTRUCTIONS
FOLLOW UP ITEMS POST DISCHARGE  Please call [phone] to schedule PCP appointment for patient.    Required specialty appointments include:       Discharge Instructions per RITU Magallanes    -Follow-up with PCP s/p hospitalization  -Follow-up with neurosurgery, Dr. Cottrell in approximately 4 weeks  -Medrol Dosepak sent by neurosurgery team  -Additional prescription during this admission include muscle relaxant, Pepcid, and oxycodone for breakthrough pain management  -Resume all other home medications    DIET: As tolerated    ACTIVITY: As tolerated    DIAGNOSIS: Leg pain    Return to ER if symptoms persist, chest pain, palpitations, shortness of breath, numbness, tingling, weakness, and high fevers.      Discharge Instructions    Discharged to home by car with relative. Discharged via wheelchair, hospital escort: Yes.  Special equipment needed: Not Applicable    Be sure to schedule a follow-up appointment with your primary care doctor or any specialists as instructed.     Discharge Plan:   Diet Plan: Discussed  Activity Level: Discussed  Confirmed Follow up Appointment: Patient to Call and Schedule Appointment  Confirmed Symptoms Management: Discussed  Medication Reconciliation Updated: Yes  Influenza Vaccine Indication: Not indicated: Previously immunized this influenza season and > 8 years of age    I understand that a diet low in cholesterol, fat, and sodium is recommended for good health. Unless I have been given specific instructions below for another diet, I accept this instruction as my diet prescription.   Other diet: Regular diet     Special Instructions: None    -Is this patient being discharged with medication to prevent blood clots?  No    Is patient discharged on Warfarin / Coumadin?   No

## 2023-02-03 NOTE — PROGRESS NOTES
Neurosurgery Progress Note    Subjective:   Pain sig better today    Exam:  LLE intact- R IP/Quad giveaway- initially able to live against resitance.  DF/PF intact    BP  Min: 115/61  Max: 131/63  Pulse  Av.2  Min: 59  Max: 95  Resp  Av.3  Min: 16  Max: 20  Temp  Av.2 °C (97.2 °F)  Min: 35.9 °C (96.7 °F)  Max: 36.5 °C (97.7 °F)  SpO2  Av.9 %  Min: 89 %  Max: 96 %    No data recorded    Recent Labs     23   WBC 10.1   RBC 4.16*   HEMOGLOBIN 12.9   HEMATOCRIT 37.3   MCV 89.7   MCH 31.0   MCHC 34.6   RDW 41.1   PLATELETCT 305   MPV 9.0       Recent Labs     23  0431 23  0237   SODIUM 133* 132*   POTASSIUM 3.3* 3.4*   CHLORIDE 96 95*   CO2 27 23   GLUCOSE 118* 173*   BUN 11 22   CREATININE 0.62 0.74   CALCIUM 9.0 10.0                 Intake/Output                         23 0700 - 23 0659 23 0700 - 23 0659     [id number] 5767-7801 Total 6751-0178 1522-7585 Total                 Intake    Total Intake -- -- -- -- -- --       Output    Stool  --  -- --  --  -- --    Number of Times Stooled -- 1 x 1 x 1 x -- 1 x    Total Output -- -- -- -- -- --       Net I/O     -- -- -- -- -- --            No intake or output data in the 24 hours ending 23 0947          atorvastatin  10 mg Q EVENING    gabapentin  300 mg TID    omeprazole  40 mg DAILY    senna-docusate  2 Tablet BID    And    polyethylene glycol/lytes  1 Packet QDAY PRN    And    magnesium hydroxide  30 mL QDAY PRN    And    bisacodyl  10 mg QDAY PRN    enoxaparin (LOVENOX) injection  40 mg DAILY AT 1800    acetaminophen  650 mg Q6HRS PRN    oxyCODONE immediate-release  5 mg Q4HRS PRN    morphine injection  2 mg Q4HRS PRN    hydroCHLOROthiazide  25 mg DAILY    cyclobenzaprine  10 mg TID PRN    famotidine  20 mg BID       Assessment and Plan:    POD R L4-5 TP decompression 23    CT no fracture- see DV note  MRI- stable  DC home- steroids sent to pharmacy

## 2023-02-03 NOTE — PROGRESS NOTES
PIV D/C tip was intact. Discharge paperwork discussed with the patient, signed copy in the chart. Pt currently waiting for ride. Meds to beds received.

## 2023-02-03 NOTE — CARE PLAN
The patient is Stable - Low risk of patient condition declining or worsening    Shift Goals  Clinical Goals: pain control. CT  Patient Goals: dc  Family Goals: Not present    Progress made toward(s) clinical / shift goals:    Problem: Pain - Standard  Goal: Alleviation of pain or a reduction in pain to the patient’s comfort goal  Outcome: Progressing     Problem: Pain - Standard  Goal: Alleviation of pain or a reduction in pain to the patient’s comfort goal  Outcome: Progressing       Patient is not progressing towards the following goals:

## 2023-02-03 NOTE — DISCHARGE SUMMARY
Discharge Summary    CHIEF COMPLAINT ON ADMISSION  Chief Complaint   Patient presents with    Leg Pain     BIBA from home for R leg pain  Pain began 4 days ago and worsened significantly 2 days ago  Pain is a sharp shooting pain from R upper leg down to calf   Recent back surgery 2 wks ago  CMS intact of R leg  100 fent IN with EMS, pt took 10mg oxycodone at 0100       Reason for Admission  EMS     Admission Date  2/2/2023    CODE STATUS  Full Code    HPI & HOSPITAL COURSE    Ms. Ariana Drake is a 72 y.o. female past medical history of hypertension, hyperlipidemia, GERD, recent L4-L5 transpedicular decompression on 1/19/2023 who presented 2/2/2023 with lower back pain that started 4 days ago progressively worsened.      Patient reports the pain is sharp, burning radiating down her right lower extremity.  She reports taking oxycodone with no relief of pain yesterday.  Patient does report using her walker.  Denies any fevers, chills, stool or urinary incontinence.  She denies any perineal numbness.     In the ER, neurosurgery was consulted recommended stat MRI of the lumbar spine.  Hospital observation requested due to intractable pain requiring IV analgesics. Neurosurgery formally consult pending official MRI read.    MRI lumbar spine obtained noting postoperative changes in the lumbar spine described as widely decompressed spinal canal with no evidence of significant stenosis, there is a right paracentral disc protrusion at L4-L5 level causing moderate right foraminal narrowing with impingement, small fluid collection at the laminectomy site behind L3.  X-ray of the lumbar spine notes no acute traumatic bony injury of the lumbar spine with facet arthrosis with neural foraminal stenosis at L4-L5 and L5-S1.  CT imaging of the spine notes multilevel discectomy, laminectomy and posterior pedicle screw fixation and hardware appears intact with multilevel degenerative disc disease with moderate levoconvex curvature  and no acute lumbar spine fracture.    Discussed case with neurosurgery team, Jade MARC.  Patient seen and examined prior to being discharged.  Discussed with patient imaging results along with recommendations from neurosurgery.  Neurosurgery had sent Medrol Dosepak for patient use.  Added muscle relaxant, Pepcid, and narcotics for breakthrough pain for patient to utilize at home.  Patient recommended to follow-up with neurosurgery team in approximately 4 weeks.  All questions and concerns answered prior to being discharged.  Patient discharged home.    Therefore, she is discharged in good and stable condition to home with close outpatient follow-up.    The patient recovered much more quickly than anticipated on admission.    Discharge Date  02/03/23      FOLLOW UP ITEMS POST DISCHARGE  Please call [phone] to schedule PCP appointment for patient.    Required specialty appointments include:       Discharge Instructions per RITU Magallanes    -Follow-up with PCP s/p hospitalization  -Follow-up with neurosurgery, Dr. Cottrell in approximately 4 weeks  -Medrol Dosepak sent by neurosurgery team  -Additional prescription during this admission include muscle relaxant, Pepcid, and oxycodone for breakthrough pain management  -Resume all other home medications    DIET: As tolerated    ACTIVITY: As tolerated    DIAGNOSIS: Leg pain    Return to ER if symptoms persist, chest pain, palpitations, shortness of breath, numbness, tingling, weakness, and high fevers.      DISCHARGE DIAGNOSES  Principal Problem (Resolved):    Intractable low back pain POA: Yes  Active Problems:    Status post lumbar surgery POA: Unknown    Hypertension POA: Unknown    Hyperlipidemia POA: Unknown    GERD (gastroesophageal reflux disease) POA: Unknown  Resolved Problems:    Hypokalemia POA: Unknown    Compression fracture of L4 lumbar vertebra, closed, initial encounter ([facility]) POA: Yes      FOLLOW UP    Felton Waldrop  M.D.  [address] [facility]  [place] [address]  [phone]    Schedule an appointment as soon as possible for a visit in 1 week(s)      Aman Cottrell M.D.  [address]  [place] [address]  [phone]    Schedule an appointment as soon as possible for a visit in 1 week(s)        MEDICATIONS ON DISCHARGE     Medication List        START taking these medications        Instructions   cyclobenzaprine 10 mg Tabs  Commonly known as: Flexeril   Take 1 Tablet by mouth 3 times a day as needed for Muscle Spasms for up to 10 days.  Dose: 10 mg     famotidine 10 MG/ML Soln  Commonly known as: PEPCID   Infuse 2 mL into a venous catheter 2 times a day for 10 days.  Dose: 20 mg     oxyCODONE immediate-release 5 MG Tabs  Commonly known as: ROXICODONE   Take 1 Tablet by mouth every 8 hours as needed for Severe Pain for up to 3 days.  Dose: 5 mg            CONTINUE taking these medications        Instructions   acyclovir 400 MG tablet  Commonly known as: Zovirax   Take 400 mg by mouth 2 times a day as needed. Indications: Genital Herpes  Dose: 400 mg     ALPRAZolam 0.25 MG Tabs  Commonly known as: XANAX   Take 0.25 mg by mouth 3 times a day as needed for Anxiety.  Dose: 0.25 mg     atorvastatin 10 MG Tabs  Commonly known as: LIPITOR   Take 10 mg by mouth every morning.  Dose: 10 mg     CALCIUM + D PO   Take 2 Tablets by mouth every morning.  Dose: 2 Tablet     COQ10 PO   Take 1 Tablet by mouth every morning.  Dose: 1 Tablet     gabapentin 300 MG Caps  Commonly known as: NEURONTIN   Take 300 mg by mouth 3 times a day.  Dose: 300 mg     hydroCHLOROthiazide 25 MG Tabs  Commonly known as: HYDRODIURIL   Take 25 mg by mouth every morning.  Dose: 25 mg     Multi For Her 50+ Tabs   Take 1 Tablet by mouth every morning.  Dose: 1 Tablet     omeprazole 40 MG delayed-release capsule  Commonly known as: PRILOSEC   Take 40 mg by mouth every morning.  Dose: 40 mg     Potassium 99 MG Tabs   Take 1 Tablet by mouth two times a week.  Tuesday & Saturday  Dose: 1 Tablet     PROBIOTIC PO   Take 1 Tablet by mouth every morning.  Dose: 1 Tablet     VITAMIN C PO   Take 1 Tablet by mouth every morning.  Dose: 1 Tablet              Allergies  No Known Allergies    DIET  Orders Placed This Encounter   Procedures    Diet Order Diet: Regular     Standing Status:   Standing     Number of Occurrences:   1     Order Specific Question:   Diet:     Answer:   Regular [1]       ACTIVITY  As tolerated.  Weight bearing as tolerated    CONSULTATIONS  NONE    PROCEDURES  NONE    IMAGING    CT-LSPINE W/O PLUS RECONS   Final Result      1.  Multilevel discectomy, laminectomy and posterior pedicle screw fixation as described above.  Hardware appears intact.   2.  Multilevel degenerative disc disease with moderate levoconvex curvature.   3.  No acute lumbar spine fracture.      DX-LUMBAR SPINE-2 OR 3 VIEWS   Final Result         1.  No acute traumatic bony injury of the lumbar spine.   2.  Facet arthrosis with neural foraminal stenosis at L4/L5 and L5/S1.      MR-LUMBAR SPINE-W/O   Final Result         Postoperative changes in the lumbar spine as described above with a widely decompressed spinal canal and no evidence of significant stenosis.      There is a right paracentral disc protrusion at the L4-5 level causes moderate right foraminal narrowing with impingement upon exiting right L4 nerve, stable from prior study.      Small fluid collections at the laminectomy site behind L3 and in the subcutaneous plane most likely represent seromas.         =    LABORATORY  Lab Results   Component Value Date    SODIUM 132 (L) 02/03/2023    POTASSIUM 3.4 (L) 02/03/2023    CHLORIDE 95 (L) 02/03/2023    CO2 23 02/03/2023    GLUCOSE 173 (H) 02/03/2023    BUN 22 02/03/2023    CREATININE 0.74 02/03/2023    GLOMRATE 69 04/08/2022        Lab Results   Component Value Date    WBC 10.1 02/02/2023    HEMOGLOBIN 12.9 02/02/2023    HEMATOCRIT 37.3 02/02/2023    PLATELETCT 305 02/02/2023         Total time of the discharge process took 36 minutes.    ======================================================================================================================================================================================================================================  Please note that this dictation was created using voice recognition software. I have made every reasonable attempt to correct obvious errors, but there may be errors of grammar and possibly content that I did not discover before finalizing the note.    Electronically signed by:  Dr. SHERRI Jefferson, DNP, APRN, FNP-C  Hospitalist Services  [facility]  [phone]  [email or web address]  02/03/23                  1146

## 2023-02-06 ENCOUNTER — HOSPITAL ENCOUNTER (OUTPATIENT)
Facility: MEDICAL CENTER | Age: 73
End: 2023-02-07
Attending: EMERGENCY MEDICINE | Admitting: FAMILY MEDICINE
Payer: MEDICARE

## 2023-02-06 ENCOUNTER — APPOINTMENT (OUTPATIENT)
Dept: RADIOLOGY | Facility: MEDICAL CENTER | Age: 73
End: 2023-02-06
Attending: EMERGENCY MEDICINE
Payer: MEDICARE

## 2023-02-06 DIAGNOSIS — R52 PAIN: ICD-10-CM

## 2023-02-06 DIAGNOSIS — M54.50 LOW BACK PAIN, UNSPECIFIED BACK PAIN LATERALITY, UNSPECIFIED CHRONICITY, UNSPECIFIED WHETHER SCIATICA PRESENT: ICD-10-CM

## 2023-02-06 DIAGNOSIS — M25.551 RIGHT HIP PAIN: ICD-10-CM

## 2023-02-06 DIAGNOSIS — M79.604 RIGHT LEG PAIN: ICD-10-CM

## 2023-02-06 DIAGNOSIS — M54.59 INTRACTABLE LOW BACK PAIN: ICD-10-CM

## 2023-02-06 LAB
ALBUMIN SERPL BCP-MCNC: 4.3 G/DL (ref 3.2–4.9)
ALBUMIN/GLOB SERPL: 1.6 G/DL
ALP SERPL-CCNC: 68 U/L (ref 30–99)
ALT SERPL-CCNC: 23 U/L (ref 2–50)
ANION GAP SERPL CALC-SCNC: 12 MMOL/L (ref 7–16)
AST SERPL-CCNC: 21 U/L (ref 12–45)
BASOPHILS # BLD AUTO: 0.3 % (ref 0–1.8)
BASOPHILS # BLD: 0.04 K/UL (ref 0–0.12)
BILIRUB SERPL-MCNC: 0.5 MG/DL (ref 0.1–1.5)
BUN SERPL-MCNC: 18 MG/DL (ref 8–22)
CALCIUM ALBUM COR SERPL-MCNC: 9.3 MG/DL (ref 8.5–10.5)
CALCIUM SERPL-MCNC: 9.5 MG/DL (ref 8.5–10.5)
CHLORIDE SERPL-SCNC: 98 MMOL/L (ref 96–112)
CO2 SERPL-SCNC: 25 MMOL/L (ref 20–33)
CREAT SERPL-MCNC: 0.7 MG/DL (ref 0.5–1.4)
EOSINOPHIL # BLD AUTO: 0.03 K/UL (ref 0–0.51)
EOSINOPHIL NFR BLD: 0.2 % (ref 0–6.9)
ERYTHROCYTE [DISTWIDTH] IN BLOOD BY AUTOMATED COUNT: 41.5 FL (ref 35.9–50)
GFR SERPLBLD CREATININE-BSD FMLA CKD-EPI: 91 ML/MIN/1.73 M 2
GLOBULIN SER CALC-MCNC: 2.7 G/DL (ref 1.9–3.5)
GLUCOSE SERPL-MCNC: 94 MG/DL (ref 65–99)
HCT VFR BLD AUTO: 42 % (ref 37–47)
HGB BLD-MCNC: 14.5 G/DL (ref 12–16)
IMM GRANULOCYTES # BLD AUTO: 0.06 K/UL (ref 0–0.11)
IMM GRANULOCYTES NFR BLD AUTO: 0.5 % (ref 0–0.9)
LYMPHOCYTES # BLD AUTO: 4.13 K/UL (ref 1–4.8)
LYMPHOCYTES NFR BLD: 32.2 % (ref 22–41)
MCH RBC QN AUTO: 31.5 PG (ref 27–33)
MCHC RBC AUTO-ENTMCNC: 34.5 G/DL (ref 33.6–35)
MCV RBC AUTO: 91.1 FL (ref 81.4–97.8)
MONOCYTES # BLD AUTO: 0.92 K/UL (ref 0–0.85)
MONOCYTES NFR BLD AUTO: 7.2 % (ref 0–13.4)
NEUTROPHILS # BLD AUTO: 7.66 K/UL (ref 2–7.15)
NEUTROPHILS NFR BLD: 59.6 % (ref 44–72)
NRBC # BLD AUTO: 0 K/UL
NRBC BLD-RTO: 0 /100 WBC
PLATELET # BLD AUTO: 369 K/UL (ref 164–446)
PMV BLD AUTO: 8.8 FL (ref 9–12.9)
POTASSIUM SERPL-SCNC: 3.5 MMOL/L (ref 3.6–5.5)
PROT SERPL-MCNC: 7 G/DL (ref 6–8.2)
RBC # BLD AUTO: 4.61 M/UL (ref 4.2–5.4)
SODIUM SERPL-SCNC: 135 MMOL/L (ref 135–145)
WBC # BLD AUTO: 12.8 K/UL (ref 4.8–10.8)

## 2023-02-06 PROCEDURE — 700102 HCHG RX REV CODE 250 W/ 637 OVERRIDE(OP): Performed by: NURSE PRACTITIONER

## 2023-02-06 PROCEDURE — A9270 NON-COVERED ITEM OR SERVICE: HCPCS | Performed by: NURSE PRACTITIONER

## 2023-02-06 PROCEDURE — 96375 TX/PRO/DX INJ NEW DRUG ADDON: CPT

## 2023-02-06 PROCEDURE — 99285 EMERGENCY DEPT VISIT HI MDM: CPT

## 2023-02-06 PROCEDURE — 700105 HCHG RX REV CODE 258: Performed by: EMERGENCY MEDICINE

## 2023-02-06 PROCEDURE — 96365 THER/PROPH/DIAG IV INF INIT: CPT

## 2023-02-06 PROCEDURE — 72131 CT LUMBAR SPINE W/O DYE: CPT

## 2023-02-06 PROCEDURE — 96372 THER/PROPH/DIAG INJ SC/IM: CPT

## 2023-02-06 PROCEDURE — A9270 NON-COVERED ITEM OR SERVICE: HCPCS

## 2023-02-06 PROCEDURE — 99222 1ST HOSP IP/OBS MODERATE 55: CPT | Mod: GC | Performed by: FAMILY MEDICINE

## 2023-02-06 PROCEDURE — G0378 HOSPITAL OBSERVATION PER HR: HCPCS

## 2023-02-06 PROCEDURE — 700111 HCHG RX REV CODE 636 W/ 250 OVERRIDE (IP): Performed by: NURSE PRACTITIONER

## 2023-02-06 PROCEDURE — 85025 COMPLETE CBC W/AUTO DIFF WBC: CPT

## 2023-02-06 PROCEDURE — 36415 COLL VENOUS BLD VENIPUNCTURE: CPT

## 2023-02-06 PROCEDURE — 700102 HCHG RX REV CODE 250 W/ 637 OVERRIDE(OP): Performed by: STUDENT IN AN ORGANIZED HEALTH CARE EDUCATION/TRAINING PROGRAM

## 2023-02-06 PROCEDURE — A9270 NON-COVERED ITEM OR SERVICE: HCPCS | Performed by: STUDENT IN AN ORGANIZED HEALTH CARE EDUCATION/TRAINING PROGRAM

## 2023-02-06 PROCEDURE — 72192 CT PELVIS W/O DYE: CPT

## 2023-02-06 PROCEDURE — 700111 HCHG RX REV CODE 636 W/ 250 OVERRIDE (IP): Performed by: EMERGENCY MEDICINE

## 2023-02-06 PROCEDURE — 700111 HCHG RX REV CODE 636 W/ 250 OVERRIDE (IP): Performed by: STUDENT IN AN ORGANIZED HEALTH CARE EDUCATION/TRAINING PROGRAM

## 2023-02-06 PROCEDURE — 96376 TX/PRO/DX INJ SAME DRUG ADON: CPT

## 2023-02-06 PROCEDURE — 73501 X-RAY EXAM HIP UNI 1 VIEW: CPT | Mod: RT

## 2023-02-06 PROCEDURE — 80053 COMPREHEN METABOLIC PANEL: CPT

## 2023-02-06 PROCEDURE — 700102 HCHG RX REV CODE 250 W/ 637 OVERRIDE(OP)

## 2023-02-06 RX ORDER — FAMOTIDINE 20 MG/1
20 TABLET, FILM COATED ORAL 2 TIMES DAILY
Status: DISCONTINUED | OUTPATIENT
Start: 2023-02-06 | End: 2023-02-07 | Stop reason: HOSPADM

## 2023-02-06 RX ORDER — GABAPENTIN 300 MG/1
300 CAPSULE ORAL 3 TIMES DAILY
Status: DISCONTINUED | OUTPATIENT
Start: 2023-02-06 | End: 2023-02-07

## 2023-02-06 RX ORDER — AMOXICILLIN 250 MG
2 CAPSULE ORAL 2 TIMES DAILY
Status: DISCONTINUED | OUTPATIENT
Start: 2023-02-06 | End: 2023-02-07 | Stop reason: HOSPADM

## 2023-02-06 RX ORDER — ENOXAPARIN SODIUM 100 MG/ML
40 INJECTION SUBCUTANEOUS DAILY
Status: DISCONTINUED | OUTPATIENT
Start: 2023-02-06 | End: 2023-02-07 | Stop reason: HOSPADM

## 2023-02-06 RX ORDER — ACETAMINOPHEN 500 MG
500 TABLET ORAL 2 TIMES DAILY
Status: DISCONTINUED | OUTPATIENT
Start: 2023-02-06 | End: 2023-02-07 | Stop reason: HOSPADM

## 2023-02-06 RX ORDER — OXYCODONE HYDROCHLORIDE 5 MG/1
5 TABLET ORAL EVERY 4 HOURS PRN
Status: DISCONTINUED | OUTPATIENT
Start: 2023-02-06 | End: 2023-02-07 | Stop reason: HOSPADM

## 2023-02-06 RX ORDER — MORPHINE SULFATE 4 MG/ML
4 INJECTION INTRAVENOUS ONCE
Status: COMPLETED | OUTPATIENT
Start: 2023-02-06 | End: 2023-02-06

## 2023-02-06 RX ORDER — DEXAMETHASONE SODIUM PHOSPHATE 4 MG/ML
4 INJECTION, SOLUTION INTRA-ARTICULAR; INTRALESIONAL; INTRAMUSCULAR; INTRAVENOUS; SOFT TISSUE EVERY 6 HOURS
Status: DISCONTINUED | OUTPATIENT
Start: 2023-02-06 | End: 2023-02-07 | Stop reason: HOSPADM

## 2023-02-06 RX ORDER — MORPHINE SULFATE 4 MG/ML
2 INJECTION INTRAVENOUS ONCE
Status: COMPLETED | OUTPATIENT
Start: 2023-02-06 | End: 2023-02-06

## 2023-02-06 RX ORDER — OMEPRAZOLE 20 MG/1
40 CAPSULE, DELAYED RELEASE ORAL EVERY MORNING
Status: DISCONTINUED | OUTPATIENT
Start: 2023-02-06 | End: 2023-02-07 | Stop reason: HOSPADM

## 2023-02-06 RX ORDER — ALPRAZOLAM 0.25 MG/1
0.25 TABLET ORAL 3 TIMES DAILY PRN
Status: DISCONTINUED | OUTPATIENT
Start: 2023-02-06 | End: 2023-02-07 | Stop reason: HOSPADM

## 2023-02-06 RX ORDER — HYDROCHLOROTHIAZIDE 25 MG/1
25 TABLET ORAL EVERY MORNING
Status: DISCONTINUED | OUTPATIENT
Start: 2023-02-06 | End: 2023-02-07 | Stop reason: HOSPADM

## 2023-02-06 RX ORDER — FAMOTIDINE 20 MG/1
20 TABLET, FILM COATED ORAL 2 TIMES DAILY
Status: DISCONTINUED | OUTPATIENT
Start: 2023-02-06 | End: 2023-02-06

## 2023-02-06 RX ORDER — ACETAMINOPHEN 500 MG
500 TABLET ORAL EVERY 6 HOURS PRN
Status: ON HOLD | COMMUNITY
End: 2023-08-02

## 2023-02-06 RX ORDER — OXYCODONE HYDROCHLORIDE 10 MG/1
10 TABLET ORAL EVERY 4 HOURS PRN
Status: DISCONTINUED | OUTPATIENT
Start: 2023-02-06 | End: 2023-02-07 | Stop reason: HOSPADM

## 2023-02-06 RX ORDER — ATORVASTATIN CALCIUM 10 MG/1
10 TABLET, FILM COATED ORAL EVERY MORNING
Status: DISCONTINUED | OUTPATIENT
Start: 2023-02-06 | End: 2023-02-07 | Stop reason: HOSPADM

## 2023-02-06 RX ORDER — ACETAMINOPHEN 325 MG/1
650 TABLET ORAL EVERY 6 HOURS PRN
Status: DISCONTINUED | OUTPATIENT
Start: 2023-02-06 | End: 2023-02-07 | Stop reason: HOSPADM

## 2023-02-06 RX ORDER — MORPHINE SULFATE 15 MG/1
15 TABLET, FILM COATED, EXTENDED RELEASE ORAL EVERY 12 HOURS
Status: DISCONTINUED | OUTPATIENT
Start: 2023-02-06 | End: 2023-02-07 | Stop reason: HOSPADM

## 2023-02-06 RX ORDER — BISACODYL 10 MG
10 SUPPOSITORY, RECTAL RECTAL
Status: DISCONTINUED | OUTPATIENT
Start: 2023-02-06 | End: 2023-02-07 | Stop reason: HOSPADM

## 2023-02-06 RX ORDER — GABAPENTIN 300 MG/1
300 CAPSULE ORAL 2 TIMES DAILY
Status: DISCONTINUED | OUTPATIENT
Start: 2023-02-06 | End: 2023-02-06

## 2023-02-06 RX ORDER — POLYETHYLENE GLYCOL 3350 17 G/17G
1 POWDER, FOR SOLUTION ORAL
Status: DISCONTINUED | OUTPATIENT
Start: 2023-02-06 | End: 2023-02-07 | Stop reason: HOSPADM

## 2023-02-06 RX ADMIN — DEXAMETHASONE SODIUM PHOSPHATE 4 MG: 4 INJECTION, SOLUTION INTRA-ARTICULAR; INTRALESIONAL; INTRAMUSCULAR; INTRAVENOUS; SOFT TISSUE at 18:49

## 2023-02-06 RX ADMIN — MORPHINE SULFATE 2 MG: 4 INJECTION INTRAVENOUS at 08:19

## 2023-02-06 RX ADMIN — MORPHINE SULFATE 15 MG: 15 TABLET, FILM COATED, EXTENDED RELEASE ORAL at 11:48

## 2023-02-06 RX ADMIN — DEXAMETHASONE SODIUM PHOSPHATE 4 MG: 4 INJECTION, SOLUTION INTRA-ARTICULAR; INTRALESIONAL; INTRAMUSCULAR; INTRAVENOUS; SOFT TISSUE at 23:11

## 2023-02-06 RX ADMIN — GABAPENTIN 300 MG: 300 CAPSULE ORAL at 11:48

## 2023-02-06 RX ADMIN — HYDROCHLOROTHIAZIDE 25 MG: 25 TABLET ORAL at 15:29

## 2023-02-06 RX ADMIN — METHOCARBAMOL 1000 MG: 100 INJECTION INTRAMUSCULAR; INTRAVENOUS at 09:16

## 2023-02-06 RX ADMIN — DEXAMETHASONE SODIUM PHOSPHATE 4 MG: 4 INJECTION, SOLUTION INTRA-ARTICULAR; INTRALESIONAL; INTRAMUSCULAR; INTRAVENOUS; SOFT TISSUE at 11:48

## 2023-02-06 RX ADMIN — OXYCODONE HYDROCHLORIDE 10 MG: 10 TABLET ORAL at 13:14

## 2023-02-06 RX ADMIN — ATORVASTATIN CALCIUM 10 MG: 10 TABLET, FILM COATED ORAL at 15:29

## 2023-02-06 RX ADMIN — ENOXAPARIN SODIUM 40 MG: 100 INJECTION SUBCUTANEOUS at 18:48

## 2023-02-06 RX ADMIN — GABAPENTIN 300 MG: 300 CAPSULE ORAL at 18:49

## 2023-02-06 RX ADMIN — ACETAMINOPHEN 500 MG: 500 TABLET, FILM COATED ORAL at 18:49

## 2023-02-06 RX ADMIN — MORPHINE SULFATE 15 MG: 15 TABLET, FILM COATED, EXTENDED RELEASE ORAL at 20:05

## 2023-02-06 RX ADMIN — OMEPRAZOLE 40 MG: 20 CAPSULE, DELAYED RELEASE ORAL at 15:28

## 2023-02-06 RX ADMIN — OXYCODONE HYDROCHLORIDE 10 MG: 10 TABLET ORAL at 20:59

## 2023-02-06 RX ADMIN — MORPHINE SULFATE 4 MG: 4 INJECTION INTRAVENOUS at 10:18

## 2023-02-06 RX ADMIN — FAMOTIDINE 20 MG: 20 TABLET, FILM COATED ORAL at 11:48

## 2023-02-06 ASSESSMENT — LIFESTYLE VARIABLES
ALCOHOL_USE: NO
CONSUMPTION TOTAL: NEGATIVE
HAVE PEOPLE ANNOYED YOU BY CRITICIZING YOUR DRINKING: NO
HOW MANY TIMES IN THE PAST YEAR HAVE YOU HAD 5 OR MORE DRINKS IN A DAY: 0
TOTAL SCORE: 0
EVER HAD A DRINK FIRST THING IN THE MORNING TO STEADY YOUR NERVES TO GET RID OF A HANGOVER: NO
TOTAL SCORE: 0
EVER FELT BAD OR GUILTY ABOUT YOUR DRINKING: NO
TOTAL SCORE: 0
AVERAGE NUMBER OF DAYS PER WEEK YOU HAVE A DRINK CONTAINING ALCOHOL: 0
ON A TYPICAL DAY WHEN YOU DRINK ALCOHOL HOW MANY DRINKS DO YOU HAVE: 0
HAVE YOU EVER FELT YOU SHOULD CUT DOWN ON YOUR DRINKING: NO

## 2023-02-06 ASSESSMENT — PATIENT HEALTH QUESTIONNAIRE - PHQ9
2. FEELING DOWN, DEPRESSED, IRRITABLE, OR HOPELESS: NOT AT ALL
SUM OF ALL RESPONSES TO PHQ9 QUESTIONS 1 AND 2: 0
1. LITTLE INTEREST OR PLEASURE IN DOING THINGS: NOT AT ALL

## 2023-02-06 ASSESSMENT — FIBROSIS 4 INDEX
FIB4 SCORE: 0.87
FIB4 SCORE: 1.26

## 2023-02-06 ASSESSMENT — PAIN DESCRIPTION - PAIN TYPE
TYPE: ACUTE PAIN

## 2023-02-06 NOTE — H&P
[facility] MEDICINE HISTORY AND PHYSICAL     PATIENT ID:  NAME:  Ariana Drake  MRN:               [id number]  YOB: 1950    Date of Admission: 2/6/2023     Attending: Jason Marmolejo MD    Resident: Sydney Smith MD     Primary Care Physician:  Felton Waldrop MD    CC:  RLE pain      HPI: Ariana Drake is a 72 y.o. female who presented with persistent RLE pain since recent spinal decompression surgery on 1/19.     She reports waxing and waning burning pain to RLE since her spinal decompression surgery 2 weeks ago. This is not associated with weakness, but her ability to use the leg is affected by pain. Ariana details an account consistent with an uncomplicated surgery with one night stay following and her only problem from home was her pain control. She lives alone and has been having a difficult time showering and completing ADL's such as laundry and cooking and bathing herself due to associated pain. Recently she was admitted for 1 night for poor post op pain control at home worked up with MRI, X ray, and CT lumbar spine and reviewed by Dr Cottrell with no signs of acute impingement. Treated with Decadron IV and outpatient medrol dosepak. Initially she felt better but now she feels the oxycodone is not enough to control her pain at home.     She denies any new symptoms of bladder or bowel incontinence, numbness, sensation changes, overt weakness, or other ailments.     ERCourse:  Afebrile, hypertensive 162/77, requiring 0-2 lpm supplemental oxygen, remainder of vitals are normal.     CBC with WBC 12.8, CMP WNL other than K+ 3.5    Hip X ray shows moderate-advanced DJD of the R hip, moderate degenerative change of the L hip and no acute abnormality    CT L spine with postoperative changes, no acute changes or fracture related to hardware placement. CT pelvis with re-demonstrated bilateral hip OA    She was medicated with Robaxin 1000mg IV, and received two doses of morphine 2 mg IV and 4 mg IV  respectively.     Dr. Cottrell's APRN was consulted from ED who recommended no acute surgical intervention or treatment from a neurosurgical standpoint.     REVIEW OF SYSTEMS:   Ten systems reviewed and were negative except as noted in the HPI.                PAST MEDICAL HISTORY:  Past Medical History:   Diagnosis Date    Arthritis     Asthma     Bowel habit changes     diarrhea    Heart burn     High cholesterol     Hypertension     Other acute back pain        PAST SURGICAL HISTORY:  Past Surgical History:   Procedure Laterality Date    LUMBAR LAMINECTOMY DISKECTOMY Right 1/19/2023    Procedure: POSTERIOR RIGHT L4-5 TRANSPEDICULAR DECOMPRESSION, DISCECTOMY;  Surgeon: Aman Cottrell M.D.;  Location: SURGERY [facility];  Service: Neurosurgery    LUMBAR DECOMPRESSION Right 1/19/2023    Procedure: DECOMPRESSION, SPINE, LUMBAR;  Surgeon: Aman Cottrell M.D.;  Location: SURGERY [facility];  Service: Neurosurgery    FUSION, SPINE, LUMBAR, ROBOT-ASSISTED WITH IMAGING GUIDANCE N/A 12/20/2021    Procedure: FUSION, SPINE, LUMBAR, ROBOT-ASSISTED WITH IMAGING GUIDANCE - L1-4 TRANSFORAMINAL LUMBAR INTERBODY FUSION WITH TOTAL FACTECTOMY;  Surgeon: Aman Cottrell M.D.;  Location: SURGERY [facility];  Service: Neurosurgery    VAGINAL HYSTERECTOMY TOTAL  1985       FAMILY HISTORY:  History reviewed. No pertinent family history.    SOCIAL HISTORY:   Social History:   Tobacco: none  Alcohol: 2-3 drinks per week  Recreational drugs (illegal and prescription): none  Activity Level: Independent with activities of daily living. Boyfriend has been helping post operatively  Living situation: Home, alone. Boyfriend visits  Recent travel:  Denies.  Primary Care Provider: Felton Waldrop MD  Other (stressors, spirituality, exposures):  Recent elective operation     DIET:   Orders Placed This Encounter   Procedures    Diet Order Diet: Regular     Standing Status:   Standing     Number of Occurrences:   1     Order Specific Question:    "Diet:     Answer:   Regular [1]       ALLERGIES:  No Known Allergies    OUTPATIENT MEDICATIONS:    Current Facility-Administered Medications:     senna-docusate (PERICOLACE or SENOKOT S) 8.6-50 MG per tablet 2 Tablet, 2 Tablet, Oral, BID **AND** polyethylene glycol/lytes (MIRALAX) PACKET 1 Packet, 1 Packet, Oral, QDAY PRN **AND** magnesium hydroxide (MILK OF MAGNESIA) suspension 30 mL, 30 mL, Oral, QDAY PRN **AND** bisacodyl (DULCOLAX) suppository 10 mg, 10 mg, Rectal, QDAY PRN, Sydney Zabala M.D.    enoxaparin (Lovenox) inj 40 mg, 40 mg, Subcutaneous, DAILY AT 1800, Sydney Zabala M.D.    acetaminophen (Tylenol) tablet 650 mg, 650 mg, Oral, Q6HRS PRN, Sydney Zabala M.D.    dexamethasone (DECADRON) injection 4 mg, 4 mg, Intravenous, Q6HRS, Jade Galvez, A.P.N., 4 mg at 23 1148    famotidine (PEPCID) tablet 20 mg, 20 mg, Oral, BID, Jade aGlvez, A.P.N., 20 mg at 23 1148    morphine ER (MS CONTIN) tablet 15 mg, 15 mg, Oral, Q12HRS, Jade Galvez, A.P.N., 15 mg at 23 1148    gabapentin (NEURONTIN) capsule 300 mg, 300 mg, Oral, TID, Jade Galvez, A.P.N., 300 mg at 23 1148    oxyCODONE immediate-release (ROXICODONE) tablet 5 mg, 5 mg, Oral, Q4HRS PRN **OR** oxyCODONE immediate release (ROXICODONE) tablet 10 mg, 10 mg, Oral, Q4HRS PRN, Evelina Hung M.D., 10 mg at 23 1314    PHYSICAL EXAM:  Vitals:    23 1000 23 1107 23 1136 23 1315   BP: (!) 144/65 (!) 147/73 (!) 175/81 127/66   Pulse: 66 71 76 77   Resp:  19 18    Temp:   36.1 °C (96.9 °F)    TempSrc:   Temporal    SpO2: 97% 94% 93% 94%   Weight:   58.8 kg (129 lb 10.1 oz)    Height:   1.575 m (5' 2\")    , Temp (24hrs), Av.4 °C (97.5 °F), Min:36.1 °C (96.9 °F), Max:36.7 °C (98.1 °F)  , Pulse Oximetry: 94 %, O2 (LPM): 2, O2 Delivery Device: Nasal Cannula    General: Pt resting in NAD, cooperative   Skin:  Pink, warm and dry.  No rashes  HEENT: NC/AT. PERRL. EOMI. " MMM. No nasal discharge. Oropharynx nonerythematous without exudate/plaques  Neck:  Supple without lymphadenopathy or rigidity.   Lungs:  Symmetrical.  CTAB with no W/R/R.  Good air movement   Cardiovascular:  S1/S2 RRR without murmurs  Abdomen:  Abdomen is soft, nontender, nondistended. +BS. No masses noted.  Genitourinary:  Deferred  Extremities:  Full range of motion--limited exam due to pain. No gross deformities noted. 2+ pulses in all extremities. No edema   Spine:  Straight, surgical scar well healed with no discharge  CNS:  Muscle tone is normal. No gross focal neurologic deficits. 5/5 BLE strength against resistance (limited by pain), sensation intact bilaterally.       LAB TESTS:   Recent Labs     02/06/23  0825   WBC 12.8*   RBC 4.61   HEMOGLOBIN 14.5   HEMATOCRIT 42.0   MCV 91.1   MCH 31.5   RDW 41.5   PLATELETCT 369   MPV 8.8*   NEUTSPOLYS 59.60   LYMPHOCYTES 32.20   MONOCYTES 7.20   EOSINOPHILS 0.20   BASOPHILS 0.30         Recent Labs     02/06/23  0825   SODIUM 135   POTASSIUM 3.5*   CHLORIDE 98   CO2 25   BUN 18   CREATININE 0.70   CALCIUM 9.5   ALBUMIN 4.3       CULTURES:   Results       ** No results found for the last 168 hours. **            IMAGES:  CT-PELVIS W/O PLUS RECONS   Final Result      1.  No displaced pelvic fracture or dislocation.   2.  Moderate to severe right hip osteoarthrosis, with cortical irregularities of the right femoral head.   3.  Mild left hip osteoarthrosis.      CT-LSPINE W/O PLUS RECONS   Final Result      1.  Postoperative changes as detailed above.   2.  No acute fracture or dislocation or hardware displacement.   3.  If there is clinical concern for neural impingement, MRI may be helpful.      DX-HIP-UNILATERAL-WITH PELVIS-1 VIEW RIGHT   Final Result      1.  Moderate-advanced degenerative joint disease of the right hip.   2.  Moderate degenerative osteophytic change of the left hip.   3.  No acute fracture or dislocation.          CONSULTS:   Neurosurgery Dr Cottrell      ASSESSMENT/PLAN:   72 y.o. female with PMHx of HTN, HLD, and recent elective L4-L5 transpedicular decompression on 1/19/23 admitted for failed outpatient pain control. This is the second admission for this concern since surgery.     # Post operative pain  2 weeks post op from L4-L5 transpedicular decompression. Uncomplicated surgery. Recent 1 night admission for poor post op pain control at home worked up with MRI, X ray, and CT lumbar spine and reviewed by Dr Cottrell with no signs of acute impingement. Treated with Decadron IV and outpatient medrol dosepak. Workup today with CT L spine and pelvis is reassuring for no new fracture or hardware dislocation. Symptoms of pain are not worsening since this last admission, simply still poorly controlled with the outpatient oxycodone   - Admission to CDU for pain control and discharge planning   - Serial neuro exams, consider repeat MRI if worsening    - Decadron 4 mg q 6 while inpatient, consider repeat medrol Dosepak on discharge   - Morphine ER for baseline pain control with oxy 5-10 for breakthrough    - Continue home gabapentin 300 mg TID    - Follow up with neurosurgery as scheduled.         Core Measures:   Fluids: none  Lines: PIV  Abx: none  DVT prophylaxis: lovenox  Code Status: Full      Sydney Smith MD  PGY-3  [facility]

## 2023-02-06 NOTE — PROGRESS NOTES
Pt arrived to unit, ambulated with assistance to bed, weak due to R leg pain. Pt is alert and pleasant, reports 5/10 burning pins and needles in right leg. Discussed plan of care and medications. Pt verbalizes understanding

## 2023-02-06 NOTE — ED TRIAGE NOTES
"BIB [facility] to [facility] w/ c/o R hip pain radiating down to her foot x 2 days  Describes the pain as \"burning\".   + cms. Patient is s/p L4-L5 decompression surgery on 1/19.  Patient took her last Oxycodone at 2am w/o relief.  Patient in gown, on monitor, chart up for ERP.   "

## 2023-02-06 NOTE — ED PROVIDER NOTES
ED Provider Note    Scribed for Isha Sanchez M.D. by Autumn Walden. 2/6/2023, 7:39 AM.    Primary care provider: Felton Waldrop M.D.  Means of arrival: EMS  History obtained from: patient   History limited by: none    CHIEF COMPLAINT  Chief Complaint   Patient presents with    Leg Pain       HPI/ROS  Ariana Drake is a 72 y.o. female who presents to the Emergency Department for right leg pain. Her pain is mostly locates to the right hip and right knee.  She recently had surgery in January of this year for lumbar decompression and has had recurrent episodes of this pain since then.  She has a history of similar symptoms and was previously discharged with muscle relaxer's, steroids, and oxycodone. Patient has been taking her medications as prescribed and last took her oxycodone yesterday which only provided mild improvement in her pain. She states her pain is constant and is exacerbated with movement.  She reports she is unable to perform her activities of daily living secondary to the pain.  Denies fevers, chills, or back pain.    EXTERNAL RECORDS REVIEWED  Per chart review the patient was hospitalized on 1/19/23. She had an L4-L5 Transpedicular decompression done by Dr. Botello. She was hospitalized again for right leg pain on 2/2/23 for pain control, just discharged 2 days ago. Patient had an MRI of her L spine done during this hospitalization which showed normal post-operative changes, however there was right paracentral disc protrusion at L4-L5 with right foraminal narrowing with impingement. Neurosurgery recommended a medrol dose anaid, muscle relaxers, and narcotics for ongoing pain. She was subsequently discharged home with these medications.    LIMITATION TO HISTORY   Select: : None    OUTSIDE HISTORIAN(S):  none      PAST MEDICAL HISTORY   has a past medical history of Arthritis, Asthma, Bowel habit changes, Heart burn, High cholesterol, Hypertension, and Other acute back pain.    SURGICAL HISTORY    has a past surgical history that includes vaginal hysterectomy total (1985); fusion, spine, lumbar, robot-assisted with imaging guidance (N/A, 12/20/2021); lumbar laminectomy diskectomy (Right, 1/19/2023); and lumbar decompression (Right, 1/19/2023).    SOCIAL HISTORY  Social History     Tobacco Use    Smoking status: Never    Smokeless tobacco: Never   Vaping Use    Vaping Use: Never used   Substance Use Topics    Alcohol use: Yes     Alcohol/week: 0.6 oz     Types: 1 Glasses of wine per week     Comment: 1 per night, 2-3 times/week    Drug use: Not Currently     Comment: CBD ointment      Social History     Substance and Sexual Activity   Drug Use Not Currently    Comment: CBD ointment       FAMILY HISTORY  History reviewed. No pertinent family history.    CURRENT MEDICATIONS  Current Outpatient Medications   Medication Instructions    acyclovir (ZOVIRAX) 400 mg, Oral, 2 TIMES DAILY PRN    ALPRAZolam (XANAX) 0.25 mg, Oral, 3 TIMES DAILY PRN    Ascorbic Acid (VITAMIN C PO) 1 Tablet, Oral, EVERY MORNING    atorvastatin (LIPITOR) 10 mg, Oral, EVERY MORNING    Calcium Citrate-Vitamin D (CALCIUM + D PO) 2 Tablets, Oral, EVERY MORNING    Coenzyme Q10 (COQ10 PO) 1 Tablet, Oral, EVERY MORNING    cyclobenzaprine (FLEXERIL) 10 mg, Oral, 3 TIMES DAILY PRN    famotidine (PEPCID) 20 MG Tab Take 1 Tablet 2 times a day for 10 days.    gabapentin (NEURONTIN) 300 mg, Oral, 3 TIMES DAILY    hydroCHLOROthiazide (HYDRODIURIL) 25 mg, Oral, EVERY MORNING    Multiple Vitamins-Minerals (MULTI FOR HER 50+) Tab 1 Tablet, Oral, EVERY MORNING    omeprazole (PRILOSEC) 40 mg, Oral, EVERY MORNING    oxyCODONE immediate-release (ROXICODONE) 5 mg, Oral, EVERY 8 HOURS PRN    Potassium 99 MG Tab 1 Tablet, Oral, 2X A WEEK, Tuesday & Saturday    Probiotic Product (PROBIOTIC PO) 1 Tablet, Oral, EVERY MORNING      ALLERGIES  No Known Allergies    PHYSICAL EXAM  VITAL SIGNS: BP (!) 162/77   Pulse 71   Temp 36.7 °C (98.1 °F) (Temporal)   Resp 20   Ht  "1.575 m (5' 2\")   Wt 59.9 kg (132 lb)   SpO2 91%   BMI 24.14 kg/m²   Vitals reviewed by myself.  Physical Exam  Nursing note and vitals reviewed.  Constitutional: Well-developed and well-nourished. No acute distress.  HENT: Head is normocephalic and atraumatic.  Eyes: extra-ocular movements intact  Cardiovascular: regular rate and regular rhythm.  2+ bilateral distal pedal pulses  Pulmonary/Chest: Breath sounds normal. No wheezes or rales.   Abdominal: Soft and non-tender. No distention.    Musculoskeletal: Patient has pain with range of motion of her right lower extremity.  She does have 5 out of 5 dorsiflexion plantarflexion bilaterally.  Normal range of motion of the right hip although this does cause her discomfort.  Neurological: Awake and alert, sensation intact in bilateral lower extremities  Skin: Skin is warm and dry. No rash.      DIAGNOSTIC STUDIES:  LABS  Labs Reviewed   CBC WITH DIFFERENTIAL - Abnormal; Notable for the following components:       Result Value    WBC 12.8 (*)     MPV 8.8 (*)     Neutrophils (Absolute) 7.66 (*)     Monos (Absolute) 0.92 (*)     All other components within normal limits   COMP METABOLIC PANEL - Abnormal; Notable for the following components:    Potassium 3.5 (*)     All other components within normal limits   CORRECTED CALCIUM   ESTIMATED GFR       All labs reviewed and independently interpreted by myself    RADIOLOGY    CT-PELVIS W/O PLUS RECONS   Final Result      1.  No displaced pelvic fracture or dislocation.   2.  Moderate to severe right hip osteoarthrosis, with cortical irregularities of the right femoral head.   3.  Mild left hip osteoarthrosis.      CT-LSPINE W/O PLUS RECONS   Final Result      1.  Postoperative changes as detailed above.   2.  No acute fracture or dislocation or hardware displacement.   3.  If there is clinical concern for neural impingement, MRI may be helpful.      DX-HIP-UNILATERAL-WITH PELVIS-1 VIEW RIGHT   Final Result      1.  " Moderate-advanced degenerative joint disease of the right hip.   2.  Moderate degenerative osteophytic change of the left hip.   3.  No acute fracture or dislocation.        The radiologist's interpretation of all radiological studies have been reviewed by me.    COURSE & MEDICAL DECISION MAKING    ED Observation Status? Yes; I am placing the patient in to an observation status due to a diagnostic uncertainty as well as therapeutic intensity. Patient placed in observation status at 7:59 AM, 2/6/2023.     Observation plan is as follows: Pain control and imaging    Upon Reevaluation, the patient's condition has: not improved; and will be escalated to hospitalization.    Patient discharged from ED Observation status at 10:19 AM 02/06/23.    INITIAL ASSESSMENT AND PLAN    Patient is a 72-year-old female who presents for evaluation of right lower extremity pain after recent back surgery.  Differential diagnosis includes radiculopathy, neuropathy, fluid collection, seroma, arthritis.  Diagnostic work-up includes labs and CT of the lumbar spine and pelvis.  Patient has no signs or symptoms concerning for acute spinal cord impingement and therefore I do not believe emergent MRI is indicated.       REASSESSMENTS   7:44 AM - Patient seen and examined at bedside. Discussed plan of care, including labs and imaging. Patient agrees to the plan of care. The patient will be treated with medication for pain.    9:28 AM - Patient was reevaluated at bedside. Her pain is better controlled at this time. Discussed lab and radiology results with the patient.      10:02 AM I discussed the patient's case and the above findings with Dr. Botello's APRN (neurosurgery) who states if the patients pain is controlled she is okay for discharge home.     10:06 AM - Patient was reevaluated at bedside. Patient states her pain is worsening and she does not think she will be able to manage her pain at home. Discussed admission for pain control. Patient  verbalizes understanding and agreement to this plan of care.     10:19 AM - I discussed the patient's case and the above findings with [facility] who will consult the patient for admission. Patient care will be transferred at this time.     ER COURSE AND FINAL DISPOSITION   Patient's initial vitals are notable for hypertension likely secondary to pain.  Patient is treated with Robaxin and morphine for pain management.  She requires multiple doses of morphine for ongoing pain management in the emergency department.  Labs  returned and are unremarkable.  Imaging demonstrates no acute pathology, she has chronic arthritis in her right hip.  This is likely contributing to her pain as well as her recent surgery.  I spoke with Dr. Cottrell's APRN who advises no indication for acute surgical intervention, patient just requires ongoing pain management.  I will hospitalize her for ongoing pain management as she has been on a pain regimen at home without relief.  I discussed the case with [facility] team who has accepted patient for hospitalization.  Patient is in guarded condition.    ADDITIONAL PROBLEM LIST AND RESOURCE UTILIZATION    Additional problems aside from the chief complaint that I have addressed: None    I have discussed management of the patient with the following physicians and DEBBIE's: Dr. Botello's APRN (neurosurgery), [facility]     Discussion of management with other Q or appropriate source(s): None    Escalation of care considered, and ultimately not performed: See above.     Barriers to care at this time, including but not limited to: None.     Decision tools and prescription drugs considered including, but not limited to: See above.    Please see review of records as noted above    DISPOSITION:  Patient will be hospitalized by [facility]  in guarded condition.    FINAL IMPRESSION  1. Right leg pain    2. Right hip pain    3. Low back pain, unspecified back pain laterality, unspecified chronicity, unspecified whether sciatica present           IAutumn (Stephanibcaridad), am scribing for, and in the presence of, Isha Sanchez M.D..    Electronically signed by: Autumn Walden (Margarita), 2/6/2023    Isha LEWIS M.D. personally performed the services described in this documentation, as scribed by Autumn Walden in my presence, and it is both accurate and complete.    The note accurately reflects work and decisions made by me.  Isha Sanchez M.D.  2/6/2023  4:14 PM

## 2023-02-06 NOTE — CARE PLAN
The patient is Stable - Low risk of patient condition declining or worsening    Shift Goals  Clinical Goals: pain control  Patient Goals: rest, pain control    Progress made toward(s) clinical / shift goals:  pain controlled with prn oxy and scheduled morphine extended. Pt calls for assistance, ambulates with handheld assist     Patient is not progressing towards the following goals:      Problem: Knowledge Deficit - Standard  Goal: Patient and family/care givers will demonstrate understanding of plan of care, disease process/condition, diagnostic tests and medications  Description: Target End Date:  1-3 days or as soon as patient condition allows    Document in Patient Education    1.  Patient and family/caregiver oriented to unit, equipment, visitation policy and means for communicating concern  2.  Complete/review Learning Assessment  3.  Assess knowledge level of disease process/condition, treatment plan, diagnostic tests and medications  4.  Explain disease process/condition, treatment plan, diagnostic tests and medications  Outcome: Progressing     Problem: Pain - Standard  Goal: Alleviation of pain or a reduction in pain to the patient’s comfort goal  Description: Target End Date:  Prior to discharge or change in level of care    Document on Vitals flowsheet    1.  Document pain using the appropriate pain scale per order or unit policy  2.  Educate and implement non-pharmacologic comfort measures (i.e. relaxation, distraction, massage, cold/heat therapy, etc.)  3.  Pain management medications as ordered  4.  Reassess pain after pain med administration per policy  5.  If opiods administered assess patient's response to pain medication is appropriate per POSS sedation scale  6.  Follow pain management plan developed in collaboration with patient and interdisciplinary team (including palliative care or pain specialists if applicable)  Outcome: Progressing     Problem: Fall Risk  Goal: Patient will remain free  from falls  Description: Target End Date:  Prior to discharge or change in level of care    Document interventions on the Tonya Reed Fall Risk Assessment    1.  Assess for fall risk factors  2.  Implement fall precautions  Outcome: Progressing

## 2023-02-06 NOTE — ED NOTES
Patient medicated w/ morphine for 7/10 RLE pain.  Awaiting additional medication from pharmacy.  Patient updated.  Sig other at the bedside.  Call light in reach.

## 2023-02-06 NOTE — PROGRESS NOTES
4 Eyes Skin Assessment Completed by VALENTIN Amezcua and VALENTIN Caraballo.    Head WDL  Ears WDL  Nose WDL  Mouth WDL  Neck WDL  Breast/Chest WDL  Shoulder Blades WDL  Spine Incision - surgical incision open to air - closed, scab, pink intact, no drainage  (R) Arm/Elbow/Hand WDL  (L) Arm/Elbow/Hand WDL  Abdomen WDL  Groin WDL  Scrotum/Coccyx/Buttocks WDL  (R) Leg WDL  (L) Leg WDL  (R) Heel/Foot/Toe WDL - pink, +1 heel edema  (L) Heel/Foot/Toe WDL - pink +1 heel edema          Devices In Places Pulse Ox and Nasal Cannula      Interventions In Place Gray Ear Foams    Possible Skin Injury No    Pictures Uploaded Into Epic N/A  Wound Consult Placed N/A  RN Wound Prevention Protocol Ordered No

## 2023-02-06 NOTE — ED NOTES
Patient medicated for 5/10 pain.  Patient to be admitted, awaiting a bed assignment.  Pt updated.

## 2023-02-07 ENCOUNTER — PHARMACY VISIT (OUTPATIENT)
Dept: PHARMACY | Facility: MEDICAL CENTER | Age: 73
End: 2023-02-07
Payer: MEDICARE

## 2023-02-07 VITALS
SYSTOLIC BLOOD PRESSURE: 142 MMHG | HEART RATE: 87 BPM | OXYGEN SATURATION: 94 % | WEIGHT: 129.63 LBS | TEMPERATURE: 97.6 F | BODY MASS INDEX: 23.85 KG/M2 | DIASTOLIC BLOOD PRESSURE: 70 MMHG | HEIGHT: 62 IN | RESPIRATION RATE: 18 BRPM

## 2023-02-07 PROBLEM — E87.1 HYPONATREMIA: Status: ACTIVE | Noted: 2023-02-07

## 2023-02-07 PROBLEM — R52 PAIN: Status: RESOLVED | Noted: 2023-02-06 | Resolved: 2023-02-07

## 2023-02-07 PROBLEM — M54.16 ACUTE RIGHT LUMBAR RADICULOPATHY: Status: ACTIVE | Noted: 2023-02-07

## 2023-02-07 LAB
ANION GAP SERPL CALC-SCNC: 13 MMOL/L (ref 7–16)
BUN SERPL-MCNC: 21 MG/DL (ref 8–22)
CALCIUM SERPL-MCNC: 9.4 MG/DL (ref 8.5–10.5)
CHLORIDE SERPL-SCNC: 94 MMOL/L (ref 96–112)
CO2 SERPL-SCNC: 23 MMOL/L (ref 20–33)
CREAT SERPL-MCNC: 0.65 MG/DL (ref 0.5–1.4)
GFR SERPLBLD CREATININE-BSD FMLA CKD-EPI: 92 ML/MIN/1.73 M 2
GLUCOSE SERPL-MCNC: 192 MG/DL (ref 65–99)
POTASSIUM SERPL-SCNC: 4.2 MMOL/L (ref 3.6–5.5)
SODIUM SERPL-SCNC: 130 MMOL/L (ref 135–145)

## 2023-02-07 PROCEDURE — G0378 HOSPITAL OBSERVATION PER HR: HCPCS

## 2023-02-07 PROCEDURE — 97165 OT EVAL LOW COMPLEX 30 MIN: CPT

## 2023-02-07 PROCEDURE — 700102 HCHG RX REV CODE 250 W/ 637 OVERRIDE(OP): Performed by: STUDENT IN AN ORGANIZED HEALTH CARE EDUCATION/TRAINING PROGRAM

## 2023-02-07 PROCEDURE — A9270 NON-COVERED ITEM OR SERVICE: HCPCS

## 2023-02-07 PROCEDURE — 97535 SELF CARE MNGMENT TRAINING: CPT

## 2023-02-07 PROCEDURE — 700102 HCHG RX REV CODE 250 W/ 637 OVERRIDE(OP): Performed by: NURSE PRACTITIONER

## 2023-02-07 PROCEDURE — 99239 HOSP IP/OBS DSCHRG MGMT >30: CPT | Performed by: HOSPITALIST

## 2023-02-07 PROCEDURE — 80048 BASIC METABOLIC PNL TOTAL CA: CPT

## 2023-02-07 PROCEDURE — 700111 HCHG RX REV CODE 636 W/ 250 OVERRIDE (IP): Performed by: NURSE PRACTITIONER

## 2023-02-07 PROCEDURE — 700102 HCHG RX REV CODE 250 W/ 637 OVERRIDE(OP)

## 2023-02-07 PROCEDURE — A9270 NON-COVERED ITEM OR SERVICE: HCPCS | Performed by: NURSE PRACTITIONER

## 2023-02-07 PROCEDURE — RXMED WILLOW AMBULATORY MEDICATION CHARGE: Performed by: HOSPITALIST

## 2023-02-07 PROCEDURE — 96376 TX/PRO/DX INJ SAME DRUG ADON: CPT

## 2023-02-07 PROCEDURE — A9270 NON-COVERED ITEM OR SERVICE: HCPCS | Performed by: STUDENT IN AN ORGANIZED HEALTH CARE EDUCATION/TRAINING PROGRAM

## 2023-02-07 RX ORDER — GABAPENTIN 300 MG/1
600 CAPSULE ORAL 3 TIMES DAILY
Status: DISCONTINUED | OUTPATIENT
Start: 2023-02-07 | End: 2023-02-07 | Stop reason: HOSPADM

## 2023-02-07 RX ORDER — GABAPENTIN 300 MG/1
600 CAPSULE ORAL 3 TIMES DAILY
Qty: 180 CAPSULE | Refills: 1 | Status: SHIPPED | OUTPATIENT
Start: 2023-02-07 | End: 2023-03-09

## 2023-02-07 RX ORDER — OXYCODONE HYDROCHLORIDE 10 MG/1
10 TABLET ORAL EVERY 8 HOURS PRN
Qty: 18 TABLET | Refills: 0 | Status: SHIPPED | OUTPATIENT
Start: 2023-02-07 | End: 2023-02-13

## 2023-02-07 RX ADMIN — HYDROCHLOROTHIAZIDE 25 MG: 25 TABLET ORAL at 05:24

## 2023-02-07 RX ADMIN — OMEPRAZOLE 40 MG: 20 CAPSULE, DELAYED RELEASE ORAL at 05:26

## 2023-02-07 RX ADMIN — DEXAMETHASONE SODIUM PHOSPHATE 4 MG: 4 INJECTION, SOLUTION INTRA-ARTICULAR; INTRALESIONAL; INTRAMUSCULAR; INTRAVENOUS; SOFT TISSUE at 05:24

## 2023-02-07 RX ADMIN — OXYCODONE HYDROCHLORIDE 10 MG: 10 TABLET ORAL at 05:25

## 2023-02-07 RX ADMIN — ACETAMINOPHEN 500 MG: 500 TABLET, FILM COATED ORAL at 05:25

## 2023-02-07 RX ADMIN — GABAPENTIN 300 MG: 300 CAPSULE ORAL at 05:24

## 2023-02-07 RX ADMIN — ATORVASTATIN CALCIUM 10 MG: 10 TABLET, FILM COATED ORAL at 05:26

## 2023-02-07 RX ADMIN — FAMOTIDINE 20 MG: 20 TABLET, FILM COATED ORAL at 05:25

## 2023-02-07 ASSESSMENT — COGNITIVE AND FUNCTIONAL STATUS - GENERAL
PERSONAL GROOMING: A LITTLE
DRESSING REGULAR LOWER BODY CLOTHING: A LOT
HELP NEEDED FOR BATHING: A LOT
DAILY ACTIVITIY SCORE: 18
SUGGESTED CMS G CODE MODIFIER DAILY ACTIVITY: CK
TOILETING: A LITTLE

## 2023-02-07 ASSESSMENT — ACTIVITIES OF DAILY LIVING (ADL): TOILETING: INDEPENDENT

## 2023-02-07 ASSESSMENT — PAIN DESCRIPTION - PAIN TYPE
TYPE: ACUTE PAIN
TYPE: ACUTE PAIN

## 2023-02-07 NOTE — PROGRESS NOTES
Assumed care. A/O,  came back to the hospital for pain in right ankle that radiates to her knee. To work with PT today.  lives alone and would be more comfortable in a different setting.

## 2023-02-07 NOTE — DISCHARGE PLANNING
Received Choice form at [id number]  Agency/Facility Name: [facility]  Referral sent per Choice form @ [phone]

## 2023-02-07 NOTE — DISCHARGE SUMMARY
Discharge Summary    CHIEF COMPLAINT ON ADMISSION  Chief Complaint   Patient presents with    Leg Pain       Reason for Admission  EMS     Admission Date  2/6/2023    CODE STATUS  Full Code    HPI & HOSPITAL COURSE  As per unr h+p    Ariana Drake is a 72 y.o. female who presented with persistent RLE pain since recent spinal decompression surgery on 1/19.      She reports waxing and waning burning pain to RLE since her spinal decompression surgery 2 weeks ago. This is not associated with weakness, but her ability to use the leg is affected by pain. Ariana details an account consistent with an uncomplicated surgery with one night stay following and her only problem from home was her pain control. She lives alone and has been having a difficult time showering and completing ADL's such as laundry and cooking and bathing herself due to associated pain. Recently she was admitted for 1 night for poor post op pain control at home worked up with MRI, X ray, and CT lumbar spine and reviewed by Dr Cottrell with no signs of acute impingement. Treated with Decadron IV and outpatient medrol dosepak. Initially she felt better but now she feels the oxycodone is not enough to control her pain at home      ==================================    Patient was treated with p.o. Neurontin and oxycodone and IV Decadron.  Patient was seen by PT OT and they recommended home health.  Patient was cleared for discharge home with home health on 2/7/2023.  Increase patient's Neurontin from 300 to 600 mg p.o. 3 times daily and added 10 mg of p.o. oxycodone as needed every 8 hours for severe pain.    Therefore, she is discharged in good and stable condition to home with close outpatient follow-up.    The patient recovered much more quickly than anticipated on admission.    Discharge Date  2/7/2023    FOLLOW UP ITEMS POST DISCHARGE      DISCHARGE DIAGNOSES  Principal Problem:    Acute right lumbar radiculopathy POA: Yes  Active Problems:    Hypertension  POA: Yes    Dyslipidemia POA: Yes    Hyponatremia POA: Yes  Resolved Problems:    Pain POA: Yes      FOLLOW UP  No future appointments.  Aman Cottrell M.D.  [address]  [place] [id number] [phone]    Schedule an appointment as soon as possible for a visit        MEDICATIONS ON DISCHARGE     Medication List        CHANGE how you take these medications        Instructions   gabapentin 300 MG Caps  What changed:   how much to take  when to take this  Commonly known as: NEURONTIN   Take 2 Capsules by mouth 3 times a day for 30 days.  Dose: 600 mg     oxyCODONE immediate release 10 MG immediate release tablet  What changed:   medication strength  how much to take  Commonly known as: ROXICODONE   Take 1 Tablet by mouth every 8 hours as needed for Severe Pain for up to 6 days.  Dose: 10 mg            CONTINUE taking these medications        Instructions   acetaminophen 500 MG Tabs  Commonly known as: TYLENOL   Take 500 mg by mouth 2 times a day.  Dose: 500 mg     acyclovir 400 MG tablet  Commonly known as: Zovirax   Take 400 mg by mouth 2 times a day as needed. Indications: Genital Herpes  Dose: 400 mg     ALPRAZolam 0.25 MG Tabs  Commonly known as: XANAX   Take 0.25 mg by mouth 3 times a day as needed for Anxiety.  Dose: 0.25 mg     atorvastatin 10 MG Tabs  Commonly known as: LIPITOR   Take 10 mg by mouth every morning.  Dose: 10 mg     CALCIUM + D PO   Take 2 Tablets by mouth every morning.  Dose: 2 Tablet     COQ10 PO   Take 1 Tablet by mouth every morning.  Dose: 1 Tablet     cyclobenzaprine 10 mg Tabs  Commonly known as: Flexeril   Take 1 Tablet by mouth 3 times a day as needed for Muscle Spasms for up to 10 days.  Dose: 10 mg     Famotidine Maximum Strength 20 MG Tabs  Generic drug: famotidine   Take 1 Tablet 2 times a day for 10 days.  Dose: 20 mg     hydroCHLOROthiazide 25 MG Tabs  Commonly known as: HYDRODIURIL   Take 25 mg by mouth every morning.  Dose: 25 mg     Multi For Her 50+ Tabs   Take 1 Tablet  by mouth every morning.  Dose: 1 Tablet     omeprazole 40 MG delayed-release capsule  Commonly known as: PRILOSEC   Take 40 mg by mouth every morning.  Dose: 40 mg     Potassium 99 MG Tabs   Take 1 Tablet by mouth two times a week. Tuesday & Saturday  Dose: 1 Tablet     PROBIOTIC PO   Take 1 Tablet by mouth every morning.  Dose: 1 Tablet     VITAMIN C PO   Take 1 Tablet by mouth every morning.  Dose: 1 Tablet              Allergies  No Known Allergies    DIET  Orders Placed This Encounter   Procedures    Diet Order Diet: Regular     Standing Status:   Standing     Number of Occurrences:   1     Order Specific Question:   Diet:     Answer:   Regular [1]       ACTIVITY  As tolerated.  Weight bearing as tolerated    CONSULTATIONS      PROCEDURES      LABORATORY  Lab Results   Component Value Date    SODIUM 130 (L) 02/07/2023    POTASSIUM 4.2 02/07/2023    CHLORIDE 94 (L) 02/07/2023    CO2 23 02/07/2023    GLUCOSE 192 (H) 02/07/2023    BUN 21 02/07/2023    CREATININE 0.65 02/07/2023    GLOMRATE 69 04/08/2022        Lab Results   Component Value Date    WBC 12.8 (H) 02/06/2023    HEMOGLOBIN 14.5 02/06/2023    HEMATOCRIT 42.0 02/06/2023    PLATELETCT 369 02/06/2023        Total time of the discharge process exceeds 38  minutes.

## 2023-02-07 NOTE — THERAPY
Occupational Therapy   Initial Evaluation     Patient Name: Ariana Drake  Age:  72 y.o., Sex:  female  Medical Record #: [id number]  Today's Date: 2/7/2023     Precautions  Precautions: Fall Risk, Spinal / Back Precautions     Assessment  Patient is 72 y.o. female admitted for persistent RLE burning pain since sx and difficulty completing I/ADLs. PMHX of B hip OA, recent L4-5 decompression sx 1/19 and re-admission for uncontrolled pain on 2/2; d/c'd 3 days ago. Completed ADLs/txfs with SBA-SPV (mod A for LB dressing and likey bathing), and functional ambulation w/FWW and SPV. Educated on spinal precautions for comfort (provided with spinal packet), adaptive techniques for ADLs and txfs, SNF vs HH, techniques for pain mgmt, timing pain meds during physical activities, and importance of continued OOB activity. Reports she and her SO (boyfriend) have different houses, but he is able to stay with her and assist PRN with IADLs; unable to provide physical assistance. Patient will not be actively followed for occupational therapy services at this time, however may be seen if requested by physician for 1 more visit within 30 days to address any discharge or equipment needs.     Plan    Occupational Therapy Initial Treatment Plan   Duration: Other (See Comments) (d/c needs only)    DC Equipment Recommendations: Tub / Shower Seat  Discharge Recommendations: Recommend home health for continued occupational therapy services      Objective     02/07/23 0814   Prior Living Situation   Prior Services Home-Independent   Housing / Facility 1 Story House   Steps Into Home 1   Bathroom Set up Walk In Shower;Grab Bars  (has been sponge bathing d/t pain/fatigue)   Equipment Owned Front-Wheel Walker;Single Point Cane;Grab Bar(s) In Tub / Shower;Grab Bar(s) By Toilet  (will be getting shower seat)   Lives with - Patient's Self Care Capacity Other (Comments)   Comments Reports she and her SO (boyfriend) have different houses, but he is  able to stay with her and assist PRN with IADLs; unable to provide physical assistance.   Prior Level of ADL Function   Self Feeding Independent   Grooming / Hygiene Independent   Bathing Independent   Dressing Independent   Toileting Independent   Prior Level of IADL Function   Medication Management Independent   Laundry Independent   Kitchen Mobility Independent   Finances Independent   Home Management Independent   Shopping Independent   Prior Level Of Mobility Independent Without Device in Community;Independent Without Device in Home   Driving / Transportation Driving Independent   Comments prior to recent sx 1/19   Precautions   Precautions Fall Risk;Spinal / Back Precautions    Vitals   O2 Delivery Device Room air w/o2 available   Pain 0 - 10 Group   Location Knee;Ankle   Location Orientation Right   Therapist Pain Assessment Post Activity;During Activity;Nurse Notified  (reports burning pain first in posterior knee and then down shin to anterior ankle)   Cognition    Cognition / Consciousness WDL   Level of Consciousness Alert   Comments pleasant and cooperative; receptive to education   Passive ROM Upper Body   Passive ROM Upper Body WDL   Active ROM Upper Body   Active ROM Upper Body  WDL   Strength Upper Body   Upper Body Strength  WDL   Balance Assessment   Sitting Balance (Static) Good   Sitting Balance (Dynamic) Fair +   Standing Balance (Static) Fair   Standing Balance (Dynamic) Fair   Weight Shift Sitting Good   Weight Shift Standing Fair   Comments w/FWW; no LOB or knee buckling during short functional mobility. increasing pain as continued to stand   Bed Mobility    Supine to Sit Supervised   Sit to Supine Supervised   Scooting Supervised   Rolling Supervised   Comments HOB flat; logroll   ADL Assessment   Eating Supervision   Grooming Supervision;Standing  (washing hands)   Lower Body Dressing Moderate Assist  (socks)   Toileting Supervision   Comments Educated on spinal precautions for comfort  (provided with spinal packet), adaptive techniques for ADLs and txfs, SNF vs HH, techniques for pain mgmt, and importance of continued OOB activity.   Functional Mobility   Sit to Stand Standby Assist   Bed, Chair, Wheelchair Transfer Supervised   Toilet Transfers Standby Assist   Transfer Method Stand Step   Mobility w/FWW in room and hallway to toilet   Edema / Skin Assessment   Edema / Skin  Not Assessed   Activity Tolerance   Comments limited by pain and fatigue   Education Group   Education Provided Spinal Precautions;Home Safety;Transfers;Activities of Daily Living;Adaptive Equipment;Pathology of bedrest   Spinal Precautions Patient Response Patient;Acceptance;Explanation;Reinforcement Needed;Demonstration;Handout;Action Demonstration   Home Safety Patient Response Patient;Acceptance;Explanation;Verbal Demonstration;Reinforcement Needed   Transfers Patient Response Patient;Acceptance;Explanation;Action Demonstration;Reinforcement Needed;Demonstration   ADL Patient Response Patient;Acceptance;Explanation;Verbal Demonstration;Reinforcement Needed   Adaptive Equipment Patient Response Patient;Acceptance;Explanation;Verbal Demonstration;Reinforcement Needed  (shower seat)   Pathology of Bedrest Patient Response Patient;Acceptance;Explanation;Verbal Demonstration;Reinforcement Needed

## 2023-02-07 NOTE — PROGRESS NOTES
Rounded on by care team. Will DC today. Medication instructions will be reviewed again to assure pain control.

## 2023-02-07 NOTE — PROGRESS NOTES
Discharge orders received.  Patient arrived to the discharge lounge.  Per patient, PIV removed by bedside RN.  Instructions given, medications reviewed and general discharge education provided to patient.  Follow up appointments discussed.  Patient verbalized understanding of dc instructions and prescriptions.  Patient signed discharge instructions. Meds to beds given to patient. Patient verbalized she had all belongings with her.  Patient left via car to home.  Wished patient a speedy recovery.

## 2023-02-07 NOTE — FACE TO FACE
Face to Face Supporting Documentation - Home Health    The encounter with this patient was in whole or in part the primary reason for home health admission.    Date of encounter:   Patient:                    MRN:                       YOB: 2024  Ariana Drake  [id number]  1950     Home health to see patient for:  Skilled Nursing care for assessment, interventions & education  Pt ot    Skilled need for:  Surgery aftercare: s/p lumbar surgery    Skilled nursing interventions to include:  Medications,   Pt  ot    Homebound status evidenced by:  Need the aid of supportive devices such as crutches, canes, wheelchairs or walkers. Leaving home requires a considerable and taxing effort. There is a normal inability to leave the home.    Community Physician to provide follow up care: Felton Waldrop M.D.     Optional Interventions? No      I certify the face to face encounter for this home health care referral meets the CMS requirements and the encounter/clinical assessment with the patient was, in whole, or in part, for the medical condition(s) listed above, which is the primary reason for home health care. Based on my clinical findings: the service(s) are medically necessary, support the need for home health care, and the homebound criteria are met.  I certify that this patient has had a face to face encounter by myself.  Lalo Grigsby M.D. - NPI: [id number]

## 2023-02-07 NOTE — CARE PLAN
The patient is Stable - Low risk of patient condition declining or worsening    Shift Goals  Clinical Goals: pain management  Patient Goals: rest  Family Goals: not in room    Progress made toward(s) clinical / shift goals:    Problem: Knowledge Deficit - Standard  Goal: Patient and family/care givers will demonstrate understanding of plan of care, disease process/condition, diagnostic tests and medications  Outcome: Progressing   Expected end date: 2/7/2023    Problem: Pain - Standard  Goal: Alleviation of pain or a reduction in pain to the patient’s comfort goal  Outcome: Progressing    Expected end date: 2/7/2023    Problem: Fall Risk  Goal: Patient will remain free from falls  Outcome: Progressing   Expected end date: 2/7/2023       Patient is not progressing towards the following goals:

## 2023-02-07 NOTE — DISCHARGE PLANNING
Received [facility] request. Spoke with patient at bedside and verified all information. Choice form filled out and signed by patient. Choice form faxed to Sebastien BANKS. Message sent to Sebastien BANKS to update.

## 2023-07-19 ENCOUNTER — APPOINTMENT (OUTPATIENT)
Dept: ADMISSIONS | Facility: MEDICAL CENTER | Age: 73
DRG: 030 | End: 2023-07-19
Attending: NEUROLOGICAL SURGERY
Payer: MEDICARE

## 2023-07-27 ENCOUNTER — HOSPITAL ENCOUNTER (OUTPATIENT)
Dept: RADIOLOGY | Facility: MEDICAL CENTER | Age: 73
End: 2023-07-27
Attending: NEUROLOGICAL SURGERY
Payer: MEDICARE

## 2023-07-27 ENCOUNTER — PRE-ADMISSION TESTING (OUTPATIENT)
Dept: ADMISSIONS | Facility: MEDICAL CENTER | Age: 73
End: 2023-07-27
Attending: NEUROLOGICAL SURGERY
Payer: MEDICARE

## 2023-07-27 VITALS — HEIGHT: 62 IN | BODY MASS INDEX: 23.71 KG/M2

## 2023-07-27 DIAGNOSIS — Z01.811 PRE-OPERATIVE RESPIRATORY EXAMINATION: ICD-10-CM

## 2023-07-27 DIAGNOSIS — Z01.812 PRE-OPERATIVE LABORATORY EXAMINATION: ICD-10-CM

## 2023-07-27 DIAGNOSIS — Z01.810 PRE-OPERATIVE CARDIOVASCULAR EXAMINATION: ICD-10-CM

## 2023-07-27 DIAGNOSIS — M48.062 SPINAL STENOSIS, LUMBAR REGION, WITH NEUROGENIC CLAUDICATION: ICD-10-CM

## 2023-07-27 LAB
ABO GROUP BLD: NORMAL
ANION GAP SERPL CALC-SCNC: 12 MMOL/L (ref 7–16)
APTT PPP: 31.6 SEC (ref 24.7–36)
BASOPHILS # BLD AUTO: 0.6 % (ref 0–1.8)
BASOPHILS # BLD: 0.04 K/UL (ref 0–0.12)
BLD GP AB SCN SERPL QL: NORMAL
BUN SERPL-MCNC: 15 MG/DL (ref 8–22)
CALCIUM SERPL-MCNC: 10.4 MG/DL (ref 8.5–10.5)
CHLORIDE SERPL-SCNC: 97 MMOL/L (ref 96–112)
CO2 SERPL-SCNC: 29 MMOL/L (ref 20–33)
CREAT SERPL-MCNC: 0.81 MG/DL (ref 0.5–1.4)
EKG IMPRESSION: NORMAL
EOSINOPHIL # BLD AUTO: 0.24 K/UL (ref 0–0.51)
EOSINOPHIL NFR BLD: 3.7 % (ref 0–6.9)
ERYTHROCYTE [DISTWIDTH] IN BLOOD BY AUTOMATED COUNT: 41.7 FL (ref 35.9–50)
GFR SERPLBLD CREATININE-BSD FMLA CKD-EPI: 76 ML/MIN/1.73 M 2
GLUCOSE SERPL-MCNC: 104 MG/DL (ref 65–99)
HCT VFR BLD AUTO: 43.7 % (ref 37–47)
HGB BLD-MCNC: 14.4 G/DL (ref 12–16)
IMM GRANULOCYTES # BLD AUTO: 0.02 K/UL (ref 0–0.11)
IMM GRANULOCYTES NFR BLD AUTO: 0.3 % (ref 0–0.9)
INR PPP: 0.99 (ref 0.87–1.13)
LYMPHOCYTES # BLD AUTO: 1.83 K/UL (ref 1–4.8)
LYMPHOCYTES NFR BLD: 28.3 % (ref 22–41)
MCH RBC QN AUTO: 29.3 PG (ref 27–33)
MCHC RBC AUTO-ENTMCNC: 33 G/DL (ref 32.2–35.5)
MCV RBC AUTO: 88.8 FL (ref 81.4–97.8)
MONOCYTES # BLD AUTO: 0.43 K/UL (ref 0–0.85)
MONOCYTES NFR BLD AUTO: 6.6 % (ref 0–13.4)
NEUTROPHILS # BLD AUTO: 3.91 K/UL (ref 1.82–7.42)
NEUTROPHILS NFR BLD: 60.5 % (ref 44–72)
NRBC # BLD AUTO: 0 K/UL
NRBC BLD-RTO: 0 /100 WBC (ref 0–0.2)
PLATELET # BLD AUTO: 325 K/UL (ref 164–446)
PMV BLD AUTO: 9.6 FL (ref 9–12.9)
POTASSIUM SERPL-SCNC: 3.3 MMOL/L (ref 3.6–5.5)
PROTHROMBIN TIME: 13 SEC (ref 12–14.6)
RBC # BLD AUTO: 4.92 M/UL (ref 4.2–5.4)
RH BLD: NORMAL
SODIUM SERPL-SCNC: 138 MMOL/L (ref 135–145)
WBC # BLD AUTO: 6.5 K/UL (ref 4.8–10.8)

## 2023-07-27 PROCEDURE — 85730 THROMBOPLASTIN TIME PARTIAL: CPT

## 2023-07-27 PROCEDURE — 80048 BASIC METABOLIC PNL TOTAL CA: CPT

## 2023-07-27 PROCEDURE — 86900 BLOOD TYPING SEROLOGIC ABO: CPT

## 2023-07-27 PROCEDURE — 85610 PROTHROMBIN TIME: CPT

## 2023-07-27 PROCEDURE — 86901 BLOOD TYPING SEROLOGIC RH(D): CPT

## 2023-07-27 PROCEDURE — 85025 COMPLETE CBC W/AUTO DIFF WBC: CPT

## 2023-07-27 PROCEDURE — 72131 CT LUMBAR SPINE W/O DYE: CPT

## 2023-07-27 PROCEDURE — 36415 COLL VENOUS BLD VENIPUNCTURE: CPT

## 2023-07-27 PROCEDURE — 86850 RBC ANTIBODY SCREEN: CPT

## 2023-07-27 PROCEDURE — 93005 ELECTROCARDIOGRAM TRACING: CPT

## 2023-07-27 PROCEDURE — 71046 X-RAY EXAM CHEST 2 VIEWS: CPT

## 2023-07-27 PROCEDURE — 93010 ELECTROCARDIOGRAM REPORT: CPT | Performed by: INTERNAL MEDICINE

## 2023-07-27 RX ORDER — GABAPENTIN 300 MG/1
300 CAPSULE ORAL 3 TIMES DAILY
Status: ON HOLD | COMMUNITY
Start: 2023-05-31 | End: 2023-08-11 | Stop reason: SDUPTHER

## 2023-07-31 ENCOUNTER — ANESTHESIA EVENT (OUTPATIENT)
Dept: SURGERY | Facility: MEDICAL CENTER | Age: 73
DRG: 030 | End: 2023-07-31
Payer: MEDICARE

## 2023-07-31 ENCOUNTER — APPOINTMENT (OUTPATIENT)
Dept: RADIOLOGY | Facility: MEDICAL CENTER | Age: 73
DRG: 030 | End: 2023-07-31
Attending: NEUROLOGICAL SURGERY
Payer: MEDICARE

## 2023-07-31 ENCOUNTER — ANESTHESIA (OUTPATIENT)
Dept: SURGERY | Facility: MEDICAL CENTER | Age: 73
DRG: 030 | End: 2023-07-31
Payer: MEDICARE

## 2023-07-31 ENCOUNTER — HOSPITAL ENCOUNTER (INPATIENT)
Facility: MEDICAL CENTER | Age: 73
LOS: 2 days | DRG: 030 | End: 2023-08-02
Attending: NEUROLOGICAL SURGERY | Admitting: NEUROLOGICAL SURGERY
Payer: MEDICARE

## 2023-07-31 DIAGNOSIS — M48.061 LUMBAR STENOSIS WITHOUT NEUROGENIC CLAUDICATION: ICD-10-CM

## 2023-07-31 LAB — POTASSIUM SERPL-SCNC: 3.2 MMOL/L (ref 3.6–5.5)

## 2023-07-31 PROCEDURE — C1713 ANCHOR/SCREW BN/BN,TIS/BN: HCPCS | Performed by: NEUROLOGICAL SURGERY

## 2023-07-31 PROCEDURE — 0QB00ZZ EXCISION OF LUMBAR VERTEBRA, OPEN APPROACH: ICD-10-PCS | Performed by: ANESTHESIOLOGY

## 2023-07-31 PROCEDURE — 80048 BASIC METABOLIC PNL TOTAL CA: CPT

## 2023-07-31 PROCEDURE — 0SG00AJ FUSION OF LUMBAR VERTEBRAL JOINT WITH INTERBODY FUSION DEVICE, POSTERIOR APPROACH, ANTERIOR COLUMN, OPEN APPROACH: ICD-10-PCS | Performed by: ANESTHESIOLOGY

## 2023-07-31 PROCEDURE — 700111 HCHG RX REV CODE 636 W/ 250 OVERRIDE (IP): Mod: JZ | Performed by: ANESTHESIOLOGY

## 2023-07-31 PROCEDURE — 502714 HCHG ROBOTIC SURGERY SERVICES: Performed by: NEUROLOGICAL SURGERY

## 2023-07-31 PROCEDURE — A9270 NON-COVERED ITEM OR SERVICE: HCPCS | Performed by: NURSE PRACTITIONER

## 2023-07-31 PROCEDURE — 160035 HCHG PACU - 1ST 60 MINS PHASE I: Performed by: NEUROLOGICAL SURGERY

## 2023-07-31 PROCEDURE — A9270 NON-COVERED ITEM OR SERVICE: HCPCS | Performed by: ANESTHESIOLOGY

## 2023-07-31 PROCEDURE — 502000 HCHG MISC OR IMPLANTS RC 0278: Performed by: NEUROLOGICAL SURGERY

## 2023-07-31 PROCEDURE — 700101 HCHG RX REV CODE 250: Performed by: ANESTHESIOLOGY

## 2023-07-31 PROCEDURE — 160002 HCHG RECOVERY MINUTES (STAT): Performed by: NEUROLOGICAL SURGERY

## 2023-07-31 PROCEDURE — 36415 COLL VENOUS BLD VENIPUNCTURE: CPT

## 2023-07-31 PROCEDURE — 00670 ANES XTNSV SP&SPI CORD PX: CPT | Performed by: ANESTHESIOLOGY

## 2023-07-31 PROCEDURE — 72100 X-RAY EXAM L-S SPINE 2/3 VWS: CPT

## 2023-07-31 PROCEDURE — 160009 HCHG ANES TIME/MIN: Performed by: NEUROLOGICAL SURGERY

## 2023-07-31 PROCEDURE — 110371 HCHG SHELL REV 272: Performed by: NEUROLOGICAL SURGERY

## 2023-07-31 PROCEDURE — 160031 HCHG SURGERY MINUTES - 1ST 30 MINS LEVEL 5: Performed by: NEUROLOGICAL SURGERY

## 2023-07-31 PROCEDURE — 0QP004Z REMOVAL OF INTERNAL FIXATION DEVICE FROM LUMBAR VERTEBRA, OPEN APPROACH: ICD-10-PCS | Performed by: ANESTHESIOLOGY

## 2023-07-31 PROCEDURE — 700105 HCHG RX REV CODE 258: Mod: JZ | Performed by: NEUROLOGICAL SURGERY

## 2023-07-31 PROCEDURE — 110454 HCHG SHELL REV 250: Performed by: NEUROLOGICAL SURGERY

## 2023-07-31 PROCEDURE — 700101 HCHG RX REV CODE 250: Performed by: NEUROLOGICAL SURGERY

## 2023-07-31 PROCEDURE — 770001 HCHG ROOM/CARE - MED/SURG/GYN PRIV*

## 2023-07-31 PROCEDURE — 160036 HCHG PACU - EA ADDL 30 MINS PHASE I: Performed by: NEUROLOGICAL SURGERY

## 2023-07-31 PROCEDURE — 700105 HCHG RX REV CODE 258: Performed by: NURSE PRACTITIONER

## 2023-07-31 PROCEDURE — 85027 COMPLETE CBC AUTOMATED: CPT

## 2023-07-31 PROCEDURE — 99100 ANES PT EXTEME AGE<1 YR&>70: CPT | Performed by: ANESTHESIOLOGY

## 2023-07-31 PROCEDURE — 0QB30ZZ EXCISION OF LEFT PELVIC BONE, OPEN APPROACH: ICD-10-PCS | Performed by: ANESTHESIOLOGY

## 2023-07-31 PROCEDURE — 160048 HCHG OR STATISTICAL LEVEL 1-5: Performed by: NEUROLOGICAL SURGERY

## 2023-07-31 PROCEDURE — 700101 HCHG RX REV CODE 250: Performed by: NURSE PRACTITIONER

## 2023-07-31 PROCEDURE — 700102 HCHG RX REV CODE 250 W/ 637 OVERRIDE(OP): Performed by: NURSE PRACTITIONER

## 2023-07-31 PROCEDURE — 700102 HCHG RX REV CODE 250 W/ 637 OVERRIDE(OP): Performed by: ANESTHESIOLOGY

## 2023-07-31 PROCEDURE — 84132 ASSAY OF SERUM POTASSIUM: CPT

## 2023-07-31 PROCEDURE — 160042 HCHG SURGERY MINUTES - EA ADDL 1 MIN LEVEL 5: Performed by: NEUROLOGICAL SURGERY

## 2023-07-31 PROCEDURE — 01NB0ZZ RELEASE LUMBAR NERVE, OPEN APPROACH: ICD-10-PCS | Performed by: ANESTHESIOLOGY

## 2023-07-31 PROCEDURE — 700111 HCHG RX REV CODE 636 W/ 250 OVERRIDE (IP): Mod: JZ | Performed by: NEUROLOGICAL SURGERY

## 2023-07-31 PROCEDURE — 700111 HCHG RX REV CODE 636 W/ 250 OVERRIDE (IP): Performed by: NURSE PRACTITIONER

## 2023-07-31 DEVICE — SCREW MAS  SOLERA 6.5 X 45MM (1TCX16+3TCX8=40): Type: IMPLANTABLE DEVICE | Site: BACK | Status: FUNCTIONAL

## 2023-07-31 DEVICE — ROD PREBENT TITANIUM 5.5 X 40MM (2TCONX2=4): Type: IMPLANTABLE DEVICE | Site: BACK | Status: FUNCTIONAL

## 2023-07-31 DEVICE — GRAFT BONE PASTE GRAFTON PLUS 10CC (1EA): Type: IMPLANTABLE DEVICE | Site: BACK | Status: FUNCTIONAL

## 2023-07-31 DEVICE — ORTHOBLEND 5CC: Type: IMPLANTABLE DEVICE | Site: BACK | Status: FUNCTIONAL

## 2023-07-31 DEVICE — SCREW MAS  SOLERA 6.5 X 40MM (1TCX16+3TCX8=40): Type: IMPLANTABLE DEVICE | Site: BACK | Status: FUNCTIONAL

## 2023-07-31 DEVICE — IMPLANTABLE DEVICE: Type: IMPLANTABLE DEVICE | Site: BACK | Status: FUNCTIONAL

## 2023-07-31 DEVICE — SCREW SOLERA SET SCREW (1TCX40+3TCX21+2TCX10=123): Type: IMPLANTABLE DEVICE | Site: BACK | Status: FUNCTIONAL

## 2023-07-31 DEVICE — SCREW MAS  SOLERA 7.5 X 45MM (1TCX8+3TCX8=32): Type: IMPLANTABLE DEVICE | Site: BACK | Status: FUNCTIONAL

## 2023-07-31 DEVICE — CROSSLINK TSRH 5.5 MULTI 1.75 - (3TX2=6) 1.75-2.15: Type: IMPLANTABLE DEVICE | Site: BACK | Status: FUNCTIONAL

## 2023-07-31 RX ORDER — DIPHENHYDRAMINE HCL 25 MG
25 TABLET ORAL EVERY 6 HOURS PRN
Status: DISCONTINUED | OUTPATIENT
Start: 2023-07-31 | End: 2023-08-02 | Stop reason: HOSPADM

## 2023-07-31 RX ORDER — HYDROCHLOROTHIAZIDE 25 MG/1
25 TABLET ORAL EVERY MORNING
Status: DISCONTINUED | OUTPATIENT
Start: 2023-08-01 | End: 2023-08-02 | Stop reason: HOSPADM

## 2023-07-31 RX ORDER — ROCURONIUM BROMIDE 10 MG/ML
INJECTION, SOLUTION INTRAVENOUS PRN
Status: DISCONTINUED | OUTPATIENT
Start: 2023-07-31 | End: 2023-07-31 | Stop reason: SURG

## 2023-07-31 RX ORDER — ONDANSETRON 2 MG/ML
4 INJECTION INTRAMUSCULAR; INTRAVENOUS
Status: DISCONTINUED | OUTPATIENT
Start: 2023-07-31 | End: 2023-07-31 | Stop reason: HOSPADM

## 2023-07-31 RX ORDER — DEXAMETHASONE SODIUM PHOSPHATE 4 MG/ML
INJECTION, SOLUTION INTRA-ARTICULAR; INTRALESIONAL; INTRAMUSCULAR; INTRAVENOUS; SOFT TISSUE PRN
Status: DISCONTINUED | OUTPATIENT
Start: 2023-07-31 | End: 2023-07-31 | Stop reason: SURG

## 2023-07-31 RX ORDER — POLYETHYLENE GLYCOL 3350 17 G/17G
1 POWDER, FOR SOLUTION ORAL 2 TIMES DAILY PRN
Status: DISCONTINUED | OUTPATIENT
Start: 2023-07-31 | End: 2023-08-02 | Stop reason: HOSPADM

## 2023-07-31 RX ORDER — ENEMA 19; 7 G/133ML; G/133ML
1 ENEMA RECTAL
Status: DISCONTINUED | OUTPATIENT
Start: 2023-07-31 | End: 2023-08-02 | Stop reason: HOSPADM

## 2023-07-31 RX ORDER — DIAZEPAM 5 MG/1
5 TABLET ORAL EVERY 4 HOURS PRN
Status: DISCONTINUED | OUTPATIENT
Start: 2023-07-31 | End: 2023-08-02 | Stop reason: HOSPADM

## 2023-07-31 RX ORDER — BUPIVACAINE HYDROCHLORIDE AND EPINEPHRINE 5; 5 MG/ML; UG/ML
INJECTION, SOLUTION PERINEURAL
Status: DISCONTINUED | OUTPATIENT
Start: 2023-07-31 | End: 2023-07-31 | Stop reason: HOSPADM

## 2023-07-31 RX ORDER — DOCUSATE SODIUM 100 MG/1
100 CAPSULE, LIQUID FILLED ORAL 2 TIMES DAILY
Status: DISCONTINUED | OUTPATIENT
Start: 2023-07-31 | End: 2023-08-02 | Stop reason: HOSPADM

## 2023-07-31 RX ORDER — HYDROMORPHONE HYDROCHLORIDE 1 MG/ML
0.4 INJECTION, SOLUTION INTRAMUSCULAR; INTRAVENOUS; SUBCUTANEOUS
Status: DISCONTINUED | OUTPATIENT
Start: 2023-07-31 | End: 2023-07-31 | Stop reason: HOSPADM

## 2023-07-31 RX ORDER — ONDANSETRON 2 MG/ML
INJECTION INTRAMUSCULAR; INTRAVENOUS PRN
Status: DISCONTINUED | OUTPATIENT
Start: 2023-07-31 | End: 2023-07-31 | Stop reason: SURG

## 2023-07-31 RX ORDER — DIPHENHYDRAMINE HYDROCHLORIDE 50 MG/ML
25 INJECTION INTRAMUSCULAR; INTRAVENOUS EVERY 6 HOURS PRN
Status: DISCONTINUED | OUTPATIENT
Start: 2023-07-31 | End: 2023-08-02 | Stop reason: HOSPADM

## 2023-07-31 RX ORDER — ONDANSETRON 4 MG/1
4 TABLET, ORALLY DISINTEGRATING ORAL EVERY 4 HOURS PRN
Status: DISCONTINUED | OUTPATIENT
Start: 2023-07-31 | End: 2023-08-02 | Stop reason: HOSPADM

## 2023-07-31 RX ORDER — OMEPRAZOLE 20 MG/1
40 CAPSULE, DELAYED RELEASE ORAL EVERY MORNING
Status: DISCONTINUED | OUTPATIENT
Start: 2023-08-01 | End: 2023-08-02 | Stop reason: HOSPADM

## 2023-07-31 RX ORDER — SODIUM CHLORIDE, SODIUM LACTATE, POTASSIUM CHLORIDE, CALCIUM CHLORIDE 600; 310; 30; 20 MG/100ML; MG/100ML; MG/100ML; MG/100ML
INJECTION, SOLUTION INTRAVENOUS CONTINUOUS
Status: DISCONTINUED | OUTPATIENT
Start: 2023-07-31 | End: 2023-07-31 | Stop reason: HOSPADM

## 2023-07-31 RX ORDER — HALOPERIDOL 5 MG/ML
1 INJECTION INTRAMUSCULAR
Status: DISCONTINUED | OUTPATIENT
Start: 2023-07-31 | End: 2023-07-31 | Stop reason: HOSPADM

## 2023-07-31 RX ORDER — CYCLOBENZAPRINE HCL 10 MG
10 TABLET ORAL EVERY 8 HOURS PRN
Status: DISCONTINUED | OUTPATIENT
Start: 2023-07-31 | End: 2023-08-02 | Stop reason: HOSPADM

## 2023-07-31 RX ORDER — ACETAMINOPHEN 500 MG
1000 TABLET ORAL ONCE
Status: COMPLETED | OUTPATIENT
Start: 2023-07-31 | End: 2023-07-31

## 2023-07-31 RX ORDER — OXYCODONE HCL 5 MG/5 ML
10 SOLUTION, ORAL ORAL
Status: COMPLETED | OUTPATIENT
Start: 2023-07-31 | End: 2023-07-31

## 2023-07-31 RX ORDER — ALENDRONATE SODIUM 70 MG/1
70 TABLET ORAL
COMMUNITY
Start: 2023-06-20

## 2023-07-31 RX ORDER — AMOXICILLIN 250 MG
1 CAPSULE ORAL
Status: DISCONTINUED | OUTPATIENT
Start: 2023-07-31 | End: 2023-08-02 | Stop reason: HOSPADM

## 2023-07-31 RX ORDER — AMOXICILLIN 250 MG
1 CAPSULE ORAL NIGHTLY
Status: DISCONTINUED | OUTPATIENT
Start: 2023-07-31 | End: 2023-08-02 | Stop reason: HOSPADM

## 2023-07-31 RX ORDER — ATORVASTATIN CALCIUM 10 MG/1
10 TABLET, FILM COATED ORAL EVERY MORNING
Status: DISCONTINUED | OUTPATIENT
Start: 2023-08-01 | End: 2023-08-02 | Stop reason: HOSPADM

## 2023-07-31 RX ORDER — LABETALOL HYDROCHLORIDE 5 MG/ML
10 INJECTION, SOLUTION INTRAVENOUS
Status: DISCONTINUED | OUTPATIENT
Start: 2023-07-31 | End: 2023-08-02 | Stop reason: HOSPADM

## 2023-07-31 RX ORDER — HYDROMORPHONE HYDROCHLORIDE 1 MG/ML
0.1 INJECTION, SOLUTION INTRAMUSCULAR; INTRAVENOUS; SUBCUTANEOUS
Status: DISCONTINUED | OUTPATIENT
Start: 2023-07-31 | End: 2023-07-31 | Stop reason: HOSPADM

## 2023-07-31 RX ORDER — BISACODYL 10 MG
10 SUPPOSITORY, RECTAL RECTAL
Status: DISCONTINUED | OUTPATIENT
Start: 2023-07-31 | End: 2023-08-02 | Stop reason: HOSPADM

## 2023-07-31 RX ORDER — DIPHENHYDRAMINE HYDROCHLORIDE 50 MG/ML
12.5 INJECTION INTRAMUSCULAR; INTRAVENOUS
Status: DISCONTINUED | OUTPATIENT
Start: 2023-07-31 | End: 2023-07-31 | Stop reason: HOSPADM

## 2023-07-31 RX ORDER — METHOCARBAMOL 750 MG/1
750 TABLET, FILM COATED ORAL EVERY 8 HOURS PRN
Status: DISCONTINUED | OUTPATIENT
Start: 2023-07-31 | End: 2023-08-02 | Stop reason: HOSPADM

## 2023-07-31 RX ORDER — ONDANSETRON 2 MG/ML
4 INJECTION INTRAMUSCULAR; INTRAVENOUS EVERY 4 HOURS PRN
Status: DISCONTINUED | OUTPATIENT
Start: 2023-07-31 | End: 2023-08-02 | Stop reason: HOSPADM

## 2023-07-31 RX ORDER — HYDROMORPHONE HYDROCHLORIDE 1 MG/ML
0.2 INJECTION, SOLUTION INTRAMUSCULAR; INTRAVENOUS; SUBCUTANEOUS
Status: DISCONTINUED | OUTPATIENT
Start: 2023-07-31 | End: 2023-07-31 | Stop reason: HOSPADM

## 2023-07-31 RX ORDER — OXYCODONE HCL 5 MG/5 ML
5 SOLUTION, ORAL ORAL
Status: COMPLETED | OUTPATIENT
Start: 2023-07-31 | End: 2023-07-31

## 2023-07-31 RX ORDER — VANCOMYCIN HYDROCHLORIDE 1 G/20ML
INJECTION, POWDER, LYOPHILIZED, FOR SOLUTION INTRAVENOUS
Status: COMPLETED | OUTPATIENT
Start: 2023-07-31 | End: 2023-07-31

## 2023-07-31 RX ORDER — GABAPENTIN 300 MG/1
300 CAPSULE ORAL 3 TIMES DAILY
Status: DISCONTINUED | OUTPATIENT
Start: 2023-07-31 | End: 2023-08-02 | Stop reason: HOSPADM

## 2023-07-31 RX ORDER — CEFAZOLIN SODIUM 1 G/3ML
INJECTION, POWDER, FOR SOLUTION INTRAMUSCULAR; INTRAVENOUS
Status: DISCONTINUED | OUTPATIENT
Start: 2023-07-31 | End: 2023-07-31 | Stop reason: HOSPADM

## 2023-07-31 RX ORDER — MIDAZOLAM HYDROCHLORIDE 1 MG/ML
INJECTION INTRAMUSCULAR; INTRAVENOUS PRN
Status: DISCONTINUED | OUTPATIENT
Start: 2023-07-31 | End: 2023-07-31 | Stop reason: SURG

## 2023-07-31 RX ORDER — SODIUM CHLORIDE, SODIUM LACTATE, POTASSIUM CHLORIDE, CALCIUM CHLORIDE 600; 310; 30; 20 MG/100ML; MG/100ML; MG/100ML; MG/100ML
INJECTION, SOLUTION INTRAVENOUS CONTINUOUS
Status: ACTIVE | OUTPATIENT
Start: 2023-07-31 | End: 2023-07-31

## 2023-07-31 RX ORDER — HYDRALAZINE HYDROCHLORIDE 20 MG/ML
10 INJECTION INTRAMUSCULAR; INTRAVENOUS
Status: DISCONTINUED | OUTPATIENT
Start: 2023-07-31 | End: 2023-08-02 | Stop reason: HOSPADM

## 2023-07-31 RX ORDER — SODIUM CHLORIDE AND POTASSIUM CHLORIDE 150; 900 MG/100ML; MG/100ML
INJECTION, SOLUTION INTRAVENOUS CONTINUOUS
Status: DISCONTINUED | OUTPATIENT
Start: 2023-07-31 | End: 2023-08-01

## 2023-07-31 RX ADMIN — LIDOCAINE HYDROCHLORIDE 100 MG: 20 INJECTION, SOLUTION EPIDURAL; INFILTRATION; INTRACAUDAL at 11:51

## 2023-07-31 RX ADMIN — POTASSIUM CHLORIDE AND SODIUM CHLORIDE: 900; 150 INJECTION, SOLUTION INTRAVENOUS at 20:42

## 2023-07-31 RX ADMIN — DEXAMETHASONE SODIUM PHOSPHATE 4 MG: 4 INJECTION INTRA-ARTICULAR; INTRALESIONAL; INTRAMUSCULAR; INTRAVENOUS; SOFT TISSUE at 11:51

## 2023-07-31 RX ADMIN — ACETAMINOPHEN 1000 MG: 500 TABLET, FILM COATED ORAL at 09:40

## 2023-07-31 RX ADMIN — OXYCODONE HYDROCHLORIDE 10 MG: 5 SOLUTION ORAL at 15:27

## 2023-07-31 RX ADMIN — CEFAZOLIN 2 G: 2 INJECTION, POWDER, FOR SOLUTION INTRAMUSCULAR; INTRAVENOUS at 22:10

## 2023-07-31 RX ADMIN — SODIUM CHLORIDE, POTASSIUM CHLORIDE, SODIUM LACTATE AND CALCIUM CHLORIDE: 600; 310; 30; 20 INJECTION, SOLUTION INTRAVENOUS at 09:41

## 2023-07-31 RX ADMIN — PROPOFOL 150 MG: 10 INJECTION, EMULSION INTRAVENOUS at 11:51

## 2023-07-31 RX ADMIN — HYDROMORPHONE HYDROCHLORIDE 0.4 MG: 1 INJECTION, SOLUTION INTRAMUSCULAR; INTRAVENOUS; SUBCUTANEOUS at 17:03

## 2023-07-31 RX ADMIN — FENTANYL CITRATE 50 MCG: 50 INJECTION, SOLUTION INTRAMUSCULAR; INTRAVENOUS at 15:26

## 2023-07-31 RX ADMIN — HYDROMORPHONE HYDROCHLORIDE 0.4 MG: 1 INJECTION, SOLUTION INTRAMUSCULAR; INTRAVENOUS; SUBCUTANEOUS at 15:57

## 2023-07-31 RX ADMIN — MIDAZOLAM 2 MG: 1 INJECTION, SOLUTION INTRAMUSCULAR; INTRAVENOUS at 11:51

## 2023-07-31 RX ADMIN — ONDANSETRON 4 MG: 2 INJECTION INTRAMUSCULAR; INTRAVENOUS at 11:51

## 2023-07-31 RX ADMIN — HYDROMORPHONE HYDROCHLORIDE 0.4 MG: 1 INJECTION, SOLUTION INTRAMUSCULAR; INTRAVENOUS; SUBCUTANEOUS at 16:57

## 2023-07-31 RX ADMIN — GABAPENTIN 300 MG: 300 CAPSULE ORAL at 22:09

## 2023-07-31 RX ADMIN — HYDROMORPHONE HYDROCHLORIDE 0.2 MG: 1 INJECTION, SOLUTION INTRAMUSCULAR; INTRAVENOUS; SUBCUTANEOUS at 16:20

## 2023-07-31 RX ADMIN — FENTANYL CITRATE 50 MCG: 50 INJECTION, SOLUTION INTRAMUSCULAR; INTRAVENOUS at 15:44

## 2023-07-31 RX ADMIN — HYDROMORPHONE HYDROCHLORIDE 0.4 MG: 1 INJECTION, SOLUTION INTRAMUSCULAR; INTRAVENOUS; SUBCUTANEOUS at 16:13

## 2023-07-31 RX ADMIN — SUGAMMADEX 200 MG: 100 INJECTION, SOLUTION INTRAVENOUS at 14:28

## 2023-07-31 RX ADMIN — FENTANYL CITRATE 100 MCG: 50 INJECTION, SOLUTION INTRAMUSCULAR; INTRAVENOUS at 11:51

## 2023-07-31 RX ADMIN — Medication: at 20:48

## 2023-07-31 RX ADMIN — CEFAZOLIN 2 G: 1 INJECTION, POWDER, FOR SOLUTION INTRAMUSCULAR; INTRAVENOUS at 11:45

## 2023-07-31 RX ADMIN — ROCURONIUM BROMIDE 40 MG: 50 INJECTION, SOLUTION INTRAVENOUS at 11:51

## 2023-07-31 ASSESSMENT — PAIN DESCRIPTION - PAIN TYPE
TYPE: ACUTE PAIN;SURGICAL PAIN
TYPE: SURGICAL PAIN
TYPE: ACUTE PAIN;SURGICAL PAIN

## 2023-07-31 ASSESSMENT — PAIN SCALES - GENERAL: PAIN_LEVEL: 1

## 2023-07-31 ASSESSMENT — FIBROSIS 4 INDEX: FIB4 SCORE: 1

## 2023-07-31 NOTE — ANESTHESIA PROCEDURE NOTES
Airway    Date/Time: 7/31/2023 11:51 AM    Performed by: Helio Tuttle M.D.  Authorized by: Helio Tuttle M.D.    Location:  OR  Urgency:  Elective  Indications for Airway Management:  Anesthesia      Spontaneous Ventilation: absent    Sedation Level:  Deep  Preoxygenated: Yes    Patient Position:  Sniffing  Final Airway Type:  Endotracheal airway  Final Endotracheal Airway:  ETT  Cuffed: Yes    Technique Used for Successful ETT Placement:  Direct laryngoscopy    Insertion Site:  Oral  Blade Type:  Jung  Laryngoscope Blade/Videolaryngoscope Blade Size:  2  ETT Size (mm):  7.0  Measured from:  Teeth  ETT to Teeth (cm):  22  Placement Verified by: auscultation and capnometry    Cormack-Lehane Classification:  Grade I - full view of glottis  Number of Attempts at Approach:  1

## 2023-07-31 NOTE — OR NURSING
Pt arrives from OR to PACU at 1452. Pt identification verified by team, pt placed on all monitors with alarms audible, report and care of pt received from Anesthesiologist and RN. Assessment completed, pt changed into hospital gown and provided with warm blankets.

## 2023-07-31 NOTE — OR NURSING
Assume care for pt in pre-op. Patient allergies and NPO status verified. Belongings secured.Patient verbalizes understanding of pain scale, expected course of stay and plan of care. Surgical procedure verified with patient. IV access established. Blood samples sent to lab for 2nd abo and k+, potassium level 3.2, Dr. Tuttle notified and wish to proceed with sx.

## 2023-07-31 NOTE — ANESTHESIA PREPROCEDURE EVALUATION
Case: [id number] Date/Time: 07/31/23 0945    Procedures:       POSTERIOR REDO L4-5 LAMINECTOMY, TOTAL FACETECTOMY, TRANSFORAMINAL LUMBAR INTERBODY FUSION, REMOVAL L4 INSTRUMENTATION AND POSSIBLE ILIAC BONE GRAFT WITH ROBOT PROTOCOL (Back)      LAMINECTOMY, SPINE, LUMBAR, WITH DISCECTOMY      BONE GRAFT, ILIAC CREST    Pre-op diagnosis: SPINAL STENOSIS OF LUMBAR REGION    Location: [facility] OR 04 / SURGERY [facility]    Surgeons: Aman Cottrell M.D.          Relevant Problems   CARDIAC   (positive) Hypertension      GI   (positive) GERD (gastroesophageal reflux disease)       Physical Exam    Airway   Mallampati: II  TM distance: >3 FB  Neck ROM: full       Cardiovascular - normal exam  Rhythm: regular  Rate: normal  (-) murmur     Dental - normal exam           Pulmonary - normal exam  Breath sounds clear to auscultation     Abdominal    Neurological - normal exam                 Anesthesia Plan    ASA 2       Plan - general       Airway plan will be ETT          Induction: intravenous    Postoperative Plan: Postoperative administration of opioids is intended.    Pertinent diagnostic labs and testing reviewed    Informed Consent:    Anesthetic plan and risks discussed with patient.    Use of blood products discussed with: patient whom consented to blood products.

## 2023-07-31 NOTE — ANESTHESIA POSTPROCEDURE EVALUATION
Patient: Ariana Drake    Procedure Summary     Date: 07/31/23 Room / Location: [facility] 05 / SURGERY [facility]    Anesthesia Start: 1145 Anesthesia Stop: 1456    Procedures:       POSTERIOR REDO L4-5 LAMINECTOMY, TOTAL FACETECTOMY, TRANSFORAMINAL LUMBAR INTERBODY FUSION, REMOVAL L4 INSTRUMENTATION AND POSSIBLE ILIAC BONE GRAFT WITH ROBOT PROTOCOL (Back)      LAMINECTOMY, SPINE, LUMBAR, WITH DISCECTOMY (Back)      BONE GRAFT, ILIAC CREST (Left: Back) Diagnosis: (SPINAL STENOSIS OF LUMBAR REGION)    Surgeons: Aman Cottrell M.D. Responsible Provider: Helio Tuttle M.D.    Anesthesia Type: general ASA Status: 2          Final Anesthesia Type: general  Last vitals  BP   Blood Pressure : 100/54    Temp   36.3 °C (97.3 °F)    Pulse   (!) 59   Resp   14    SpO2   96 %      Anesthesia Post Evaluation    Patient location during evaluation: PACU  Patient participation: complete - patient participated  Level of consciousness: awake and alert  Pain score: 1    Airway patency: patent  Anesthetic complications: no  Cardiovascular status: hemodynamically stable  Respiratory status: acceptable  Hydration status: euvolemic    PONV: none          There were no known notable events for this encounter.     Nurse Pain Score: 1 (NPRS)

## 2023-07-31 NOTE — ANESTHESIA TIME REPORT
Anesthesia Start and Stop Event Times     Date Time Event    7/31/2023 0923 Ready for Procedure     1145 Anesthesia Start     1456 Anesthesia Stop        Responsible Staff  07/31/23    Name Role Begin End    Helio Tuttle M.D. Anesth [phone]        Overtime Reason:  no overtime (within assigned shift)    Comments:

## 2023-08-01 LAB
ANION GAP SERPL CALC-SCNC: 9 MMOL/L (ref 7–16)
BUN SERPL-MCNC: 19 MG/DL (ref 8–22)
CALCIUM SERPL-MCNC: 8.4 MG/DL (ref 8.5–10.5)
CHLORIDE SERPL-SCNC: 103 MMOL/L (ref 96–112)
CO2 SERPL-SCNC: 26 MMOL/L (ref 20–33)
CREAT SERPL-MCNC: 0.89 MG/DL (ref 0.5–1.4)
ERYTHROCYTE [DISTWIDTH] IN BLOOD BY AUTOMATED COUNT: 42.5 FL (ref 35.9–50)
GFR SERPLBLD CREATININE-BSD FMLA CKD-EPI: 68 ML/MIN/1.73 M 2
GLUCOSE SERPL-MCNC: 150 MG/DL (ref 65–99)
HCT VFR BLD AUTO: 35.3 % (ref 37–47)
HGB BLD-MCNC: 12 G/DL (ref 12–16)
MCH RBC QN AUTO: 30.6 PG (ref 27–33)
MCHC RBC AUTO-ENTMCNC: 34 G/DL (ref 32.2–35.5)
MCV RBC AUTO: 90.1 FL (ref 81.4–97.8)
PLATELET # BLD AUTO: 255 K/UL (ref 164–446)
PMV BLD AUTO: 9.7 FL (ref 9–12.9)
POTASSIUM SERPL-SCNC: 4 MMOL/L (ref 3.6–5.5)
RBC # BLD AUTO: 3.92 M/UL (ref 4.2–5.4)
SODIUM SERPL-SCNC: 138 MMOL/L (ref 135–145)
WBC # BLD AUTO: 11.6 K/UL (ref 4.8–10.8)

## 2023-08-01 PROCEDURE — 97162 PT EVAL MOD COMPLEX 30 MIN: CPT

## 2023-08-01 PROCEDURE — 700105 HCHG RX REV CODE 258: Performed by: NURSE PRACTITIONER

## 2023-08-01 PROCEDURE — 97165 OT EVAL LOW COMPLEX 30 MIN: CPT

## 2023-08-01 PROCEDURE — A9270 NON-COVERED ITEM OR SERVICE: HCPCS | Performed by: NURSE PRACTITIONER

## 2023-08-01 PROCEDURE — 770001 HCHG ROOM/CARE - MED/SURG/GYN PRIV*

## 2023-08-01 PROCEDURE — 97535 SELF CARE MNGMENT TRAINING: CPT

## 2023-08-01 PROCEDURE — 700111 HCHG RX REV CODE 636 W/ 250 OVERRIDE (IP): Performed by: NURSE PRACTITIONER

## 2023-08-01 PROCEDURE — 700102 HCHG RX REV CODE 250 W/ 637 OVERRIDE(OP): Performed by: NURSE PRACTITIONER

## 2023-08-01 RX ORDER — OXYCODONE HYDROCHLORIDE AND ACETAMINOPHEN 5; 325 MG/1; MG/1
1-2 TABLET ORAL EVERY 6 HOURS PRN
Status: DISCONTINUED | OUTPATIENT
Start: 2023-08-01 | End: 2023-08-02 | Stop reason: HOSPADM

## 2023-08-01 RX ADMIN — ATORVASTATIN CALCIUM 10 MG: 10 TABLET, FILM COATED ORAL at 05:03

## 2023-08-01 RX ADMIN — METHOCARBAMOL 750 MG: 750 TABLET ORAL at 18:02

## 2023-08-01 RX ADMIN — OXYCODONE HYDROCHLORIDE AND ACETAMINOPHEN 2 TABLET: 5; 325 TABLET ORAL at 14:51

## 2023-08-01 RX ADMIN — GABAPENTIN 300 MG: 300 CAPSULE ORAL at 08:11

## 2023-08-01 RX ADMIN — OMEPRAZOLE 40 MG: 20 CAPSULE, DELAYED RELEASE ORAL at 05:03

## 2023-08-01 RX ADMIN — CEFAZOLIN 2 G: 2 INJECTION, POWDER, FOR SOLUTION INTRAMUSCULAR; INTRAVENOUS at 05:02

## 2023-08-01 RX ADMIN — HYDROCHLOROTHIAZIDE 25 MG: 25 TABLET ORAL at 05:03

## 2023-08-01 RX ADMIN — GABAPENTIN 300 MG: 300 CAPSULE ORAL at 20:01

## 2023-08-01 RX ADMIN — GABAPENTIN 300 MG: 300 CAPSULE ORAL at 14:52

## 2023-08-01 ASSESSMENT — GAIT ASSESSMENTS
DEVIATION: BRADYKINETIC;DECREASED BASE OF SUPPORT;DECREASED TOE OFF;DECREASED HEEL STRIKE
DISTANCE (FEET): 12
ASSISTIVE DEVICE: FRONT WHEEL WALKER
GAIT LEVEL OF ASSIST: CONTACT GUARD ASSIST

## 2023-08-01 ASSESSMENT — COGNITIVE AND FUNCTIONAL STATUS - GENERAL
MOBILITY SCORE: 10
DRESSING REGULAR UPPER BODY CLOTHING: A LITTLE
SUGGESTED CMS G CODE MODIFIER DAILY ACTIVITY: CJ
MOVING FROM LYING ON BACK TO SITTING ON SIDE OF FLAT BED: UNABLE
HELP NEEDED FOR BATHING: A LITTLE
SUGGESTED CMS G CODE MODIFIER DAILY ACTIVITY: CJ
CLIMB 3 TO 5 STEPS WITH RAILING: TOTAL
MOBILITY SCORE: 16
CLIMB 3 TO 5 STEPS WITH RAILING: A LOT
TURNING FROM BACK TO SIDE WHILE IN FLAT BAD: UNABLE
STANDING UP FROM CHAIR USING ARMS: A LITTLE
TURNING FROM BACK TO SIDE WHILE IN FLAT BAD: A LITTLE
MOVING TO AND FROM BED TO CHAIR: UNABLE
TOILETING: A LITTLE
STANDING UP FROM CHAIR USING ARMS: A LOT
HELP NEEDED FOR BATHING: A LITTLE
WALKING IN HOSPITAL ROOM: A LITTLE
SUGGESTED CMS G CODE MODIFIER MOBILITY: CL
SUGGESTED CMS G CODE MODIFIER MOBILITY: CK
DRESSING REGULAR LOWER BODY CLOTHING: A LITTLE
DAILY ACTIVITIY SCORE: 21
MOVING TO AND FROM BED TO CHAIR: A LITTLE
DAILY ACTIVITIY SCORE: 21
WALKING IN HOSPITAL ROOM: A LITTLE
MOVING FROM LYING ON BACK TO SITTING ON SIDE OF FLAT BED: A LITTLE
DRESSING REGULAR LOWER BODY CLOTHING: A LITTLE

## 2023-08-01 ASSESSMENT — PAIN DESCRIPTION - PAIN TYPE
TYPE: ACUTE PAIN;SURGICAL PAIN
TYPE: ACUTE PAIN;SURGICAL PAIN
TYPE: ACUTE PAIN
TYPE: ACUTE PAIN;SURGICAL PAIN
TYPE: ACUTE PAIN;SURGICAL PAIN

## 2023-08-01 ASSESSMENT — ACTIVITIES OF DAILY LIVING (ADL): TOILETING: INDEPENDENT

## 2023-08-01 NOTE — OP REPORT
DATE OF SERVICE:  07/31/2023     PREOPERATIVE DIAGNOSIS:  Recurrent right L4 radiculopathy secondary to   foraminal stenosis, lateral disk.     POSTOPERATIVE DIAGNOSIS:  Recurrent right L4 radiculopathy secondary to   foraminal stenosis, lateral disk.     OPERATIONS:  1.  Bilateral L4-5 partial laminectomy, right-sided total facetectomy,   excision of lateral disk, extensive decompression of bilateral L5 and right L4   nerve roots.  2.  L4-5 fusion with translumbar interbody fusion plus posterolateral fusion.  3.  Placement of right-sided L4-5 translumbar interbody cage (Medtronic   Catalyft cage 11 x 29 mm).  4.  Harvesting of left iliac crest autograft through separate fascial   incision.  5.  Harvesting preparation of local bone graft.  6.  Removal of right L4 pedicle screw instrumentation with replacement of   pedicle screws bilaterally, L4 and L5 (Medtronic Solera system).  7.  Mazor stereotaxy.     SURGEON:  Aman Cottrell MD     ASSISTANT:  VINCE Hatch     ANESTHESIA:  General endotracheal.     ANESTHESIOLOGIST:  Helio Tuttle MD     PREPARATION:  ChloraPrep.     MEDICATIONS:  The patient given Ancef prior to incision.     INDICATIONS:  This woman had a previous 2-4 fusion on the right side, from   which she did well, then she developed a lateral disk affecting the 4 root at   4-5.  We did a transpedicular excision of lateral disk almost a year ago. The   patient developed recurrent symptoms, recurrent stenosis.  The patient was   felt to be a candidate for the proposed procedure to relieve back and   radicular pain.  The patient understood major risks and complications,   paralysis exceedingly rare, biggest risk of surgery is nonresponse, which is   10%, small risk of wound infection, spinal fluid leak, the chance to require   another operation for the rest of her life 15-20%.  The patient is   understanding and agreed to proceed and signed a consent.     NEED FOR SURGICAL ASSISTANCE:  Surgical  assistant required throughout the case   for retraction, suction, irrigation, cleaning of instruments, keep the case   moving forward to minimize operative time.     DESCRIPTION OF PROCEDURE:  The patient was brought to the operating room.    Peripheral venous lines in place.  General anesthesia was induced.  The   patient intubated.  A Brandt catheter was placed.  The patient laid prone on   the OSI table using 6 bolsters.  Pressure points were carefully padded.  The   back was doubly prepped with ChloraPrep by myself and draped.  Previous   incision was opened inferior two-thirds and extended inferiorly roughly 2   inches, dissected off spinous processes of lamina of 4 and 5, bilateral facet   joints, dissected out the transverse processes of 4-5 bilaterally.  The right   L4 pedicle screw was dissected out.  I drilled across the fatemeh above the L4   pedicle screw and remove that screw.  Using large Leksell, I removed the   spinous process of 4, inferior 3/4 of lamina 4, drilled across the pars   intraarticularis on the right, removed the inferior facet, removed the medial   facet, the tip of the superior facet of 5 on the right for extensive   foraminotomy over the 4 root.  We excised recurrent lateral disk from   underneath the distal 4 root.  Once that was accomplished, I could pass the   nerve probe into distal foramen without further compromise of the 4 root.  The   5 roots were decompressed in the lateral recess bilaterally.  Bone dust was   collected with the Hensler bone press.  Large bone fragments were cleansed of   soft tissues and placed through the bone mill.  Next, I harvested a left iliac   crest autograft through a separate fascial incision on the left over the left   posterior iliac spine.  Cancellous bone was harvested with gouges and   curettes.  Once sufficient bone was obtained for the TLIF, this was back   filled with [place] putty.  The fascial layer was closed with #1 Vicryls.  The   localizing  pin was placed in the right posterior iliac spine, connected to   the POLYBONA robot.  Fluoroscopy was brought in.  The patient was registered with   the preoperative CT scan.  Once this was accomplished, the wound was   irrigated with antibiotic irrigation.  Deep Gelpi retractors were replaced.  I   drilled the facet joint on the left side, packed the bone graft within the   facet joint and decorticated the transverse process of 4-5 bilaterally,   placing bone graft posterolaterally.  There was local bone graft, then 2.5 mL   of fibrinous [place] putty was placed most posteriorly to hold the graft in   place.  Next, using the BlueYieldor robot, we placed pedicle screws bilaterally at   4-5.  At L5, we placed 6.5 x 40 mm screws.  I replaced a 7.5 x 45 mm screw,   right L4 and the left L4 was a 6.5 x 45 mm screw.  Good bone purchase was   obtained at all sites.  Before placement of the screw,  I palpated the   trajectory.  I could palpate bone medially, laterally, superiorly and   inferiorly at the depth, ensuring no penetration outside the pedicle or   vertebral body.  A 40 mm lordosis rods were placed in the heads of the screws,   distraction was applied.  Next, the thecal sac was retracted from right to   left.  The disk space was incised with a 15 blade and then the disk was   debrided with the paddle antonella, serrated curettes, straight and upbiting   pituitary rongeurs, bony plate slightly decorticated.  Once the disk was   prepared, iliac crest autograft was packed in the depth of the disk space,   then 11 x 28 Catalyft cage under stereotaxy was tamped into place, then fully   expanded, distraction was released.  There was really no settling.  The final   tightening of the locking nuts was carried out x4, further tightened x4, three   22 cross connector was placed, connected x3.  Final exploration revealed   complete decompression of the right L4 and 5 and left L5 nerve roots.  Fat   graft was placed over the 4 root in  the foramen.  Wound again irrigated with   antibiotic irrigation.  A gram of vancomycin powder was sprinkled in the   wound.  Deep fascia was closed with #1 Vicryl, subcutaneous fascia with #1 and   0 Vicryl, subcuticular closed with 3-0 Vicryl, pin site closed with 3-0   Vicryl.  Hemovac exit site was controlled with 0 Vicryl, connected to closed   drainage system.  Sterile dressings were placed.  A quarter-inch Steri-Strips   placed across the skin margins.  The patient laid supine on the bed,   extubated, taken to recovery room in satisfactory condition.  Final sponge,   needle counted, counts correct.     ESTIMATED BLOOD LOSS:  300 mL.        ______________________________  MD TALHA DURÁN/JOSH/INDIGO    DD:  07/31/2023 15:02  DT:  07/31/2023 17:19    Job#:  [id number]    CC:Helio Tuttle MD

## 2023-08-01 NOTE — PROGRESS NOTES
Neurosurgery Progress Note    Subjective:  Denies new symptoms  Eating, some ambulation, baez just removed  PCA    Exam:  BLE str intact with coaching- some giveaway on R- CDI with hvac 120    BP  Min: 100/54  Max: 153/84  Pulse  Av.1  Min: 59  Max: 82  Resp  Avg: 15.3  Min: 12  Max: 19  Temp  Av.3 °C (97.4 °F)  Min: 35.7 °C (96.2 °F)  Max: 36.8 °C (98.2 °F)  SpO2  Av.8 %  Min: 93 %  Max: 98 %    No data recorded    Recent Labs     23  2356   WBC 11.6*   RBC 3.92*   HEMOGLOBIN 12.0   HEMATOCRIT 35.3*   MCV 90.1   MCH 30.6   MCHC 34.0   RDW 42.5   PLATELETCT 255   MPV 9.7     Recent Labs     23  0941 23  2356   SODIUM  --  138   POTASSIUM 3.2* 4.0   CHLORIDE  --  103   CO2  --  26   GLUCOSE  --  150*   BUN  --  19   CREATININE  --  0.89   CALCIUM  --  8.4*               Intake/Output                         23 0700 - 23 0659 23 0700 - 23 0659     [id number] 0618-4408 Total 5055-5169 3407-4190 Total                 Intake    P.O.  --  120 120  --  -- --    P.O. -- 120 120 -- -- --    I.V.  1000  840.4 1840.4  --  -- --    PCA End of Shift Total Volume (ml) -- 6 6 -- -- --    Volume (mL) (0.9 % NaCl with KCl 20 mEq infusion) -- 834.4 834.4 -- -- --    Volume (mL) (lactated ringers infusion) 1000 -- 1000 -- -- --    IV Piggyback  --  97.2 97.2  --  -- --    Volume (mL) (ceFAZolin (Ancef) 2 g in  mL IVPB) -- 97.2 97.2 -- -- --    Total Intake 1000 1057.7 2057.7 -- -- --       Output    Urine  120  450 570  --  -- --    Urine 45 -- 45 -- -- --    Output (mL) ([REMOVED] Urethral Catheter Non-latex 16 Fr. 23 0510) 75 450 525 -- -- --    Drains  35  220 255  --  -- --    Output (mL) (Closed/Suction Drain 1 Posterior;Midline Back Hemovac) 35 220 255 -- -- --    Stool  --  -- --  --  -- --    Number of Times Stooled -- 0 x 0 x -- -- --    Blood  50  -- 50  --  -- --    Est. Blood Loss 50 -- 50 -- -- --    Total Output 205 222 525 -- -- --       Net I/O      795 387.7 1182.7 -- -- --              Intake/Output Summary (Last 24 hours) at 8/1/2023 1246  Last data filed at 8/1/2023 0643  Gross per 24 hour   Intake 2057.67 ml   Output 875 ml   Net 1182.67 ml             atorvastatin  10 mg QAM    gabapentin  300 mg TID    hydroCHLOROthiazide  25 mg QAM    omeprazole  40 mg QAM    Pharmacy Consult Request  1 Each PHARMACY TO DOSE    MD OSBALDO...DO NOT ADMINISTER NSAIDS or ASPIRIN unless ORDERED By Neurosurgery  1 Each PRN    docusate sodium  100 mg BID    senna-docusate  1 Tablet Nightly    senna-docusate  1 Tablet Q24HRS PRN    polyethylene glycol/lytes  1 Packet BID PRN    magnesium hydroxide  30 mL QDAY PRN    bisacodyl  10 mg Q24HRS PRN    sodium phosphate  1 Each Once PRN    0.9 % NaCl with KCl 20 mEq 1,000 mL   Continuous    morphine   Continuous    ondansetron  4 mg Q4HRS PRN    ondansetron  4 mg Q4HRS PRN    diphenhydrAMINE  25 mg Q6HRS PRN    Or    diphenhydrAMINE  25 mg Q6HRS PRN    methocarbamol  750 mg Q8HRS PRN    Or    cyclobenzaprine  10 mg Q8HRS PRN    Or    diazePAM  5 mg Q4HRS PRN    labetalol  10 mg Q HOUR PRN    hydrALAZINE  10 mg Q HOUR PRN       Assessment and Plan:    POD #1 L45 fusion  Prophylactic anticoagulation: no         Start date/time: tbd    Plan:  Watch hvac  PTOT  DC pca- start orals  Bladder protocol

## 2023-08-01 NOTE — CARE PLAN
The patient is Watcher - Medium risk of patient condition declining or worsening    Shift Goals  Clinical Goals: rest, pain control, mobility, I&Os  Patient Goals: rest, pain control    Progress made toward(s) clinical / shift goals:  Patient was able to rest intermittently overnight. Patient reported pain to be managed with PCA bolus button and scheduled medications. Educated on pharmacological and non pharmacological modalities. Patient was able to ambulate to bed per day shift RN. I&Os per flowsheets.    Patient is not progressing towards the following goals:

## 2023-08-01 NOTE — PROGRESS NOTES
Brandt removed per MD order. Patient tolerated appropriately. Educated on need to void by 1115. Patient verbalized understanding.

## 2023-08-01 NOTE — PROGRESS NOTES
Assumed care of patient at 1845. Bedside report received. Assessment complete.  AA&Ox4. Denies CP/SOB.  Reporting 4/10 pain. Medicated per MAR with PCA bolus button.   Educated patient regarding pharmacologic and non pharmacologic modalities for pain management.  Skin per flowsheet.  Tolerating regular diet. Denies N/V at this time.   + void via baez catheter. - BM. Last BM PTA  Pt ambulates x1 assist with the FWW.  All needs met at this time. Call light within reach. Bed alarm on and audible. Pt calls appropriately. Bed low and locked, non skid socks in place. Hourly rounding in place.

## 2023-08-01 NOTE — THERAPY
"Occupational Therapy   Initial Evaluation     Patient Name: Ariana Drake  Age:  73 y.o., Sex:  female  Medical Record #: [id number]  Today's Date: 8/1/2023     Precautions  Precautions: Fall Risk, Spinal / Back Precautions   Comments: no brace per order    Assessment  Patient is 73 y.o. female admitted to [facility] for elective posterior revision L4-L5 laminectomy. PMHx of GERD, dyslipidemia, HTN, and hyponatremia. During OT eval, pt participated in standing and seated ADLs. Pt required increased time to complete tasks secondary to pain. Pt reported feelings of dizziness throughout OT session from positional changes. BP was monitored throughout (see vitals below). However, pt presented with good postural control, core strength, AROM of BUE, activity tolerance,  dynamic sitting balance, dynamic standing balance, and endurance while completing ADLs (toileting, grooming). Patient will not be actively followed for occupational therapy services at this time, however may be seen if requested by physician for 1 more visit within 30 days to address any discharge or equipment needs.      Plan  DC Equipment Recommendations: None  Discharge Recommendations: Anticipate that the patient will have no further occupational therapy needs after discharge from the hospital (due to pt receiving assistance from ance. However, if pain does not improve recommend  OT)     Subjective  Pt reports, \"My fiance works but we live separately but he can help me once I am able to go home.\"     Objective   08/01/23 0828   Initial Contact Note    Initial Contact Note Order Received and Verified, Occupational Therapy Evaluation in Progress with Full Report to Follow.   Prior Living Situation   Prior Services Home-Independent   Housing / Facility 1 Story House   Steps Into Home 0   Steps In Home 0   Rail None  (Pt reports she installed a ramp to the front entrance.)   Elevator No   Bathroom Set up Walk In Shower;Shower Chair;Grab Bars   Equipment Owned " 4-Wheel Walker;Raised Toilet Seat With Arms;Tub / Shower Seat;Hand Held Shower;Reacher;Ramp;Other (Comments)  (Pt reports she has an adjustable bed with bilateral rails)   Lives with - Patient's Self Care Capacity Alone and Able to Care For Self   Comments Pt reports her fiance, Kei, is available to assist her upon d/c however they live in separate households.   Prior Level of ADL Function   Self Feeding Independent   Grooming / Hygiene Independent   Bathing Independent   Dressing Independent   Toileting Independent   Prior Level of IADL Function   Medication Management Independent   Laundry Independent   Kitchen Mobility Independent   Finances Independent   Home Management Independent   Shopping Independent   Prior Level Of Mobility Independent With Device in Community;Independent With Device in Home   Driving / Transportation Driving Independent   Occupation (Pre-Hospital Vocational) Retired Due To Age   History of Falls   History of Falls No   Precautions   Precautions Fall Risk;Spinal / Back Precautions    Comments no brace per order   Vitals   Patient BP Position Sitting;Standing 1 minute;Ferreira's Position   Blood Pressure  107/49  (Pt reported feeling dizzy upon changing position from semi fowlers to sitting EOB so vitals were monitored throughout OT eval in EOB and standing. BP EOB-106/49, BP after STS-106/49, BP after ambulating and returning to EOB-96/52.)   O2 Delivery Device None - Room Air   Vitals Comments Pt did not desat while sitting on RA for more than 5 mins   Pain 0 - 10 Group   Therapist Pain Assessment 4;Prior to Activity;During Activity;Post Activity;8;Nurse Notified  (Pt reports being in 4/10 pain while sitting in semi fowlers position prior to OT eval. During eval, pt c/o stinging pain in lumbar region and reported 8/10 pain. Pt utilized bolus button to alleviate pain. RN notified.)   Cognition    Cognition / Consciousness WDL   Level of Consciousness Alert   Comments Pt is pleasant and  cooperative.   Active ROM Upper Body   Active ROM Upper Body  WDL   Strength Upper Body   Upper Body Strength  WDL   Neurological Concerns   Neurological Concerns No   Balance Assessment   Sitting Balance (Static) Fair   Sitting Balance (Dynamic) Fair   Standing Balance (Static) Fair -   Standing Balance (Dynamic) Fair -   Weight Shift Sitting Fair   Weight Shift Standing Fair   Comments w/FWW   Bed Mobility    Supine to Sit Minimal Assist  (HOB elevated; Pt completed log roll but required Shakira to bring BLE to the floor secondary to pain)   Sit to Supine Minimal Assist  (Pt required Shakira to manage BLE to return to bed secondary to pain)   Scooting Supervised  (Pt was able to scoot to EOB by weightshifting L/R.)   Rolling Supervised  (Pt was able to roll to the L and utilized bedrails to bring trunk upright.)   ADL Assessment   Grooming Standing;Supervision  (Pt was able to stand at sink to wash her face and brush her teeth using FWW. No signs of fatigue or LOB.)   Lower Body Dressing   (Pt agreeable to rehearse LBD however, after feeling dizzy pt politely declined to get dressed. But was able to demo figure 4 position to illustrate how she would don underwear, pants, and socks. Pt reports that recently her fiance has assisted w/LBD)   Toileting Supervision  (Pt was able to lean trunk laterally to wipe perineal area and adhere to lumbar spinal precautions.)   Comments Pt required extensive time as she needed a slower pace secondary to pain.   How much help from another person does the patient currently need...   Putting on and taking off regular lower body clothing? 3   Bathing (including washing, rinsing, and drying)? 3   Toileting, which includes using a toilet, bedpan, or urinal? 3   Putting on and taking off regular upper body clothing? 4   Taking care of personal grooming such as brushing teeth? 4   Eating meals? 4   6 Clicks Daily Activity Score 21   Functional Mobility   Sit to Stand Supervised   Bed, Chair,  Wheelchair Transfer Supervised   Toilet Transfers Supervised   Transfer Method Stand Step   Mobility semi moeller>EOB<>toilet  (w/FWW)   Comments Pt required verbal cues for proper body mechanics and hand placement on FWW during txf.   Activity Tolerance   Sitting Edge of Bed +10 mins   Standing +5 mins   Education Group   Education Provided Spinal Precautions;Activities of Daily Living;Role of Occupational Therapist   Role of Occupational Therapist Patient Response Patient;Acceptance;Explanation;Verbal Demonstration   Spinal Precautions Patient Response Patient;Acceptance;Handout;Explanation;Verbal Demonstration;Action Demonstration   ADL Patient Response Patient;Acceptance;Explanation;Verbal Demonstration;Action Demonstration   Additional Comments Pt received educational handout regarding lumbar spinal precautions during completion of ADLs. Pt verbalized understanding as she reported this is her fourth back sx.   Anticipated Discharge Equipment and Recommendations   DC Equipment Recommendations None   Discharge Recommendations Anticipate that the patient will have no further occupational therapy needs after discharge from the hospital  (due to pt receiving assistance from vladimir. However, if pain does not improve recommend  OT)   Interdisciplinary Plan of Care Collaboration   IDT Collaboration with  Physical Therapist;Nursing   Patient Position at End of Therapy In Bed;Call Light within Reach;Phone within Reach;Tray Table within Reach;Family / Friend in Room  (Pt's fiance pre/post OT eval)   Collaboration Comments RN updated regarding pt's functional status

## 2023-08-01 NOTE — DISCHARGE PLANNING
Care Transition Team Assessment    Information Source  Orientation Level: Oriented X4  Information Given By: Patient  Informant's Name: Ariana Drake  Who is responsible for making decisions for patient? : Patient    Readmission Evaluation  Is this a readmission?: No    Elopement Risk  Legal Hold: No  Ambulatory or Self Mobile in Wheelchair: Yes  Disoriented: No  Psychiatric Symptoms: None  History of Wandering: No  Elopement this Admit: No  Vocalizing Wanting to Leave: No  Displays Behaviors, Body Language Wanting to Leave: No-Not at Risk for Elopement  Elopement Risk: Not at Risk for Elopement    Interdisciplinary Discharge Planning  Does Admitting Nurse Feel This Could be a Complex Discharge?: No  Primary Care Physician: STEPHANI PATEL*  Lives with - Patient's Self Care Capacity: Alone and Able to Care For Self  Support Systems: Spouse / Significant Other  Housing / Facility: [address] (ramp into the entrance.)  Do You Take your Prescribed Medications Regularly: Yes  Able to Return to Previous ADL's: Yes  Mobility Issues: Yes  Prior Services: None  Patient Prefers to be Discharged to:: home  Durable Medical Equipment: Other - Specify (Rolater, 4 prong cane, shower bar)    Discharge Preparedness  What is your plan after discharge?: Home with help  What are your discharge supports?: Other (comment) (farrukh)  Prior Functional Level: Ambulatory, Independent with Activities of Daily Living  Difficulity with ADLs: Walking  Difficulty with ADLs Comment: none    Functional Assesment  Prior Functional Level: Ambulatory, Independent with Activities of Daily Living    Finances  Financial Barriers to Discharge: No    Vision / Hearing Impairment  Vision Impairment : Yes  Right Eye Vision: Wears Glasses  Left Eye Vision: Wears Glasses  Hearing Impairment : Yes (Has hearing aides at home)  Hearing Impairment: Both Ears     Advance Directive  Advance Directive?: Living Will     Psychological Assessment  History  of Substance Abuse: Alcohol, Marijuana (Wine twice a week and CBD gummies at night.)    Discharge Risks or Barriers  Discharge risks or barriers?: No (Patient lives alone)    Anticipated Discharge Information  Discharge Disposition: Discharged to home/self care (01)  Discharge Address: [address] [place] [address] [address] [place] [address]  Discharge Contact Phone Number: [phone]    CM met with the patient at the bedside to discuss discharge planning. Per the patent she has a fiancee that is 89 and will not be able to assist at this time. Per the patient she prefers to go home.

## 2023-08-01 NOTE — CARE PLAN
The patient is Stable - Low risk of patient condition declining or worsening      Problem: Pain - Standard  Goal: Alleviation of pain or a reduction in pain to the patient’s comfort goal  Outcome: Progressing  Pain managed well at this time.  Will implement appropriate interventions PRN.       Problem: Knowledge Deficit - Standard  Goal: Patient and family/care givers will demonstrate understanding of plan of care, disease process/condition, diagnostic tests and medications  Outcome: Progressing  Patient updated on POC. All questions answered.

## 2023-08-01 NOTE — THERAPY
Physical Therapy   Initial Evaluation     Patient Name: Ariana Drake  Age:  73 y.o., Sex:  female  Medical Record #: [id number]  Today's Date: 8/1/2023     Precautions  Precautions: Fall Risk;Spinal / Back Precautions  (no brace orders observed)  Comments: no brace per order,  Home Lifting Restrictions: 10 lbs    Assessment  Patient is 73 y.o. female s/p posterior redo L4-5 TLIF. Pt with 2 previous surgeries at back. Pt very slow moving, increase pain with bed mobility. Pt able to state her spinal precautions. Requiring assistance with all mobility. She will need to be able to DC home as independent as possible, due to S.O. cannot assist her physically. Pt will continue to benefit from acute physical therapy to assist towards established goals. Pt would benefit from PM&R consult. Pt can tolerate 3 hours of multiple therapies, is motivated and willing to participate. Pt has a good and safe discharge plan.         Plan    Physical Therapy Initial Treatment Plan   Treatment Plan : Bed Mobility, Gait Training, Therapeutic Activities, Therapeutic Exercise, Neuro Re-Education / Balance  Treatment Frequency: 5 Times per Week  Duration: Until Therapy Goals Met    DC Equipment Recommendations: Unable to determine at this time (possibly FWW? pt owns a 4WW)  Discharge Recommendations: Recommend post-acute placement for additional physical therapy services prior to discharge home       Subjective    Pt resting in bed, requesting to use bathroom.      Objective       08/01/23 1335   Initial Contact Note    Initial Contact Note Order Received and Verified, Physical Therapy Evaluation in Progress with Full Report to Follow.   Precautions   Precautions Fall Risk;Spinal / Back Precautions   (no brace orders observed)   Vitals   Room Air Oximetry 94   Pain 0 - 10 Group   Location Back   Location Orientation Lower   Therapist Pain Assessment Post Activity Pain Same as Prior to Activity;Nurse Notified  (increase pain with bed mobility  "and rolling, pt stated that her pain is better this afternoon)   Prior Living Situation   Housing / Facility 1 Story House   Steps Into Home 0   Steps In Home 0   Rail None   Elevator No   Equipment Owned 4-Wheel Walker   Comments adjusatble bed with bed rails, fiance is u/a to assist her physically. Dtr is coming to stay with her on 8/8/23, pt will need to be as independent as possible at DC   Prior Level of Functional Mobility   Comments independent, \"bad\" right leg   Cognition    Cognition / Consciousness WDL   Level of Consciousness Alert   Comments able to direct care for positioning for comfort   Strength Lower Body   Lower Body Strength  X   Gross Strength Generalized Weakness, Equal Bilaterally   Coordination Lower Body    Coordination Lower Body  WDL   Other Treatments   Other Treatments Provided log roll to EOB>assisted to toilet and amb in room> log roll back to bed   Balance Assessment   Sitting Balance (Static) Fair   Sitting Balance (Dynamic) Fair   Standing Balance (Static) Fair   Standing Balance (Dynamic) Fair -   Weight Shift Sitting Fair   Weight Shift Standing Fair   Comments with FWW and at sink, required 1 UE support for balance while stnading   Bed Mobility    Supine to Sit Minimal Assist   Sit to Supine Minimal Assist   Scooting Contact Guard Assist   Rolling Supervised   Comments flat bed, use of bed rail, pt has to walk her feet small paces to bend up knees, required assistance with trunk for side lying to EOB, assistance with LEs for back to bed, slow with all bed mobility   Gait Analysis   Gait Level Of Assist Contact Guard Assist   Assistive Device Front Wheel Walker   Distance (Feet) 12   # of Times Distance was Traveled 2   Deviation Bradykinetic;Decreased Base Of Support;Decreased Toe Off;Decreased Heel Strike  (decreased step length bilat)   Comments very slow gait, heavy reliance of UE on walker, pt wanted to walk further than the bathroom but could only tolerate in room amb "   Functional Mobility   Sit to Stand Standby Assist   Bed, Chair, Wheelchair Transfer Standby Assist   Toilet Transfers Standby Assist  (with BSC over toilet)   Transfer Method Stand Step   Mobility with FWW   How much difficulty does the patient currently have...   Turning over in bed (including adjusting bedclothes, sheets and blankets)? 1   Sitting down on and standing up from a chair with arms (e.g., wheelchair, bedside commode, etc.) 1   Moving from lying on back to sitting on the side of the bed? 1   How much help from another person does the patient currently need...   Moving to and from a bed to a chair (including a wheelchair)? 3   Need to walk in a hospital room? 3   Climbing 3-5 steps with a railing? 1   6 clicks Mobility Score 10   Activity Tolerance   Comments limited amb due to pain   Patient / Family Goals    Patient / Family Goal #1 be able to get in and out of bed on own in order to go home   Short Term Goals    Short Term Goal # 1 bed mob with bed rail, log roll supervised in 6 visits   Short Term Goal # 2 transfers with FWW supervised   Short Term Goal # 3 amb with FWW 100ft supervised in 6 visits   Education Group   Education Provided Spine Precautions;Role of Physical Therapist;Gait Training;Use of Assistive Device;Exercises - Supine;Transfer Status  (ankle pumps and incentive spirometer)   Spine Precautions Patient Response Patient;Acceptance;Explanation;Action Demonstration  (pt able to demonstrate all mobility within spinal precautions)   Role of Physical Therapist Patient Response Patient;Acceptance;Explanation   Gait Training Patient Response Patient;Acceptance;Explanation;Action Demonstration   Use of Assistive Device Patient Response Patient;Acceptance;Explanation;Action Demonstration   Exercises - Supine Patient Response Patient;Acceptance;Explanation   Transfer Status Patient Response Patient;Acceptance;Explanation;Action Demonstration   Physical Therapy Initial Treatment Plan     Treatment Plan  Bed Mobility;Gait Training;Therapeutic Activities;Therapeutic Exercise;Neuro Re-Education / Balance   Treatment Frequency 5 Times per Week   Duration Until Therapy Goals Met   Problem List    Problems Impaired Bed Mobility;Impaired Transfers;Impaired Ambulation;Functional Strength Deficit;Impaired Balance;Decreased Activity Tolerance   Anticipated Discharge Equipment and Recommendations   DC Equipment Recommendations Unable to determine at this time  (possibly FWW? pt owns a 4WW)   Discharge Recommendations Recommend post-acute placement for additional physical therapy services prior to discharge home   Interdisciplinary Plan of Care Collaboration   IDT Collaboration with  Nursing   Patient Position at End of Therapy In Bed;Bed Alarm On;Call Light within Reach;Tray Table within Reach;Phone within Reach

## 2023-08-02 ENCOUNTER — HOSPITAL ENCOUNTER (INPATIENT)
Facility: REHABILITATION | Age: 73
LOS: 10 days | DRG: 560 | End: 2023-08-12
Attending: PHYSICAL MEDICINE & REHABILITATION | Admitting: PHYSICAL MEDICINE & REHABILITATION
Payer: MEDICARE

## 2023-08-02 VITALS
HEART RATE: 75 BPM | HEIGHT: 62 IN | DIASTOLIC BLOOD PRESSURE: 51 MMHG | BODY MASS INDEX: 23.65 KG/M2 | OXYGEN SATURATION: 93 % | TEMPERATURE: 97.2 F | RESPIRATION RATE: 15 BRPM | SYSTOLIC BLOOD PRESSURE: 106 MMHG | WEIGHT: 128.53 LBS

## 2023-08-02 DIAGNOSIS — K21.9 GASTROESOPHAGEAL REFLUX DISEASE, UNSPECIFIED WHETHER ESOPHAGITIS PRESENT: ICD-10-CM

## 2023-08-02 DIAGNOSIS — M48.061 LUMBAR STENOSIS WITHOUT NEUROGENIC CLAUDICATION: ICD-10-CM

## 2023-08-02 DIAGNOSIS — Z98.1 S/P LUMBAR FUSION: ICD-10-CM

## 2023-08-02 DIAGNOSIS — E78.5 DYSLIPIDEMIA: ICD-10-CM

## 2023-08-02 LAB
ANION GAP SERPL CALC-SCNC: 10 MMOL/L (ref 7–16)
BUN SERPL-MCNC: 10 MG/DL (ref 8–22)
CALCIUM SERPL-MCNC: 8.8 MG/DL (ref 8.5–10.5)
CHLORIDE SERPL-SCNC: 97 MMOL/L (ref 96–112)
CO2 SERPL-SCNC: 26 MMOL/L (ref 20–33)
CREAT SERPL-MCNC: 0.73 MG/DL (ref 0.5–1.4)
ERYTHROCYTE [DISTWIDTH] IN BLOOD BY AUTOMATED COUNT: 42.4 FL (ref 35.9–50)
GFR SERPLBLD CREATININE-BSD FMLA CKD-EPI: 86 ML/MIN/1.73 M 2
GLUCOSE SERPL-MCNC: 131 MG/DL (ref 65–99)
HCT VFR BLD AUTO: 33.1 % (ref 37–47)
HGB BLD-MCNC: 11.2 G/DL (ref 12–16)
MCH RBC QN AUTO: 30.1 PG (ref 27–33)
MCHC RBC AUTO-ENTMCNC: 33.8 G/DL (ref 32.2–35.5)
MCV RBC AUTO: 89 FL (ref 81.4–97.8)
PLATELET # BLD AUTO: 212 K/UL (ref 164–446)
PMV BLD AUTO: 9.7 FL (ref 9–12.9)
POTASSIUM SERPL-SCNC: 3.2 MMOL/L (ref 3.6–5.5)
RBC # BLD AUTO: 3.72 M/UL (ref 4.2–5.4)
SODIUM SERPL-SCNC: 133 MMOL/L (ref 135–145)
WBC # BLD AUTO: 10.3 K/UL (ref 4.8–10.8)

## 2023-08-02 PROCEDURE — 36415 COLL VENOUS BLD VENIPUNCTURE: CPT

## 2023-08-02 PROCEDURE — 85027 COMPLETE CBC AUTOMATED: CPT

## 2023-08-02 PROCEDURE — 97116 GAIT TRAINING THERAPY: CPT | Mod: CQ

## 2023-08-02 PROCEDURE — A9270 NON-COVERED ITEM OR SERVICE: HCPCS | Performed by: PHYSICAL MEDICINE & REHABILITATION

## 2023-08-02 PROCEDURE — 94760 N-INVAS EAR/PLS OXIMETRY 1: CPT

## 2023-08-02 PROCEDURE — A9270 NON-COVERED ITEM OR SERVICE: HCPCS | Performed by: NURSE PRACTITIONER

## 2023-08-02 PROCEDURE — 700102 HCHG RX REV CODE 250 W/ 637 OVERRIDE(OP): Performed by: PHYSICAL MEDICINE & REHABILITATION

## 2023-08-02 PROCEDURE — 99223 1ST HOSP IP/OBS HIGH 75: CPT | Performed by: PHYSICAL MEDICINE & REHABILITATION

## 2023-08-02 PROCEDURE — 80048 BASIC METABOLIC PNL TOTAL CA: CPT

## 2023-08-02 PROCEDURE — 700102 HCHG RX REV CODE 250 W/ 637 OVERRIDE(OP): Performed by: NURSE PRACTITIONER

## 2023-08-02 PROCEDURE — 770010 HCHG ROOM/CARE - REHAB SEMI PRIVAT*

## 2023-08-02 PROCEDURE — 97530 THERAPEUTIC ACTIVITIES: CPT | Mod: CQ

## 2023-08-02 RX ORDER — GABAPENTIN 300 MG/1
300 CAPSULE ORAL 3 TIMES DAILY
Status: DISCONTINUED | OUTPATIENT
Start: 2023-08-02 | End: 2023-08-12 | Stop reason: HOSPADM

## 2023-08-02 RX ORDER — OMEPRAZOLE 20 MG/1
40 CAPSULE, DELAYED RELEASE ORAL EVERY MORNING
Status: CANCELLED | OUTPATIENT
Start: 2023-08-03

## 2023-08-02 RX ORDER — ATORVASTATIN CALCIUM 10 MG/1
10 TABLET, FILM COATED ORAL EVERY MORNING
Status: DISCONTINUED | OUTPATIENT
Start: 2023-08-03 | End: 2023-08-02

## 2023-08-02 RX ORDER — AMOXICILLIN 250 MG
2 CAPSULE ORAL 2 TIMES DAILY
Status: DISCONTINUED | OUTPATIENT
Start: 2023-08-02 | End: 2023-08-04

## 2023-08-02 RX ORDER — ACETAMINOPHEN 500 MG
500 TABLET ORAL EVERY 6 HOURS PRN
Status: DISCONTINUED | OUTPATIENT
Start: 2023-08-02 | End: 2023-08-12 | Stop reason: HOSPADM

## 2023-08-02 RX ORDER — ALUMINA, MAGNESIA, AND SIMETHICONE 2400; 2400; 240 MG/30ML; MG/30ML; MG/30ML
20 SUSPENSION ORAL
Status: DISCONTINUED | OUTPATIENT
Start: 2023-08-02 | End: 2023-08-12 | Stop reason: HOSPADM

## 2023-08-02 RX ORDER — LIDOCAINE HYDROCHLORIDE 20 MG/ML
INJECTION, SOLUTION EPIDURAL; INFILTRATION; INTRACAUDAL; PERINEURAL PRN
Status: DISCONTINUED | OUTPATIENT
Start: 2023-07-31 | End: 2023-08-02 | Stop reason: SURG

## 2023-08-02 RX ORDER — ATORVASTATIN CALCIUM 10 MG/1
10 TABLET, FILM COATED ORAL EVERY EVENING
Status: DISCONTINUED | OUTPATIENT
Start: 2023-08-02 | End: 2023-08-12 | Stop reason: HOSPADM

## 2023-08-02 RX ORDER — CARBOXYMETHYLCELLULOSE SODIUM 5 MG/ML
1 SOLUTION/ DROPS OPHTHALMIC PRN
Status: DISCONTINUED | OUTPATIENT
Start: 2023-08-02 | End: 2023-08-12 | Stop reason: HOSPADM

## 2023-08-02 RX ORDER — ALPRAZOLAM 0.25 MG/1
0.12 TABLET ORAL NIGHTLY PRN
Status: DISCONTINUED | OUTPATIENT
Start: 2023-08-02 | End: 2023-08-02

## 2023-08-02 RX ORDER — ECHINACEA PURPUREA EXTRACT 125 MG
2 TABLET ORAL PRN
Status: DISCONTINUED | OUTPATIENT
Start: 2023-08-02 | End: 2023-08-12 | Stop reason: HOSPADM

## 2023-08-02 RX ORDER — ATORVASTATIN CALCIUM 10 MG/1
10 TABLET, FILM COATED ORAL EVERY MORNING
Status: CANCELLED | OUTPATIENT
Start: 2023-08-03

## 2023-08-02 RX ORDER — OMEPRAZOLE 20 MG/1
20 CAPSULE, DELAYED RELEASE ORAL DAILY
Status: DISCONTINUED | OUTPATIENT
Start: 2023-08-02 | End: 2023-08-02

## 2023-08-02 RX ORDER — BISACODYL 10 MG
10 SUPPOSITORY, RECTAL RECTAL
Status: DISCONTINUED | OUTPATIENT
Start: 2023-08-02 | End: 2023-08-04

## 2023-08-02 RX ORDER — OXYCODONE HYDROCHLORIDE 5 MG/1
5 TABLET ORAL EVERY 4 HOURS PRN
Status: DISCONTINUED | OUTPATIENT
Start: 2023-08-02 | End: 2023-08-12 | Stop reason: HOSPADM

## 2023-08-02 RX ORDER — TRAZODONE HYDROCHLORIDE 50 MG/1
50 TABLET ORAL
Status: DISCONTINUED | OUTPATIENT
Start: 2023-08-02 | End: 2023-08-12 | Stop reason: HOSPADM

## 2023-08-02 RX ORDER — HYDROCHLOROTHIAZIDE 25 MG/1
25 TABLET ORAL EVERY MORNING
Status: CANCELLED | OUTPATIENT
Start: 2023-08-03

## 2023-08-02 RX ORDER — GABAPENTIN 300 MG/1
300 CAPSULE ORAL 3 TIMES DAILY
Status: CANCELLED | OUTPATIENT
Start: 2023-08-02

## 2023-08-02 RX ORDER — CEFAZOLIN SODIUM 1 G/3ML
INJECTION, POWDER, FOR SOLUTION INTRAMUSCULAR; INTRAVENOUS PRN
Status: DISCONTINUED | OUTPATIENT
Start: 2023-07-31 | End: 2023-08-02 | Stop reason: SURG

## 2023-08-02 RX ORDER — OXYCODONE HYDROCHLORIDE 10 MG/1
10 TABLET ORAL EVERY 4 HOURS PRN
Status: DISCONTINUED | OUTPATIENT
Start: 2023-08-02 | End: 2023-08-12 | Stop reason: HOSPADM

## 2023-08-02 RX ORDER — ONDANSETRON 4 MG/1
4 TABLET, ORALLY DISINTEGRATING ORAL 4 TIMES DAILY PRN
Status: DISCONTINUED | OUTPATIENT
Start: 2023-08-02 | End: 2023-08-12 | Stop reason: HOSPADM

## 2023-08-02 RX ORDER — HYDRALAZINE HYDROCHLORIDE 25 MG/1
25 TABLET, FILM COATED ORAL EVERY 8 HOURS PRN
Status: DISCONTINUED | OUTPATIENT
Start: 2023-08-02 | End: 2023-08-12 | Stop reason: HOSPADM

## 2023-08-02 RX ORDER — POLYETHYLENE GLYCOL 3350 17 G/17G
1 POWDER, FOR SOLUTION ORAL
Status: DISCONTINUED | OUTPATIENT
Start: 2023-08-02 | End: 2023-08-04

## 2023-08-02 RX ORDER — ONDANSETRON 2 MG/ML
4 INJECTION INTRAMUSCULAR; INTRAVENOUS 4 TIMES DAILY PRN
Status: DISCONTINUED | OUTPATIENT
Start: 2023-08-02 | End: 2023-08-12 | Stop reason: HOSPADM

## 2023-08-02 RX ORDER — HYDROCHLOROTHIAZIDE 25 MG/1
25 TABLET ORAL EVERY MORNING
Status: DISCONTINUED | OUTPATIENT
Start: 2023-08-03 | End: 2023-08-03

## 2023-08-02 RX ORDER — OMEPRAZOLE 20 MG/1
40 CAPSULE, DELAYED RELEASE ORAL EVERY MORNING
Status: DISCONTINUED | OUTPATIENT
Start: 2023-08-03 | End: 2023-08-12 | Stop reason: HOSPADM

## 2023-08-02 RX ADMIN — ATORVASTATIN CALCIUM 10 MG: 10 TABLET, FILM COATED ORAL at 20:46

## 2023-08-02 RX ADMIN — OXYCODONE HYDROCHLORIDE 5 MG: 5 TABLET ORAL at 16:00

## 2023-08-02 RX ADMIN — OXYCODONE HYDROCHLORIDE AND ACETAMINOPHEN 1 TABLET: 5; 325 TABLET ORAL at 03:14

## 2023-08-02 RX ADMIN — OXYCODONE HYDROCHLORIDE AND ACETAMINOPHEN 2 TABLET: 5; 325 TABLET ORAL at 10:05

## 2023-08-02 RX ADMIN — HYDROCHLOROTHIAZIDE 25 MG: 25 TABLET ORAL at 04:37

## 2023-08-02 RX ADMIN — GABAPENTIN 300 MG: 300 CAPSULE ORAL at 10:05

## 2023-08-02 RX ADMIN — OXYCODONE HYDROCHLORIDE 10 MG: 10 TABLET ORAL at 20:46

## 2023-08-02 RX ADMIN — OMEPRAZOLE 40 MG: 20 CAPSULE, DELAYED RELEASE ORAL at 04:37

## 2023-08-02 RX ADMIN — DOCUSATE SODIUM 100 MG: 100 CAPSULE, LIQUID FILLED ORAL at 04:37

## 2023-08-02 RX ADMIN — GABAPENTIN 300 MG: 300 CAPSULE ORAL at 20:46

## 2023-08-02 RX ADMIN — GABAPENTIN 300 MG: 300 CAPSULE ORAL at 15:26

## 2023-08-02 RX ADMIN — ATORVASTATIN CALCIUM 10 MG: 10 TABLET, FILM COATED ORAL at 04:37

## 2023-08-02 RX ADMIN — ACETAMINOPHEN 500 MG: 500 TABLET ORAL at 19:27

## 2023-08-02 ASSESSMENT — PAIN DESCRIPTION - PAIN TYPE
TYPE: ACUTE PAIN;SURGICAL PAIN
TYPE: ACUTE PAIN;SURGICAL PAIN
TYPE: ACUTE PAIN
TYPE: ACUTE PAIN
TYPE: ACUTE PAIN;SURGICAL PAIN

## 2023-08-02 ASSESSMENT — COGNITIVE AND FUNCTIONAL STATUS - GENERAL
WALKING IN HOSPITAL ROOM: A LITTLE
SUGGESTED CMS G CODE MODIFIER MOBILITY: CL
MOBILITY SCORE: 12
MOVING TO AND FROM BED TO CHAIR: UNABLE
TURNING FROM BACK TO SIDE WHILE IN FLAT BAD: UNABLE
MOVING FROM LYING ON BACK TO SITTING ON SIDE OF FLAT BED: A LOT
STANDING UP FROM CHAIR USING ARMS: A LITTLE
CLIMB 3 TO 5 STEPS WITH RAILING: A LOT

## 2023-08-02 ASSESSMENT — LIFESTYLE VARIABLES
HAVE PEOPLE ANNOYED YOU BY CRITICIZING YOUR DRINKING: NO
ALCOHOL_USE: NO
AVERAGE NUMBER OF DAYS PER WEEK YOU HAVE A DRINK CONTAINING ALCOHOL: 2
ON A TYPICAL DAY WHEN YOU DRINK ALCOHOL HOW MANY DRINKS DO YOU HAVE: 2
HOW MANY TIMES IN THE PAST YEAR HAVE YOU HAD 5 OR MORE DRINKS IN A DAY: 0
TOTAL SCORE: 0
TOTAL SCORE: 0
HAVE YOU EVER FELT YOU SHOULD CUT DOWN ON YOUR DRINKING: NO
EVER FELT BAD OR GUILTY ABOUT YOUR DRINKING: NO
TOTAL SCORE: 0
EVER_SMOKED: NEVER
EVER HAD A DRINK FIRST THING IN THE MORNING TO STEADY YOUR NERVES TO GET RID OF A HANGOVER: NO
CONSUMPTION TOTAL: NEGATIVE

## 2023-08-02 ASSESSMENT — COPD QUESTIONNAIRES
HAVE YOU SMOKED AT LEAST 100 CIGARETTES IN YOUR ENTIRE LIFE: NO/DON'T KNOW
DURING THE PAST 4 WEEKS HOW MUCH DID YOU FEEL SHORT OF BREATH: NONE/LITTLE OF THE TIME
DO YOU EVER COUGH UP ANY MUCUS OR PHLEGM?: NO/ONLY WITH OCCASIONAL COLDS OR INFECTIONS
COPD SCREENING SCORE: 2

## 2023-08-02 ASSESSMENT — PATIENT HEALTH QUESTIONNAIRE - PHQ9
SUM OF ALL RESPONSES TO PHQ9 QUESTIONS 1 AND 2: 0
SUM OF ALL RESPONSES TO PHQ9 QUESTIONS 1 AND 2: 0
2. FEELING DOWN, DEPRESSED, IRRITABLE, OR HOPELESS: NOT AT ALL
1. LITTLE INTEREST OR PLEASURE IN DOING THINGS: NOT AT ALL
1. LITTLE INTEREST OR PLEASURE IN DOING THINGS: NOT AT ALL
2. FEELING DOWN, DEPRESSED, IRRITABLE, OR HOPELESS: NOT AT ALL

## 2023-08-02 ASSESSMENT — GAIT ASSESSMENTS
ASSISTIVE DEVICE: FRONT WHEEL WALKER
DEVIATION: STEP TO;BRADYKINETIC
DISTANCE (FEET): 12
GAIT LEVEL OF ASSIST: STANDBY ASSIST

## 2023-08-02 ASSESSMENT — FIBROSIS 4 INDEX: FIB4 SCORE: 1.53

## 2023-08-02 ASSESSMENT — PAIN SCALES - GENERAL: PAIN_LEVEL: 3

## 2023-08-02 NOTE — DISCHARGE PLANNING
Approved transfer to [facility] Dr. Alvarez is accepting. Transportation arranged for 1400 pick Bedside nurse Christine notified. Left voice mail for patient on her phone. BHAVIK notified

## 2023-08-02 NOTE — DISCHARGE PLANNING
PM&R referral from Lenny CLARKE. Following for post acute services. Spoke with Ariana about goals and expectation for IRF level of care. Discussed therapy plan for 3 hours per day 5 days a week. Discussed therapy schedules 30/45 or 60 minute sessions typically 1.5 hours between breakfast and lunch and another 1.5 hours between lunch and dinner. Discussed PT/OT and SLP roles. Discussed potential length of stay. Discussed comprehensive medical surveillance by physiatry as well as hospitalist team. Discussed patient to nursing ratio. Discussed insurance Medicare A&B with a supplement  coverage for the program.  S.O Kei will be primary support for Ariana  to return to the community. Kei will provide supervision and transportation, assist with IADL'S. Discussed participation with therapy program when visiting.  Family/ patient rehab goals are  to achieve SHERYL with transfers and mobility, self care goal SHERYL. Home is a 1 story with ramp to enter.  Discussed discharge planner role and team conference. DME in the home includes a ramp ,SPC, Walker and grab bars in the shower not in the toilet area..  In the event of additional support need home health versus out patient .  Physiatry to consult per protocol.

## 2023-08-02 NOTE — PROGRESS NOTES
4 Eyes Skin Assessment Completed by , RN and VALENTIN Hodges.    Head WDL  Ears WDL  Nose WDL  Mouth Ulcer(s)  Neck WDL  Breast/Chest WDL  Shoulder Blades WDL  Spine Incision  (R) Arm/Elbow/Hand WDL  (L) Arm/Elbow/Hand WDL  Abdomen WDL  Groin WDL  Scrotum/Coccyx/Buttocks WDL  (R) Leg WDL  (L) Leg WDL  (R) Heel/Foot/Toe WDL  (L) Heel/Foot/Toe WDL          Devices In Places N/A      Interventions In Place Pillows and Pressure Redistribution Mattress    Possible Skin Injury No    Pictures Uploaded Into Epic Yes  Wound Consult Placed N/A  RN Wound Prevention Protocol Ordered No

## 2023-08-02 NOTE — PROGRESS NOTES
Discharge instructions provided & reviewed with patient. Patient transferred to [facility]. Report given to VALENTIN Lowry. All belongings taken.

## 2023-08-02 NOTE — PROGRESS NOTES
Neurosurgery Progress Note    Subjective:  Denies new symptoms- some pain control issues  Eating, some ambulation, voiding  PT rec rehab, OT notes state home- however misinformation in that min support at home    Exam:  BLE str intact with coaching- some giveaway on R- CDI     BP  Min: 94/66  Max: 124/59  Pulse  Av.2  Min: 79  Max: 85  Resp  Av  Min: 15  Max: 18  Temp  Av.5 °C (97.7 °F)  Min: 36.2 °C (97.2 °F)  Max: 36.8 °C (98.2 °F)  SpO2  Av %  Min: 90 %  Max: 95 %    No data recorded    Recent Labs     23  0511   WBC 11.6* 10.3   RBC 3.92* 3.72*   HEMOGLOBIN 12.0 11.2*   HEMATOCRIT 35.3* 33.1*   MCV 90.1 89.0   MCH 30.6 30.1   MCHC 34.0 33.8   RDW 42.5 42.4   PLATELETCT 255 212   MPV 9.7 9.7       Recent Labs     23  0941 23  0511   SODIUM  --  138 133*   POTASSIUM 3.2* 4.0 3.2*   CHLORIDE  --  103 97   CO2  --  26 26   GLUCOSE  --  150* 131*   BUN  --  19 10   CREATININE  --  0.89 0.73   CALCIUM  --  8.4* 8.8                 Intake/Output                         23 - 23 - 23 59      Total  Total                 Intake    Total Intake -- -- -- -- -- --       Output    Urine  --  -- --  --  -- --    Number of Times Voided 2 x -- 2 x 1 x -- 1 x    Drains  --  5 5  --  -- --    Output (mL) (Closed/Suction Drain 1 Posterior;Midline Back Hemovac) -- 5 5 -- -- --    Total Output -- 5 5 -- -- --       Net I/O     -- -5 -5 -- -- --              Intake/Output Summary (Last 24 hours) at 2023 0922  Last data filed at 2023 0425  Gross per 24 hour   Intake --   Output 5 ml   Net -5 ml               oxyCODONE-acetaminophen  1-2 Tablet Q6HRS PRN    atorvastatin  10 mg QAM    gabapentin  300 mg TID    hydroCHLOROthiazide  25 mg QAM    omeprazole  40 mg QAM    Pharmacy Consult Request  1 Each PHARMACY TO DOSE    MD ALERT...DO NOT ADMINISTER NSAIDS or ASPIRIN unless ORDERED  By Neurosurgery  1 Each PRN    docusate sodium  100 mg BID    senna-docusate  1 Tablet Nightly    senna-docusate  1 Tablet Q24HRS PRN    polyethylene glycol/lytes  1 Packet BID PRN    magnesium hydroxide  30 mL QDAY PRN    bisacodyl  10 mg Q24HRS PRN    sodium phosphate  1 Each Once PRN    ondansetron  4 mg Q4HRS PRN    ondansetron  4 mg Q4HRS PRN    diphenhydrAMINE  25 mg Q6HRS PRN    Or    diphenhydrAMINE  25 mg Q6HRS PRN    methocarbamol  750 mg Q8HRS PRN    Or    cyclobenzaprine  10 mg Q8HRS PRN    Or    diazePAM  5 mg Q4HRS PRN    labetalol  10 mg Q HOUR PRN    hydrALAZINE  10 mg Q HOUR PRN       Assessment and Plan:    POD #2 L45 fusion  Prophylactic anticoagulation: no         Start date/time: tbd    Plan:  PTOT- OT to re-eval- prefer rehab if it is an option  Continue pain control  Bladder protocol

## 2023-08-02 NOTE — CARE PLAN
Problem: Pain - Standard  Goal: Alleviation of pain or a reduction in pain to the patient’s comfort goal  Flowsheets (Taken 8/2/2023 1600)  Pain Rating Scale (NPRS): 5     Problem: Fall Risk - Rehab  Goal: Patient will remain free from falls  Note: Patient remains free from falls this shift. Patient was educated on using the call light to prevent falls. Patients bed is in the lowest position. The patients belongings are placed in near proximity to the patient.     The patient is Stable - Low risk of patient condition declining or worsening

## 2023-08-02 NOTE — DISCHARGE PLANNING
Care Transition Team Assessment    Information Source  Orientation Level: Oriented X4  Information Given By: Patient  Informant's Name: Ariana Drake  Who is responsible for making decisions for patient? : Patient    Readmission Evaluation  Is this a readmission?: No    Elopement Risk  Legal Hold: No  Ambulatory or Self Mobile in Wheelchair: Yes  Disoriented: No  Psychiatric Symptoms: None  History of Wandering: No  Elopement this Admit: No  Vocalizing Wanting to Leave: No  Displays Behaviors, Body Language Wanting to Leave: No-Not at Risk for Elopement  Elopement Risk: Not at Risk for Elopement    Interdisciplinary Discharge Planning  Does Admitting Nurse Feel This Could be a Complex Discharge?: No  Primary Care Physician: STEPHANI PATEL*  Lives with - Patient's Self Care Capacity: Alone and Able to Care For Self  Support Systems: Spouse / Significant Other  Housing / Facility: [address] [place] [address]  Do You Take your Prescribed Medications Regularly: Yes  Able to Return to Previous ADL's: Yes  Mobility Issues: Yes  Prior Services: None  Patient Prefers to be Discharged to:: home  Durable Medical Equipment: Other - Specify (Rolater, 4 prong cane, shower bar)    Discharge Preparedness  What is your plan after discharge?: Home with help  What are your discharge supports?: Other (comment) (fiancee)  Prior Functional Level: Ambulatory, Independent with Activities of Daily Living  Difficulity with ADLs: Walking  Difficulty with ADLs Comment: none    Functional Assesment  Prior Functional Level: Ambulatory, Independent with Activities of Daily Living    Finances  Financial Barriers to Discharge: No    Vision / Hearing Impairment  Vision Impairment : Yes  Right Eye Vision: Wears Glasses  Left Eye Vision: Wears Glasses  Hearing Impairment : Yes (Has hearing aides at home)  Hearing Impairment: Both Ears      Advance Directive  Advance Directive?: Living Will      Psychological Assessment  History of Substance Abuse:  Alcohol, Marijuana (Wine twice a week and CBD gummies at night.)    Discharge Risks or Barriers  Discharge risks or barriers?: No (Patient lives alone)    Anticipated Discharge Information  Discharge Disposition: Discharged to home/self care (01)  Discharge Address: [address] [place] [address] [address] [id number]  Discharge Contact Phone Number: [phone]    CM met with the patient at the bedside to discuss discharge planning. Patient is now open to rehab or SNF.

## 2023-08-02 NOTE — THERAPY
"Physical Therapy   Daily Treatment     Patient Name: Ariana Drake  Age:  73 y.o., Sex:  female  Medical Record #: [id number]  Today's Date: 8/2/2023     Precautions  Precautions: Fall Risk;Spinal / Back Precautions  (no brace orders observed)  Comments: home lifting restrictions 10lbs    Assessment    Pt greeted and seen for PT treatment. Pt primarily limited by pain at this time. Pt req'd Cedrick for bed mobility via log roll. Pt req'd close SBA for ambulation 2/2 pain and c/o LE weakness. Pt does have fiance however fiance is unable to provide any physical assist. Pt would greatly benefit from intensive rehab; pt strongly desires to DC to rehab. Pt currently limited by impaired balance, weakness, decreased activity tolerance and pain which negatively impacts functional mobility. Pt will continue to benefit from skilled PT to address deficits.       Plan    Treatment Plan Status: Continue Current Treatment Plan  Type of Treatment: Bed Mobility, Gait Training, Therapeutic Activities, Therapeutic Exercise, Neuro Re-Education / Balance  Treatment Frequency: 5 Times per Week  Treatment Duration: Until Therapy Goals Met    DC Equipment Recommendations: Unable to determine at this time (FWW if she were to DC home)  Discharge Recommendations: Recommend post-acute placement for additional physical therapy services prior to discharge home (IPR)         08/02/23 0939   Cognition    Comments pleasant, 2 previous spine surgeries in 18 mos, \"this is the worst one\". limited by pain   Balance   Sitting Balance (Static) Fair   Sitting Balance (Dynamic) Fair   Standing Balance (Static) Fair   Standing Balance (Dynamic) Fair -   Weight Shift Sitting Fair   Weight Shift Standing Fair   Skilled Intervention Verbal Cuing;Compensatory Strategies;Facilitation;Tactile Cuing;Sequencing   Comments w/ FWW   Bed Mobility    Supine to Sit Minimal Assist   Sit to Supine Minimal Assist   Scooting Standby Assist   Rolling Moderate Assist to Lt. "   Skilled Intervention Verbal Cuing;Sequencing;Facilitation;Compensatory Strategies;Tactile Cuing   Comments pt's bed mobility limited by pain, VC for technique and breathing. Pt used rails, has rails at home and adjustable pau   Gait Analysis   Gait Level Of Assist Standby Assist   Assistive Device Front Wheel Walker   Distance (Feet) 12   # of Times Distance was Traveled 2   Deviation Step To;Bradykinetic   Skilled Intervention Verbal Cuing;Compensatory Strategies   Comments VC for breathing for pain mgmt. pt was 4WW at home but will need FWW if she were to DC home due to increased UE reliance   Functional Mobility   Sit to Stand Standby Assist   Bed, Chair, Wheelchair Transfer Standby Assist   Transfer Method Stand Step   Mobility bed mobility, transfer to chair, ambulation, back to bed   Skilled Intervention Verbal Cuing   Comments moves cautiously   Short Term Goals    Short Term Goal # 1 bed mob with bed rail, log roll supervised in 6 visits   Goal Outcome # 1 Progressing as expected   Short Term Goal # 2 transfers with FWW supervised   Goal Outcome # 2 Progressing as expected   Short Term Goal # 3 amb with FWW 100ft supervised in 6 visits   Goal Outcome # 3 Progressing slower than expected   Supervising Physical Therapist (PTA Treatments Only)   Supervising Physical Therapist Yee freed

## 2023-08-02 NOTE — PROGRESS NOTES
NURSING DAILY NOTE    Name: Ariana Drake   Date of Admission: 8/2/2023   Admitting Diagnosis: S/P lumbar fusion  Attending Physician: Farhat Dhillon M.d.  Allergies: Patient has no known allergies.    Safety  Patient Assist     Patient Precautions     Precaution Comments     Bed Transfer Status     Toilet Transfer Status      Assistive Devices     Oxygen  None - Room Air  Diet/Therapeutic Dining  Current Diet Order   Procedures    Diet Order Diet: Regular     Pill Administration  whole  Agitated Behavioral Scale     ABS Level of Severity       Fall Risk  Has the patient had a fall this admission?   No  Tonya Reed Fall Risk Scoring  13, MODERATE RISK  Fall Risk Safety Measures  bed alarm, chair alarm, and poor balance    Vitals  Pulse: 85  Respiration: 18  Blood Pressure : 118/61  Blood Pressure MAP (Calculated): 80 MM HG  BP Location: Right, Upper Arm  Patient BP Position: Supine     Oxygen  Pulse Oximetry: 92 %  O2 Delivery Device: None - Room Air    Bowel and Bladder  Last Bowel Movement  07/31/23  Stool Type     Bowel Device     Continent  Bladder: Stress incontinence   Bowel: Continent movement  Bladder Function     Genitourinary Assessment        Skin  Gerry Score   19  Sensory Interventions      Moisture Interventions         Pain  Pain Rating Scale  5 - Interrupts some activities  Pain Location     Pain Location Orientation     Pain Interventions   Declines    ADLs    Bathing      Linen Change      Personal Hygiene     Chlorhexidine Bath      Oral Care     Teeth/Dentures  Intact  Shave     Nutrition Percentage Eaten     Environmental Precautions     Patient Turns/Positioning  Patient Turns Self from Side to Side  Patient Turns Assistance/Tolerance     Bed Positions     Head of Bed Elevated         Psychosocial/Neurologic Assessment  Psychosocial Assessment  Psychosocial (WDL):  Within Defined Limits  Neurologic Assessment  Neuro (WDL):  Exceptions to WDL  Level of Consciousness: Alert  Orientation Level: Oriented X4  Cognition: Appropriate judgement, Appropriate safety awareness, Appropriate attention/concentration, Appropriate for developmental age, Follows commands  Speech: Clear  Pupil Assesment: No  Motor Function/Sensation Assessment: Dorsiflexion, Motor response, Sensation, Motor strength  R Foot Dorsiflexion: Strong  L Foot Dorsiflexion: Strong  RUE Motor Response: Responds to commands  RUE Sensation: Full sensation  Muscle Strength Right Arm: Good Strength Against Gravity and Moderate Resistance  LUE Motor Response: Responds to commands  LUE Sensation: Full sensation  Muscle Strength Left Arm: Good Strength Against Gravity and Moderate Resistance  RLE Motor Response: Responds to commands  RLE Sensation: Full sensation  Muscle Strength Right Leg: Fair Strength against Gravity but No Resistance  LLE Motor Response: Responds to commands  LLE Sensation: Full sensation  Muscle Strength Left Leg: Fair Strength against Gravity but No Resistance  EENT (WDL):  WDL Except    Cardio/Pulmonary Assessment  Edema      Respiratory Breath Sounds     Cardiac Assessment   Cardiac (WDL):  Within Defined Limits

## 2023-08-02 NOTE — DISCHARGE SUMMARY
DATE OF ADMISSION:  07/31/2023    DATE OF DISCHARGE:  08/02/2023    DATE OF POSSIBLE DISCHARGE:  08/02/2023.     OPERATION PERFORMED:  L4 through L5 fusion with Dr. Cottrell on 07/31/2023.     HOSPITAL COURSE:  On date of admission, operation was performed.    Postoperatively, the patient has done well.  She does still have some   persistent right lower extremity pain and some low back pain; however, is   eating, ambulating, and voiding with assistance.  Her drain is removed.    Bilateral lower extremity strength is grossly intact.  She does have some   giveaway on the right, which does improve with coaching.     DISCHARGE INSTRUCTIONS:  Given to patient in paper format.     DISCHARGE MEDICATIONS:  Med rec filled out in [facility] for rehabilitation.     ASSESSMENT AND PLAN:    1.  Status post above delineated surgery with Dr. Cottrell on 07/31/2023.  2.  The patient will follow up in our office in 4 weeks with x-rays.  3.  The patient will avoid all NSAIDs and smoking for 4 months.     Should the patient have further questions or concerns, they will not hesitate   to give our office a call.          ___________________________________  VINCE Hatch      ___________________________________  SCOTTY COTTRELL MD      DD: 08/02/2023 12:10:34  DT: 08/02/2023 15:31:15  Job#: [id number]

## 2023-08-02 NOTE — DISCHARGE PLANNING
Received Choice form @: 4573  Agency/Facility Name: [facility]   Referral sent per Choice form @: [id number]

## 2023-08-02 NOTE — H&P
"On admission patient states that she  Physical Medicine & Rehabilitation  History and Physical (H&P)  &     Post Admission Physician Evaluation (RUT)       Date of Admission: 8/2/2023  Date of Service: 8/2/2023   Ariana Drake  RH09/01    [facility] Code to Support Admission: 0008.9 - Orthopaedic Disorders: Other Orthopaedic  Etiologic Diagnosis: S/P lumbar fusion    _______________________________________________    Chief Complaint: Back pain    History of Present Illness:    Records reviewed: Neurosurgery notes.  Mrs. Drake is postoperative from a right-sided L4-5 transpedicular excision of lateral disc 1/19/2023.  She previously had an L1-L4 fusion which they had done years ago.  After her surgery in January she started to have some bothersome sensations in her right leg.  She was also getting symptoms in the left leg.  Additional surgical intervention was recommended.      Patient was admitted for elective surgery 7/31/2023 where she underwent L4-L5 fusion.  Postoperative course was relatively uneventful, however, functionally she lives on her own and needs additional acute rehab to discharge home independently.  She was admitted to [facility] rehab 8/2/2023.    On admission patient reports that she feels as though the surgery is the worst 1.  It is her third 1.  She is having a lot of pain.  She still has a lot of right leg symptoms including weakness and pain in the hip.  She did have a few very large bowel movements the day of her surgery.  She feels as though she is voiding normally.  She does not normally take Xanax at night.  She hopes to be going home independently.  She does have a fiancé but they do not live together her daughter will be flying in on August 8.    Review of Systems:     14 point review systems negative unless otherwise specified in the HPI  Past Medical History:  Past Medical History:   Diagnosis Date    Arthritis     Asthma     Bowel habit changes     diarrhea r/t \"nerves\"    Heart burn     " High cholesterol     Hypertension     Other acute back pain        Past Surgical History:  Past Surgical History:   Procedure Laterality Date    FUSION, SPINE, LUMBAR, ROBOT-ASSISTED WITH IMAGING GUIDANCE N/A 7/31/2023    Procedure: POSTERIOR REDO L4-5 LAMINECTOMY, TOTAL FACETECTOMY, TRANSFORAMINAL LUMBAR INTERBODY FUSION, REMOVAL L4 INSTRUMENTATION AND POSSIBLE ILIAC BONE GRAFT WITH ROBOT PROTOCOL;  Surgeon: Aman Cottrell M.D.;  Location: [facility];  Service: Orthopedics    LUMBAR LAMINECTOMY DISKECTOMY N/A 7/31/2023    Procedure: LAMINECTOMY, SPINE, LUMBAR, WITH DISCECTOMY;  Surgeon: Aman Cottrell M.D.;  Location: [facility];  Service: Orthopedics    ILIAC BONE GRAFT Left 7/31/2023    Procedure: BONE GRAFT, ILIAC CREST;  Surgeon: Aman Cottrell M.D.;  Location: [facility];  Service: Orthopedics    LUMBAR LAMINECTOMY DISKECTOMY Right 1/19/2023    Procedure: POSTERIOR RIGHT L4-5 TRANSPEDICULAR DECOMPRESSION, DISCECTOMY;  Surgeon: Aman Cottrell M.D.;  Location: [facility];  Service: Neurosurgery    LUMBAR DECOMPRESSION Right 1/19/2023    Procedure: DECOMPRESSION, SPINE, LUMBAR;  Surgeon: Aman Cottrell M.D.;  [facility];  Service: Neurosurgery    FUSION, SPINE, LUMBAR, ROBOT-ASSISTED WITH IMAGING GUIDANCE N/A 12/20/2021    Procedure: FUSION, SPINE, LUMBAR, ROBOT-ASSISTED WITH IMAGING GUIDANCE - L1-4 TRANSFORAMINAL LUMBAR INTERBODY FUSION WITH TOTAL FACTECTOMY;  Surgeon: Aman Cottrell M.D.;  Location: [facility];  Service: Neurosurgery    VAGINAL HYSTERECTOMY TOTAL  1985       Family History:  No family history on file.    Medications:  Current Facility-Administered Medications   Medication Dose    gabapentin (Neurontin) capsule 300 mg  300 mg    [START ON 8/3/2023] hydroCHLOROthiazide (Hydrodiuril) tablet 25 mg  25 mg    [START ON 8/3/2023] omeprazole (PriLOSEC) capsule 40 mg  40 mg    Respiratory Therapy Consult      Pharmacy Consult Request  ...Pain Management Review 1 Each  1 Each    oxyCODONE immediate-release (Roxicodone) tablet 5 mg  5 mg    oxyCODONE immediate release (Roxicodone) tablet 10 mg  10 mg    hydrALAZINE (Apresoline) tablet 25 mg  25 mg    senna-docusate (Pericolace Or Senokot S) 8.6-50 MG per tablet 2 Tablet  2 Tablet    And    polyethylene glycol/lytes (Miralax) PACKET 1 Packet  1 Packet    And    magnesium hydroxide (Milk Of Magnesia) suspension 30 mL  30 mL    And    bisacodyl (Dulcolax) suppository 10 mg  10 mg    carboxymethylcellulose (Refresh Tears) 0.5 % ophthalmic drops 1 Drop  1 Drop    benzocaine-menthol (Cepacol) lozenge 1 Lozenge  1 Lozenge    mag hydrox-al hydrox-simeth (Maalox Plus Es Or Mylanta Ds) suspension 20 mL  20 mL    ondansetron (Zofran ODT) dispertab 4 mg  4 mg    Or    ondansetron (Zofran) syringe/vial injection 4 mg  4 mg    traZODone (Desyrel) tablet 50 mg  50 mg    sodium chloride (Ocean) 0.65 % nasal spray 2 Spray  2 Spray    acetaminophen (Tylenol) tablet 500 mg  500 mg    atorvastatin (Lipitor) tablet 10 mg  10 mg       Allergies:  Patient has no known allergies.    Psychosocial History:  Living Site:  Home  Living With:  self  Caregiver's availability:  Not Applicable  Number of stairs:   0    Level of Function Prior to Disability:  Housing / Facility: 1 Story House  Steps Into Home: 0  Steps In Home: 0  Rail: None  Elevator: No  Bathroom Set up: Walk In Shower, Shower Chair, Grab Bars  Equipment Owned: 4-Wheel Walker  Lives with - Patient's Self Care Capacity: Alone and Able to Care For Self     Self Feeding: Independent  Grooming / Hygiene: Independent  Bathing: Independent  Dressing: Independent  Toileting: Independent  Medication Management: Independent  Laundry: Independent  Kitchen Mobility: Independent  Finances: Independent  Home Management: Independent  Shopping: Independent  Prior Level Of Mobility: Independent With Device in Community, Independent With Device in Home  Driving / Transportation:  Driving Independent  Prior Services: None  Housing / Facility: [address]  Occupation (Pre-Hospital Vocational): Retired Due To Age    Level of Function Prior to Admission to [facility]:  Gait Level Of Assist: Standby Assist  Assistive Device: Front Wheel Walker  Distance (Feet): 12  Deviation: Step To, Bradykinetic  Skilled Intervention: Verbal Cuing, Compensatory Strategies  Supine to Sit: Minimal Assist  Sit to Supine: Minimal Assist  Scooting: Standby Assist  Rolling: Moderate Assist to Lt.  Skilled Intervention: Verbal Cuing, Sequencing, Facilitation, Compensatory Strategies, Tactile Cuing  Comments: pt's bed mobility limited by pain, VC for technique and breathing. Pt used rails, has rails at home and adjustable pau  Sit to Stand: Standby Assist  Bed, Chair, Wheelchair Transfer: Standby Assist  Toilet Transfers: Standby Assist (with BSC over toilet)  Transfer Method: Stand Step  Skilled Intervention: Verbal Cuing  Sitting Edge of Bed: +10 mins  Standing: +5 mins    Lower Body Dressing:  (Pt agreeable to rehearse LBD however, after feeling dizzy pt politely declined to get dressed. But was able to demo figure 4 position to illustrate how she would don underwear, pants, and socks. Pt reports that recently her fiance has assisted w/LBD)  Toileting: Supervision (Pt was able to lean trunk laterally to wipe perineal area and adhere to lumbar spinal precautions.)    CURRENT LEVEL OF FUNCTION:   Same as level of function prior to admission to [facility]    Physical Examination:     VITAL SIGNS:   weight is 58.3 kg (128 lb 8.5 oz). Her blood pressure is 118/61 and her pulse is 85. Her respiration is 18 and oxygen saturation is 92%.   GENERAL: No apparent distress  HEENT: Normocephalic/atraumatic, EOMI, and PERRL  CARDIAC: Extremities warm and well-perfused  LUNGS: Breathing nonlabored on room air  ABDOMINAL: soft and nontender    EXTREMITIES: no spasticity or no  edema  NEURO:  Mental status:  A&Ox4 (person, place, date, situation) answers questions appropriately follows commands  Speech: fluent, no aphasia or dysarthria    Motor Exam Upper Extremities   ? Myotome R L   Shoulder Abduction C5 5 5   Elbow flexion C5 5 5   Wrist extension C6 5 5   Elbow extension C7 5 5   Finger flexion C8 5 5   Finger abduction T1 5 5     Motor Exam Lower Extremities  ? Myotome R L   Hip flexion L2 1 3   Knee extension L3 2 3   Ankle dorsiflexion L4 5 5   Toe extension L5 5 5   Ankle plantarflexion S1 5 5         Radiology:  CT L Spine 7/27/23  1.  L1-L4 posterior spinal fusion and laminectomies again noted and as described.  2.  Lower lumbar disc and facet degeneration.  3.  L4-L5 right posterolateral disc protrusion with right foraminal narrowing and possibly abuts the exiting right L4 nerve. Correlate for right L4 radiculopathy.  4.  L5-S1 moderate left foraminal narrowing    Laboratory Values:  Recent Labs     07/31/23  0941 07/31/23 2356 08/02/23  0511   SODIUM  --  138 133*   POTASSIUM 3.2* 4.0 3.2*   CHLORIDE  --  103 97   CO2  --  26 26   GLUCOSE  --  150* 131*   BUN  --  19 10   CREATININE  --  0.89 0.73   CALCIUM  --  8.4* 8.8     Recent Labs     07/31/23 2356 08/02/23  0511   WBC 11.6* 10.3   RBC 3.92* 3.72*   HEMOGLOBIN 12.0 11.2*   HEMATOCRIT 35.3* 33.1*   MCV 90.1 89.0   MCH 30.6 30.1   MCHC 34.0 33.8   RDW 42.5 42.4   PLATELETCT 255 212   MPV 9.7 9.7           Primary Rehabilitation Diagnosis:    This patient is a 73 y.o. female admitted for acute inpatient rehabilitation with   S/P lumbar fusion.    Impairments:   ADLs/IADLs  Mobility    Secondary Diagnosis/Medical Co-morbidities Affecting Function  Hypokalemia, hyponatremia, postoperative acute pain, impaired mobility, anemia    Relevant Changes Since Preadmission Evaluation:    Status unchanged    The patient's rehabilitation potential is Very Good  The patient's medical prognosis is good    Rehabilitation Plan:    Discussion and Recommendations:   1. The patient requires an acute inpatient rehabilitation program with a coordinated program of care at an intensity and frequency not available at a lower level of care. This recommendation is substantiated by the patient's medical physicians who recommend that the patient's intervention and assessment of medical issues needs to be done at an acute level of care for patient's safety and maximum outcome.   2. A coordinated program of care will be supplied by an interdisciplinary team of physical therapy, occupational therapy, rehab physician, rehab nursing, and, if needed, speech therapy and rehab psychology. Rehab team presents a patient-specific rehabilitation and education program concentrating on prevention of future problems related to accessibility, mobility, skin, bowel, bladder, sexuality, and psychosocial and medical/surgical problems.   3. Need for Rehabilitation Physician: The rehab physician will be evaluating the patient on a multi-weekly basis to help coordinate the program of care. The rehab physician communicates between medical physicians, therapists, and nurses to maximize the patient's potential outcome. Specific areas in which the rehab physician will be providing daily assessment include the following:   A. Assessing the patient's heart rate and blood pressure response (vitals monitoring) to activity and making adjustments in medications or conservative measures as needed.   B. The rehab physician will be assessing the frequency at which the program can be increased to allow the patient to reach optimal functional outcome.   C. The rehab physician will also provide assessments in daily skin care, especially in light of patient's impairments in mobility.   D. The rehab physician will provide special expertise in understanding how to work with functional impairment and recommend appropriate interventions, compensatory techniques, and education that will  facilitate the patient's outcome.   4. Rehab R.N.   The rehab RN will be working with patient to carry over in room mobility and activities of daily living when the patient is not in 3 hours of skilled therapy. Rehab nursing will be working in conjunction with rehab physician to address all the medical issues above and continue to assess laboratory work and discuss abnormalities with the treating physicians, assess vitals, and response to activity, and discuss and report abnormalities with the rehab physician. Rehab RN will also continue daily skin care, supervise bladder/bowel program, instruct in medication administration, and ensure patient safety.   5. Rehab Therapy: Therapies to treat at intensity and frequency of (may change after completion of evaluation by all therapeutic disciplines):       PT:  Physical therapy to address mobility, transfer, gait training and evaluation for adaptive equipment needs 1.5 hour/day at least 5 days/week for the duration of the ELOS (see below)       OT:  Occupational therapy to address ADLs, self-care, home management training, functional mobility/transfers and assistive device evaluation, and community re-integration 1.5 hour/day at least 5 days/week for the duration of the ELOS (see below).        ST/Dysphagia:  Speech therapy to address speech, language, and cognitive deficits as well as swallowing difficulties with retraining/dysphagia management and community re-integration with comprehension, expression, cognitive training 0 hour/day at least 5 days/week for the duration of the ELOS (see below).     Medical management / Rehabilitation Issues/ Adverse Potential as part of rehabilitation plan     Rehabilitation Issues/Adverse Potential  1.  Lumbar fusion: Patient demonstrates functional deficits in strength, balance, coordination, and ADL's. Patient is admitted to [facility] for comprehensive rehabilitation therapy as described below.   Rehabilitation  nursing monitors bowel and bladder control, educates on medication administration, co-morbidities and monitors patient safety.    2.  Neurostimulants: None at this time but continue to assess daily for need to initiate should status change.    3.  DVT prophylaxis:  Patient is on not on chemoprophylaxis for anticoagulation upon transfer. Encourage OOB. Monitor daily for signs and symptoms of DVT including but not limited to swelling and pain to prevent the development of DVT that may interfere with therapies.    4.  GI prophylaxis:  On prilosec to prevent gastritis/dyspepsia which may interfere with therapies.    5.  Pain: Controlled with Tylenol and Oxycodone    6.  Nutrition/Dysphagia: Dietician monitors nutrient intake, recommend supplements prn and provide nutrition education to pt/family to promote optimal nutrition for wound healing/recovery.     7.  Bladder/bowel:  Start bowel and bladder program as described below, to prevent constipation, urinary retention (which may lead to UTI), and urinary incontinence (which will impact upon pt's functional independence).   - TV Q3h while awake with post void bladder scans, I&O cath for PVRs >400  - up to commode after meal     8.  Skin/dermal ulcer prophylaxis: Monitor for new skin conditions with q.2 h. turns as required to prevent the development of skin breakdown.     9.  Cognition/Behavior: As needed psychologist provides adjustment counseling to illness and psychosocial barriers that may be potential barriers to rehabilitation.     10. Respiratory therapy: RT performs O2 management prn, breathing retraining, pulmonary hygiene and bronchospasm management prn to optimize participation in therapies.     Medical Co-Morbidities/Adverse Potential Affecting Function:    Recurrent right L4 radiculopathy secondary to neuroforaminal stenosis s/p bilateral L4-5 partial laminectomy, right-sided total facetectomy, decompression of bilateral L5 and right L4 nerve roots with L4-5  fusion 7/31/2023 Dr. Cottrell  -PT and OT for mobility and ADLs. Per guidelines, 15 hours per week between PT, OT and/or SLP.  -Follow-up neurosurgery    Hypertension-continue hydrochlorothiazide 25 mg daily    Hyperlipidemia-continue Lipitor 10 mg    Pain -Tylenol, gabapentin and oxycodone    Hyponatremia -Labs 8/3    Hypokalemia -Labs 8/3    Anemia -Labs 8/3    Skin - Patient at risk for skin breakdown due to debility in areas including sacrum, achilles, elbows and head in addition to other sites. Nursing to assess skin daily.     GI Ppx - Patient on Prilosec for GERD prophylaxis. Patient on Senna-docusate for constipation prophylaxis.     DVT Ppx -not on chemoprophylaxis.  Encourage out of bed.    I personally performed a complete drug regimen review and no potential clinically significant medication issues were identified.     Goals/Expected Level of Function Based on Current Medical and Functional Status:  (may change based on patient's medical status and rate of impairment recovery)  Transfers:   Modified Independent  Mobility/Gait:   Modified Independent  ADL's:   Modified Independent    DISPOSITION: Discharge to pre-morbid independent living setting with the supportive care of patient's self and fiance.    ELOS: 7-14 days  ____________________________________    Gabby Alvarez DO, MS  [facility] - Physical Medicine & Rehabilitation   ____________________________________    Pt was seen today for 75 min, and entire time spent in face-to-face contact was >50% in counseling and coordination of care as detailed in A/P above.

## 2023-08-02 NOTE — ANESTHESIA POSTPROCEDURE EVALUATION
Patient: Ariana Drake    Procedure Summary     Date: 07/31/23 Room / Location: [facility] 05 / SURGERY [facility]    Anesthesia Start: 1145 Anesthesia Stop: 1456    Procedures:       POSTERIOR REDO L4-5 LAMINECTOMY, TOTAL FACETECTOMY, TRANSFORAMINAL LUMBAR INTERBODY FUSION, REMOVAL L4 INSTRUMENTATION AND POSSIBLE ILIAC BONE GRAFT WITH ROBOT PROTOCOL (Back)      LAMINECTOMY, SPINE, LUMBAR, WITH DISCECTOMY (Back)      BONE GRAFT, ILIAC CREST (Left: Back) Diagnosis: (SPINAL STENOSIS OF LUMBAR REGION)    Surgeons: Aman Cottrell M.D. Responsible Provider: Helio Tuttle M.D.    Anesthesia Type: general ASA Status: 2          Final Anesthesia Type: general  Last vitals  BP   Blood Pressure : 94/66    Temp   36.3 °C (97.3 °F)    Pulse   79   Resp   15    SpO2   93 %      Anesthesia Post Evaluation    Patient location during evaluation: PACU  Patient participation: complete - patient participated  Level of consciousness: awake and alert  Pain score: 3    Airway patency: patent  Anesthetic complications: no  Cardiovascular status: hemodynamically stable  Respiratory status: acceptable  Hydration status: euvolemic    PONV: none          There were no known notable events for this encounter.     Nurse Pain Score: 4 (NPRS)

## 2023-08-02 NOTE — CARE PLAN
The patient is Stable - Low risk of patient condition declining or worsening    Shift Goals  Clinical Goals: monitor drain output  Patient Goals: pain control, rest  Family Goals: NA    Progress made toward(s) clinical / shift goals:    Problem: Pain - Standard  Goal: Alleviation of pain or a reduction in pain to the patient’s comfort goal  Outcome: Progressing     Problem: Knowledge Deficit - Standard  Goal: Patient and family/care givers will demonstrate understanding of plan of care, disease process/condition, diagnostic tests and medications  Outcome: Progressing     Problem: Fall Risk  Goal: Patient will remain free from falls  Outcome: Progressing       Patient is not progressing towards the following goals:

## 2023-08-02 NOTE — ANESTHESIA TIME REPORT
Anesthesia Start and Stop Event Times     Date Time Event    7/31/2023 1133 Ready for Procedure     1145 Anesthesia Start     1456 Anesthesia Stop        Responsible Staff  07/31/23    Name Role Begin End    Helio Tuttle M.D. Anesth [id number]        Overtime Reason:  no overtime (within assigned shift)    Comments:

## 2023-08-02 NOTE — DISCHARGE PLANNING
Case Management Discharge Planning    Admission Date: 7/31/2023  GMLOS: 2.7  ALOS: 2    6-Clicks ADL Score: 21  6-Clicks Mobility Score: 10  PT and/or OT Eval ordered: Yes  Post-acute Referrals Ordered: Yes  Post-acute Choice Obtained: Yes  Has referral(s) been sent to post-acute provider:  Yes      Anticipated Discharge Dispo: Discharge Disposition: Discharged to home/self care (01)  Discharge Address: [address] [place] [address] [address] [place] [address]  Discharge Contact Phone Number: [phone]    DME Needed: Yes    DME Ordered: Yes patient has a FWW at home.    Action(s) Taken: Updated Provider/Nurse on Discharge Plan, Patient Conference, Choice obtained, and Referral(s) sent    Escalations Completed: None    Medically Clear: No    Next Steps: f/u acceptance to [facility]    Barriers to Discharge: Medical clearance    Is the patient up for discharge tomorrow: No    CM me with the patient this morning and at this time.   Jade Galvez in to discuss discharge planning and she will order a PMR consult.   Choices were obtained and faxed to the DPA.

## 2023-08-02 NOTE — PROGRESS NOTES
Patient admitted to facility at 1430; accompanied by hospital transport.  Patient assisted to room and positioned in bed for comfort and safety; call light within reach.  Patient assisted with stowing belongings and oriented to room and facility. Will continue to monitor.

## 2023-08-03 ENCOUNTER — APPOINTMENT (OUTPATIENT)
Dept: PHYSICAL THERAPY | Facility: REHABILITATION | Age: 73
DRG: 560 | End: 2023-08-03
Attending: PHYSICAL MEDICINE & REHABILITATION
Payer: MEDICARE

## 2023-08-03 ENCOUNTER — APPOINTMENT (OUTPATIENT)
Dept: OCCUPATIONAL THERAPY | Facility: REHABILITATION | Age: 73
DRG: 560 | End: 2023-08-03
Attending: PHYSICAL MEDICINE & REHABILITATION
Payer: MEDICARE

## 2023-08-03 LAB
ALBUMIN SERPL BCP-MCNC: 3.4 G/DL (ref 3.2–4.9)
ALBUMIN/GLOB SERPL: 1.1 G/DL
ALP SERPL-CCNC: 60 U/L (ref 30–99)
ALT SERPL-CCNC: 13 U/L (ref 2–50)
ANION GAP SERPL CALC-SCNC: 11 MMOL/L (ref 7–16)
APPEARANCE UR: CLEAR
AST SERPL-CCNC: 21 U/L (ref 12–45)
BACTERIA #/AREA URNS HPF: NEGATIVE /HPF
BASOPHILS # BLD AUTO: 0.5 % (ref 0–1.8)
BASOPHILS # BLD: 0.05 K/UL (ref 0–0.12)
BILIRUB SERPL-MCNC: 0.7 MG/DL (ref 0.1–1.5)
BILIRUB UR QL STRIP.AUTO: NEGATIVE
BUN SERPL-MCNC: 11 MG/DL (ref 8–22)
CALCIUM ALBUM COR SERPL-MCNC: 9.5 MG/DL (ref 8.5–10.5)
CALCIUM SERPL-MCNC: 9 MG/DL (ref 8.5–10.5)
CHLORIDE SERPL-SCNC: 97 MMOL/L (ref 96–112)
CO2 SERPL-SCNC: 28 MMOL/L (ref 20–33)
COLOR UR: YELLOW
CREAT SERPL-MCNC: 0.68 MG/DL (ref 0.5–1.4)
EOSINOPHIL # BLD AUTO: 0.23 K/UL (ref 0–0.51)
EOSINOPHIL NFR BLD: 2.5 % (ref 0–6.9)
EPI CELLS #/AREA URNS HPF: NEGATIVE /HPF
ERYTHROCYTE [DISTWIDTH] IN BLOOD BY AUTOMATED COUNT: 44.3 FL (ref 35.9–50)
GFR SERPLBLD CREATININE-BSD FMLA CKD-EPI: 91 ML/MIN/1.73 M 2
GLOBULIN SER CALC-MCNC: 3 G/DL (ref 1.9–3.5)
GLUCOSE SERPL-MCNC: 111 MG/DL (ref 65–99)
GLUCOSE UR STRIP.AUTO-MCNC: NEGATIVE MG/DL
HCT VFR BLD AUTO: 35.6 % (ref 37–47)
HGB BLD-MCNC: 11.7 G/DL (ref 12–16)
HYALINE CASTS #/AREA URNS LPF: NORMAL /LPF
IMM GRANULOCYTES # BLD AUTO: 0.04 K/UL (ref 0–0.11)
IMM GRANULOCYTES NFR BLD AUTO: 0.4 % (ref 0–0.9)
KETONES UR STRIP.AUTO-MCNC: 40 MG/DL
LEUKOCYTE ESTERASE UR QL STRIP.AUTO: NEGATIVE
LYMPHOCYTES # BLD AUTO: 1.65 K/UL (ref 1–4.8)
LYMPHOCYTES NFR BLD: 18 % (ref 22–41)
MAGNESIUM SERPL-MCNC: 1.8 MG/DL (ref 1.5–2.5)
MCH RBC QN AUTO: 30.2 PG (ref 27–33)
MCHC RBC AUTO-ENTMCNC: 32.9 G/DL (ref 32.2–35.5)
MCV RBC AUTO: 91.8 FL (ref 81.4–97.8)
MICRO URNS: ABNORMAL
MONOCYTES # BLD AUTO: 0.84 K/UL (ref 0–0.85)
MONOCYTES NFR BLD AUTO: 9.2 % (ref 0–13.4)
MUCOUS THREADS #/AREA URNS HPF: NORMAL /HPF
NEUTROPHILS # BLD AUTO: 6.37 K/UL (ref 1.82–7.42)
NEUTROPHILS NFR BLD: 69.4 % (ref 44–72)
NITRITE UR QL STRIP.AUTO: NEGATIVE
NRBC # BLD AUTO: 0 K/UL
NRBC BLD-RTO: 0 /100 WBC (ref 0–0.2)
PH UR STRIP.AUTO: 6 [PH] (ref 5–8)
PHOSPHATE SERPL-MCNC: 1.9 MG/DL (ref 2.5–4.5)
PLATELET # BLD AUTO: 201 K/UL (ref 164–446)
PMV BLD AUTO: 10.2 FL (ref 9–12.9)
POTASSIUM SERPL-SCNC: 3.1 MMOL/L (ref 3.6–5.5)
PROT SERPL-MCNC: 6.4 G/DL (ref 6–8.2)
PROT UR QL STRIP: 30 MG/DL
RBC # BLD AUTO: 3.88 M/UL (ref 4.2–5.4)
RBC # URNS HPF: NORMAL /HPF
RBC UR QL AUTO: NEGATIVE
SODIUM SERPL-SCNC: 136 MMOL/L (ref 135–145)
SP GR UR STRIP.AUTO: 1.02
TSH SERPL DL<=0.005 MIU/L-ACNC: 2.89 UIU/ML (ref 0.38–5.33)
UROBILINOGEN UR STRIP.AUTO-MCNC: 1 MG/DL
WBC # BLD AUTO: 9.2 K/UL (ref 4.8–10.8)
WBC #/AREA URNS HPF: NORMAL /HPF

## 2023-08-03 PROCEDURE — 84100 ASSAY OF PHOSPHORUS: CPT

## 2023-08-03 PROCEDURE — 82652 VIT D 1 25-DIHYDROXY: CPT

## 2023-08-03 PROCEDURE — 85025 COMPLETE CBC W/AUTO DIFF WBC: CPT

## 2023-08-03 PROCEDURE — 700102 HCHG RX REV CODE 250 W/ 637 OVERRIDE(OP): Performed by: PHYSICAL MEDICINE & REHABILITATION

## 2023-08-03 PROCEDURE — 97530 THERAPEUTIC ACTIVITIES: CPT

## 2023-08-03 PROCEDURE — 81001 URINALYSIS AUTO W/SCOPE: CPT

## 2023-08-03 PROCEDURE — 770010 HCHG ROOM/CARE - REHAB SEMI PRIVAT*

## 2023-08-03 PROCEDURE — 36415 COLL VENOUS BLD VENIPUNCTURE: CPT

## 2023-08-03 PROCEDURE — A9270 NON-COVERED ITEM OR SERVICE: HCPCS | Performed by: PHYSICAL MEDICINE & REHABILITATION

## 2023-08-03 PROCEDURE — 700111 HCHG RX REV CODE 636 W/ 250 OVERRIDE (IP): Performed by: PHYSICAL MEDICINE & REHABILITATION

## 2023-08-03 PROCEDURE — 83735 ASSAY OF MAGNESIUM: CPT

## 2023-08-03 PROCEDURE — 97165 OT EVAL LOW COMPLEX 30 MIN: CPT

## 2023-08-03 PROCEDURE — 97162 PT EVAL MOD COMPLEX 30 MIN: CPT

## 2023-08-03 PROCEDURE — 97116 GAIT TRAINING THERAPY: CPT

## 2023-08-03 PROCEDURE — 84443 ASSAY THYROID STIM HORMONE: CPT

## 2023-08-03 PROCEDURE — 97535 SELF CARE MNGMENT TRAINING: CPT

## 2023-08-03 PROCEDURE — 80053 COMPREHEN METABOLIC PANEL: CPT

## 2023-08-03 PROCEDURE — 99232 SBSQ HOSP IP/OBS MODERATE 35: CPT | Performed by: PHYSICAL MEDICINE & REHABILITATION

## 2023-08-03 RX ORDER — HYDROCHLOROTHIAZIDE 25 MG/1
12.5 TABLET ORAL EVERY MORNING
Status: DISCONTINUED | OUTPATIENT
Start: 2023-08-04 | End: 2023-08-04

## 2023-08-03 RX ORDER — POTASSIUM CHLORIDE 20 MEQ/1
20 TABLET, EXTENDED RELEASE ORAL ONCE
Status: COMPLETED | OUTPATIENT
Start: 2023-08-03 | End: 2023-08-03

## 2023-08-03 RX ORDER — MIDODRINE HYDROCHLORIDE 10 MG/1
10 TABLET ORAL ONCE
Status: COMPLETED | OUTPATIENT
Start: 2023-08-03 | End: 2023-08-03

## 2023-08-03 RX ADMIN — HYDROCHLOROTHIAZIDE 25 MG: 25 TABLET ORAL at 08:18

## 2023-08-03 RX ADMIN — GABAPENTIN 300 MG: 300 CAPSULE ORAL at 20:39

## 2023-08-03 RX ADMIN — GABAPENTIN 300 MG: 300 CAPSULE ORAL at 08:18

## 2023-08-03 RX ADMIN — OXYCODONE HYDROCHLORIDE 10 MG: 10 TABLET ORAL at 05:57

## 2023-08-03 RX ADMIN — ONDANSETRON 4 MG: 4 TABLET, ORALLY DISINTEGRATING ORAL at 11:32

## 2023-08-03 RX ADMIN — OMEPRAZOLE 40 MG: 20 CAPSULE, DELAYED RELEASE ORAL at 08:18

## 2023-08-03 RX ADMIN — DIBASIC SODIUM PHOSPHATE, MONOBASIC POTASSIUM PHOSPHATE AND MONOBASIC SODIUM PHOSPHATE 250 MG: 852; 155; 130 TABLET ORAL at 17:38

## 2023-08-03 RX ADMIN — MIDODRINE HYDROCHLORIDE 10 MG: 10 TABLET ORAL at 13:02

## 2023-08-03 RX ADMIN — ATORVASTATIN CALCIUM 10 MG: 10 TABLET, FILM COATED ORAL at 20:39

## 2023-08-03 RX ADMIN — MAGNESIUM HYDROXIDE 30 ML: 1200 LIQUID ORAL at 17:37

## 2023-08-03 RX ADMIN — POTASSIUM CHLORIDE 20 MEQ: 1500 TABLET, EXTENDED RELEASE ORAL at 12:55

## 2023-08-03 RX ADMIN — SENNOSIDES AND DOCUSATE SODIUM 2 TABLET: 50; 8.6 TABLET ORAL at 20:39

## 2023-08-03 RX ADMIN — DIBASIC SODIUM PHOSPHATE, MONOBASIC POTASSIUM PHOSPHATE AND MONOBASIC SODIUM PHOSPHATE 250 MG: 852; 155; 130 TABLET ORAL at 12:55

## 2023-08-03 RX ADMIN — GABAPENTIN 300 MG: 300 CAPSULE ORAL at 16:11

## 2023-08-03 RX ADMIN — SENNOSIDES AND DOCUSATE SODIUM 2 TABLET: 50; 8.6 TABLET ORAL at 08:20

## 2023-08-03 SDOH — ECONOMIC STABILITY: TRANSPORTATION INSECURITY
IN THE PAST 12 MONTHS, HAS LACK OF RELIABLE TRANSPORTATION KEPT YOU FROM MEDICAL APPOINTMENTS, MEETINGS, WORK OR FROM GETTING THINGS NEEDED FOR DAILY LIVING?: NO

## 2023-08-03 SDOH — ECONOMIC STABILITY: TRANSPORTATION INSECURITY
IN THE PAST 12 MONTHS, HAS THE LACK OF TRANSPORTATION KEPT YOU FROM MEDICAL APPOINTMENTS OR FROM GETTING MEDICATIONS?: NO

## 2023-08-03 ASSESSMENT — GAIT ASSESSMENTS
DEVIATION: STEP TO;BRADYKINETIC;DECREASED HEEL STRIKE;DECREASED TOE OFF
GAIT LEVEL OF ASSIST: CONTACT GUARD ASSIST
ASSISTIVE DEVICE: FRONT WHEEL WALKER
DISTANCE (FEET): 200
DISTANCE (FEET): 30
ASSISTIVE DEVICE: FRONT WHEEL WALKER
GAIT LEVEL OF ASSIST: CONTACT GUARD ASSIST
DEVIATION: STEP TO;BRADYKINETIC;DECREASED HEEL STRIKE;DECREASED TOE OFF

## 2023-08-03 ASSESSMENT — PATIENT HEALTH QUESTIONNAIRE - PHQ9
SUM OF ALL RESPONSES TO PHQ9 QUESTIONS 1 AND 2: 0
1. LITTLE INTEREST OR PLEASURE IN DOING THINGS: NOT AT ALL
SUM OF ALL RESPONSES TO PHQ9 QUESTIONS 1 AND 2: 0
1. LITTLE INTEREST OR PLEASURE IN DOING THINGS: NOT AT ALL
2. FEELING DOWN, DEPRESSED, IRRITABLE, OR HOPELESS: NOT AT ALL
2. FEELING DOWN, DEPRESSED, IRRITABLE, OR HOPELESS: NOT AT ALL

## 2023-08-03 ASSESSMENT — BRIEF INTERVIEW FOR MENTAL STATUS (BIMS)
WHAT DAY OF THE WEEK IS IT: INCORRECT
WHAT MONTH IS IT: ACCURATE WITHIN 5 DAYS
BIMS SUMMARY SCORE: 11
ASKED TO RECALL SOCK: NO, COULD NOT RECALL
INITIAL REPETITION OF BED BLUE SOCK - FIRST ATTEMPT: 3
WHAT YEAR IS IT: CORRECT
ASKED TO RECALL BED: YES, AFTER CUEING (A PIECE OF FURNITURE")"
ASKED TO RECALL BLUE: YES, NO CUE REQUIRED

## 2023-08-03 ASSESSMENT — 10 METER WALK TEST (10METWT)
AVERAGE TIME - SECONDS: 23.33
TRIAL 1: TIME TO WALK 10 METERS: 25
TRIAL 3: TIME TO WALK 10 METERS: 23
TRIAL 2: TIME TO WALK 10 METERS: 22
AVERAGE VELOCITY - METERS PER SECOND: 0.26

## 2023-08-03 ASSESSMENT — ACTIVITIES OF DAILY LIVING (ADL)
TOILETING: INDEPENDENT
BED_CHAIR_WHEELCHAIR_TRANSFER_DESCRIPTION: INCREASED TIME;INITIAL PREPARATION FOR TASK;SET-UP OF EQUIPMENT;SUPERVISION FOR SAFETY;VERBAL CUEING
TUB_SHOWER_TRANSFER_DESCRIPTION: GRAB BAR;SHOWER BENCH;INCREASED TIME;INITIAL PREPARATION FOR TASK;SET-UP OF EQUIPMENT;SUPERVISION FOR SAFETY;VERBAL CUEING
TOILET_TRANSFER_DESCRIPTION: GRAB BAR;INCREASED TIME;INITIAL PREPARATION FOR TASK;SET-UP OF EQUIPMENT;SUPERVISION FOR SAFETY;VERBAL CUEING

## 2023-08-03 ASSESSMENT — PAIN DESCRIPTION - PAIN TYPE: TYPE: ACUTE PAIN

## 2023-08-03 NOTE — PROGRESS NOTES
NURSING DAILY NOTE    Name: Ariana Drake  Date of Admission: 8/2/2023  Admitting Diagnosis: S/P lumbar fusion  Attending Physician: Farhat Dhillon M.d.  Allergies: Patient has no known allergies.    Safety  Patient Assist  omin-mod  Patient Precautions  o   Precaution Comments  o   Bed Transfer Status  o   Toilet Transfer Status  o   Assistive Devices  oWheelchair, Rails  Oxygen  oNone - Room Air  Diet/Therapeutic Dining  o  Current Diet Order   Procedures    Diet Order Diet: Regular     Pill Administration  owhole  Agitated Behavioral Scale  o   ABS Level of Severity  o     Fall Risk  Has the patient had a fall this admission?  Lilly  Tonya Reed Fall Risk Scoring  o13, MODERATE RISK  Fall Risk Safety Measures  beatrice alarm and chair alarm    Vitals  Temperature: 37.3 °C (99.1 °F)  Temp src: Oral  Pulse: 91  Respiration: 17  Blood Pressure : 108/55  Blood Pressure MAP (Calculated): 73 MM HG  BP Location: Right, Upper Arm  Patient BP Position: Sitting (Recline sitting position in bed)    Oxygen  Pulse Oximetry: 92 %  O2 Delivery Device: None - Room Air  Incentive Spirometer Volume: 1000 mL    Bowel and Bladder  Last Bowel Movement  o07/31/23 (refused senna hs)  Stool Type  o   Bowel Device  o   Continent  oBladder: Stress incontinence  oBowel: Continent movement  Bladder Function  oUrine Void (mL):  (Moerate)  Number of Times Voided: 1  Urine Color: Yellow  Genitourinary Assessment  oBladder Assessment (WDL):  Within Defined Limits  Brandt Catheter: Not Applicable  Urine Color: Yellow  Bladder Device: Bathroom  Bladder Scan: Post Void  $ Bladder Scan Results (mL): 132    Skin  Gerry Score  o 19  Sensory Interventions  o    Moisture Interventions  o       Pain  Pain Rating Scale  o8 - Awful, hard to do anything  Pain Location  oBack  Pain Location Orientation  oLower  Pain Interventions  oMedication (see MAR)    ADLs    Bathing  o   Linen Change  o   Personal Hygiene  o   Chlorhexidine Bath  o   Oral  Care  o   Teeth/Dentures  oIntact  Shave  o   Nutrition Percentage Eaten  o   Environmental Precautions  o   Patient Turns/Positioning  oPatient Turns Self from Side to Side  Patient Turns Assistance/Tolerance  o   Bed Positions  o   Head of Bed Elevated  o       Psychosocial/Neurologic Assessment  Psychosocial Assessment  oPsychosocial (WDL):  Within Defined Limits  Neurologic Assessment  oNeuro (WDL): Exceptions to WDL  Level of Consciousness: Alert  Orientation Level: Oriented X4  Cognition: Appropriate judgement, Appropriate safety awareness, Appropriate attention/concentration, Appropriate for developmental age, Follows commands  Speech: Clear  Pupil Assesment: No  Motor Function/Sensation Assessment: Dorsiflexion, Motor response, Sensation, Motor strength  R Foot Dorsiflexion: Strong  L Foot Dorsiflexion: Strong  RUE Motor Response: Responds to commands  RUE Sensation: Full sensation  Muscle Strength Right Arm: Good Strength Against Gravity and Moderate Resistance  LUE Motor Response: Responds to commands  LUE Sensation: Full sensation  Muscle Strength Left Arm: Good Strength Against Gravity and Moderate Resistance  RLE Motor Response: Responds to commands  RLE Sensation: Full sensation  Muscle Strength Right Leg: Fair Strength against Gravity but No Resistance  LLE Motor Response: Responds to commands  LLE Sensation: Full sensation  Muscle Strength Left Leg: Fair Strength against Gravity but No Resistance  oEENT (WDL):  WDL Except    Cardio/Pulmonary Assessment  Edema  o   Respiratory Breath Sounds  oRUL Breath Sounds: Clear  RML Breath Sounds: Clear  RLL Breath Sounds: Clear  JEWEL Breath Sounds: Clear  LLL Breath Sounds: Clear  Cardiac Assessment  oCardiac (WDL):  Within Defined Limits

## 2023-08-03 NOTE — THERAPY
Physical Therapy   Daily Treatment     Patient Name: Ariana Drake  Age:  73 y.o., Sex:  female  Medical Record #: [id number]  Today's Date: 8/3/2023     Precautions  Precautions: Fall Risk, Spinal / Back Precautions   Comments: monitor BP- orthostatic    Subjective    Ariana was supine in bed and agreeable to therapy at beginning of session.      Objective       08/03/23 1031 08/03/23 1032 08/03/23 1033   Vitals   Pulse 85 86 94   Patient BP Position Supine Sitting Standing 1 minute  (After 100' walk)   Blood Pressure  133/60  (MAP 85) 115/70 115/71   Pulse Oximetry 92 % 93 %  --       08/03/23 1034   Vitals   Pulse 78   Patient BP Position Sitting  (after 10MWT)   Blood Pressure  98/72   Pulse Oximetry  --       08/03/23 1031   Gait Functional Level of Assist    Gait Level Of Assist Contact Guard Assist   Assistive Device Front Wheel Walker   Distance (Feet) 200  (100x1 (10MWT); 100x1 room to gym)   # of Times Distance was Traveled 1   Deviation Step To;Bradykinetic;Decreased Heel Strike;Decreased Toe Off   Transfer Functional Level of Assist   Bed, Chair, Wheelchair Transfer Minimal Assist   Bed Chair Wheelchair Transfer Description Increased time;Initial preparation for task;Adaptive equipment;Set-up of equipment;Verbal cueing  (FWW)   Bed Mobility    Sit to Supine Moderate Assist   Sit to Stand Minimal Assist  (tends to push c/ BUE on FWW to stand, requires VC for safety)   10 Meter Walk Test   Normal - Trial 1 25 seconds   Normal - Trial 2 22 seconds   Normal - Trial 3 23 seconds   Normal Average Time 23.33 seconds   Normal Average Velocity (m/s) 0.26   Interdisciplinary Plan of Care Collaboration   Patient Position at End of Therapy Seated;Chair Alarm On;Call Light within Reach;Phone within Reach        Moderate sway when therapist dons/doffs BP cuff in standing c/ FWW; able to self correct  Wheelchair follow c/ 10MWT d/t orthostatic hypotension; symptomatic c/ nausea     Assessment  Ariana presents c/ impaired  bed mobility, balance deficits as demonstrated via LOB in static stance c/ minor perturbations, and slow gait speed d/t deconditioning due to decreased activity levels prior to admission. She also demonstrated a trend indicative of orthostatic hypotension during this session and requires continued monitoring of vitals.    Strengths: Able to follow instructions, Alert and oriented, Effective communication skills, Independent prior level of function, Motivated for self care and independence, Willingly participates in therapeutic activities  Barriers: Bladder retention, Generalized weakness, Hearing impairment, Orthostatic hypotension, Impaired activity tolerance, Impaired balance, Limited mobility, Pain, Pain poorly managed    Plan  Gait training c/ FWW and emphasis on large amplitude stepping, sufficient heel strike/toe off  LE strengthening c/ shuttle, mini squats, step ups, hip flexion c/ theraband, standing marches  Curb training c/ FWW  Dynamic balance activities via weight shifting, 4 way hip target taps, UE cone stacking c/ small WANDER  Core stability c/ block balance on head, large therapy ball sitting and reaching, postural holds c/ alternating perturbations    Passport items to be completed:  Get in/out of bed safely, in/out of a vehicle, safely use mobility device, walk or wheel around home/community, navigate up and down stairs, show how to get up/down from the ground, ensure home is accessible, demonstrate HEP, complete caregiver training    Physical Therapy Problems (Active)       Problem: Mobility       Dates: Start:  08/03/23         Goal: STG-Within one week, patient will ambulate up/down a curb at [facility] or better c/ LRAD       Dates: Start:  08/03/23               Problem: Mobility Transfers       Dates: Start:  08/03/23         Goal: STG-Within one week, patient will transfer supine<>sit at [facility] w/o VCs        Dates: Start:  08/03/23            Goal: STG-Within one week, patient will roll to the L and R  at [facility] w/o VCs       Dates: Start:  08/03/23            Goal: STG-Within one week, patient will sit to stand c/ LRAD at A or better       Dates: Start:  08/03/23               Problem: PT-Long Term Goals       Dates: Start:  08/03/23         Goal: LTG-By discharge, patient will ambulate 50ft c/ SPC at [facility]       Dates: Start:  08/03/23            Goal: LTG-By discharge, patient will perform home exercise program for LE strengthening       Dates: Start:  08/03/23            Goal: LTG-By discharge[facility]       Dates: Start:  08/03/23

## 2023-08-03 NOTE — FLOWSHEET NOTE
"   08/02/23 1704   Incentive Spirometry Treatment   Height 1.57 m (5' 1.81\")   Predicted Inspiratory Capacity 1650   60% of predicted IS capacity 990 mL   40% of predicted IS capacity 660 mL   Incentive Spirometer Volume 1000 mL   Incentive Spirometer Next Change Date 08/30/23       "

## 2023-08-03 NOTE — THERAPY
Physical Therapy   Initial Evaluation     Patient Name: Ariana Drake  Age:  73 y.o., Sex:  female  Medical Record #: [id number]  Today's Date: 8/3/2023     Subjective    Ariana was in bed at start of session, agreeable to eval. She notes LBP during bed mobility and transfers which decreases during gait and increases after prolonged periods of sitting. She also mentioned urinary retention/odor since being in the hospital; and decreased activity levels over the years d/t LBP. She enjoys knitting/crocheting and gardening, and has the ultimate goal of being able to walk down the aisle for her wedding (date not set) c/ a SPC.      Objective       08/03/23 0901   Prior Living Situation   Prior Services Home-Independent   Housing / Facility 1 Story House  (in  and older community)   Steps Into Home 0   Steps In Home 0   Elevator No   Bathroom Set up Walk In Shower;Grab Bars;Shower Chair   Equipment Owned 4-Wheel Walker  (used intermittently d/t back pain)   Lives with - Patient's Self Care Capacity Alone and Able to Care For Self   Comments 52 year old daughter arriving 8/8 from [place] to assist as needed   Prior Level of Functional Mobility   Bed Mobility Independent   Transfer Status Independent   Ambulation Independent   Distance Ambulation (Feet) 150  (household ambulator)   Assistive Devices Used None   Stairs Independent   Prior Functioning: Everyday Activities   Self Care Independent   Indoor Mobility (Ambulation) Independent   Stairs Independent   Functional Cognition Independent   Prior Device Use None of the given options   Vitals   Pulse 97   Patient BP Position Sitting   Blood Pressure  104/60   Pulse Oximetry 93 %   Pain 0 - 10 Group   Location Back   Location Orientation Lower   Comfort Goal Comfort with Movement;Perform Activity   Therapist Pain Assessment During Activity;Post Activity Pain Same as Prior to Activity   Active ROM Lower Body    Active ROM Lower Body  WDL   Strength Lower Body   Lower  Body Strength  X   Rt Hip Flexion Strength 3- (F-)   Rt Knee Extension Strength 3 (F)   Lt Hip Flexion Strength 3- (F-)   Lt Knee Extension Strength 4- (G-)   Sensation Lower Body   Lower Extremity Sensation   WDL   Lower Body Muscle Tone   Lower Body Muscle Tone  WDL   Balance Assessment   Sitting Balance (Static) Fair   Sitting Balance (Dynamic) Poor +   Standing Balance (Static) Fair   Standing Balance (Dynamic) Poor   Weight Shift Sitting Good   Weight Shift Standing Fair   Bed Mobility    Supine to Sit Maximal Assist  (VCs, manual assist from sidelying>sit at LEs and trunk)   Sit to Supine Maximal Assist  (VCs, manual assist from sidelying>supine at LEs and trunk)   Sit to Stand Maximal Assist  (LBP)   Scooting Moderate Assist   Rolling Maximal Assist to Rt.;Maximum Assist to Lt.   Roll Left and Right   Assistance Needed Physical assistance   Physical Assistance Level 51%-75%   CARE Score - Roll Left and Right 2   Roll Left and Right Discharge Goal   Discharge Goal 6   Sit to Lying   Assistance Needed Physical assistance   Physical Assistance Level 76% or more   CARE Score - Sit to Lying 2   Sit to Lying Discharge Goal   Discharge Goal 6   Lying to Sitting on Side of Bed   Assistance Needed Physical assistance   Physical Assistance Level 76% or more   CARE Score - Lying to Sitting on Side of Bed 2   Lying to Sitting on Side of Bed Discharge Goal   Discharge Goal 6   Sit to Stand   Assistance Needed Physical assistance   Physical Assistance Level 76% or more   CARE Score - Sit to Stand 2   Sit to Stand Discharge Goal   Discharge Goal 6   Chair/Bed-to-Chair Transfer   Assistance Needed Physical assistance   Physical Assistance Level Total assistance   CARE Score - Chair/Bed-to-Chair Transfer 1   Chair/Bed-to-Chair Transfer Discharge Goal   Discharge Goal 6   Toilet Transfer   Assistance Needed Physical assistance   Physical Assistance Level Total assistance   CARE Score - Toilet Transfer 1   Toilet Transfer  Discharge Goal   Discharge Goal 6   Car Transfer   Reason if not Attempted Environmental limitations   CARE Score - Car Transfer 10   Car Transfer Discharge Goal   Discharge Goal 4   Walk 10 Feet   Assistance Needed Physical assistance   Physical Assistance Level 25% or less   CARE Score - Walk 10 Feet 3   Walk 10 Feet Discharge Goal   Discharge Goal 6   Walk 50 Feet with Two Turns   Reason if not Attempted Safety concerns   CARE Score - Walk 50 Feet with Two Turns 88   Walk 50 Feet with Two Turns Discharge Goal   Discharge Goal 6   Walk 150 Feet   Reason if not Attempted Safety concerns   CARE Score - Walk 150 Feet 88   Walk 150 Feet Discharge Goal   Discharge Goal 6   Walking 10 Feet on Uneven Surfaces   Reason if not Attempted Safety concerns   CARE Score - Walking 10 Feet on Uneven Surfaces 88   Walking 10 Feet on Uneven Surfaces Discharge Goal   Discharge Goal 6   1 Step (Curb)   Reason if not Attempted Safety concerns   CARE Score - 1 Step (Curb) 88   1 Step (Curb) Discharge Goal   Discharge Goal 6   4 Steps   Reason if not Attempted Safety concerns   CARE Score - 4 Steps 88   4 Steps Discharge Goal   Discharge Goal 9   12 Steps   Reason if not Attempted Safety concerns   CARE Score - 12 Steps 88   12 Steps Discharge Goal   Discharge Goal 9   Picking Up Object   Reason if not Attempted Safety concerns   CARE Score - Picking Up Object 88   Picking Up Object Discharge Goal   Discharge Goal 6   Wheel 50 Feet with Two Turns   Reason if not Attempted Activity not applicable   CARE Score - Wheel 50 Feet with Two Turns 9   Wheel 50 Feet with Two Turns Discharge Goal   Discharge Goal 9   Wheel 150 Feet   Reason if not Attempted Activity not applicable   CARE Score - Wheel 150 Feet 9   Wheel 150 Feet Discharge Goal   Discharge Goal 9   Gait Functional Level of Assist    Gait Level Of Assist Contact Guard Assist   Assistive Device Front Wheel Walker   Distance (Feet) 30   # of Times Distance was Traveled 1   Deviation  Step To;Bradykinetic;Decreased Heel Strike;Decreased Toe Off   Transfer Functional Level of Assist   Bed, Chair, Wheelchair Transfer Total Assist   Bed Chair Wheelchair Transfer Description Squat pivot transfer to wheelchair;Supervision for safety;Verbal cueing;Initial preparation for task   Toilet Transfers Total Assist  (2 people; 1 MAXA sit to stand, 1 assist c/ donning pants)   Toilet Transfer Description Grab bar;Initial preparation for task;Supervision for safety;Set-up of equipment;Verbal cueing   Problem List    Problems Pain;Impaired Bed Mobility;Impaired Transfers;Functional Strength Deficit;Functional ROM Deficit;Impaired Balance;Limited Knowledge of Post-Op Precautions;Decreased Activity Tolerance;Motor Planning / Sequencing   Precautions   Precautions Fall Risk;Spinal / Back Precautions    Comments LBP   Interdisciplinary Plan of Care Collaboration   IDT Collaboration with  Physician   Patient Position at End of Therapy Bed Alarm On;In Bed;Call Light within Reach;Tray Table within Reach;Phone within Reach   Collaboration Comments CLOF, request for UA   Strengths & Barriers   Strengths Able to follow instructions;Alert and oriented;Effective communication skills;Independent prior level of function;Motivated for self care and independence;Willingly participates in therapeutic activities   Barriers Bladder retention;Generalized weakness;Hearing impairment;Orthostatic hypotension;Impaired activity tolerance;Impaired balance;Limited mobility;Pain;Pain poorly managed   Symptoms of nausea c/ ambulation and motion sensitivity while being propelled in wheelchair. See 1030 note for detailed BP assessment      Patient education: reviewed PT plan of care, rehab expectations, mobility needs and patient passport, orientation to unit, role of PT, fall risk and use of call light. Also discussed lumbar fusion surgery c/ visual aides and spinal precautions.     Assessment    The patient is a 73 y.o. female admitted to  "[facility] inpatient rehabilitation 8/2/2023 with severe functional debility after an elective L4-L5 fusion on 7/31/23. She has a history of back surgeries; a right-sided L4-5 transpedicular excision of lateral disc on 1/19/2023 and an L1-L4 fusion which had been completed years ago. She presents c/ impaired bed mobility, orthostatic hypotension, LBP, and BLE weakness and requires skilled physical therapy intervention to address the aforementioned impairments.    Patient's PMH includes:  Past Medical History:   Diagnosis Date    Arthritis     Asthma     Bowel habit changes     diarrhea r/t \"nerves\"    Heart burn     High cholesterol     Hypertension     Other acute back pain          PT evaluation performed today; functional performance at today's assessment is as above.     Additional factors influencing patient status and prognosis include: prior level of function, the above comorbidities, pain, generalized LE weakness, impulsivity, and lack of social support.     The patient is performing well below their baseline level of function and will benefit from an interdisciplinary high intensity rehabilitation program to maximize functional independence, decrease burden of care, and support a safe return to home with HHPT support.     Plan  Recommend Physical Therapy  minutes per day 5-6 days per week for 10-14 days for the following treatments:  PT Group Therapy, PT E Stim Attended, PT Orthotics Training, PT Gait Training, PT Self Care/Home Eval, PT Therapeutic Exercises, PT TENS Application, PT Neuro Re-Ed/Balance, PT Therapeutic Activity, PT Manual Therapy, PT Wound Therapy, and PT Evaluation.    Passport items to be completed:  Get in/out of bed safely, in/out of a vehicle, safely use mobility device, walk or wheel around home/community, navigate up and down stairs, show how to get up/down from the ground, ensure home is accessible, demonstrate HEP, complete caregiver training    Goals:  Long term and short " term goals have been discussed with patient and they are in agreement.     Physical Therapy Problems (Active)       Problem: Mobility       Dates: Start:  08/03/23         Goal: STG-Within one week, patient will ambulate up/down a curb at [facility] or better c/ LRAD       Dates: Start:  08/03/23               Problem: Mobility Transfers       Dates: Start:  08/03/23         Goal: STG-Within one week, patient will transfer supine<>sit via logroll at [facility] w/o VCs        Dates: Start:  08/03/23            Goal: STG-Within one week, patient will logroll to the L and R at [facility] w/o VCs       Dates: Start:  08/03/23            Goal: STG-Within one week, patient will sit to stand c/ LRAD at [facility] or better       Dates: Start:  08/03/23               Problem: PT-Long Term Goals       Dates: Start:  08/03/23         Goal: LTG-By discharge, patient will ambulate 50ft c/ SPC at [facility]       Dates: Start:  08/03/23            Goal: LTG-By discharge, patient will perform home exercise program for LE strengthening       Dates: Start:  08/03/23            Goal: LTG-By discharge, patient will transfer in/out of a car at [facility] or better c/ LRAD       Dates: Start:  08/03/23

## 2023-08-03 NOTE — FLOWSHEET NOTE
08/02/23 1659   Protocol Assessment   Initial Assessment Yes   Patient History   Pulmonary Diagnosis None   Procedures Relevant to Respiratory Status None   Home O2 No   Nocturnal CPAP No   Home Treatments/Frequency No   Sleep Apnea Screening   Have you had a sleep study? No   Have you been diagnosed with sleep apnea? No   S - Have you been told that you SNORE? 1   T - Are you often TIRED during the day? 1   O - Do you know if you stop breathing or has anyone witnessed you stop breathing while you were asleep? (OBSTRUCTION) 0   P - Do you have high blood PRESSURE or on medication to control high blood pressure? 1   B - Is your Body Mass Index greater than 35? (BMI) 0   A - Are you 50 years old or older? (AGE) 1   N - Are you a male with a NECK circumference greater than 17 inches, or a female with a neck circumference greater than 16 inches? 0   G - Are you male? (GENDER) 0   Stop Bang Total Score 4   COPD Risk Screening   Do you have a history of COPD? No   COPD Population Screener   During the past 4 weeks, how much did you feel short of breath? 0   Do you ever cough up any mucus or phlegm? 0   In the past 12 months, you do less than you used to because of your breathing problems 0   Have you smoked at least 100 cigarettes in your entire life? 0   How old are you? 2   COPD Screening Score 2   COPD Coordinator Not Recommended Yes   Protocol Pathways   Protocol Pathways None

## 2023-08-03 NOTE — PROGRESS NOTES
"Rehab Progress Note     Encounter Date: 8/3/2023    CC: Urinary frequency and suprapubic discomfort    Interval Events (Subjective)  Vital signs stable  Last BM 7/31  Voiding volitionally low PVRs    Patient seen and examined in her room working with therapy.  She states she is doing well other than some suprapubic pain and feeling a little nauseous and dizzy.    14 point ROS reviewed and negative except as stated above.     Objective:  VITAL SIGNS: /63   Pulse 86   Temp 38 °C (100.4 °F) (Oral)   Resp 17   Ht 1.57 m (5' 1.81\")   Wt 58.3 kg (128 lb 8.5 oz)   SpO2 92%   BMI 23.65 kg/m²     GEN: No apparent distress  HEENT: Head normocephalic, atraumatic.  Sclera nonicteric bilaterally, no ocular discharge appreciated bilaterally.  CV: Extremities warm and well-perfused, no peripheral edema appreciated bilaterally.  PULMONARY: Breathing nonlabored on room air, no respiratory accessory muscle use.  Not requiring supplemental oxygen.  ABD: Soft, nontender.  SKIN: No appreciable skin breakdown on exposed areas of skin.  NEURO: Awake alert.  Conversational.  Logical thought content.  PSYCH: Mood and affect within normal limits.    Recent Results (from the past 72 hour(s))   Basic Metabolic Panel (BMP)    Collection Time: 07/31/23 11:56 PM   Result Value Ref Range    Sodium 138 135 - 145 mmol/L    Potassium 4.0 3.6 - 5.5 mmol/L    Chloride 103 96 - 112 mmol/L    Co2 26 20 - 33 mmol/L    Glucose 150 (H) 65 - 99 mg/dL    Bun 19 8 - 22 mg/dL    Creatinine 0.89 0.50 - 1.40 mg/dL    Calcium 8.4 (L) 8.5 - 10.5 mg/dL    Anion Gap 9.0 7.0 - 16.0   CBC without Differential    Collection Time: 07/31/23 11:56 PM   Result Value Ref Range    WBC 11.6 (H) 4.8 - 10.8 K/uL    RBC 3.92 (L) 4.20 - 5.40 M/uL    Hemoglobin 12.0 12.0 - 16.0 g/dL    Hematocrit 35.3 (L) 37.0 - 47.0 %    MCV 90.1 81.4 - 97.8 fL    MCH 30.6 27.0 - 33.0 pg    MCHC 34.0 32.2 - 35.5 g/dL    RDW 42.5 35.9 - 50.0 fL    Platelet Count 255 164 - 446 K/uL    " MPV 9.7 9.0 - 12.9 fL   ESTIMATED GFR    Collection Time: 07/31/23 11:56 PM   Result Value Ref Range    GFR (CKD-EPI) 68 >60 mL/min/1.73 m 2   Basic Metabolic Panel (BMP)    Collection Time: 08/02/23  5:11 AM   Result Value Ref Range    Sodium 133 (L) 135 - 145 mmol/L    Potassium 3.2 (L) 3.6 - 5.5 mmol/L    Chloride 97 96 - 112 mmol/L    Co2 26 20 - 33 mmol/L    Glucose 131 (H) 65 - 99 mg/dL    Bun 10 8 - 22 mg/dL    Creatinine 0.73 0.50 - 1.40 mg/dL    Calcium 8.8 8.5 - 10.5 mg/dL    Anion Gap 10.0 7.0 - 16.0   CBC without Differential    Collection Time: 08/02/23  5:11 AM   Result Value Ref Range    WBC 10.3 4.8 - 10.8 K/uL    RBC 3.72 (L) 4.20 - 5.40 M/uL    Hemoglobin 11.2 (L) 12.0 - 16.0 g/dL    Hematocrit 33.1 (L) 37.0 - 47.0 %    MCV 89.0 81.4 - 97.8 fL    MCH 30.1 27.0 - 33.0 pg    MCHC 33.8 32.2 - 35.5 g/dL    RDW 42.4 35.9 - 50.0 fL    Platelet Count 212 164 - 446 K/uL    MPV 9.7 9.0 - 12.9 fL   ESTIMATED GFR    Collection Time: 08/02/23  5:11 AM   Result Value Ref Range    GFR (CKD-EPI) 86 >60 mL/min/1.73 m 2   CBC with Differential    Collection Time: 08/03/23  5:23 AM   Result Value Ref Range    WBC 9.2 4.8 - 10.8 K/uL    RBC 3.88 (L) 4.20 - 5.40 M/uL    Hemoglobin 11.7 (L) 12.0 - 16.0 g/dL    Hematocrit 35.6 (L) 37.0 - 47.0 %    MCV 91.8 81.4 - 97.8 fL    MCH 30.2 27.0 - 33.0 pg    MCHC 32.9 32.2 - 35.5 g/dL    RDW 44.3 35.9 - 50.0 fL    Platelet Count 201 164 - 446 K/uL    MPV 10.2 9.0 - 12.9 fL    Neutrophils-Polys 69.40 44.00 - 72.00 %    Lymphocytes 18.00 (L) 22.00 - 41.00 %    Monocytes 9.20 0.00 - 13.40 %    Eosinophils 2.50 0.00 - 6.90 %    Basophils 0.50 0.00 - 1.80 %    Immature Granulocytes 0.40 0.00 - 0.90 %    Nucleated RBC 0.00 0.00 - 0.20 /100 WBC    Neutrophils (Absolute) 6.37 1.82 - 7.42 K/uL    Lymphs (Absolute) 1.65 1.00 - 4.80 K/uL    Monos (Absolute) 0.84 0.00 - 0.85 K/uL    Eos (Absolute) 0.23 0.00 - 0.51 K/uL    Baso (Absolute) 0.05 0.00 - 0.12 K/uL    Immature Granulocytes (abs)  0.04 0.00 - 0.11 K/uL    NRBC (Absolute) 0.00 K/uL   Comp Metabolic Panel (CMP)    Collection Time: 08/03/23  5:23 AM   Result Value Ref Range    Sodium 136 135 - 145 mmol/L    Potassium 3.1 (L) 3.6 - 5.5 mmol/L    Chloride 97 96 - 112 mmol/L    Co2 28 20 - 33 mmol/L    Anion Gap 11.0 7.0 - 16.0    Glucose 111 (H) 65 - 99 mg/dL    Bun 11 8 - 22 mg/dL    Creatinine 0.68 0.50 - 1.40 mg/dL    Calcium 9.0 8.5 - 10.5 mg/dL    Correct Calcium 9.5 8.5 - 10.5 mg/dL    AST(SGOT) 21 12 - 45 U/L    ALT(SGPT) 13 2 - 50 U/L    Alkaline Phosphatase 60 30 - 99 U/L    Total Bilirubin 0.7 0.1 - 1.5 mg/dL    Albumin 3.4 3.2 - 4.9 g/dL    Total Protein 6.4 6.0 - 8.2 g/dL    Globulin 3.0 1.9 - 3.5 g/dL    A-G Ratio 1.1 g/dL   Magnesium    Collection Time: 08/03/23  5:23 AM   Result Value Ref Range    Magnesium 1.8 1.5 - 2.5 mg/dL   Phosphorus    Collection Time: 08/03/23  5:23 AM   Result Value Ref Range    Phosphorus 1.9 (L) 2.5 - 4.5 mg/dL   TSH with Reflex to FT4    Collection Time: 08/03/23  5:23 AM   Result Value Ref Range    TSH 2.890 0.380 - 5.330 uIU/mL   ESTIMATED GFR    Collection Time: 08/03/23  5:23 AM   Result Value Ref Range    GFR (CKD-EPI) 91 >60 mL/min/1.73 m 2       Current Facility-Administered Medications   Medication Frequency    gabapentin (Neurontin) capsule 300 mg TID    hydroCHLOROthiazide (Hydrodiuril) tablet 25 mg QAM    omeprazole (PriLOSEC) capsule 40 mg QAM    Respiratory Therapy Consult Continuous RT    Pharmacy Consult Request ...Pain Management Review 1 Each PHARMACY TO DOSE    oxyCODONE immediate-release (Roxicodone) tablet 5 mg Q4HRS PRN    oxyCODONE immediate release (Roxicodone) tablet 10 mg Q4HRS PRN    hydrALAZINE (Apresoline) tablet 25 mg Q8HRS PRN    senna-docusate (Pericolace Or Senokot S) 8.6-50 MG per tablet 2 Tablet BID    And    polyethylene glycol/lytes (Miralax) PACKET 1 Packet QDAY PRN    And    magnesium hydroxide (Milk Of Magnesia) suspension 30 mL QDAY PRN    And    bisacodyl  (Dulcolax) suppository 10 mg QDAY PRN    carboxymethylcellulose (Refresh Tears) 0.5 % ophthalmic drops 1 Drop PRN    benzocaine-menthol (Cepacol) lozenge 1 Lozenge Q2HRS PRN    mag hydrox-al hydrox-simeth (Maalox Plus Es Or Mylanta Ds) suspension 20 mL Q2HRS PRN    ondansetron (Zofran ODT) dispertab 4 mg 4X/DAY PRN    Or    ondansetron (Zofran) syringe/vial injection 4 mg 4X/DAY PRN    traZODone (Desyrel) tablet 50 mg QHS PRN    sodium chloride (Ocean) 0.65 % nasal spray 2 Spray PRN    acetaminophen (Tylenol) tablet 500 mg Q6HRS PRN    atorvastatin (Lipitor) tablet 10 mg Q EVENING       Orders Placed This Encounter   Procedures    Diet Order Diet: Regular     Standing Status:   Standing     Number of Occurrences:   1     Order Specific Question:   Diet:     Answer:   Regular [1]       Assessment:  Active Hospital Problems    Diagnosis     *S/P lumbar fusion     Hyponatremia     Hypertension     Dyslipidemia     GERD (gastroesophageal reflux disease)        Medical Decision Making and Plan:  Recurrent right L4 radiculopathy secondary to neuroforaminal stenosis s/p bilateral L4-5 partial laminectomy, right-sided total facetectomy, decompression of bilateral L5 and right L4 nerve roots with L4-5 fusion 7/31/2023 Dr. Cottrell  -PT and OT for mobility and ADLs. Per guidelines, 15 hours per week between PT, OT and/or SLP.  -Follow-up neurosurgery     Hypertension-continue hydrochlorothiazide 25 mg daily -blood pressure stable, continue hydrochlorothiazide, though patient slightly dizzy, could be UTI, monitor BP. 8/3 more orthostatic with therapy. Reduce HCTZ to 12.5mg daily.      Hyperlipidemia-continue Lipitor 10 mg     Pain -Tylenol, gabapentin and oxycodone     Hyponatremia -Labs 8/3 -sodium normal at 136.     Hypokalemia -Labs 8/3 low at 3.1 -supplement x1 8/3.  Recheck BMP 8/7    Hypophosphatemia -start K-Phos x4 days.  Recheck phosphorus 8/7     Anemia -Labs 8/3 -improved.    Dysuria, suprapubic pain -check urinalysis  8/3     Skin - Patient at risk for skin breakdown due to debility in areas including sacrum, achilles, elbows and head in addition to other sites. Nursing to assess skin daily.      GI Ppx - Patient on Prilosec for GERD prophylaxis. Patient on Senna-docusate for constipation prophylaxis.      DVT Ppx -not on chemoprophylaxis.  Encourage out of bed.       Gabby Alvarez D.O.

## 2023-08-03 NOTE — IDT DISCHARGE PLANNING
CASE MANAGEMENT INITIAL ASSESSMENT    Admit Date:  8/2/2023     CM reviewed the medical chart and will meet with the patient to discuss role of case management / discharge planning / team conference.       Diagnosis: S/P lumbar fusion [Z98.1]    Co-morbidities:   Patient Active Problem List    Diagnosis Date Noted    S/P lumbar fusion 08/02/2023    Acute right lumbar radiculopathy 02/07/2023    Hyponatremia 02/07/2023    Status post lumbar surgery 02/02/2023    Hypertension 02/02/2023    Dyslipidemia 02/02/2023    GERD (gastroesophageal reflux disease) 02/02/2023    Lumbar stenosis without neurogenic claudication 12/20/2021     Prior Living Situation:   Alone and able to care for herself.     Prior Level of Function:   Independent.    Support Systems:  Primary : Kei Everett  Other support systems: Gabby Drake (dtr)       Previous Services Utilized:        Other Information:        Primary Payor Source: Medicare A, Medicare B  Primary Care Practitioner : Felton Waldrop    Patient / Family Goal:  Patient / Family Goal: Return home.    Plan:  1. Continue to follow patient through hospitalization and provide discharge planning in collaboration with patient, family, physicians and ancillary services.     2. Utilize community resources to ensure a safe discharge.

## 2023-08-03 NOTE — THERAPY
"Occupational Therapy   Initial Evaluation     Patient Name: Ariana Drake  Age:  73 y.o., Sex:  female  Medical Record #: [id number]  Today's Date: 8/3/2023     Subjective    \"I just feel really tired.\" Pt reported throughout session- noted low BP at start of session but remained stable.      Objective       08/03/23 1231   OT Charge Group   Charges Yes   OT Self Care / ADL (Units) 2   OT Therapy Activity (Units) 1   OT Evaluation OT Evaluation Low   OT Total Time Spent   OT Individual Total Time Spent (Mins) 90   Prior Living Situation   Prior Services Home-Independent   Housing / Facility 1 Story House   Steps Into Home 0   Steps In Home 0   Elevator No   Bathroom Set up Walk In Shower;Grab Bars;Shower Chair   Equipment Owned 4-Wheel Walker;Front-Wheel Walker;Quad Cane;Tub / Shower Seat;Grab Bar(s) In Tub / Shower;Hand Held Shower  (bed rail)   Lives with - Patient's Self Care Capacity Alone and Able to Care For Self   Comments 54 yo daughter arriving 8/9 to assist for 2 weeks as needed and can stay longer if needed   Prior Level of ADL Function   Self Feeding Independent   Grooming / Hygiene Independent   Bathing Independent   Dressing Independent   Toileting Independent   Prior Level of IADL Function   Medication Management Independent   Laundry Independent   Kitchen Mobility Independent   Finances Independent   Home Management Independent   Shopping Independent  (uses [employer] delivery services)   Prior Level Of Mobility Independent With Device in Community;Independent With Device in Home;Independent Without Device in Home   Driving / Transportation Driving Independent   Occupation (Pre-Hospital Vocational) Retired Due To Age   Leisure Interests Crafts;Other (Comments)  (sewing, jonathan, needle work)   Prior Functioning: Everyday Activities   Self Care Independent   Indoor Mobility (Ambulation) Independent   Stairs Independent   Functional Cognition Independent   Prior Device Use Walker;None of the given " "options  (at home does not use walker, except at night to the bathroom; uses 4WW in community)   Vitals   Pulse 89   Patient BP Position Supine   Blood Pressure  106/56   O2 Delivery Device None - Room Air   Pain   Intervention Distraction;Emotional Support;Shower   Pain 0 - 10 Group   Location Back   Location Orientation Mid;Lower   Pain Rating Scale (NPRS) 4   Description Burning   Comfort Goal Comfort with Movement;Perform Activity;Sleep Comfortably   Therapist Pain Assessment During Activity   Cognition    Orientation Level Oriented x 4   Level of Consciousness Alert   Ability To Follow Commands 2 Step   Safety Awareness Impaired   Attention Impaired   ABS (Agitated Behavior Scale)   Agitated Behavior Scale Performed No   Cognitive Pattern Assessment   Cognitive Pattern Assessment Used BIMS   Brief Interview for Mental Status (BIMS)   Repetition of Three Words (First Attempt) 3   Temporal Orientation: Year Correct   Temporal Orientation: Month Accurate within 5 days   Temporal Orientation: Day Incorrect  (stated wednesday when it is thursday)   Recall: \"Sock\" No, could not recall   Recall: \"Blue\" Yes, no cue required   Recall: \"Bed\" Yes, after cueing (\"a piece of furniture\")   BIMS Summary Score 11   Confusion Assessment Method (CAM)   Is there evidence of an acute change in mental status from the patient's baseline? Yes   Inattention Behavior present, fluctuates (comes and goes, changes in severity)   Disorganized thinking Behavior present, fluctuates (comes and goes, changes in severity)   Altered level of consciousness Behavior not present   Vision Screen   Vision Not tested  (no reports of double/blurry vision- wears glasses)   Passive ROM Upper Body   Passive ROM Upper Body WDL   Active ROM Upper Body   Active ROM Upper Body  WDL   Dominant Hand Right   Strength Upper Body   Upper Body Strength  Generalized weakness in BUE   Sensation Upper Body   Upper Extremity Sensation  WDL   Upper Body Muscle Tone "   Upper Body Muscle Tone  WDL   Balance Assessment   Sitting Balance (Static) Fair   Sitting Balance (Dynamic) Fair -   Standing Balance (Static) Fair -   Standing Balance (Dynamic) Poor   Bed Mobility    Supine to Sit Minimal Assist   Sit to Supine Minimal Assist   Sit to Stand Minimal Assist   Scooting Contact Guard Assist   Rolling Contact Guard Assist   Coordination Upper Body   Coordination WDL   Eating   Assistance Needed Independent   CARE Score - Eating 6   Eating Discharge Goal   Discharge Goal 6   Oral Hygiene   Assistance Needed Set-up / clean-up   CARE Score - Oral Hygiene 5   Oral Hygiene Discharge Goal   Discharge Goal 6   Shower/Bathe Self   Assistance Needed Physical assistance   Physical Assistance Level 25% or less   CARE Score - Shower/Bathe Self 3   Shower/Bathe Self Discharge Goal   Discharge Goal 6   Upper Body Dressing   Assistance Needed Incidental touching   CARE Score - Upper Body Dressing 4   Upper Body Dressing Discharge Goal   Discharge Goal 6   Lower Body Dressing   Assistance Needed Physical assistance   Physical Assistance Level 51%-75%   CARE Score - Lower Body Dressing 2   Lower Body Dressing Discharge Goal   Discharge Goal 6   Putting On/Taking Off Footwear   Assistance Needed Physical assistance   Physical Assistance Level 51%-75%   CARE Score - Putting On/Taking Off Footwear 2   Putting On/Taking Off Footwear Discharge Goal   Discharge Goal 6   Toileting Hygiene   Assistance Needed Physical assistance   Physical Assistance Level 25% or less   CARE Score - Toileting Hygiene 3   Toileting Hygiene Discharge Goal   Discharge Goal 6   Toilet Transfer   Assistance Needed Physical assistance   Physical Assistance Level 51%-75%   CARE Score - Toilet Transfer 2   Toilet Transfer Discharge Goal   Discharge Goal 6   Hearing, Speech, and Vision   Ability to Hear Minimal difficulty   Ability to See in Adequate Light Adequate   Expression of Ideas and Wants Without difficulty   Understanding  Verbal and Non-Verbal Content Understands   Functional Level of Assist   Eating Modified Independent   Grooming Supervision   Grooming Description Increased time;Initial preparation for task;Seated in wheelchair at sink;Set-up of equipment  (brush teeth)   Bathing Minimal Assist   Bathing Description Hand held shower;Grab bar;Tub bench;Assit with perineal;Increased time;Initial preparation for task;Set-up of equipment;Supervision for safety;Verbal cueing   Upper Body Dressing Contact Guard Assist   Lower Body Dressing Moderate Assist   Lower Body Dressing Description Grab bar;Increased time;Initial preparation for task;Set-up of equipment;Verbal cueing;Supervision for safety   Toileting Minimal Assist   Toileting Description Grab bar;Assist for standing balance;Increased time;Initial preparation for task;Set-up of equipment;Supervision for safety;Verbal cueing   Bed, Chair, Wheelchair Transfer Moderate Assist   Bed Chair Wheelchair Transfer Description Increased time;Initial preparation for task;Set-up of equipment;Supervision for safety;Verbal cueing   Toilet Transfers Moderate Assist   Toilet Transfer Description Grab bar;Increased time;Initial preparation for task;Set-up of equipment;Supervision for safety;Verbal cueing   Tub / Shower Transfers Moderate Assist   Tub Shower Transfer Description Grab bar;Shower bench;Increased time;Initial preparation for task;Set-up of equipment;Supervision for safety;Verbal cueing   Problem List   Problem List Decreased Active Daily Living Skills;Decreased Homemaking Skills;Decreased Functional Mobility;Decreased Activity Tolerance;Safety Awareness Deficits / Cognition;Impaired Postural Control / Balance   Precautions   Precautions Fall Risk;Spinal / Back Precautions    Comments monitor BP- orthostatic   Current Discharge Plan   Current Discharge Plan Return to Prior Living Situation  (w/ daughter there to assist as needed upon d/c)   Benefit    Therapy Benefit Patient Would  Benefit from Inpatient Rehab Occupational Therapy to Maximize [place] with ADLs, IADLs and Functional Mobility.   Interdisciplinary Plan of Care Collaboration   IDT Collaboration with  Physical Therapist   Patient Position at End of Therapy In Bed;Bed Alarm On;Call Light within Reach;Tray Table within Reach;Phone within Reach   Collaboration Comments CLOF   Strengths & Barriers   Strengths Able to follow instructions;Alert and oriented;Independent prior level of function;Motivated for self care and independence;Pleasant and cooperative;Supportive family;Willingly participates in therapeutic activities   Barriers Decreased endurance;Fatigue;Hearing impairment;Orthostatic hypotension;Impaired activity tolerance;Impaired balance;Pain  (decreased safety awareness/attention)       Assessment  Patient is 73 y.o. female with a diagnosis of lumbar fusion.  Mrs. Drake is postoperative from a right-sided L4-5 transpedicular excision of lateral disc 1/19/2023.  She previously had an L1-L4 fusion which they had done years ago.  After her surgery in January she started to have some bothersome sensations in her right leg.  She was also getting symptoms in the left leg.  Additional surgical intervention was recommended. Patient was admitted for elective surgery 7/31/2023 where she underwent L4-L5 fusion.     Pt lives in single level home, 0 MIGUEL, alone. Pt's fiance lives 4 blocks and 5 houses down from her but can not physically assist. Pt's daughter plans to fly in 8/8 or 8/9 and stay x 2 weeks but can extend if needed. Pt has walk-in shower with grab bars and shower chair. Pt owns 2 quad canes, 1 FWW, and 4WW, bed rail. Pt uses 4 WW when out in the community and at night to go to the bathroom. Pt was independent with all ADL's/IADL's and mobility prior to surgery. Pt gets groceries delivered from [employer]. Pt currently presents with burning sensation during activity in mid-low back (incision site), some STM deficits and  decreased safety awareness, generalized weakness, decreased BP and fatigue. Pt requires increased time and assist with ADL's and functional mobility at this time.     Plan  Recommend Occupational Therapy  minutes per day 5-7 days per week for 2 weeks for the following treatments:  OT Self Care/ADL, OT Cognitive Skill Dev, OT Community Reintegration, OT Neuro Re-Ed/Balance, OT Therapeutic Activity, OT Evaluation, and OT Therapeutic Exercise.    Passport items to be completed:  Perform bathroom transfers, complete dressing, complete feeding, get ready for the day, prepare a simple meal, participate in household tasks, adapt home for safety needs, demonstrate home exercise program, complete caregiver training     Goals:  Long term and short term goals have been discussed with patient and they are in agreement.    Occupational Therapy Goals (Active)       Problem: Dressing       Dates: Start:  08/03/23         Goal: STG-Within one week, patient will dress LB with min a        Dates: Start:  08/03/23               Problem: Functional Transfers       Dates: Start:  08/03/23         Goal: STG-Within one week, patient will transfer to toilet with CGA        Dates: Start:  08/03/23               Problem: OT Long Term Goals       Dates: Start:  08/03/23         Goal: LTG-By discharge, patient will complete basic self care tasks with mod I-supervision        Dates: Start:  08/03/23            Goal: LTG-By discharge, patient will perform bathroom transfers with mod I-supervision        Dates: Start:  08/03/23            Goal: LTG-By discharge, patient will complete basic home management with mod I-supervision        Dates: Start:  08/03/23               Problem: Toileting       Dates: Start:  08/03/23         Goal: STG-Within one week, patient will complete toileting tasks with CGA        Dates: Start:  08/03/23

## 2023-08-03 NOTE — CARE PLAN
The patient is Stable - Low risk of patient condition declining or worsening         Progress made toward(s) clinical / shift goals:    Problem: Pain - Standard  Goal: Alleviation of pain or a reduction in pain to the patient’s comfort goal  Outcome: Progressing. Patient c/o having 7-8/10 low back pain, achy and intermittent. Patient has oxycodone 5-10 mg q4h, states adequate pain relief after medication. Calls appropriately with call light when she is having pain.      Problem: Fall Risk - Rehab  Goal: Patient will remain free from falls  Outcome: Progressing. Patient bed in lowest locked position with bed alarm on and call light within reach. Patient calls appropriately with call light for needs. Patient has not been impulsive over shift.

## 2023-08-03 NOTE — CARE PLAN
Problem: Knowledge Deficit - Standard  Goal: Patient and family/care givers will demonstrate understanding of plan of care, disease process/condition, diagnostic tests and medications  Outcome: Progressing     Problem: Pain - Standard  Goal: Alleviation of pain or a reduction in pain to the patient’s comfort goal  Outcome: Progressing     Problem: Fall Risk - Rehab  Goal: Patient will remain free from falls  Outcome: Progressing  Patient calls for assist with needs/transfers.   The patient is Stable - Low risk of patient condition declining or worsening

## 2023-08-03 NOTE — PROGRESS NOTES
NURSING DAILY NOTE    Name: Ariana Drake   Date of Admission: 8/2/2023   Admitting Diagnosis: S/P lumbar fusion  Attending Physician: Gabby Alvarez D.o.  Allergies: Patient has no known allergies.    Safety  Patient Assist  min-mod  Patient Precautions  Fall Risk, Spinal / Back Precautions   Precaution Comments  monitor BP- orthostatic  Bed Transfer Status  Moderate Assist  Toilet Transfer Status   Moderate Assist  Assistive Devices  Rails, Walker - platform  Oxygen  None - Room Air  Diet/Therapeutic Dining  Current Diet Order   Procedures    Diet Order Diet: Regular     Pill Administration  whole  Agitated Behavioral Scale     ABS Level of Severity       Fall Risk  Has the patient had a fall this admission?   No  Tonya Reed Fall Risk Scoring  13, MODERATE RISK  Fall Risk Safety Measures  bed alarm, chair alarm, and seatbelt alarm    Vitals  Temperature: (!) 38.1 °C (100.5 °F)  Temp src: Oral  Pulse: 87  Respiration: 17  Blood Pressure : 127/61 (post shower/dressing)  Blood Pressure MAP (Calculated): 83 MM HG  BP Location: Left, Upper Arm  Patient BP Position: Sitting     Oxygen  Pulse Oximetry: 93 %  O2 Delivery Device: None - Room Air  Incentive Spirometer Volume: 1000 mL    Bowel and Bladder  Last Bowel Movement  07/31/23 (refused senna hs)  Stool Type     Bowel Device     Continent  Bladder: Stress incontinence   Bowel: Continent movement  Bladder Function  Urine Void (mL):  (Moerate)  Number of Times Voided: 1  Urine Color: Yellow  Genitourinary Assessment   Bladder Assessment (WDL):  Within Defined Limits  Brandt Catheter: Not Applicable  Urine Color: Yellow  Bladder Device: Bathroom  Bladder Scan: Post Void  $ Bladder Scan Results (mL): 132    Skin  Gerry Score   19  Sensory Interventions      Moisture Interventions         Pain  Pain Rating Scale  4 - Distracts me, can do usual activities  Pain Location  Back  Pain Location Orientation  Mid,  Lower  Pain Interventions   Distraction, Emotional Support, Shower    ADLs    Bathing   Shower, * * With Assistance from, Staff  Linen Change      Personal Hygiene     Chlorhexidine Bath      Oral Care     Teeth/Dentures  Intact  Shave     Nutrition Percentage Eaten  Lunch, Between 50-75% Consumed (65)  Environmental Precautions     Patient Turns/Positioning  Patient Turns Self from Side to Side  Patient Turns Assistance/Tolerance     Bed Positions     Head of Bed Elevated         Psychosocial/Neurologic Assessment  Psychosocial Assessment  Psychosocial (WDL):  Within Defined Limits  Neurologic Assessment  Neuro (WDL): Exceptions to WDL  Level of Consciousness: Alert  Orientation Level: Oriented X4  Cognition: Appropriate judgement  Speech: Clear  Pupil Assesment: No  Motor Function/Sensation Assessment: Dorsiflexion, Motor response  R Foot Dorsiflexion: Strong  L Foot Dorsiflexion: Strong  RUE Motor Response: Responds to commands  RUE Sensation: Full sensation  Muscle Strength Right Arm: Good Strength Against Gravity and Moderate Resistance  LUE Motor Response: Responds to commands  LUE Sensation: Full sensation  Muscle Strength Left Arm: Good Strength Against Gravity and Moderate Resistance  RLE Motor Response: Responds to commands  RLE Sensation: Full sensation  Muscle Strength Right Leg: Fair Strength against Gravity but No Resistance  LLE Motor Response: Responds to commands  LLE Sensation: Full sensation  Muscle Strength Left Leg: Fair Strength against Gravity but No Resistance  EENT (WDL):  WDL Except    Cardio/Pulmonary Assessment  Edema      Respiratory Breath Sounds  RUL Breath Sounds: Clear  RML Breath Sounds: Clear  RLL Breath Sounds: Clear  JEWEL Breath Sounds: Clear  LLL Breath Sounds: Clear  Cardiac Assessment   Cardiac (WDL):  Within Defined Limits

## 2023-08-03 NOTE — FLOWSHEET NOTE
08/02/23 1651   Events/Summary/Plan   Events/Summary/Plan RT Assment   Vital Signs   Pulse 86   Respiration 16   Pulse Oximetry 94 %   $ Pulse Oximetry (Spot Check) Yes   Respiratory Assessment   Respiratory Pattern Within Normal Limits   Level of Consciousness Alert   Chest Exam   Work Of Breathing / Effort Within Normal Limits   Breath Sounds   RUL Breath Sounds Clear   RML Breath Sounds Clear   RLL Breath Sounds Clear   JEWEL Breath Sounds Clear   LLL Breath Sounds Clear   Secretions   Cough Strong;Non Productive   Oxygen   O2 Delivery Device None - Room Air   Smoking History   Have you ever smoked Never

## 2023-08-04 ENCOUNTER — APPOINTMENT (OUTPATIENT)
Dept: OCCUPATIONAL THERAPY | Facility: REHABILITATION | Age: 73
DRG: 560 | End: 2023-08-04
Attending: PHYSICAL MEDICINE & REHABILITATION
Payer: MEDICARE

## 2023-08-04 ENCOUNTER — APPOINTMENT (OUTPATIENT)
Dept: PHYSICAL THERAPY | Facility: REHABILITATION | Age: 73
DRG: 560 | End: 2023-08-04
Attending: PHYSICAL MEDICINE & REHABILITATION
Payer: MEDICARE

## 2023-08-04 ENCOUNTER — APPOINTMENT (OUTPATIENT)
Dept: RADIOLOGY | Facility: REHABILITATION | Age: 73
DRG: 560 | End: 2023-08-04
Attending: PHYSICAL MEDICINE & REHABILITATION
Payer: MEDICARE

## 2023-08-04 LAB — 1,25(OH)2D3 SERPL-MCNC: 36 PG/ML (ref 19.9–79.3)

## 2023-08-04 PROCEDURE — 97530 THERAPEUTIC ACTIVITIES: CPT

## 2023-08-04 PROCEDURE — 99232 SBSQ HOSP IP/OBS MODERATE 35: CPT | Performed by: PHYSICAL MEDICINE & REHABILITATION

## 2023-08-04 PROCEDURE — 97110 THERAPEUTIC EXERCISES: CPT

## 2023-08-04 PROCEDURE — 74018 RADEX ABDOMEN 1 VIEW: CPT

## 2023-08-04 PROCEDURE — 700102 HCHG RX REV CODE 250 W/ 637 OVERRIDE(OP): Performed by: PHYSICAL MEDICINE & REHABILITATION

## 2023-08-04 PROCEDURE — 700111 HCHG RX REV CODE 636 W/ 250 OVERRIDE (IP): Performed by: PHYSICAL MEDICINE & REHABILITATION

## 2023-08-04 PROCEDURE — 97535 SELF CARE MNGMENT TRAINING: CPT

## 2023-08-04 PROCEDURE — 770010 HCHG ROOM/CARE - REHAB SEMI PRIVAT*

## 2023-08-04 PROCEDURE — A9270 NON-COVERED ITEM OR SERVICE: HCPCS | Performed by: PHYSICAL MEDICINE & REHABILITATION

## 2023-08-04 RX ORDER — AMOXICILLIN 250 MG
2 CAPSULE ORAL 2 TIMES DAILY PRN
Status: DISCONTINUED | OUTPATIENT
Start: 2023-08-04 | End: 2023-08-12 | Stop reason: HOSPADM

## 2023-08-04 RX ORDER — POLYETHYLENE GLYCOL 3350 17 G/17G
1 POWDER, FOR SOLUTION ORAL
Status: DISCONTINUED | OUTPATIENT
Start: 2023-08-04 | End: 2023-08-12 | Stop reason: HOSPADM

## 2023-08-04 RX ORDER — BISACODYL 10 MG
10 SUPPOSITORY, RECTAL RECTAL
Status: DISCONTINUED | OUTPATIENT
Start: 2023-08-04 | End: 2023-08-12 | Stop reason: HOSPADM

## 2023-08-04 RX ADMIN — ONDANSETRON 4 MG: 4 TABLET, ORALLY DISINTEGRATING ORAL at 08:33

## 2023-08-04 RX ADMIN — GABAPENTIN 300 MG: 300 CAPSULE ORAL at 08:29

## 2023-08-04 RX ADMIN — OMEPRAZOLE 40 MG: 20 CAPSULE, DELAYED RELEASE ORAL at 08:29

## 2023-08-04 RX ADMIN — GABAPENTIN 300 MG: 300 CAPSULE ORAL at 20:09

## 2023-08-04 RX ADMIN — HYDROCHLOROTHIAZIDE 12.5 MG: 25 TABLET ORAL at 08:29

## 2023-08-04 RX ADMIN — DIBASIC SODIUM PHOSPHATE, MONOBASIC POTASSIUM PHOSPHATE AND MONOBASIC SODIUM PHOSPHATE 250 MG: 852; 155; 130 TABLET ORAL at 05:11

## 2023-08-04 RX ADMIN — GABAPENTIN 300 MG: 300 CAPSULE ORAL at 14:28

## 2023-08-04 RX ADMIN — ATORVASTATIN CALCIUM 10 MG: 10 TABLET, FILM COATED ORAL at 20:09

## 2023-08-04 RX ADMIN — DIBASIC SODIUM PHOSPHATE, MONOBASIC POTASSIUM PHOSPHATE AND MONOBASIC SODIUM PHOSPHATE 250 MG: 852; 155; 130 TABLET ORAL at 17:10

## 2023-08-04 RX ADMIN — OXYCODONE HYDROCHLORIDE 10 MG: 10 TABLET ORAL at 05:11

## 2023-08-04 RX ADMIN — OXYCODONE HYDROCHLORIDE 10 MG: 10 TABLET ORAL at 20:12

## 2023-08-04 RX ADMIN — ONDANSETRON 4 MG: 4 TABLET, ORALLY DISINTEGRATING ORAL at 00:30

## 2023-08-04 RX ADMIN — DIBASIC SODIUM PHOSPHATE, MONOBASIC POTASSIUM PHOSPHATE AND MONOBASIC SODIUM PHOSPHATE 250 MG: 852; 155; 130 TABLET ORAL at 00:11

## 2023-08-04 RX ADMIN — DIBASIC SODIUM PHOSPHATE, MONOBASIC POTASSIUM PHOSPHATE AND MONOBASIC SODIUM PHOSPHATE 250 MG: 852; 155; 130 TABLET ORAL at 11:43

## 2023-08-04 ASSESSMENT — ACTIVITIES OF DAILY LIVING (ADL): BED_CHAIR_WHEELCHAIR_TRANSFER_DESCRIPTION: INCREASED TIME;INITIAL PREPARATION FOR TASK

## 2023-08-04 ASSESSMENT — PAIN SCALES - WONG BAKER
WONGBAKER_NUMERICALRESPONSE: HURTS JUST A LITTLE BIT
WONGBAKER_NUMERICALRESPONSE: DOESN'T HURT AT ALL
WONGBAKER_NUMERICALRESPONSE: HURTS JUST A LITTLE BIT
WONGBAKER_NUMERICALRESPONSE: DOESN'T HURT AT ALL

## 2023-08-04 ASSESSMENT — PATIENT HEALTH QUESTIONNAIRE - PHQ9
SUM OF ALL RESPONSES TO PHQ9 QUESTIONS 1 AND 2: 0
2. FEELING DOWN, DEPRESSED, IRRITABLE, OR HOPELESS: NOT AT ALL
1. LITTLE INTEREST OR PLEASURE IN DOING THINGS: NOT AT ALL

## 2023-08-04 ASSESSMENT — PAIN SCALES - PAIN ASSESSMENT IN ADVANCED DEMENTIA (PAINAD)
TOTALSCORE: 0
TOTALSCORE: 0
CONSOLABILITY: NO NEED TO CONSOLE
BREATHING: NORMAL
CONSOLABILITY: NO NEED TO CONSOLE
FACIALEXPRESSION: SMILING OR INEXPRESSIVE
TOTALSCORE: 0
BREATHING: NORMAL
BODYLANGUAGE: RELAXED
BREATHING: NORMAL
BODYLANGUAGE: RELAXED
FACIALEXPRESSION: SMILING OR INEXPRESSIVE
BODYLANGUAGE: RELAXED
FACIALEXPRESSION: SMILING OR INEXPRESSIVE
CONSOLABILITY: NO NEED TO CONSOLE

## 2023-08-04 ASSESSMENT — PAIN DESCRIPTION - PAIN TYPE
TYPE: ACUTE PAIN
TYPE: ACUTE PAIN

## 2023-08-04 ASSESSMENT — GAIT ASSESSMENTS
DEVIATION: STEP TO;SHUFFLED GAIT;BRADYKINETIC
DISTANCE (FEET): 150
GAIT LEVEL OF ASSIST: CONTACT GUARD ASSIST
ASSISTIVE DEVICE: FRONT WHEEL WALKER

## 2023-08-04 NOTE — THERAPY
"Occupational Therapy  Daily Treatment     Patient Name: Ariana Drake  Age:  73 y.o., Sex:  female  Medical Record #: [id number]  Today's Date: 8/4/2023     Precautions  Precautions: (P) Fall Risk, Spinal / Back Precautions   Comments: monitor BP- orthostatic         Subjective    \"I'm just so nauseated.  If I knew you would be here, I wouldn't have eaten breakfast first.\"      (No emesis, however, patient noting nausea throughout session. Anti-nausea medication provided at start of session.)      Objective       08/04/23 0831   OT Charge Group   Charges Yes   OT Therapeutic Exercise (Units) 2   OT Total Time Spent   OT Individual Total Time Spent (Mins) 30   Precautions   Precautions Fall Risk;Spinal / Back Precautions    Pain   Pain Scales Browning Baker Scale   Pain 0 - 10 Group   Location Back   Location Orientation Mid;Lower   Description Tingling   Therapist Pain Assessment Prior to Activity   Non Verbal Descriptors   Non Verbal Scale  Calm   Pain- Browning Baker Scale Group   Browning-Gaviria Scale  2    Cognition    Cognition / Consciousness X   Orientation Level Oriented x 4   Level of Consciousness Alert   Safety Awareness Impaired   Attention Impaired   Sitting Upper Body Exercises   Sitting Upper Body Exercises Yes   Tricep Press 3 sets of 15;Bilateral   Upper Extremity Bike Level 1 Resistance  (10 minutes FluidoBike)   Balance   Sitting Balance (Static) Good   Sitting Balance (Dynamic) Fair   Weight Shift Sitting Good   Interdisciplinary Plan of Care Collaboration   IDT Collaboration with  Nursing   Patient Position at End of Therapy Seated;Chair Alarm On;Self Releasing Lap Belt Applied;Call Light within Reach;Tray Table within Reach   Collaboration Comments Med pass/nausea         Assessment    Patient requiring incidental physical assist with fluidobike to maintain pace and decrease resistance.  Light weighted tricep activity tolerated well with min physical assist to reposition trunk.     Strengths: Able to follow " instructions, Alert and oriented, Independent prior level of function, Motivated for self care and independence, Pleasant and cooperative, Supportive family, Willingly participates in therapeutic activities  Barriers: Decreased endurance, Fatigue, Hearing impairment, Orthostatic hypotension, Impaired activity tolerance, Impaired balance, Pain (decreased safety awareness/attention)    Plan    Continue OT for increased standing balance, standing tolerance, endurance building, strengthening, ADL retraining re: ongoing spinal precautions.     Passport items to be completed:  Perform bathroom transfers, complete dressing, complete feeding, get ready for the day, prepare a simple meal, participate in household tasks, adapt home for safety needs, demonstrate home exercise program, complete caregiver training     Occupational Therapy Goals (Active)       Problem: Dressing       Dates: Start:  08/03/23         Goal: STG-Within one week, patient will dress LB with min a        Dates: Start:  08/03/23               Problem: Functional Transfers       Dates: Start:  08/03/23         Goal: STG-Within one week, patient will transfer to toilet with CGA        Dates: Start:  08/03/23               Problem: OT Long Term Goals       Dates: Start:  08/03/23         Goal: LTG-By discharge, patient will complete basic self care tasks with mod I-supervision        Dates: Start:  08/03/23            Goal: LTG-By discharge, patient will perform bathroom transfers with mod I-supervision        Dates: Start:  08/03/23            Goal: LTG-By discharge, patient will complete basic home management with mod I-supervision        Dates: Start:  08/03/23               Problem: Toileting       Dates: Start:  08/03/23         Goal: STG-Within one week, patient will complete toileting tasks with CGA        Dates: Start:  08/03/23

## 2023-08-04 NOTE — PROGRESS NOTES
"Rehab Progress Note     Encounter Date: 8/4/2023     CC: Nausea, dizziness    Interval Events (Subjective)  VSS  Large watery BM 8/3  VV low PVRs    Patient seen and examined in her room.  States that she has been feeling nauseous and dizzy today.  Her blood pressure has been low.  Discussed stopping hydrochlorothiazide.  Discussed that her urine test was negative.  Made bowel medications as needed.    14 point ROS reviewed and negative except as stated above.     Objective:  VITAL SIGNS: /61   Pulse 82   Temp 37.2 °C (98.9 °F) (Oral)   Resp 16   Ht 1.57 m (5' 1.81\")   Wt 58.3 kg (128 lb 8.5 oz)   SpO2 98%   BMI 23.65 kg/m²     GEN: No apparent distress  HEENT: Head normocephalic, atraumatic.  Sclera nonicteric bilaterally, no ocular discharge appreciated bilaterally.  CV: Extremities warm and well-perfused, no peripheral edema appreciated bilaterally.  PULMONARY: Breathing nonlabored on room air, no respiratory accessory muscle use.  Not requiring supplemental oxygen.  SKIN: No appreciable skin breakdown on exposed areas of skin.  NEURO: Awake alert.  Conversational.  Logical thought content.  PSYCH: Mood and affect within normal limits.    Recent Results (from the past 72 hour(s))   Basic Metabolic Panel (BMP)    Collection Time: 08/02/23  5:11 AM   Result Value Ref Range    Sodium 133 (L) 135 - 145 mmol/L    Potassium 3.2 (L) 3.6 - 5.5 mmol/L    Chloride 97 96 - 112 mmol/L    Co2 26 20 - 33 mmol/L    Glucose 131 (H) 65 - 99 mg/dL    Bun 10 8 - 22 mg/dL    Creatinine 0.73 0.50 - 1.40 mg/dL    Calcium 8.8 8.5 - 10.5 mg/dL    Anion Gap 10.0 7.0 - 16.0   CBC without Differential    Collection Time: 08/02/23  5:11 AM   Result Value Ref Range    WBC 10.3 4.8 - 10.8 K/uL    RBC 3.72 (L) 4.20 - 5.40 M/uL    Hemoglobin 11.2 (L) 12.0 - 16.0 g/dL    Hematocrit 33.1 (L) 37.0 - 47.0 %    MCV 89.0 81.4 - 97.8 fL    MCH 30.1 27.0 - 33.0 pg    MCHC 33.8 32.2 - 35.5 g/dL    RDW 42.4 35.9 - 50.0 fL    Platelet Count " 212 164 - 446 K/uL    MPV 9.7 9.0 - 12.9 fL   ESTIMATED GFR    Collection Time: 08/02/23  5:11 AM   Result Value Ref Range    GFR (CKD-EPI) 86 >60 mL/min/1.73 m 2   CBC with Differential    Collection Time: 08/03/23  5:23 AM   Result Value Ref Range    WBC 9.2 4.8 - 10.8 K/uL    RBC 3.88 (L) 4.20 - 5.40 M/uL    Hemoglobin 11.7 (L) 12.0 - 16.0 g/dL    Hematocrit 35.6 (L) 37.0 - 47.0 %    MCV 91.8 81.4 - 97.8 fL    MCH 30.2 27.0 - 33.0 pg    MCHC 32.9 32.2 - 35.5 g/dL    RDW 44.3 35.9 - 50.0 fL    Platelet Count 201 164 - 446 K/uL    MPV 10.2 9.0 - 12.9 fL    Neutrophils-Polys 69.40 44.00 - 72.00 %    Lymphocytes 18.00 (L) 22.00 - 41.00 %    Monocytes 9.20 0.00 - 13.40 %    Eosinophils 2.50 0.00 - 6.90 %    Basophils 0.50 0.00 - 1.80 %    Immature Granulocytes 0.40 0.00 - 0.90 %    Nucleated RBC 0.00 0.00 - 0.20 /100 WBC    Neutrophils (Absolute) 6.37 1.82 - 7.42 K/uL    Lymphs (Absolute) 1.65 1.00 - 4.80 K/uL    Monos (Absolute) 0.84 0.00 - 0.85 K/uL    Eos (Absolute) 0.23 0.00 - 0.51 K/uL    Baso (Absolute) 0.05 0.00 - 0.12 K/uL    Immature Granulocytes (abs) 0.04 0.00 - 0.11 K/uL    NRBC (Absolute) 0.00 K/uL   Comp Metabolic Panel (CMP)    Collection Time: 08/03/23  5:23 AM   Result Value Ref Range    Sodium 136 135 - 145 mmol/L    Potassium 3.1 (L) 3.6 - 5.5 mmol/L    Chloride 97 96 - 112 mmol/L    Co2 28 20 - 33 mmol/L    Anion Gap 11.0 7.0 - 16.0    Glucose 111 (H) 65 - 99 mg/dL    Bun 11 8 - 22 mg/dL    Creatinine 0.68 0.50 - 1.40 mg/dL    Calcium 9.0 8.5 - 10.5 mg/dL    Correct Calcium 9.5 8.5 - 10.5 mg/dL    AST(SGOT) 21 12 - 45 U/L    ALT(SGPT) 13 2 - 50 U/L    Alkaline Phosphatase 60 30 - 99 U/L    Total Bilirubin 0.7 0.1 - 1.5 mg/dL    Albumin 3.4 3.2 - 4.9 g/dL    Total Protein 6.4 6.0 - 8.2 g/dL    Globulin 3.0 1.9 - 3.5 g/dL    A-G Ratio 1.1 g/dL   Magnesium    Collection Time: 08/03/23  5:23 AM   Result Value Ref Range    Magnesium 1.8 1.5 - 2.5 mg/dL   Phosphorus    Collection Time: 08/03/23  5:23 AM    Result Value Ref Range    Phosphorus 1.9 (L) 2.5 - 4.5 mg/dL   TSH with Reflex to FT4    Collection Time: 08/03/23  5:23 AM   Result Value Ref Range    TSH 2.890 0.380 - 5.330 uIU/mL   ESTIMATED GFR    Collection Time: 08/03/23  5:23 AM   Result Value Ref Range    GFR (CKD-EPI) 91 >60 mL/min/1.73 m 2   URINALYSIS    Collection Time: 08/03/23  1:50 PM   Result Value Ref Range    Color Yellow     Character Clear     Specific Gravity 1.020 <1.035    Ph 6.0 5.0 - 8.0    Glucose Negative Negative mg/dL    Ketones 40 (A) Negative mg/dL    Protein 30 (A) Negative mg/dL    Bilirubin Negative Negative    Urobilinogen, Urine 1.0 Negative    Nitrite Negative Negative    Leukocyte Esterase Negative Negative    Occult Blood Negative Negative    Micro Urine Req Microscopic    URINE MICROSCOPIC (W/UA)    Collection Time: 08/03/23  1:50 PM   Result Value Ref Range    WBC 0-2 /hpf    RBC 0-2 /hpf    Bacteria Negative None /hpf    Epithelial Cells Negative /hpf    Mucous Threads Few /hpf    Hyaline Cast 0-2 /lpf       Current Facility-Administered Medications   Medication Frequency    phosphorus (K-Phos-Neutral) per tablet 250 mg Q6HRS    hydroCHLOROthiazide (Hydrodiuril) tablet 12.5 mg QAM    gabapentin (Neurontin) capsule 300 mg TID    omeprazole (PriLOSEC) capsule 40 mg QAM    Respiratory Therapy Consult Continuous RT    Pharmacy Consult Request ...Pain Management Review 1 Each PHARMACY TO DOSE    oxyCODONE immediate-release (Roxicodone) tablet 5 mg Q4HRS PRN    oxyCODONE immediate release (Roxicodone) tablet 10 mg Q4HRS PRN    hydrALAZINE (Apresoline) tablet 25 mg Q8HRS PRN    senna-docusate (Pericolace Or Senokot S) 8.6-50 MG per tablet 2 Tablet BID    And    polyethylene glycol/lytes (Miralax) PACKET 1 Packet QDAY PRN    And    magnesium hydroxide (Milk Of Magnesia) suspension 30 mL QDAY PRN    And    bisacodyl (Dulcolax) suppository 10 mg QDAY PRN    carboxymethylcellulose (Refresh Tears) 0.5 % ophthalmic drops 1 Drop PRN     benzocaine-menthol (Cepacol) lozenge 1 Lozenge Q2HRS PRN    mag hydrox-al hydrox-simeth (Maalox Plus Es Or Mylanta Ds) suspension 20 mL Q2HRS PRN    ondansetron (Zofran ODT) dispertab 4 mg 4X/DAY PRN    Or    ondansetron (Zofran) syringe/vial injection 4 mg 4X/DAY PRN    traZODone (Desyrel) tablet 50 mg QHS PRN    sodium chloride (Ocean) 0.65 % nasal spray 2 Spray PRN    acetaminophen (Tylenol) tablet 500 mg Q6HRS PRN    atorvastatin (Lipitor) tablet 10 mg Q EVENING       Orders Placed This Encounter   Procedures    Diet Order Diet: Regular     Standing Status:   Standing     Number of Occurrences:   1     Order Specific Question:   Diet:     Answer:   Regular [1]       Assessment:  Active Hospital Problems    Diagnosis     *S/P lumbar fusion     Hyponatremia     Hypertension     Dyslipidemia     GERD (gastroesophageal reflux disease)        Medical Decision Making and Plan:  Recurrent right L4 radiculopathy secondary to neuroforaminal stenosis s/p bilateral L4-5 partial laminectomy, right-sided total facetectomy, decompression of bilateral L5 and right L4 nerve roots with L4-5 fusion 7/31/2023 Dr. Cottrell  -PT and OT for mobility and ADLs. Per guidelines, 15 hours per week between PT, OT and/or SLP.  -Follow-up neurosurgery     Hypertension-continue hydrochlorothiazide 25 mg daily -blood pressure stable, continue hydrochlorothiazide, though patient slightly dizzy, could be UTI, monitor BP. 8/3 more orthostatic with therapy. Reduce HCTZ to 12.5mg daily. 8/4 BP stable continue HCTZ 12.5mg. 8/4 stop hydrochlorothiazide.    Nausea and dizziness -likely secondary to orthostatic hypotension.  Check KUB.  Last bowel movement 8/3.     Hyperlipidemia-continue Lipitor 10 mg     Pain -Tylenol, gabapentin and oxycodone     Hyponatremia -Labs 8/3 -sodium normal at 136.     Hypokalemia -Labs 8/3 low at 3.1 -supplement x1 8/3.  Recheck BMP 8/7    Hypophosphatemia -start K-Phos x4 days.  Recheck phosphorus 8/7     Anemia -Labs 8/3  -improved.    Dysuria, suprapubic pain -check urinalysis 8/3 - negative     Skin - Patient at risk for skin breakdown due to debility in areas including sacrum, achilles, elbows and head in addition to other sites. Nursing to assess skin daily.      GI Ppx - Patient on Prilosec for GERD prophylaxis. Patient on Senna-docusate for constipation prophylaxis made PRN due to large watery BM x2 8/3.      DVT Ppx -not on chemoprophylaxis.  Encourage out of bed.       Gabby Alvarez D.O.

## 2023-08-04 NOTE — CARE PLAN
"The patient is Stable - Low risk of patient condition declining or worsening    Shift Goals  Clinical Goals: safety  Patient Goals: safety, sleep/rest      Problem: Fall Risk - Rehab  Goal: Patient will remain free from falls  Outcome: Progressing  Note: Tonya Reed Fall risk Assessment Score: 17    High fall risk Interventions   - Alarming seatbelt  - Wander guard  - Bed and strip alarm   - Yellow sign by the door   - Yellow wrist band \"Fall risk\"  - Room near to the nurse station  - Do not leave patient unattended in the bathroom  - Fall risk education provided  Assisted to the bathroom with episode of dizziness and nausea. Dangle feet at the edge of the bed and wait until patient is okay. Went to the toilet x2 tonight with loose, watery BM, with relief after passing BM.  Assisted back to bed and emesis bag given d/t c/o nausea. Zofran PO given with help. Call light place within reach and reminded to use when in need of help. Continue on close monitoring.            "

## 2023-08-04 NOTE — THERAPY
"Physical Therapy   Daily Treatment     Patient Name: Ariana Drake  Age:  73 y.o., Sex:  female  Medical Record #: [id number]  Today's Date: 8/4/2023     Precautions  Precautions: Fall Risk, Spinal / Back Precautions   Comments: monitor BP- orthostatic    Subjective    Ariana was asleep in bed at start of session, agreeable to therapy. \"Watch how good my transfers are now!\"     Objective     08/04/23 1001   Precautions   Precautions Fall Risk;Spinal / Back Precautions    Comments monitor BP- orthostatic      08/04/23 1001 08/04/23 1002 08/04/23 1003   Vitals   Pulse 83 90 82   Patient BP Position Supine Standing 1 minute Sitting  (After 150' walking c/ FWW)   Blood Pressure  122/65 99/69 115/56   Pulse Oximetry 90 %  --   --       08/04/23 1004 08/04/23 1005   Vitals   Pulse 83 92   Patient BP Position Sitting  (After supine exercises and abdominal binder) Sitting  (after Nustep)   Blood Pressure  116/57 106/53   Pulse Oximetry  --   --      OH presents c/ symptoms of nausea/lightheadedness        08/04/23 1001   Gait Functional Level of Assist    Gait Level Of Assist Contact Guard Assist   Assistive Device Front Wheel Walker   Distance (Feet) 150   # of Times Distance was Traveled 1   Deviation Step To;Shuffled Gait;Bradykinetic  (VC for larger steps, limited by OH)   Supine Lower Body Exercise   Hip Flexion 2 sets of 10  (x2, one c/ bolster under knees; one from hooklying. Excessively bradykinetic, used hand as target for 90* hip flexion. Manual resistance at end range of last rep x10s)   Short Arc Quad 2 sets of 10   Other Exercises HS curl c/ theraball; used hand as target for 90* hip flexion   Sitting Lower Body Exercises   Hip Flexion 2 sets of 10   Long Arc Quad 2 sets of 10   Bed Mobility    Supine to Sit Standby Assist   Sit to Supine Standby Assist   Sit to Stand Contact Guard Assist   Scooting Minimal Assist  (VC for LE use/adequate bridging)   Rolling Standby Assist   Neuro-Muscular Treatments " "  Neuro-Muscular Treatments Facilitation;Verbal Cuing   Interdisciplinary Plan of Care Collaboration   Patient Position at End of Therapy In Bed;Bed Alarm On;Call Light within Reach;Tray Table within Reach;Phone within Reach  (Advised to elevate HOB for 30 min prior to subsequent OT session)     Session focused on endurance and LE strengthening. Modifications were made to supine/sitting activities d/t drop in BP after ambulating    Nustep x15 min; 10 min of intervals (30s over 40spm; 1 min casual pace at level 6); 5 min at lvl 3 over 70 spm    Standing postural facilitation c/ decreasing UE support 3x10\"; 2x1UE on FWW, 1x w/o UE support on FWW; rearward LOB c/ self correction on last set w/o UE support    Education on BP management; need for adequate hydration/elevating HOB or being seated 30min prior to therapy; Ariana agreed to comply    Assessment  Ariana presents c/ orthostatic hypotension, LE weakness, and decreased endurance which limits her abilities to safely return home independently. BP did not improve after Nustep activity and patient remains symptomatic. RN informed.    Strengths: Able to follow instructions, Alert and oriented, Effective communication skills, Independent prior level of function, Motivated for self care and independence, Willingly participates in therapeutic activities  Barriers: Bladder retention, Generalized weakness, Hearing impairment, Orthostatic hypotension, Impaired activity tolerance, Impaired balance, Limited mobility, Pain, Pain poorly managed    Plan    Gait training c/ FWW and emphasis on large amplitude stepping, sufficient heel strike/toe off  LE strengthening c/ shuttle, mini squats, step ups, hip flexion c/ theraband, standing marches  Curb training c/ FWW  Dynamic balance activities via weight shifting, 4 way hip target taps, UE cone stacking c/ small WANDER  Core stability c/ block balance on head, large therapy ball sitting and reaching, postural holds c/ alternating " perturbations    Passport items to be completed:  Get in/out of bed safely, in/out of a vehicle, safely use mobility device, walk or wheel around home/community, navigate up and down stairs, show how to get up/down from the ground, ensure home is accessible, demonstrate HEP, complete caregiver training    Physical Therapy Problems (Active)       Problem: Mobility       Dates: Start:  08/03/23         Goal: STG-Within one week, patient will ambulate up/down a curb at CGA or better c/ LRAD       Dates: Start:  08/03/23               Problem: Mobility Transfers       Dates: Start:  08/03/23         Goal: STG-Within one week, patient will transfer via logroll supine<>sit at SBA w/o VCs        Dates: Start:  08/03/23            Goal: STG-Within one week, patient will logroll to the L and R at SBA w/o VCs       Dates: Start:  08/03/23            Goal: STG-Within one week, patient will sit to stand c/ LRAD at SBA or better       Dates: Start:  08/03/23               Problem: PT-Long Term Goals       Dates: Start:  08/03/23         Goal: LTG-By discharge, patient will ambulate 50ft c/ SPC at SPV       Dates: Start:  08/03/23            Goal: LTG-By discharge, patient will perform home exercise program for LE strengthening       Dates: Start:  08/03/23            Goal: LTG-By discharge, patient will transfer in/out of a car at setupA or better c/ LRAD       Dates: Start:  08/03/23

## 2023-08-04 NOTE — PROGRESS NOTES
NURSING DAILY NOTE    Name: Ariana Drake   Date of Admission: 8/2/2023   Admitting Diagnosis: S/P lumbar fusion  Attending Physician: Gabby Alvarez D.o.  Allergies: Patient has no known allergies.    Safety  Patient Assist  min a  Patient Precautions  Fall Risk, Spinal / Back Precautions   Precaution Comments  monitor BP- orthostatic  Bed Transfer Status  Standby Assist  Toilet Transfer Status   Moderate Assist  Assistive Devices  Wheelchair, Rails  Oxygen  None - Room Air  Diet/Therapeutic Dining  Current Diet Order   Procedures    Diet Order Diet: Regular     Pill Administration  whole and one at a time   Agitated Behavioral Scale     ABS Level of Severity       Fall Risk  Has the patient had a fall this admission?   No  Tonya Reed Fall Risk Scoring  13, MODERATE RISK  Fall Risk Safety Measures  bed alarm, chair alarm, and low vision/ hearing    Vitals  Temperature: 36.2 °C (97.1 °F)  Temp src: Oral  Pulse: 70  Respiration: 18  Blood Pressure : 118/62  Blood Pressure MAP (Calculated): 81 MM HG  BP Location: Left, Upper Arm  Patient BP Position: Sitting     Oxygen  Pulse Oximetry: 92 %  O2 (LPM): 0  O2 Delivery Device: None - Room Air  Incentive Spirometer Volume: 1000 mL    Bowel and Bladder  Last Bowel Movement  08/04/23  Stool Type  Type 7: Watery, no solid pieces-entirely liquid  Bowel Device     Continent  Bladder: Stress incontinence   Bowel: Continent movement  Bladder Function  Urine Void (mL):  (Moderate)  Number of Times Voided: 1  Urine Color: Unable To Evaluate  Genitourinary Assessment   Bladder Assessment (WDL):  WDL Except  Brandt Catheter: Not Applicable  Urine Color: Unable To Evaluate  Bladder Device: Bathroom  Bladder Scan: Post Void  $ Bladder Scan Results (mL): 98    Skin  Gerry Score   19  Sensory Interventions      Moisture Interventions         Pain  Pain Rating Scale  3 - Sometimes distracts me  Pain Location  Back  Pain  Location Orientation  Mid, Lower  Pain Interventions   Declines    ADLs    Bathing   Shower, Staff  Linen Change      Personal Hygiene     Chlorhexidine Bath      Oral Care     Teeth/Dentures  Intact  Shave     Nutrition Percentage Eaten  *  * Meal *  *, Breakfast, Between 25-50% Consumed  Environmental Precautions  Treaded Slipper Socks on Patient, Bed in Low Position  Patient Turns/Positioning  Patient Turns Self from Side to Side  Patient Turns Assistance/Tolerance     Bed Positions  Bed Controls On, Bed Locked  Head of Bed Elevated         Psychosocial/Neurologic Assessment  Psychosocial Assessment  Psychosocial (WDL):  Within Defined Limits  Neurologic Assessment  Neuro (WDL): Exceptions to WDL  Level of Consciousness: Alert  Orientation Level: Oriented X4  Cognition: Appropriate judgement  Speech: Clear  Pupil Assesment: No  Motor Function/Sensation Assessment: Motor response  R Foot Dorsiflexion: Strong  L Foot Dorsiflexion: Strong  RUE Motor Response: Responds to commands  RUE Sensation: Full sensation  Muscle Strength Right Arm: Good Strength Against Gravity and Moderate Resistance  LUE Motor Response: Responds to commands  LUE Sensation: Full sensation  Muscle Strength Left Arm: Good Strength Against Gravity and Moderate Resistance  RLE Motor Response: Responds to commands  RLE Sensation: Full sensation  Muscle Strength Right Leg: Fair Strength against Gravity but No Resistance  LLE Motor Response: Responds to commands  LLE Sensation: Full sensation  Muscle Strength Left Leg: Fair Strength against Gravity but No Resistance  EENT (WDL):  WDL Except    Cardio/Pulmonary Assessment  Edema      Respiratory Breath Sounds  RUL Breath Sounds: Clear  RML Breath Sounds: Clear  RLL Breath Sounds: Clear  JEWEL Breath Sounds: Clear  LLL Breath Sounds: Clear  Cardiac Assessment   Cardiac (WDL):  Within Defined Limits

## 2023-08-04 NOTE — PROGRESS NOTES
NURSING DAILY NOTE    Name: Ariana Drake   Date of Admission: 8/2/2023   Admitting Diagnosis: S/P lumbar fusion  Attending Physician: Gabby Alvarez D.o.  Allergies: Patient has no known allergies.    Safety  Patient Assist  min a  Patient Precautions  Fall Risk, Spinal / Back Precautions   Precaution Comments  monitor BP- orthostatic  Bed Transfer Status  Moderate Assist  Toilet Transfer Status   Moderate Assist  Assistive Devices  Rails, Wheelchair  Oxygen  None - Room Air  Diet/Therapeutic Dining  Current Diet Order   Procedures    Diet Order Diet: Regular     Pill Administration  whole  Agitated Behavioral Scale     ABS Level of Severity       Fall Risk  Has the patient had a fall this admission?   No  Tonya Reed Fall Risk Scoring  17, HIGH RISK  Fall Risk Safety Measures  bed alarm, chair alarm, and seatbelt alarm    Vitals  Temperature: 37.4 °C (99.3 °F)  Temp src: Oral  Pulse: 90  Respiration: 17  Blood Pressure : 105/62  Blood Pressure MAP (Calculated): 76 MM HG  BP Location: Right, Upper Arm  Patient BP Position: Supine     Oxygen  Pulse Oximetry: 92 %  O2 Delivery Device: None - Room Air  Incentive Spirometer Volume: 1000 mL    Bowel and Bladder  Last Bowel Movement  08/04/23  Stool Type  Type 7: Watery, no solid pieces-entirely liquid  Bowel Device     Continent  Bladder: Stress incontinence   Bowel: Continent movement  Bladder Function  Urine Void (mL):  (Moderate)  Number of Times Voided: 1  Urine Color: Yellow  Genitourinary Assessment   Bladder Assessment (WDL):  WDL Except  Brandt Catheter: Not Applicable  Urine Color: Yellow  Bladder Device: Bathroom  Bladder Scan: Post Void  $ Bladder Scan Results (mL): 98    Skin  Gerry Score   19  Sensory Interventions      Moisture Interventions         Pain  Pain Rating Scale  8 - Awful, hard to do anything  Pain Location  Back  Pain Location Orientation  Mid, Lower  Pain Interventions    Distraction    ADLs    Bathing   Shower, * * With Assistance from, Staff  Linen Change      Personal Hygiene     Chlorhexidine Bath      Oral Care     Teeth/Dentures  Intact  Shave     Nutrition Percentage Eaten  Between 25-50% Consumed (25)  Environmental Precautions  Treaded Slipper Socks on Patient, Bed in Low Position  Patient Turns/Positioning  Patient Turns Self from Side to Side  Patient Turns Assistance/Tolerance     Bed Positions  Bed Controls On, Bed Locked  Head of Bed Elevated         Psychosocial/Neurologic Assessment  Psychosocial Assessment  Psychosocial (WDL):  Within Defined Limits  Neurologic Assessment  Neuro (WDL): Exceptions to WDL  Level of Consciousness: Alert  Orientation Level: Oriented X4  Cognition: Appropriate judgement  Speech: Clear  Pupil Assesment: No  Motor Function/Sensation Assessment: Dorsiflexion, Motor response  R Foot Dorsiflexion: Strong  L Foot Dorsiflexion: Strong  RUE Motor Response: Responds to commands  RUE Sensation: Full sensation  Muscle Strength Right Arm: Good Strength Against Gravity and Moderate Resistance  LUE Motor Response: Responds to commands  LUE Sensation: Full sensation  Muscle Strength Left Arm: Good Strength Against Gravity and Moderate Resistance  RLE Motor Response: Responds to commands  RLE Sensation: Full sensation  Muscle Strength Right Leg: Fair Strength against Gravity but No Resistance  LLE Motor Response: Responds to commands  LLE Sensation: Full sensation  Muscle Strength Left Leg: Fair Strength against Gravity but No Resistance  EENT (WDL):  WDL Except    Cardio/Pulmonary Assessment  Edema      Respiratory Breath Sounds  RUL Breath Sounds: Clear  RML Breath Sounds: Clear  RLL Breath Sounds: Clear  JEWEL Breath Sounds: Clear  LLL Breath Sounds: Clear  Cardiac Assessment   Cardiac (WDL):  Within Defined Limits

## 2023-08-04 NOTE — CARE PLAN
Problem: Pain - Standard  Goal: Alleviation of pain or a reduction in pain to the patient’s comfort goal  Flowsheets  Taken 8/4/2023 0831 by Perla Cuellar OT  Non Verbal Scale: Calm  Taken 8/4/2023 0800 by Essence Cast R.N.  Pain Rating Scale (NPRS): 3     Problem: Nutrition  Goal: Patient's nutritional and fluid intake will be adequate or improve  Note: Patient has been nauseous this shift. Given zofran with short term relief. Dr. Alvarez aware.    The patient is Stable - Low risk of patient condition declining or worsening

## 2023-08-04 NOTE — THERAPY
"Occupational Therapy  Daily Treatment     Patient Name: Ariana Drake  Age:  73 y.o., Sex:  female  Medical Record #: [id number]  Today's Date: 8/4/2023     Precautions  Precautions: (P) Fall Risk, Spinal / Back Precautions   Comments: monitor BP- orthostatic         Subjective    \"I haven't been able to walk a mile at one time for over ten years.\"      Objective       08/04/23 1301   OT Charge Group   Charges Yes   OT Self Care / ADL (Units) 1   OT Therapy Activity (Units) 3   OT Total Time Spent   OT Individual Total Time Spent (Mins) 60   Precautions   Precautions Fall Risk;Spinal / Back Precautions    Vitals   Patient BP Position Standing 3 minutes   Blood Pressure  117/60   Vitals Comments abdominal binder   Non Verbal Descriptors   Non Verbal Scale  Calm   Cognition    Level of Consciousness Alert   Sleep/Wake Cycle   Sleep & Rest Awake   Functional Level of Assist   Lower Body Dressing Contact Guard Assist  (Footwear from supine with use of figure four)   Bed, Chair, Wheelchair Transfer Standby Assist   Bed Chair Wheelchair Transfer Description Increased time;Initial preparation for task   Neuro-Muscular Treatments   Neuro-Muscular Treatments Verbal Cuing;Weight Shift Right;Weight Shift Left;Compensatory Strategies;Anterior weight shift   Balance   Sitting Balance (Static) Good   Sitting Balance (Dynamic) Fair   Standing Balance (Static) Fair   Standing Balance (Dynamic) Fair -   Weight Shift Sitting Good   Weight Shift Standing Fair   Skilled Intervention Compensatory Strategies   Comments In-facility and outside ambulation with FWW at a CGA level with standing RB as needed (~35 minutes; 450ft x2, 550 ft x 1).  Standing carnival game activity without use of FWW with stagger step, CGA, and no LOB with 3 x seated RB for 20 minute in standing. No LOB or c/o pain.   Bed Mobility    Supine to Sit Standby Assist   Sit to Supine Standby Assist   Scooting Standby Assist   Rolling Standby Assist   Interdisciplinary " Plan of Care Collaboration   Patient Position at End of Therapy In Bed;Bed Alarm On;Tray Table within Reach;Call Light within Reach;Phone within Reach         Assessment    Patient tolerated treatment well with no LOB, improved BP with abdominal binder in place, fair adherence to ongoing spinal precautions and occasional requests for seated RBs. Patient able to manage LB clothing from support supine position without use of AE.       Strengths: Able to follow instructions, Alert and oriented, Independent prior level of function, Motivated for self care and independence, Pleasant and cooperative, Supportive family, Willingly participates in therapeutic activities  Barriers: Decreased endurance, Fatigue, Hearing impairment, Orthostatic hypotension, Impaired activity tolerance, Impaired balance, Pain (decreased safety awareness/attention)    Plan    Continue OT POC with focus on increased standing tolerance, standing balance, endurance building, ADL retraining    Passport items to be completed:  Perform bathroom transfers, complete dressing, complete feeding, get ready for the day, prepare a simple meal, participate in household tasks, adapt home for safety needs, demonstrate home exercise program, complete caregiver training     Occupational Therapy Goals (Active)       Problem: Dressing       Dates: Start:  08/03/23         Goal: STG-Within one week, patient will dress LB with min a        Dates: Start:  08/03/23               Problem: Functional Transfers       Dates: Start:  08/03/23         Goal: STG-Within one week, patient will transfer to toilet with CGA        Dates: Start:  08/03/23               Problem: OT Long Term Goals       Dates: Start:  08/03/23         Goal: LTG-By discharge, patient will complete basic self care tasks with mod I-supervision        Dates: Start:  08/03/23            Goal: LTG-By discharge, patient will perform bathroom transfers with mod I-supervision        Dates: Start:  08/03/23             Goal: LTG-By discharge, patient will complete basic home management with mod I-supervision        Dates: Start:  08/03/23               Problem: Toileting       Dates: Start:  08/03/23         Goal: STG-Within one week, patient will complete toileting tasks with CGA        Dates: Start:  08/03/23

## 2023-08-05 PROCEDURE — 700102 HCHG RX REV CODE 250 W/ 637 OVERRIDE(OP): Performed by: PHYSICAL MEDICINE & REHABILITATION

## 2023-08-05 PROCEDURE — 770010 HCHG ROOM/CARE - REHAB SEMI PRIVAT*

## 2023-08-05 PROCEDURE — A9270 NON-COVERED ITEM OR SERVICE: HCPCS | Performed by: PHYSICAL MEDICINE & REHABILITATION

## 2023-08-05 RX ADMIN — DIBASIC SODIUM PHOSPHATE, MONOBASIC POTASSIUM PHOSPHATE AND MONOBASIC SODIUM PHOSPHATE 250 MG: 852; 155; 130 TABLET ORAL at 17:53

## 2023-08-05 RX ADMIN — GABAPENTIN 300 MG: 300 CAPSULE ORAL at 15:39

## 2023-08-05 RX ADMIN — DIBASIC SODIUM PHOSPHATE, MONOBASIC POTASSIUM PHOSPHATE AND MONOBASIC SODIUM PHOSPHATE 250 MG: 852; 155; 130 TABLET ORAL at 12:07

## 2023-08-05 RX ADMIN — OXYCODONE HYDROCHLORIDE 10 MG: 10 TABLET ORAL at 19:54

## 2023-08-05 RX ADMIN — DIBASIC SODIUM PHOSPHATE, MONOBASIC POTASSIUM PHOSPHATE AND MONOBASIC SODIUM PHOSPHATE 250 MG: 852; 155; 130 TABLET ORAL at 05:48

## 2023-08-05 RX ADMIN — ATORVASTATIN CALCIUM 10 MG: 10 TABLET, FILM COATED ORAL at 20:50

## 2023-08-05 RX ADMIN — DIBASIC SODIUM PHOSPHATE, MONOBASIC POTASSIUM PHOSPHATE AND MONOBASIC SODIUM PHOSPHATE 250 MG: 852; 155; 130 TABLET ORAL at 01:32

## 2023-08-05 RX ADMIN — OMEPRAZOLE 40 MG: 20 CAPSULE, DELAYED RELEASE ORAL at 08:27

## 2023-08-05 RX ADMIN — GABAPENTIN 300 MG: 300 CAPSULE ORAL at 08:27

## 2023-08-05 RX ADMIN — GABAPENTIN 300 MG: 300 CAPSULE ORAL at 20:50

## 2023-08-05 RX ADMIN — ACETAMINOPHEN 500 MG: 500 TABLET ORAL at 05:08

## 2023-08-05 ASSESSMENT — PAIN SCALES - PAIN ASSESSMENT IN ADVANCED DEMENTIA (PAINAD)
TOTALSCORE: 0
FACIALEXPRESSION: SMILING OR INEXPRESSIVE
BODYLANGUAGE: RELAXED
BODYLANGUAGE: RELAXED
BREATHING: NORMAL
FACIALEXPRESSION: SMILING OR INEXPRESSIVE
CONSOLABILITY: NO NEED TO CONSOLE
CONSOLABILITY: NO NEED TO CONSOLE
BREATHING: NORMAL
TOTALSCORE: 0

## 2023-08-05 ASSESSMENT — PAIN SCALES - WONG BAKER
WONGBAKER_NUMERICALRESPONSE: DOESN'T HURT AT ALL
WONGBAKER_NUMERICALRESPONSE: HURTS JUST A LITTLE BIT

## 2023-08-05 ASSESSMENT — PATIENT HEALTH QUESTIONNAIRE - PHQ9
2. FEELING DOWN, DEPRESSED, IRRITABLE, OR HOPELESS: NOT AT ALL
1. LITTLE INTEREST OR PLEASURE IN DOING THINGS: NOT AT ALL
SUM OF ALL RESPONSES TO PHQ9 QUESTIONS 1 AND 2: 0
SUM OF ALL RESPONSES TO PHQ9 QUESTIONS 1 AND 2: 0
2. FEELING DOWN, DEPRESSED, IRRITABLE, OR HOPELESS: NOT AT ALL
1. LITTLE INTEREST OR PLEASURE IN DOING THINGS: NOT AT ALL

## 2023-08-05 ASSESSMENT — PAIN DESCRIPTION - PAIN TYPE
TYPE: ACUTE PAIN

## 2023-08-05 NOTE — PROGRESS NOTES
NURSING DAILY NOTE    Name: Ariana Draek   Date of Admission: 8/2/2023   Admitting Diagnosis: S/P lumbar fusion  Attending Physician: Gabby Alvarez D.o.  Allergies: Patient has no known allergies.    Safety  Patient Assist  min a  Patient Precautions  Fall Risk, Spinal / Back Precautions   Precaution Comments  monitor BP- orthostatic  Bed Transfer Status  Standby Assist  Toilet Transfer Status   Moderate Assist  Assistive Devices  Walker - front wheel  Oxygen  None - Room Air  Diet/Therapeutic Dining  Current Diet Order   Procedures    Diet Order Diet: Regular     Pill Administration  whole  Agitated Behavioral Scale     ABS Level of Severity       Fall Risk  Has the patient had a fall this admission?   No  Tonya Reed Fall Risk Scoring  13, MODERATE RISK  Fall Risk Safety Measures  bed alarm, chair alarm, and low vision/ hearing    Vitals  Temperature: 37.3 °C (99.2 °F)  Temp src: Oral  Pulse: 88  Respiration: 17  Blood Pressure : 105/58  Blood Pressure MAP (Calculated): 74 MM HG  BP Location: Left, Upper Arm  Patient BP Position: Supine     Oxygen  Pulse Oximetry: 92 %  O2 (LPM): 0  O2 Delivery Device: None - Room Air  Incentive Spirometer Volume: 1000 mL    Bowel and Bladder  Last Bowel Movement  08/04/23  Stool Type  Type 7: Watery, no solid pieces-entirely liquid  Bowel Device     Continent  Bladder: Stress incontinence   Bowel: Continent movement  Bladder Function  Urine Void (mL):  (Moderate)  Number of Times Voided: 1  Urine Color: Yellow  Genitourinary Assessment   Bladder Assessment (WDL):  WDL Except  Brandt Catheter: Not Applicable  Urine Color: Yellow  Bladder Device: Bathroom  Bladder Scan: Post Void  $ Bladder Scan Results (mL): 98    Skin  Gerry Score   19  Sensory Interventions      Moisture Interventions         Pain  Pain Rating Scale  0 - No Pain  Pain Location  Buttock, Head, Coccyx  Pain Location Orientation  Mid, Lower  Pain  Interventions   Medication (see MAR)    ADLs    Bathing   Shower, Staff  Linen Change      Personal Hygiene     Chlorhexidine Bath      Oral Care     Teeth/Dentures  Intact  Shave     Nutrition Percentage Eaten  *  * Meal *  *, Dinner, Between 50-75% Consumed  Environmental Precautions  Treaded Slipper Socks on Patient, Bed in Low Position  Patient Turns/Positioning  Patient Turns Self from Side to Side  Patient Turns Assistance/Tolerance     Bed Positions  Bed Controls On, Bed Locked  Head of Bed Elevated  Less than 30 degrees      Psychosocial/Neurologic Assessment  Psychosocial Assessment  Psychosocial (WDL):  Within Defined Limits  Neurologic Assessment  Neuro (WDL): Exceptions to WDL  Level of Consciousness: Alert  Orientation Level: Oriented X4  Cognition: Appropriate judgement  Speech: Clear  Pupil Assesment: No  Motor Function/Sensation Assessment: Motor response  R Foot Dorsiflexion: Strong  L Foot Dorsiflexion: Strong  RUE Motor Response: Responds to commands  RUE Sensation: Full sensation  Muscle Strength Right Arm: Good Strength Against Gravity and Moderate Resistance  LUE Motor Response: Responds to commands  LUE Sensation: Full sensation  Muscle Strength Left Arm: Good Strength Against Gravity and Moderate Resistance  RLE Motor Response: Responds to commands  RLE Sensation: Full sensation  Muscle Strength Right Leg: Fair Strength against Gravity but No Resistance  LLE Motor Response: Responds to commands  LLE Sensation: Full sensation  Muscle Strength Left Leg: Fair Strength against Gravity but No Resistance  EENT (WDL):  WDL Except    Cardio/Pulmonary Assessment  Edema      Respiratory Breath Sounds  RUL Breath Sounds: Clear  RML Breath Sounds: Clear  RLL Breath Sounds: Clear  JEWEL Breath Sounds: Clear  LLL Breath Sounds: Clear  Cardiac Assessment   Cardiac (WDL):  Within Defined Limits

## 2023-08-05 NOTE — CARE PLAN
"The patient is Stable - Low risk of patient condition declining or worsening    Shift Goals  Clinical Goals: safety  Patient Goals: safety, sleep/rest    Problem: Pain - Standard  Goal: Alleviation of pain or a reduction in pain to the patient’s comfort goal  Outcome: Progressing  Flowsheets (Taken 8/5/2023 0243)  OB Pain Intervention: Medication - See MAR  Note: Patient able to verbalize pain level and verbalize an acceptable level of pain.      Problem: Fall Risk - Rehab  Goal: Patient will remain free from falls  Outcome: Progressing  Note: Tonya Reed Fall risk Assessment Score: 13    Moderate fall risk Interventions  - Bed and strip alarm   - Yellow sign by the door   - Yellow wrist band \"Fall risk\"  - Room near to the nurse station  - Do not leave patient unattended in the bathroom  - Fall risk education provided    Pt uses call light consistently and appropriately. Waits for assistance does not attempt self transfer this shift.  Bed and chair alarm on and in place. Able to verbalize needs.                "

## 2023-08-06 ENCOUNTER — APPOINTMENT (OUTPATIENT)
Dept: OCCUPATIONAL THERAPY | Facility: REHABILITATION | Age: 73
DRG: 560 | End: 2023-08-06
Attending: PHYSICAL MEDICINE & REHABILITATION
Payer: MEDICARE

## 2023-08-06 ENCOUNTER — APPOINTMENT (OUTPATIENT)
Dept: PHYSICAL THERAPY | Facility: REHABILITATION | Age: 73
DRG: 560 | End: 2023-08-06
Attending: PHYSICAL MEDICINE & REHABILITATION
Payer: MEDICARE

## 2023-08-06 PROCEDURE — A9270 NON-COVERED ITEM OR SERVICE: HCPCS | Performed by: PHYSICAL MEDICINE & REHABILITATION

## 2023-08-06 PROCEDURE — 97535 SELF CARE MNGMENT TRAINING: CPT

## 2023-08-06 PROCEDURE — 97110 THERAPEUTIC EXERCISES: CPT

## 2023-08-06 PROCEDURE — 770010 HCHG ROOM/CARE - REHAB SEMI PRIVAT*

## 2023-08-06 PROCEDURE — 97116 GAIT TRAINING THERAPY: CPT

## 2023-08-06 PROCEDURE — 97530 THERAPEUTIC ACTIVITIES: CPT

## 2023-08-06 PROCEDURE — 700102 HCHG RX REV CODE 250 W/ 637 OVERRIDE(OP): Performed by: PHYSICAL MEDICINE & REHABILITATION

## 2023-08-06 RX ADMIN — DIBASIC SODIUM PHOSPHATE, MONOBASIC POTASSIUM PHOSPHATE AND MONOBASIC SODIUM PHOSPHATE 250 MG: 852; 155; 130 TABLET ORAL at 13:43

## 2023-08-06 RX ADMIN — GABAPENTIN 300 MG: 300 CAPSULE ORAL at 07:30

## 2023-08-06 RX ADMIN — OMEPRAZOLE 40 MG: 20 CAPSULE, DELAYED RELEASE ORAL at 07:30

## 2023-08-06 RX ADMIN — OXYCODONE HYDROCHLORIDE 10 MG: 10 TABLET ORAL at 20:52

## 2023-08-06 RX ADMIN — OXYCODONE HYDROCHLORIDE 10 MG: 10 TABLET ORAL at 00:09

## 2023-08-06 RX ADMIN — OXYCODONE HYDROCHLORIDE 5 MG: 5 TABLET ORAL at 07:39

## 2023-08-06 RX ADMIN — DIBASIC SODIUM PHOSPHATE, MONOBASIC POTASSIUM PHOSPHATE AND MONOBASIC SODIUM PHOSPHATE 250 MG: 852; 155; 130 TABLET ORAL at 00:08

## 2023-08-06 RX ADMIN — GABAPENTIN 300 MG: 300 CAPSULE ORAL at 20:52

## 2023-08-06 RX ADMIN — DIBASIC SODIUM PHOSPHATE, MONOBASIC POTASSIUM PHOSPHATE AND MONOBASIC SODIUM PHOSPHATE 250 MG: 852; 155; 130 TABLET ORAL at 20:56

## 2023-08-06 RX ADMIN — OXYCODONE HYDROCHLORIDE 5 MG: 5 TABLET ORAL at 16:37

## 2023-08-06 RX ADMIN — DIBASIC SODIUM PHOSPHATE, MONOBASIC POTASSIUM PHOSPHATE AND MONOBASIC SODIUM PHOSPHATE 250 MG: 852; 155; 130 TABLET ORAL at 05:21

## 2023-08-06 RX ADMIN — GABAPENTIN 300 MG: 300 CAPSULE ORAL at 16:33

## 2023-08-06 RX ADMIN — ATORVASTATIN CALCIUM 10 MG: 10 TABLET, FILM COATED ORAL at 20:52

## 2023-08-06 ASSESSMENT — PAIN SCALES - PAIN ASSESSMENT IN ADVANCED DEMENTIA (PAINAD)
BODYLANGUAGE: RELAXED
CONSOLABILITY: NO NEED TO CONSOLE
TOTALSCORE: 0
BREATHING: NORMAL
BREATHING: NORMAL
FACIALEXPRESSION: SMILING OR INEXPRESSIVE
BREATHING: NORMAL
BODYLANGUAGE: RELAXED
TOTALSCORE: 0
FACIALEXPRESSION: SMILING OR INEXPRESSIVE
TOTALSCORE: 0
FACIALEXPRESSION: SMILING OR INEXPRESSIVE
CONSOLABILITY: NO NEED TO CONSOLE
BODYLANGUAGE: RELAXED
FACIALEXPRESSION: SMILING OR INEXPRESSIVE
CONSOLABILITY: NO NEED TO CONSOLE
BREATHING: NORMAL
BODYLANGUAGE: RELAXED
CONSOLABILITY: NO NEED TO CONSOLE
TOTALSCORE: 0

## 2023-08-06 ASSESSMENT — PATIENT HEALTH QUESTIONNAIRE - PHQ9
2. FEELING DOWN, DEPRESSED, IRRITABLE, OR HOPELESS: NOT AT ALL
1. LITTLE INTEREST OR PLEASURE IN DOING THINGS: NOT AT ALL
2. FEELING DOWN, DEPRESSED, IRRITABLE, OR HOPELESS: NOT AT ALL
SUM OF ALL RESPONSES TO PHQ9 QUESTIONS 1 AND 2: 0
1. LITTLE INTEREST OR PLEASURE IN DOING THINGS: NOT AT ALL
SUM OF ALL RESPONSES TO PHQ9 QUESTIONS 1 AND 2: 0

## 2023-08-06 ASSESSMENT — ACTIVITIES OF DAILY LIVING (ADL)
BED_CHAIR_WHEELCHAIR_TRANSFER_DESCRIPTION: INCREASED TIME;SET-UP OF EQUIPMENT;SUPERVISION FOR SAFETY;VERBAL CUEING
BED_CHAIR_WHEELCHAIR_TRANSFER_DESCRIPTION: ADAPTIVE EQUIPMENT;SET-UP OF EQUIPMENT;SUPERVISION FOR SAFETY
BED_CHAIR_WHEELCHAIR_TRANSFER_DESCRIPTION: ADAPTIVE EQUIPMENT;SET-UP OF EQUIPMENT;SUPERVISION FOR SAFETY
TOILET_TRANSFER_DESCRIPTION: GRAB BAR;ADAPTIVE EQUIPMENT;INCREASED TIME;SET-UP OF EQUIPMENT;SUPERVISION FOR SAFETY
TUB_SHOWER_TRANSFER_DESCRIPTION: GRAB BAR;SHOWER BENCH;SET-UP OF EQUIPMENT;SUPERVISION FOR SAFETY
TOILETING_LEVEL_OF_ASSIST_DESCRIPTION: GRAB BAR;INCREASED TIME;INITIAL PREPARATION FOR TASK;SET-UP OF EQUIPMENT;SUPERVISION FOR SAFETY

## 2023-08-06 ASSESSMENT — PAIN DESCRIPTION - PAIN TYPE
TYPE: ACUTE PAIN

## 2023-08-06 ASSESSMENT — PAIN SCALES - WONG BAKER
WONGBAKER_NUMERICALRESPONSE: DOESN'T HURT AT ALL
WONGBAKER_NUMERICALRESPONSE: HURTS AS MUCH AS POSSIBLE

## 2023-08-06 ASSESSMENT — GAIT ASSESSMENTS
DISTANCE (FEET): 150
ASSISTIVE DEVICE: FRONT WHEEL WALKER
GAIT LEVEL OF ASSIST: CONTACT GUARD ASSIST
DEVIATION: STEP TO;SHUFFLED GAIT;BRADYKINETIC

## 2023-08-06 ASSESSMENT — FIBROSIS 4 INDEX: FIB4 SCORE: 2.14

## 2023-08-06 NOTE — CARE PLAN
The patient is Stable - Low risk of patient condition declining or worsening    Shift Goals  Clinical Goals: Pain, safety  Patient Goals: Safety  Family Goals: Education    Progress made toward(s) clinical / shift goals: Pt's VSS, pain controlled with PRN medication, educated on medication this shift.

## 2023-08-06 NOTE — CARE PLAN
"The patient is Stable - Low risk of patient condition declining or worsening    Shift Goals  Clinical Goals: safety, pain control  Patient Goals: safety, pain management  Family Goals: Education      Problem: Pain - Standard  Goal: Alleviation of pain or a reduction in pain to the patient’s comfort goal  Outcome: Progressing  Flowsheets (Taken 8/6/2023 0114)  Non Verbal Scale: Crying  Browning-Gaviria Scale: 10  OB Pain Level: 2-Requires Intervention  OB Pain Intervention: Medication - See MAR  Pain Rating Scale (NPRS): 10  Note: Received patient crying for pain medication d/t left hip pain. Patient verbalize when she moves around her bed to reposition self pain radiates from hip down to foot.  Medicated with PRN Oxycodone 10 mg with help. Re-assessed after an hour and pt's already smiling and happy that she's okay.      Problem: Fall Risk - Rehab  Goal: Patient will remain free from falls  Outcome: Progressing  Note: Tonya Reed Fall risk Assessment Score: 13    Moderate fall risk Interventions  - Bed and strip alarm   - Yellow sign by the door   - Yellow wrist band \"Fall risk\"  - Room near to the nurse station  - Do not leave patient unattended in the bathroom  - Fall risk education provided  Patient uses call light consistently and appropriately this shift.  Waits for assistance when needed and does not attempt self transfer.  Able to verbalize needs.  Will continue to monitor.              "

## 2023-08-06 NOTE — THERAPY
Physical Therapy   Daily Treatment     Patient Name: Ariana Drake  Age:  73 y.o., Sex:  female  Medical Record #: [id number]  Today's Date: 8/6/2023     Precautions  Precautions: Spinal / Back Precautions , Fall Risk  Comments: monitor BP- orthostatic, Lumbar    Subjective    Patient agreeable to PT at 1545; received supine in bed.     Objective       08/06/23 1545   PT Charge Group   PT Gait Training (Units) 1   PT Therapeutic Exercise (Units) 1   PT Therapeutic Activities (Units) 1   PT Total Time Spent   PT Individual Total Time Spent (Mins) 45   Precautions   Precautions Spinal / Back Precautions ;Fall Risk   Comments monitor BP- orthostatic, Lumbar   Vitals   O2 (LPM) 0   O2 Delivery Device None - Room Air   Pain 0 - 10 Group   Comfort Goal 0   Non Verbal Descriptors   Non Verbal Scale  Calm   Gait Functional Level of Assist    Gait Level Of Assist Contact Guard Assist   Assistive Device Front Wheel Walker   Distance (Feet) 150   # of Times Distance was Traveled 1   Deviation Step To;Shuffled Gait;Bradykinetic  (VC for increased step length B)   Transfer Functional Level of Assist   Bed, Chair, Wheelchair Transfer Contact Guard Assist   Bed Chair Wheelchair Transfer Description Increased time;Set-up of equipment;Supervision for safety;Verbal cueing  (FWW, 2 reps.)   Supine Lower Body Exercise   Other Exercises Shuttle BLE leg press for 2 reps of 7-8 minutes each, BLE use, 2 bands resistance first rep, 3 bands on second rep.   Bed Mobility    Supine to Sit Supervised  (but CGA with Shuttle)   Sit to Supine Supervised  (but SBA with shuttle)   Sit to Stand Contact Guard Assist  (FWW, setup, extra time)   Scooting Supervised   Rolling Supervised   Interdisciplinary Plan of Care Collaboration   Patient Position at End of Therapy In Bed;Bed Alarm On;Call Light within Reach;Tray Table within Reach;Phone within Reach         Assessment    Pt receptive to extensive spinal education & POC discussion but may require  repetition for improved carryover. Good motivation and participation within structured task.  Focus on increased activity tolerance without pain limitations.  Impaired balance but improves with use of BUE support.    Strengths: Able to follow instructions, Alert and oriented, Effective communication skills, Independent prior level of function, Motivated for self care and independence, Willingly participates in therapeutic activities  Barriers: Bladder retention, Generalized weakness, Hearing impairment, Orthostatic hypotension, Impaired activity tolerance, Impaired balance, Limited mobility, Pain, Pain poorly managed    Plan    Gait training c/ FWW and emphasis on large amplitude stepping, sufficient heel strike/toe off  LE strengthening c/ shuttle, mini squats, step ups, hip flexion c/ theraband, standing marches  Curb training c/ FWW  Dynamic balance activities via weight shifting, 4 way hip target taps, UE cone stacking c/ small WANDER  Core stability c/ block balance on head, large therapy ball sitting and reaching, postural holds c/ alternating perturbations    Passport items to be completed:  Get in/out of bed safely, in/out of a vehicle, safely use mobility device, walk or wheel around home/community, navigate up and down stairs, show how to get up/down from the ground, ensure home is accessible, demonstrate HEP, complete caregiver training    Physical Therapy Problems (Active)       Problem: Mobility       Dates: Start:  08/03/23         Goal: STG-Within one week, patient will ambulate up/down a curb at CGA or better c/ LRAD       Dates: Start:  08/03/23               Problem: Mobility Transfers       Dates: Start:  08/03/23         Goal: STG-Within one week, patient will transfer via logroll supine<>sit at SBA w/o VCs        Dates: Start:  08/03/23            Goal: STG-Within one week, patient will logroll to the L and R at SBA w/o VCs       Dates: Start:  08/03/23            Goal: STG-Within one week, patient  will sit to stand c/ LRAD at SBA or better       Dates: Start:  08/03/23               Problem: PT-Long Term Goals       Dates: Start:  08/03/23         Goal: LTG-By discharge, patient will ambulate 50ft c/ SPC at SPV       Dates: Start:  08/03/23            Goal: LTG-By discharge, patient will perform home exercise program for LE strengthening       Dates: Start:  08/03/23            Goal: LTG-By discharge, patient will transfer in/out of a car at setupA or better c/ LRAD       Dates: Start:  08/03/23

## 2023-08-06 NOTE — THERAPY
Occupational Therapy  Daily Treatment     Patient Name: Ariana Drake  Age:  73 y.o., Sex:  female  Medical Record #: [id number]  Today's Date: 8/6/2023     Precautions  Precautions: Fall Risk, Spinal / Back Precautions   Comments: monitor BP- orthostatic         Subjective    Pt supine in bed upon arrival. Pt pleasant and cooperative, agreeable to therapy.     Objective       08/06/23 1501   OT Charge Group   OT Self Care / ADL (Units) 1   OT Therapy Activity (Units) 1   OT Total Time Spent   OT Individual Total Time Spent (Mins) 30   Vitals   O2 Delivery Device None - Room Air   Cognition    Level of Consciousness Alert   Sleep/Wake Cycle   Sleep & Rest Awake   Functional Level of Assist   Toileting Standby Assist   Toileting Description Grab bar;Increased time;Initial preparation for task;Set-up of equipment;Supervision for safety   Bed, Chair, Wheelchair Transfer Standby Assist   Bed Chair Wheelchair Transfer Description Adaptive equipment;Set-up of equipment;Supervision for safety  (from bed to ambulating with FWW)   Toilet Transfers Contact Guard Assist   Toilet Transfer Description Grab bar;Adaptive equipment;Increased time;Set-up of equipment;Supervision for safety  (from ambulation with FWW <> toilet)   IADL Treatments   IADL Treatments Kitchen mobility education   Kitchen Mobility Education Pt retrieved 6 cones from high positions placed in kitchen cupboards, appliances, and drawers with FWW and CGA.   Interdisciplinary Plan of Care Collaboration   Patient Position at End of Therapy In Bed;Bed Alarm On;Call Light within Reach;Tray Table within Reach;Phone within Reach     Functional mobility- pt ambulated from room <> ADL kitchen with FWW and CGA.    Assessment    Pt was successful with all functional mobility with no LOB noted. Pt required one cue to find last cone during kitchen mobility, but pt did not need cues during activity for FWW safety. Pt needed one sitting rest break after ambulating to ADL  kitchen, but was able to ambulate right after kitchen mobility activity to room with no rest break. Pt completed all activities with no break of precautions.    Strengths: Able to follow instructions, Alert and oriented, Independent prior level of function, Motivated for self care and independence, Pleasant and cooperative, Supportive family, Willingly participates in therapeutic activities  Barriers: Decreased endurance, Fatigue, Hearing impairment, Orthostatic hypotension, Impaired activity tolerance, Impaired balance, Pain (decreased safety awareness/attention)    Plan    Continue increasing overall strength and endurance. ADL/IADL training with FWW.    Passport items to be completed:  Perform bathroom transfers, complete dressing, complete feeding, get ready for the day, prepare a simple meal, participate in household tasks, adapt home for safety needs, demonstrate home exercise program, complete caregiver training     Occupational Therapy Goals (Active)       Problem: Dressing       Dates: Start:  08/03/23         Goal: STG-Within one week, patient will dress LB with min a        Dates: Start:  08/03/23               Problem: Functional Transfers       Dates: Start:  08/03/23         Goal: STG-Within one week, patient will transfer to toilet with CGA        Dates: Start:  08/03/23               Problem: OT Long Term Goals       Dates: Start:  08/03/23         Goal: LTG-By discharge, patient will complete basic self care tasks with mod I-supervision        Dates: Start:  08/03/23            Goal: LTG-By discharge, patient will perform bathroom transfers with mod I-supervision        Dates: Start:  08/03/23            Goal: LTG-By discharge, patient will complete basic home management with mod I-supervision        Dates: Start:  08/03/23               Problem: Toileting       Dates: Start:  08/03/23         Goal: STG-Within one week, patient will complete toileting tasks with CGA        Dates: Start:  08/03/23

## 2023-08-06 NOTE — PROGRESS NOTES
NURSING DAILY NOTE    Name: Ariana Drake   Date of Admission: 8/2/2023   Admitting Diagnosis: S/P lumbar fusion  Attending Physician: Gabby Alvarez D.o.  Allergies: Patient has no known allergies.    Safety  Patient Assist  min a  Patient Precautions  Fall Risk, Spinal / Back Precautions   Precaution Comments  monitor BP- orthostatic  Bed Transfer Status  Standby Assist  Toilet Transfer Status   Moderate Assist  Assistive Devices  Rails, Wheelchair  Oxygen  None - Room Air  Diet/Therapeutic Dining  Current Diet Order   Procedures    Diet Order Diet: Regular     Pill Administration  whole  Agitated Behavioral Scale     ABS Level of Severity       Fall Risk  Has the patient had a fall this admission?   No  Tonya Reed Fall Risk Scoring  13, MODERATE RISK  Fall Risk Safety Measures  bed alarm, chair alarm, poor balance, low vision/ hearing, and ok to leave pt in bathroom    Vitals  Temperature: 36.6 °C (97.9 °F)  Temp src: Oral  Pulse: 77  Respiration: 18  Blood Pressure : 108/63  Blood Pressure MAP (Calculated): 78 MM HG  BP Location: Left, Upper Arm  Patient BP Position: Supine     Oxygen  Pulse Oximetry: 97 %  O2 (LPM): 0  O2 Delivery Device: None - Room Air  Incentive Spirometer Volume: 1000 mL    Bowel and Bladder  Last Bowel Movement  08/04/23  Stool Type  Type 7: Watery, no solid pieces-entirely liquid  Bowel Device     Continent  Bladder: Stress incontinence   Bowel: Continent movement  Bladder Function  Urine Void (mL):  (MOderate)  Number of Times Voided: 1  Urine Color: Yellow  Genitourinary Assessment   Bladder Assessment (WDL):  WDL Except  Brandt Catheter: Not Applicable  Urine Color: Yellow  Bladder Device: Bathroom  Bladder Scan: Post Void  $ Bladder Scan Results (mL): 98    Skin  Gerry Score   19  Sensory Interventions      Moisture Interventions         Pain  Pain Rating Scale  0 - No Pain  Pain Location  Head  Pain Location  Orientation  Mid, Lower  Pain Interventions   Medication (see MAR)    ADLs    Bathing   Shower, Staff  Linen Change      Personal Hygiene  Moist Darlyn Wipes  Chlorhexidine Bath      Oral Care  Brushed Teeth  Teeth/Dentures  Intact  Shave     Nutrition Percentage Eaten  *  * Meal *  *, Lunch, Between 50-75% Consumed  Environmental Precautions  Treaded Slipper Socks on Patient, Bed in Low Position  Patient Turns/Positioning  Patient Turns Self from Side to Side  Patient Turns Assistance/Tolerance     Bed Positions  Bed Controls On, Bed Locked  Head of Bed Elevated  Self regulated      Psychosocial/Neurologic Assessment  Psychosocial Assessment  Psychosocial (WDL):  Within Defined Limits  Neurologic Assessment  Neuro (WDL): Exceptions to WDL  Level of Consciousness: Alert  Orientation Level: Oriented X4  Cognition: Appropriate judgement  Speech: Clear  Pupil Assesment: No  Motor Function/Sensation Assessment: Motor response  R Foot Dorsiflexion: Strong  L Foot Dorsiflexion: Strong  RUE Motor Response: Responds to commands  RUE Sensation: Full sensation  Muscle Strength Right Arm: Good Strength Against Gravity and Moderate Resistance  LUE Motor Response: Responds to commands  LUE Sensation: Full sensation  Muscle Strength Left Arm: Good Strength Against Gravity and Moderate Resistance  RLE Motor Response: Responds to commands  RLE Sensation: Full sensation  Muscle Strength Right Leg: Fair Strength against Gravity but No Resistance  LLE Motor Response: Responds to commands  LLE Sensation: Full sensation  Muscle Strength Left Leg: Fair Strength against Gravity but No Resistance  EENT (WDL):  WDL Except    Cardio/Pulmonary Assessment  Edema      Respiratory Breath Sounds  RUL Breath Sounds: Clear  RML Breath Sounds: Clear  RLL Breath Sounds: Clear  JEWEL Breath Sounds: Clear  LLL Breath Sounds: Clear  Cardiac Assessment   Cardiac (WDL):  Within Defined Limits

## 2023-08-06 NOTE — THERAPY
Occupational Therapy  Daily Treatment     Patient Name: Ariana Drake  Age:  73 y.o., Sex:  female  Medical Record #: [id number]  Today's Date: 8/6/2023     Precautions  Precautions: (P) Fall Risk, Spinal / Back Precautions   Comments: (P) monitor BP- orthostatic         Subjective    Patient was resting in bed and agreeable to performing shower routine.  She had an ice pack on her left knee upon arrival.       Objective       08/06/23 1031   OT Charge Group   OT Self Care / ADL (Units) 3   OT Therapy Activity (Units) 1   OT Total Time Spent   OT Individual Total Time Spent (Mins) 60   Precautions   Precautions Fall Risk;Spinal / Back Precautions    Comments monitor BP- orthostatic   Pain   Intervention Cold Pack   Pain 0 - 10 Group   Location Hip;Knee   Location Orientation Left   Therapist Pain Assessment Prior to Activity   Functional Level of Assist   Eating Independent   Grooming Independent;Seated   Bathing Supervision   Bathing Description Grab bar;Hand held shower;Tub bench;Set-up of equipment;Supervision for safety  (figure 4 to wash feet)   Upper Body Dressing Supervision   Upper Body Dressing Description Set-up of equipment   Lower Body Dressing Standby Assist   Lower Body Dressing Description Set-up of equipment;Supervision for safety  (setup/sba to don/doff pullups, pants, don socks)   Bed, Chair, Wheelchair Transfer Standby Assist   Bed Chair Wheelchair Transfer Description Adaptive equipment;Set-up of equipment;Supervision for safety  (fww)   Tub / Shower Transfers Standby Assist   Tub Shower Transfer Description Grab bar;Shower bench;Set-up of equipment;Supervision for safety   Bed Mobility    Supine to Sit Supervised   Sit to Supine Supervised   Scooting Supervised   Rolling Supervised   Interdisciplinary Plan of Care Collaboration   IDT Collaboration with  Nursing   Patient Position at End of Therapy In Bed;Call Light within Reach;Tray Table within Reach;Phone within Reach;Bed Alarm On    Collaboration Comments RN informed of skin redness under top portion of island dressing, may have allergic reaction to the adhesive         Assessment    Patient demonstrated improvements in multiple areas of self care today compared to evaluation day.  She is able to complete LB dressing without use of AE while maintaining spinal precautions.  BP okay after shower, though patient stated she felt generalized fatigue.  Strengths: Able to follow instructions, Alert and oriented, Independent prior level of function, Motivated for self care and independence, Pleasant and cooperative, Supportive family, Willingly participates in therapeutic activities  Barriers: Decreased endurance, Fatigue, Hearing impairment, Orthostatic hypotension, Impaired activity tolerance, Impaired balance, Pain (decreased safety awareness/attention)    Plan    ADLs, IADLs, functional mobility with FWW (walking is patient's favorite occupation), standing tolerance/balance, endurance    Occupational Therapy Goals (Active)       Problem: Dressing       Dates: Start:  08/03/23         Goal: STG-Within one week, patient will dress LB with min a        Dates: Start:  08/03/23               Problem: Functional Transfers       Dates: Start:  08/03/23         Goal: STG-Within one week, patient will transfer to toilet with CGA        Dates: Start:  08/03/23               Problem: OT Long Term Goals       Dates: Start:  08/03/23         Goal: LTG-By discharge, patient will complete basic self care tasks with mod I-supervision        Dates: Start:  08/03/23            Goal: LTG-By discharge, patient will perform bathroom transfers with mod I-supervision        Dates: Start:  08/03/23            Goal: LTG-By discharge, patient will complete basic home management with mod I-supervision        Dates: Start:  08/03/23               Problem: Toileting       Dates: Start:  08/03/23         Goal: STG-Within one week, patient will complete toileting tasks with  CGA        Dates: Start:  08/03/23

## 2023-08-07 ENCOUNTER — APPOINTMENT (OUTPATIENT)
Dept: PHYSICAL THERAPY | Facility: REHABILITATION | Age: 73
DRG: 560 | End: 2023-08-07
Attending: PHYSICAL MEDICINE & REHABILITATION
Payer: MEDICARE

## 2023-08-07 ENCOUNTER — APPOINTMENT (OUTPATIENT)
Dept: OCCUPATIONAL THERAPY | Facility: REHABILITATION | Age: 73
DRG: 560 | End: 2023-08-07
Attending: PHYSICAL MEDICINE & REHABILITATION
Payer: MEDICARE

## 2023-08-07 LAB
ANION GAP SERPL CALC-SCNC: 12 MMOL/L (ref 7–16)
BUN SERPL-MCNC: 14 MG/DL (ref 8–22)
CALCIUM SERPL-MCNC: 9 MG/DL (ref 8.5–10.5)
CHLORIDE SERPL-SCNC: 97 MMOL/L (ref 96–112)
CO2 SERPL-SCNC: 27 MMOL/L (ref 20–33)
CREAT SERPL-MCNC: 0.75 MG/DL (ref 0.5–1.4)
GFR SERPLBLD CREATININE-BSD FMLA CKD-EPI: 83 ML/MIN/1.73 M 2
GLUCOSE SERPL-MCNC: 103 MG/DL (ref 65–99)
PHOSPHATE SERPL-MCNC: 3.2 MG/DL (ref 2.5–4.5)
POTASSIUM SERPL-SCNC: 3 MMOL/L (ref 3.6–5.5)
SODIUM SERPL-SCNC: 136 MMOL/L (ref 135–145)

## 2023-08-07 PROCEDURE — 770010 HCHG ROOM/CARE - REHAB SEMI PRIVAT*

## 2023-08-07 PROCEDURE — 97530 THERAPEUTIC ACTIVITIES: CPT

## 2023-08-07 PROCEDURE — 97112 NEUROMUSCULAR REEDUCATION: CPT

## 2023-08-07 PROCEDURE — 80048 BASIC METABOLIC PNL TOTAL CA: CPT

## 2023-08-07 PROCEDURE — 99232 SBSQ HOSP IP/OBS MODERATE 35: CPT | Performed by: PHYSICAL MEDICINE & REHABILITATION

## 2023-08-07 PROCEDURE — 700102 HCHG RX REV CODE 250 W/ 637 OVERRIDE(OP): Performed by: PHYSICAL MEDICINE & REHABILITATION

## 2023-08-07 PROCEDURE — 84100 ASSAY OF PHOSPHORUS: CPT

## 2023-08-07 PROCEDURE — 36415 COLL VENOUS BLD VENIPUNCTURE: CPT

## 2023-08-07 PROCEDURE — 97535 SELF CARE MNGMENT TRAINING: CPT

## 2023-08-07 PROCEDURE — 97116 GAIT TRAINING THERAPY: CPT

## 2023-08-07 PROCEDURE — 97110 THERAPEUTIC EXERCISES: CPT

## 2023-08-07 PROCEDURE — A9270 NON-COVERED ITEM OR SERVICE: HCPCS | Performed by: PHYSICAL MEDICINE & REHABILITATION

## 2023-08-07 RX ORDER — POTASSIUM CHLORIDE 20 MEQ/1
40 TABLET, EXTENDED RELEASE ORAL DAILY
Status: DISCONTINUED | OUTPATIENT
Start: 2023-08-08 | End: 2023-08-12 | Stop reason: HOSPADM

## 2023-08-07 RX ADMIN — GABAPENTIN 300 MG: 300 CAPSULE ORAL at 20:27

## 2023-08-07 RX ADMIN — ACETAMINOPHEN 500 MG: 500 TABLET ORAL at 20:27

## 2023-08-07 RX ADMIN — GABAPENTIN 300 MG: 300 CAPSULE ORAL at 08:25

## 2023-08-07 RX ADMIN — OXYCODONE HYDROCHLORIDE 10 MG: 10 TABLET ORAL at 21:46

## 2023-08-07 RX ADMIN — GABAPENTIN 300 MG: 300 CAPSULE ORAL at 15:39

## 2023-08-07 RX ADMIN — OXYCODONE HYDROCHLORIDE 5 MG: 5 TABLET ORAL at 18:21

## 2023-08-07 RX ADMIN — ATORVASTATIN CALCIUM 10 MG: 10 TABLET, FILM COATED ORAL at 20:27

## 2023-08-07 RX ADMIN — OMEPRAZOLE 40 MG: 20 CAPSULE, DELAYED RELEASE ORAL at 08:25

## 2023-08-07 RX ADMIN — DIBASIC SODIUM PHOSPHATE, MONOBASIC POTASSIUM PHOSPHATE AND MONOBASIC SODIUM PHOSPHATE 250 MG: 852; 155; 130 TABLET ORAL at 03:13

## 2023-08-07 ASSESSMENT — GAIT ASSESSMENTS
DEVIATION: STEP TO;BRADYKINETIC;SHUFFLED GAIT
ASSISTIVE DEVICE: FRONT WHEEL WALKER
GAIT LEVEL OF ASSIST: CONTACT GUARD ASSIST
DISTANCE (FEET): 150

## 2023-08-07 ASSESSMENT — PAIN DESCRIPTION - PAIN TYPE: TYPE: ACUTE PAIN

## 2023-08-07 ASSESSMENT — ACTIVITIES OF DAILY LIVING (ADL)
TUB_SHOWER_TRANSFER_DESCRIPTION: GRAB BAR;SHOWER BENCH;SET-UP OF EQUIPMENT;SUPERVISION FOR SAFETY
TOILET_TRANSFER_DESCRIPTION: GRAB BAR;VERBAL CUEING;SUPERVISION FOR SAFETY;SET-UP OF EQUIPMENT
TOILETING_LEVEL_OF_ASSIST_DESCRIPTION: GRAB BAR;SUPERVISION FOR SAFETY
BED_CHAIR_WHEELCHAIR_TRANSFER_DESCRIPTION: ADAPTIVE EQUIPMENT;SET-UP OF EQUIPMENT;SUPERVISION FOR SAFETY

## 2023-08-07 NOTE — THERAPY
Occupational Therapy  Daily Treatment     Patient Name: Ariana Drake  Age:  73 y.o., Sex:  female  Medical Record #: [id number]  Today's Date: 8/7/2023     Precautions  Precautions: Fall Risk, Spinal / Back Precautions   Comments: monitor BP- orthostatic, Lumbar         Subjective    Patient in restroom using the toilet upon OT arrival.  She reported right sided neck pain with head movement.  Agreeable to OT.     Objective       08/07/23 0831   OT Charge Group   OT Self Care / ADL (Units) 1   OT Therapy Activity (Units) 2   OT Therapeutic Exercise (Units) 1   OT Total Time Spent   OT Individual Total Time Spent (Mins) 60   Precautions   Precautions Fall Risk;Spinal / Back Precautions    Pain 0 - 10 Group   Location Neck   Location Orientation Right   Therapist Pain Assessment Prior to Activity;During Activity   Functional Level of Assist   Grooming Independent;Seated   Toileting Standby Assist   Toileting Description Grab bar;Supervision for safety   Bed, Chair, Wheelchair Transfer Standby Assist   Bed Chair Wheelchair Transfer Description Adaptive equipment;Set-up of equipment;Supervision for safety  (fww w/c to bed)   Toilet Transfers Standby Assist   Toilet Transfer Description Grab bar;Verbal cueing;Supervision for safety;Set-up of equipment   Tub / Shower Transfers Standby Assist   Tub Shower Transfer Description Grab bar;Shower bench;Set-up of equipment;Supervision for safety  (dry stall shower transfer with grab bar and shower chair and SBA)   Sitting Upper Body Exercises   Sitting Upper Body Exercises Yes   Shoulder Press 1 set of 10;Right ;Left;Weight (See Comments for lbs)   Bicep Curls 1 set of 10;Right ;Left;Weight (See Comments for lbs)   Comments 3 lb weight   Sitting Lower Body Exercises   Sitting Lower Body Exercises Yes   Nustep Resistance Level 1  (x 10 minutes with LEs only)   Bed Mobility    Sit to Supine Supervised   Interdisciplinary Plan of Care Collaboration   Patient Position at End of  Therapy In Bed;Bed Alarm On;Call Light within Reach;Tray Table within Reach;Phone within Reach       Functional mobility with FWW in main gym and down hallway to back gym with SBA.    Assessment    Patient tolerated session fair with reports of R neck pain, incisional discomfort and LE fatigue.    Strengths: Able to follow instructions, Alert and oriented, Independent prior level of function, Motivated for self care and independence, Pleasant and cooperative, Supportive family, Willingly participates in therapeutic activities  Barriers: Decreased endurance, Fatigue, Hearing impairment, Orthostatic hypotension, Impaired activity tolerance, Impaired balance, Pain (decreased safety awareness/attention)    Plan    ADLs, IADLs, functional mobility with FWW (walking is patient's favorite occupation), standing tolerance/balance, endurance    Occupational Therapy Goals (Active)       Problem: Dressing       Dates: Start:  08/03/23         Goal: STG-Within one week, patient will dress LB with min a        Dates: Start:  08/03/23               Problem: Functional Transfers       Dates: Start:  08/03/23         Goal: STG-Within one week, patient will transfer to toilet with CGA        Dates: Start:  08/03/23               Problem: OT Long Term Goals       Dates: Start:  08/03/23         Goal: LTG-By discharge, patient will complete basic self care tasks with mod I-supervision        Dates: Start:  08/03/23            Goal: LTG-By discharge, patient will perform bathroom transfers with mod I-supervision        Dates: Start:  08/03/23            Goal: LTG-By discharge, patient will complete basic home management with mod I-supervision        Dates: Start:  08/03/23               Problem: Toileting       Dates: Start:  08/03/23         Goal: STG-Within one week, patient will complete toileting tasks with CGA        Dates: Start:  08/03/23

## 2023-08-07 NOTE — THERAPY
Physical Therapy   Daily Treatment     Patient Name: Ariana Drake  Age:  73 y.o., Sex:  female  Medical Record #: [id number]  Today's Date: 8/7/2023     Precautions  Precautions: (P) Fall Risk, Spinal / Back Precautions   Comments: (P) monitor BP- orthostatic, Lumbar    Subjective    Ariana was in bed prior to therapy, agreeable to treatment. Discussed feeling sore in her posterior neck on R side d/t poor sleeping position.      Objective    Incision inspection- no s/s of infection     08/07/23 1031   Precautions   Precautions Fall Risk;Spinal / Back Precautions    Comments monitor BP- orthostatic, Lumbar   Gait Functional Level of Assist    Gait Level Of Assist Contact Guard Assist   Assistive Device Front Wheel Walker   Distance (Feet) 150   # of Times Distance was Traveled 1   Deviation Step To;Bradykinetic;Shuffled Gait   Transfer Functional Level of Assist   Bed, Chair, Wheelchair Transfer Standby Assist   Bed Chair Wheelchair Transfer Description Supervision for safety;Adaptive equipment;Set-up of equipment  (FWW w/c to bed; stood w/o therapist prepared c/ walker)   Standing Lower Body Exercises   Marching 1 set of 10  (poor functional hip flex AROM)   Interdisciplinary Plan of Care Collaboration   IDT Collaboration with  Physician   Patient Position at End of Therapy In Bed;Bed Alarm On;Call Light within Reach;Tray Table within Reach;Phone within Reach   Collaboration Comments Discussed improved BP management     Gait training c/ emphasis on foot clearance + increased step length in //  Blocked practice forward/backward stepping 2x10 steps each direction  Step overs using 2 flat targets 4x10'; decreased UE support 2>0; 2 M/L LOB-able to self-correct using hip strategy   Forward walking 4x10' c/ B UE support and manual cues for weight shift and verbal cues for foot clearance; SPV>CGA d/t R knee buckling during activity  Backward walking 4x10' laps c/ B UE support  Standing hip abduction 1x10, bilateral UE  support  Lateral walking 3x10' c/ B UE support  -excessively bradykinetic c/ gait training, required repeat VCs for foot clearance, excessive sway c/ bilateral UE support at //, self limiting.    Ariana began feeling upset and tearful about her LBP/function during session, required rest and water break. Episodes of R knee buckling and crouched gait increased as session progressed in //: 'my ankles and calves have been numb since before my surgery and the FWW gives me more support'  LE sensation WNL- able to feel light touch at L4-S1    Assessment    Ariana presents c/ poor dynamic and anticipatory balance, LBP, and self limiting behaviors which hinder her ability to fully engage in therapy. Noted significant difficulty c/ sagittal plane LE movements in standing d/t weak hip and knee flexors/hip extensors.     Strengths: Able to follow instructions, Alert and oriented, Effective communication skills, Independent prior level of function, Motivated for self care and independence, Willingly participates in therapeutic activities  Barriers: Bladder retention, Generalized weakness, Hearing impairment, Orthostatic hypotension, Impaired activity tolerance, Impaired balance, Limited mobility, Pain, Pain poorly managed    Plan    Gait training c/ FWW and emphasis on large amplitude stepping, sufficient heel strike/toe off  LE strengthening c/ shuttle, mini squats, step ups, hip flexion c/ theraband, standing marches, bridging  Curb training c/ FWW  Dynamic balance activities via weight shifting, 4 way hip target taps, UE cone stacking c/ small WANDER  Core stability c/ block balance on head, large therapy ball sitting and reaching, postural holds c/ alternating perturbations    Passport items to be completed:  Get in/out of bed safely, in/out of a vehicle, safely use mobility device, walk or wheel around home/community, navigate up and down stairs, show how to get up/down from the ground, ensure home is accessible, demonstrate  HEP, complete caregiver training    Physical Therapy Problems (Active)       Problem: Mobility       Dates: Start:  08/03/23         Goal: STG-Within one week, patient will ambulate up/down a curb at CGA or better c/ LRAD       Dates: Start:  08/03/23               Problem: Mobility Transfers       Dates: Start:  08/03/23         Goal: STG-Within one week, patient will transfer via logroll supine<>sit at SBA w/o VCs        Dates: Start:  08/03/23            Goal: STG-Within one week, patient will logroll to the L and R at SBA w/o VCs       Dates: Start:  08/03/23            Goal: STG-Within one week, patient will sit to stand c/ LRAD at SBA or better       Dates: Start:  08/03/23               Problem: PT-Long Term Goals       Dates: Start:  08/03/23         Goal: LTG-By discharge, patient will ambulate 50ft c/ SPC at SPV       Dates: Start:  08/03/23            Goal: LTG-By discharge, patient will perform home exercise program for LE strengthening       Dates: Start:  08/03/23            Goal: LTG-By discharge, patient will transfer in/out of a car at setupA or better c/ LRAD       Dates: Start:  08/03/23

## 2023-08-07 NOTE — THERAPY
Physical Therapy   Daily Treatment     Patient Name: Ariana Drake  Age:  73 y.o., Sex:  female  Medical Record #: [id number]  Today's Date: 8/7/2023     Precautions  Precautions: (P) Fall Risk, Spinal / Back Precautions   Comments: (P) monitor BP- orthostatic, Lumbar    Subjective    Pt reported nerve type pain in L lateral thigh, fatigue from previous session, numbness in BLE (L > R), and tightness in L medial thigh.      Objective       08/07/23 1401   PT Charge Group   PT Therapeutic Exercise (Units) 1   PT Neuromuscular Re-Education / Balance (Units) 2   PT Therapeutic Activities (Units) 1   PT Total Time Spent   PT Individual Total Time Spent (Mins) 60   Precautions   Precautions Fall Risk;Spinal / Back Precautions    Comments monitor BP- orthostatic, Lumbar   Supine Lower Body Exercise   Pelvic Tilt Bilateral  (5x, poorly tolerated, discontinued)   Abdominal Bracing 1 set of 10   Hip Flexion Bilateral  (5x alt hip marches, with emphasis on core stabilization)   Hip Adduction  1 set of 10;Bilateral  (emphasis on LE motor control)   Heel Slide 1 set of 10;Bilateral  (with core recruitment)   Gluteal Isometrics 1 set of 10;Bilateral   Bed Mobility    Scooting Supervised   Neuro-Muscular Treatments   Neuro-Muscular Treatments Weight Shift Right;Weight Shift Left;Verbal Cuing;Tactile Cuing;Sequencing   Comments Education on core recruitment/ stabilization. Education on LE desensitivation techniques. STM/ PROM to L hip ADD to relax tight musculature.   Interdisciplinary Plan of Care Collaboration   IDT Collaboration with  Certified Nursing Assistant   Patient Position at End of Therapy In Bed;Call Light within Reach;Tray Table within Reach;Phone within Reach;Bed Alarm On   Collaboration Comments Vitals assessed near start of tx         Assessment    Pt demonstrated impaired motor planning during supine exercises, and impaired core stabilization; both improved during session. Pt was limited by intermittent nerve  type pain, and apparent hypersensitivity. PROM, STM, and desensitization techniques assisted in improved performance of core stabilization HEP. Pt was receptive to education and training.       Strengths: Able to follow instructions, Alert and oriented, Effective communication skills, Independent prior level of function, Motivated for self care and independence, Willingly participates in therapeutic activities  Barriers: Bladder retention, Generalized weakness, Hearing impairment, Orthostatic hypotension, Impaired activity tolerance, Impaired balance, Limited mobility, Pain, Pain poorly managed    Plan    Gait training c/ FWW and emphasis on large amplitude stepping, sufficient heel strike/toe off  LE strengthening c/ shuttle, mini squats, step ups, hip flexion c/ theraband, standing marches  Curb training c/ FWW  Dynamic balance activities via weight shifting, 4 way hip target taps, UE cone stacking c/ small WANDER  Core stability c/ block balance on head, large therapy ball sitting and reaching, postural holds c/ alternating perturbations    Passport items to be completed:  Get in/out of bed safely, in/out of a vehicle, safely use mobility device, walk or wheel around home/community, navigate up and down stairs, show how to get up/down from the ground, ensure home is accessible, demonstrate HEP, complete caregiver training    Physical Therapy Problems (Active)       Problem: Mobility       Dates: Start:  08/03/23         Goal: STG-Within one week, patient will ambulate up/down a curb at [facility] or better c/ [facility]       Dates: Start:  08/03/23               Problem: Mobility Transfers       Dates: Start:  08/03/23         Goal: STG-Within one week, patient will transfer via logroll supine<>sit at [facility] w/o VCs        Dates: Start:  08/03/23            Goal: STG-Within one week, patient will logroll to the L and R at [facility] w/o VCs       Dates: Start:  08/03/23            Goal: STG-Within one week, patient will sit to stand c/  LRAD at [facility] or better       Dates: Start:  08/03/23               Problem: PT-Long Term Goals       Dates: Start:  08/03/23         Goal: LTG-By discharge, patient will ambulate 50ft c/ SPC at V       Dates: Start:  08/03/23            Goal: LTG-By discharge, patient will perform home exercise program for LE strengthening       Dates: Start:  08/03/23            Goal: LTG-By discharge, [facility]       Dates: Start:  08/03/23

## 2023-08-07 NOTE — PROGRESS NOTES
"  Physical Medicine & Rehabilitation Progress Note    Encounter Date: 8/7/2023    Chief Complaint: Decreased mobility, weakness    Interval Events (Subjective):  Patient sitting up in room. She reports the dizziness/nausea she had has improved since her SBP has been higher. Denies NVD.     Objective:  VITAL SIGNS: /55   Pulse 70   Temp 37 °C (98.6 °F) (Oral)   Resp 18   Ht 1.57 m (5' 1.81\")   Wt 55 kg (121 lb 4.1 oz)   SpO2 94%   BMI 22.31 kg/m²   Gen: NAD  Psych: Mood and affect appropriate  CV: RRR, 0 edema  Resp: CTAB, no upper airway sounds  Abd: NTND  Neuro: AOx4, following commands    Laboratory Values:  Recent Results (from the past 72 hour(s))   Basic Metabolic Panel    Collection Time: 08/07/23  5:32 AM   Result Value Ref Range    Sodium 136 135 - 145 mmol/L    Potassium 3.0 (L) 3.6 - 5.5 mmol/L    Chloride 97 96 - 112 mmol/L    Co2 27 20 - 33 mmol/L    Glucose 103 (H) 65 - 99 mg/dL    Bun 14 8 - 22 mg/dL    Creatinine 0.75 0.50 - 1.40 mg/dL    Calcium 9.0 8.5 - 10.5 mg/dL    Anion Gap 12.0 7.0 - 16.0   PHOSPHORUS    Collection Time: 08/07/23  5:32 AM   Result Value Ref Range    Phosphorus 3.2 2.5 - 4.5 mg/dL   ESTIMATED GFR    Collection Time: 08/07/23  5:32 AM   Result Value Ref Range    GFR (CKD-EPI) 83 >60 mL/min/1.73 m 2       Medications:  Scheduled Medications   Medication Dose Frequency    [START ON 8/8/2023] potassium chloride SA  40 mEq DAILY    gabapentin  300 mg TID    omeprazole  40 mg QAM    Pharmacy Consult Request  1 Each PHARMACY TO DOSE    atorvastatin  10 mg Q EVENING     PRN medications: senna-docusate **AND** polyethylene glycol/lytes **AND** magnesium hydroxide **AND** bisacodyl, Respiratory Therapy Consult, oxyCODONE immediate-release, oxyCODONE immediate release, hydrALAZINE, carboxymethylcellulose, benzocaine-menthol, mag hydrox-al hydrox-simeth, ondansetron **OR** ondansetron, traZODone, sodium chloride, acetaminophen    Diet:  Current Diet Order   Procedures    Diet " Order Diet: Regular       Medical Decision Making and Plan:  Adapted from Dr. Alvarez' A&P  Recurrent right L4 radiculopathy secondary to neuroforaminal stenosis s/p bilateral L4-5 partial laminectomy, right-sided total facetectomy, decompression of bilateral L5 and right L4 nerve roots with L4-5 fusion 7/31/2023 Dr. Cottrell  -PT and OT for mobility and ADLs. Per guidelines, 15 hours per week between PT, OT and/or SLP.  -Follow-up neurosurgery     Hypertension-continue hydrochlorothiazide 25 mg daily -blood pressure stable, continue hydrochlorothiazide, though patient slightly dizzy, could be UTI, monitor BP. 8/3 more orthostatic with therapy. Reduce HCTZ to 12.5mg daily. 8/4 BP stable continue HCTZ 12.5mg. 8/4 stop hydrochlorothiazide. Continue off HCTZ.      Nausea and dizziness -likely secondary to orthostatic hypotension.  Check KUB.       Hyperlipidemia-continue Lipitor 10 mg     Pain -Tylenol, gabapentin and oxycodone. Continue Gabapentin 300 mg TID     Hyponatremia -Labs 8/3 -sodium normal at 136.     Hypokalemia -Labs 8/3 low at 3.1 -supplement x1 8/3.  Recheck BMP 8/7 3.0, start 40 mEq     Hypophosphatemia -start K-Phos x4 days.  Recheck phosphorus 8/7     Anemia -Labs 8/3 -improved.     Dysuria, suprapubic pain -check urinalysis 8/3 - negative     Skin - Patient at risk for skin breakdown due to debility in areas including sacrum, achilles, elbows and head in addition to other sites. Nursing to assess skin daily.      GI Ppx - Patient on Prilosec for GERD prophylaxis. Patient on Senna-docusate for constipation prophylaxis made PRN due to large watery BM x2 8/3.      DVT Ppx -not on chemoprophylaxis.  Encourage out of bed.  ____________________________________    T. Gonzalez Dhillon MD/PhD  [facility] - Physical Medicine & Rehabilitation   [facility] - Brain Injury Medicine   ____________________________________

## 2023-08-07 NOTE — CARE PLAN
"The patient is Stable - Low risk of patient condition declining or worsening    Shift Goals  Clinical Goals: safety  Patient Goals: safety, pain management  Family Goals: Education    Problem: Pain - Standard  Goal: Alleviation of pain or a reduction in pain to the patient’s comfort goal  Outcome: Progressing  Flowsheets (Taken 8/7/2023 0148)  OB Pain Intervention: Medication - See MAR  Note: Patient able to verbalize pain level and verbalize an acceptable level of pain.      Problem: Fall Risk - Rehab  Goal: Patient will remain free from falls  Outcome: Progressing  Note: Tonya Reed Fall risk Assessment Score: 16    High fall risk Interventions   - Alarming seatbelt  - Bed and strip alarm   - Yellow sign by the door   - Yellow wrist band \"Fall risk\"  - Room near to the nurse station  - Do not leave patient unattended in the bathroom  - Fall risk education provided  Patient uses call light consistently and appropriately this shift.  Waits for assistance when needed and does not attempt self transfer.  Able to verbalize needs.  Will continue to monitor.          "

## 2023-08-07 NOTE — PROGRESS NOTES
NURSING DAILY NOTE    Name: Ariana Drake   Date of Admission: 8/2/2023   Admitting Diagnosis: S/P lumbar fusion  Attending Physician: Gabby Alvarez D.o.  Allergies: Patient has no known allergies.    Safety  Patient Assist  SBA  Patient Precautions  Spinal / Back Precautions , Fall Risk  Precaution Comments  monitor BP- orthostatic, Lumbar  Bed Transfer Status  Contact Guard Assist  Toilet Transfer Status   Contact Guard Assist  Assistive Devices  Rails, Walker - front wheel  Oxygen  None - Room Air  Diet/Therapeutic Dining  Current Diet Order   Procedures    Diet Order Diet: Regular     Pill Administration  whole  Agitated Behavioral Scale     ABS Level of Severity       Fall Risk  Has the patient had a fall this admission?   No  Tonya Reed Fall Risk Scoring  13, MODERATE RISK  Fall Risk Safety Measures  bed alarm, chair alarm, poor balance, and low vision/ hearing    Vitals  Temperature: 36.7 °C (98.1 °F)  Temp src: Oral  Pulse: (!) 59  Respiration: 16  Blood Pressure : 121/61  Blood Pressure MAP (Calculated): 81 MM HG  BP Location: Left, Upper Arm  Patient BP Position: Supine     Oxygen  Pulse Oximetry: 93 %  O2 (LPM): 0  O2 Delivery Device: None - Room Air  Incentive Spirometer Volume: 1000 mL    Bowel and Bladder  Last Bowel Movement  08/06/23  Stool Type  Type 4: Like a sausage or snake, smooth and soft, Type 5: Soft blob with clear cut edges (passed easily) (between 4 and 5)  Bowel Device     Continent  Bladder: Stress incontinence   Bowel: Continent movement  Bladder Function  Urine Void (mL):  (large)  Number of Times Voided: 1  Urine Color: Yellow  Wet Diaper Count: 1  Genitourinary Assessment   Bladder Assessment (WDL):  WDL Except  Brandt Catheter: Not Applicable  Urine Color: Yellow  Bladder Device: Bathroom  Bladder Scan: Post Void  $ Bladder Scan Results (mL): 98    Skin  Gerry Score   19  Sensory Interventions      Moisture  Interventions         Pain  Pain Rating Scale  0 - No Pain  Pain Location  Hip, Knee  Pain Location Orientation  Left  Pain Interventions   Cold Pack    ADLs    Bathing   Shower, Staff  Linen Change      Personal Hygiene  Change Darlyn Pads, Moist Darlyn Wipes  Chlorhexidine Bath      Oral Care  Brushed Teeth  Teeth/Dentures  Intact  Shave     Nutrition Percentage Eaten  *  * Meal *  *, Lunch, Between 50-75% Consumed  Environmental Precautions  Treaded Slipper Socks on Patient, Bed in Low Position  Patient Turns/Positioning  Patient Turns Self from Side to Side  Patient Turns Assistance/Tolerance     Bed Positions  Bed Controls On, Bed Locked  Head of Bed Elevated  Self regulated      Psychosocial/Neurologic Assessment  Psychosocial Assessment  Psychosocial (WDL):  Within Defined Limits  Neurologic Assessment  Neuro (WDL): Exceptions to WDL  Level of Consciousness: Alert  Orientation Level: Oriented X4  Cognition: Appropriate judgement  Speech: Clear  Pupil Assesment: No  Motor Function/Sensation Assessment: Motor response  R Foot Dorsiflexion: Strong  L Foot Dorsiflexion: Strong  RUE Motor Response: Responds to commands  RUE Sensation: Full sensation  Muscle Strength Right Arm: Good Strength Against Gravity and Moderate Resistance  LUE Motor Response: Responds to commands  LUE Sensation: Full sensation  Muscle Strength Left Arm: Good Strength Against Gravity and Moderate Resistance  RLE Motor Response: Responds to commands  RLE Sensation: Full sensation  Muscle Strength Right Leg: Fair Strength against Gravity but No Resistance  LLE Motor Response: Responds to commands  LLE Sensation: Full sensation  Muscle Strength Left Leg: Fair Strength against Gravity but No Resistance  EENT (WDL):  WDL Except    Cardio/Pulmonary Assessment  Edema      Respiratory Breath Sounds  RUL Breath Sounds: Clear  RML Breath Sounds: Clear  RLL Breath Sounds: Clear  JEWEL Breath Sounds: Clear  LLL Breath Sounds: Clear  Cardiac Assessment    Cardiac (WDL):  Within Defined Limits

## 2023-08-07 NOTE — THERAPY
Occupational Therapy  Daily Treatment     Patient Name: Ariana Drake  Age:  73 y.o., Sex:  female  Medical Record #: [id number]  Today's Date: 8/7/2023     Precautions  Precautions: (P) Fall Risk, Spinal / Back Precautions   Comments: (P) monitor BP- orthostatic, Lumbar    Subjective    Patient in bed upon arrival, agreeable to participate in OT.      Objective     08/07/23 1301   OT Charge Group   OT Neuromuscular Re-education / Balance (Units) 1   OT Therapy Activity (Units) 1   OT Total Time Spent   OT Individual Total Time Spent (Mins) 30   Precautions   Precautions Fall Risk;Spinal / Back Precautions    Comments monitor BP- orthostatic, Lumbar   Vitals   O2 Delivery Device None - Room Air   Sleep/Wake Cycle   Sleep & Rest Awake   Functional Level of Assist   Bed, Chair, Wheelchair Transfer Supervised  (fww level)   IADL Treatments   IADL Treatments Home management   Home Management Supervision for laundry task in standing; placed towels in washer and retrieved w/ use of reacher to facilitate safety/ independence and adherence to spinal precautions; patient also folded towels while standing @ supervision level   Interdisciplinary Plan of Care Collaboration   Patient Position at End of Therapy In Bed;Call Light within Reach     Functional mobility w/ FWW >1000' w/ supervision     Assessment    Patie  Strengths: Able to follow instructions, Alert and oriented, Independent prior level of function, Motivated for self care and independence, Pleasant and cooperative, Supportive family, Willingly participates in therapeutic activities  Barriers: Decreased endurance, Fatigue, Hearing impairment, Orthostatic hypotension, Impaired activity tolerance, Impaired balance, Pain (decreased safety awareness/attention)    Plan    ***    Passport items to be completed:  {REHAB PASSPORT:09198}    Occupational Therapy Goals (Active)       Problem: Dressing       Dates: Start:  08/03/23         Goal: STG-Within one week, patient  will dress LB with min a        Dates: Start:  08/03/23               Problem: Functional Transfers       Dates: Start:  08/03/23         Goal: STG-Within one week, patient will transfer to toilet with [facility]        Dates: Start:  08/03/23               Problem: OT Long Term Goals       Dates: Start:  08/03/23         Goal: LTG-By discharge, patient will complete basic self care tasks with mod I-supervision        Dates: Start:  08/03/23            Goal: LTG-By discharge, patient will perform bathroom transfers with mod I-supervision        Dates: Start:  08/03/23            Goal: LTG-By discharge, patient will complete basic home management with mod I-supervision        Dates: Start:  08/03/23               Problem: Toileting       Dates: Start:  08/03/23         Goal: STG-Within one week, patient will complete toileting tasks with [facility]        Dates: Start:  08/03/23

## 2023-08-07 NOTE — THERAPY
Occupational Therapy  Daily Treatment     Patient Name: Ariana Drake  Age:  73 y.o., Sex:  female  Medical Record #: [id number]  Today's Date: 8/7/2023     Precautions  Precautions: Fall Risk, Spinal / Back Precautions   Comments: monitor BP- orthostatic, Lumbar         Subjective    Patient in bed upon arrival, agreeable to participate in OT.      Objective       08/07/23 1301   OT Charge Group   OT Neuromuscular Re-education / Balance (Units) 1   OT Therapy Activity (Units) 1   OT Total Time Spent   OT Individual Total Time Spent (Mins) 30   Precautions   Precautions Fall Risk;Spinal / Back Precautions    Comments monitor BP- orthostatic, Lumbar   Vitals   O2 Delivery Device None - Room Air   Sleep/Wake Cycle   Sleep & Rest Awake   Functional Level of Assist   Grooming Supervision;Standing   Toileting Supervision   Bed, Chair, Wheelchair Transfer Supervised  (fww level)   Toilet Transfers Supervised  (FWW level)   IADL Treatments   IADL Treatments Home management   Home Management Supervision for laundry task in standing; placed towels in washer and retrieved w/ use of reacher to facilitate safety/ independence and adherence to spinal precautions; patient also folded towels while standing @ supervision level   Interdisciplinary Plan of Care Collaboration   Patient Position at End of Therapy In Bed;Call Light within Reach     Functional mobility w/ FWW >1000' w/ supervision     Assessment    Patient tolerated OT session well with focus on IADLs and functional mobility @ FWW level. Patient receptive to recommendations re: adaptive techniques and safety considerations to maximize independence w/ ADL/IADL tasks at home (ie incorporating AE, pacing, walker attachments if using FWW).  Strengths: Able to follow instructions, Alert and oriented, Independent prior level of function, Motivated for self care and independence, Pleasant and cooperative, Supportive family, Willingly participates in therapeutic  activities  Barriers: Decreased endurance, Fatigue, Hearing impairment, Orthostatic hypotension, Impaired activity tolerance, Impaired balance, Pain (decreased safety awareness/attention)    Plan    Assess for mod I in room, ADLs, IADLs, functional mobility with FWW (walking is patient's favorite occupation), standing tolerance/balance, endurance    Occupational Therapy Goals (Active)       Problem: Dressing       Dates: Start:  08/03/23         Goal: STG-Within one week, patient will dress LB with min a        Dates: Start:  08/03/23               Problem: Functional Transfers       Dates: Start:  08/03/23         Goal: STG-Within one week, patient will transfer to toilet with [facility]        Dates: Start:  08/03/23               Problem: OT Long Term Goals       Dates: Start:  08/03/23         Goal: LTG-By discharge, patient will complete basic self care tasks with mod I-supervision        Dates: Start:  08/03/23            Goal: LTG-By discharge, patient will perform bathroom transfers with mod I-supervision        Dates: Start:  08/03/23            Goal: LTG-By discharge, patient will complete basic home management with mod I-supervision        Dates: Start:  08/03/23               Problem: Toileting       Dates: Start:  08/03/23         Goal: STG-Within one week, patient will complete toileting tasks with [facility]        Dates: Start:  08/03/23

## 2023-08-07 NOTE — PROGRESS NOTES
NURSING DAILY NOTE    Name: Ariana Drake   Date of Admission: 8/2/2023   Admitting Diagnosis: S/P lumbar fusion  Attending Physician: Gabby Alvarez D.o.  Allergies: Patient has no known allergies.    Safety  Patient Assist  SBA  Patient Precautions  Spinal / Back Precautions , Fall Risk  Precaution Comments  monitor BP- orthostatic, Lumbar  Bed Transfer Status  Contact Guard Assist  Toilet Transfer Status   Contact Guard Assist  Assistive Devices  Rails, Walker - front wheel  Oxygen  None - Room Air  Diet/Therapeutic Dining  Current Diet Order   Procedures    Diet Order Diet: Regular     Pill Administration  whole  Agitated Behavioral Scale     ABS Level of Severity       Fall Risk  Has the patient had a fall this admission?   No  Tonya Reed Fall Risk Scoring  16, HIGH RISK  Fall Risk Safety Measures  bed alarm, chair alarm, and poor balance    Vitals  Temperature: 37.1 °C (98.8 °F)  Temp src: Oral  Pulse: 87  Respiration: 18  Blood Pressure : 113/61  Blood Pressure MAP (Calculated): 78 MM HG  BP Location: Left, Upper Arm  Patient BP Position: Supine     Oxygen  Pulse Oximetry: 95 %  O2 (LPM): 0  O2 Delivery Device: None - Room Air  Incentive Spirometer Volume: 1000 mL    Bowel and Bladder  Last Bowel Movement  08/06/23  Stool Type  Type 4: Like a sausage or snake, smooth and soft, Type 5: Soft blob with clear cut edges (passed easily) (between 4 and 5)  Bowel Device     Continent  Bladder: Stress incontinence   Bowel: Continent movement  Bladder Function  Urine Void (mL):  (large)  Number of Times Voided: 1  Urine Color: Yellow  Wet Diaper Count: 1  Genitourinary Assessment   Bladder Assessment (WDL):  WDL Except  Brandt Catheter: Not Applicable  Urine Color: Yellow  Bladder Device: Bathroom  Bladder Scan: Post Void  $ Bladder Scan Results (mL): 98    Skin  Gerry Score   19  Sensory Interventions      Moisture Interventions         Pain  Pain  Rating Scale  0 - No Pain  Pain Location  Leg, Hip  Pain Location Orientation  Left  Pain Interventions   Medication (see MAR)    ADLs    Bathing   Shower, Staff  Linen Change      Personal Hygiene  Change Darlyn Pads, Moist Darlyn Wipes  Chlorhexidine Bath      Oral Care  Brushed Teeth  Teeth/Dentures  Intact  Shave     Nutrition Percentage Eaten  *  * Meal *  *, Lunch, Between 50-75% Consumed  Environmental Precautions  Treaded Slipper Socks on Patient, Bed in Low Position  Patient Turns/Positioning  Patient Turns Self from Side to Side  Patient Turns Assistance/Tolerance     Bed Positions  Bed Controls On, Bed Locked  Head of Bed Elevated  Self regulated      Psychosocial/Neurologic Assessment  Psychosocial Assessment  Psychosocial (WDL):  Within Defined Limits  Neurologic Assessment  Neuro (WDL): Exceptions to WDL  Level of Consciousness: Alert  Orientation Level: Oriented X4  Cognition: Appropriate judgement  Speech: Clear  Pupil Assesment: No  Motor Function/Sensation Assessment: Motor response  R Foot Dorsiflexion: Strong  L Foot Dorsiflexion: Strong  RUE Motor Response: Responds to commands  RUE Sensation: Full sensation  Muscle Strength Right Arm: Good Strength Against Gravity and Moderate Resistance  LUE Motor Response: Responds to commands  LUE Sensation: Full sensation  Muscle Strength Left Arm: Good Strength Against Gravity and Moderate Resistance  RLE Motor Response: Responds to commands  RLE Sensation: Full sensation  Muscle Strength Right Leg: Fair Strength against Gravity but No Resistance  LLE Motor Response: Responds to commands  LLE Sensation: Full sensation  Muscle Strength Left Leg: Fair Strength against Gravity but No Resistance  EENT (WDL):  WDL Except    Cardio/Pulmonary Assessment  Edema      Respiratory Breath Sounds  RUL Breath Sounds: Clear  RML Breath Sounds: Clear  RLL Breath Sounds: Clear  JEWEL Breath Sounds: Clear  LLL Breath Sounds: Clear  Cardiac Assessment   Cardiac (WDL):  Within  Defined Limits

## 2023-08-07 NOTE — CARE PLAN
Problem: Knowledge Deficit - Standard  Goal: Patient and family/care givers will demonstrate understanding of plan of care, disease process/condition, diagnostic tests and medications  Outcome: Progressing     Problem: Pain - Standard  Goal: Alleviation of pain or a reduction in pain to the patient’s comfort goal  Note: Pt complained of pain to right side of neck; saying she slept wrong. She refused prn pain medication; heat pack offered and applied. Pt able to participate in therapies and activities this shift. Will continue to monitor.    The patient is Watcher - Medium risk of patient condition declining or worsening    Shift Goals  Clinical Goals: Safety  Patient Goals: safety, pain control  Family Goals: Education

## 2023-08-07 NOTE — CARE PLAN
The patient is Stable - Low risk of patient condition declining or worsening    Shift Goals  Clinical Goals: Safety, pain control  Patient Goals: Safety  Family Goals: Education    Progress made toward(s) clinical / shift goals: Pt's VSS, able to participate in therapy, pain controlled with PRN medication, skin tear on back from island dressing.

## 2023-08-08 ENCOUNTER — APPOINTMENT (OUTPATIENT)
Dept: RADIOLOGY | Facility: REHABILITATION | Age: 73
DRG: 560 | End: 2023-08-08
Attending: PHYSICAL MEDICINE & REHABILITATION
Payer: MEDICARE

## 2023-08-08 ENCOUNTER — APPOINTMENT (OUTPATIENT)
Dept: PHYSICAL THERAPY | Facility: REHABILITATION | Age: 73
DRG: 560 | End: 2023-08-08
Attending: PHYSICAL MEDICINE & REHABILITATION
Payer: MEDICARE

## 2023-08-08 ENCOUNTER — APPOINTMENT (OUTPATIENT)
Dept: OCCUPATIONAL THERAPY | Facility: REHABILITATION | Age: 73
DRG: 560 | End: 2023-08-08
Attending: PHYSICAL MEDICINE & REHABILITATION
Payer: MEDICARE

## 2023-08-08 PROCEDURE — 97535 SELF CARE MNGMENT TRAINING: CPT | Mod: CO

## 2023-08-08 PROCEDURE — 97530 THERAPEUTIC ACTIVITIES: CPT

## 2023-08-08 PROCEDURE — 97110 THERAPEUTIC EXERCISES: CPT

## 2023-08-08 PROCEDURE — 700102 HCHG RX REV CODE 250 W/ 637 OVERRIDE(OP): Performed by: PHYSICAL MEDICINE & REHABILITATION

## 2023-08-08 PROCEDURE — 97530 THERAPEUTIC ACTIVITIES: CPT | Mod: CO

## 2023-08-08 PROCEDURE — 97116 GAIT TRAINING THERAPY: CPT

## 2023-08-08 PROCEDURE — 97112 NEUROMUSCULAR REEDUCATION: CPT

## 2023-08-08 PROCEDURE — A9270 NON-COVERED ITEM OR SERVICE: HCPCS | Performed by: PHYSICAL MEDICINE & REHABILITATION

## 2023-08-08 PROCEDURE — 74018 RADEX ABDOMEN 1 VIEW: CPT

## 2023-08-08 PROCEDURE — 99233 SBSQ HOSP IP/OBS HIGH 50: CPT | Performed by: PHYSICAL MEDICINE & REHABILITATION

## 2023-08-08 PROCEDURE — A9270 NON-COVERED ITEM OR SERVICE: HCPCS | Mod: JZ | Performed by: PHYSICAL MEDICINE & REHABILITATION

## 2023-08-08 PROCEDURE — 700102 HCHG RX REV CODE 250 W/ 637 OVERRIDE(OP): Mod: JZ | Performed by: PHYSICAL MEDICINE & REHABILITATION

## 2023-08-08 PROCEDURE — 770010 HCHG ROOM/CARE - REHAB SEMI PRIVAT*

## 2023-08-08 RX ORDER — LOPERAMIDE HYDROCHLORIDE 2 MG/1
2 CAPSULE ORAL 4 TIMES DAILY PRN
Status: DISCONTINUED | OUTPATIENT
Start: 2023-08-08 | End: 2023-08-12 | Stop reason: HOSPADM

## 2023-08-08 RX ADMIN — OXYCODONE HYDROCHLORIDE 10 MG: 10 TABLET ORAL at 05:03

## 2023-08-08 RX ADMIN — OMEPRAZOLE 40 MG: 20 CAPSULE, DELAYED RELEASE ORAL at 08:19

## 2023-08-08 RX ADMIN — GABAPENTIN 300 MG: 300 CAPSULE ORAL at 08:19

## 2023-08-08 RX ADMIN — ATORVASTATIN CALCIUM 10 MG: 10 TABLET, FILM COATED ORAL at 20:48

## 2023-08-08 RX ADMIN — GABAPENTIN 300 MG: 300 CAPSULE ORAL at 20:49

## 2023-08-08 RX ADMIN — POTASSIUM CHLORIDE 40 MEQ: 1500 TABLET, EXTENDED RELEASE ORAL at 14:20

## 2023-08-08 RX ADMIN — OXYCODONE HYDROCHLORIDE 10 MG: 10 TABLET ORAL at 20:48

## 2023-08-08 RX ADMIN — GABAPENTIN 300 MG: 300 CAPSULE ORAL at 14:20

## 2023-08-08 ASSESSMENT — GAIT ASSESSMENTS
DEVIATION: BRADYKINETIC
DISTANCE (FEET): 150
GAIT LEVEL OF ASSIST: STANDBY ASSIST
GAIT LEVEL OF ASSIST: STANDBY ASSIST
DEVIATION: BRADYKINETIC
ASSISTIVE DEVICE: FRONT WHEEL WALKER;4 WHEEL WALKER
DISTANCE (FEET): 175
GAIT LEVEL OF ASSIST: STANDBY ASSIST
ASSISTIVE DEVICE: 4 WHEEL WALKER
DISTANCE (FEET): 250
ASSISTIVE DEVICE: 4 WHEEL WALKER

## 2023-08-08 ASSESSMENT — ACTIVITIES OF DAILY LIVING (ADL)
BED_CHAIR_WHEELCHAIR_TRANSFER_DESCRIPTION: ADAPTIVE EQUIPMENT;INCREASED TIME;INITIAL PREPARATION FOR TASK;SUPERVISION FOR SAFETY
BED_CHAIR_WHEELCHAIR_TRANSFER_DESCRIPTION: ADAPTIVE EQUIPMENT;SET-UP OF EQUIPMENT;SUPERVISION FOR SAFETY;VERBAL CUEING
BED_CHAIR_WHEELCHAIR_TRANSFER_DESCRIPTION: ADAPTIVE EQUIPMENT;INCREASED TIME;SET-UP OF EQUIPMENT;SUPERVISION FOR SAFETY;VERBAL CUEING

## 2023-08-08 NOTE — PROGRESS NOTES
NURSING DAILY NOTE    Name: Ariana Drake   Date of Admission: 8/2/2023   Admitting Diagnosis: S/P lumbar fusion  Attending Physician: Farhat Dhillon M.d.  Allergies: Patient has no known allergies.    Safety  Patient Assist  SBA  Patient Precautions  Fall Risk, Spinal / Back Precautions   Precaution Comments  monitor BP- orthostatic, Lumbar  Bed Transfer Status  Modified Independent (4WW)  Toilet Transfer Status   Supervised (FWW level)  Assistive Devices  Rails, Walker - front wheel  Oxygen  None - Room Air  Diet/Therapeutic Dining  Current Diet Order   Procedures    Diet Order Diet: Regular     Pill Administration  whole  Agitated Behavioral Scale  14  ABS Level of Severity  No Agitation    Fall Risk  Has the patient had a fall this admission?   No  Tonya Reed Fall Risk Scoring  16, HIGH RISK  Fall Risk Safety Measures  bed alarm and chair alarm    Vitals  Temperature: 36.8 °C (98.2 °F)  Temp src: Oral  Pulse: 78  Respiration: 18  Blood Pressure : 120/65  Blood Pressure MAP (Calculated): 83 MM HG  BP Location: Right, Upper Arm  Patient BP Position: Supine     Oxygen  Pulse Oximetry: 95 %  O2 (LPM): 0  O2 Delivery Device: None - Room Air  Incentive Spirometer Volume: 1000 mL    Bowel and Bladder  Last Bowel Movement  08/08/23  Stool Type  Type 6: Fluffy pieces with ragged edges, a mushy stool  Bowel Device  Bathroom  Continent  Bladder: Stress incontinence   Bowel: Continent movement  Bladder Function  Urine Void (mL):  (large)  Number of Times Voided: 1  Urine Color: Yellow  Wet Diaper Count: 1  Genitourinary Assessment   Bladder Assessment (WDL):  WDL Except  Brandt Catheter: Not Applicable  Urine Color: Yellow  Bladder Device: Bathroom  Bladder Scan: Post Void  $ Bladder Scan Results (mL): 98    Skin  Gerry Score   19  Sensory Interventions      Moisture Interventions         Pain  Pain Rating Scale  0 - No Pain  Pain Location  Neck  Pain  Location Orientation  Posterior  Pain Interventions   Medication (see MAR)    ADLs    Bathing    (OT Shower Tomorrow)  Linen Change      Personal Hygiene  Change Darlyn Pads, Moist Darlyn Wipes  Chlorhexidine Bath      Oral Care  Brushed Teeth  Teeth/Dentures  Intact  Shave     Nutrition Percentage Eaten  Lunch, Between % Consumed  Environmental Precautions  Treaded Slipper Socks on Patient  Patient Turns/Positioning  Patient Turns Self from Side to Side  Patient Turns Assistance/Tolerance     Bed Positions  Bed Controls On, Bed Locked  Head of Bed Elevated  Self regulated      Psychosocial/Neurologic Assessment  Psychosocial Assessment  Psychosocial (WDL):  Within Defined Limits  Neurologic Assessment  Neuro (WDL): Exceptions to WDL  Level of Consciousness: Alert  Orientation Level: Oriented X4  Cognition: Appropriate judgement  Speech: Clear  Pupil Assesment: No  Motor Function/Sensation Assessment: Motor response  R Foot Dorsiflexion: Strong  L Foot Dorsiflexion: Strong  RUE Motor Response: Responds to commands  RUE Sensation: Full sensation  Muscle Strength Right Arm: Good Strength Against Gravity and Moderate Resistance  LUE Motor Response: Responds to commands  LUE Sensation: Full sensation  Muscle Strength Left Arm: Good Strength Against Gravity and Moderate Resistance  RLE Motor Response: Responds to commands  RLE Sensation: Full sensation  Muscle Strength Right Leg: Fair Strength against Gravity but No Resistance  LLE Motor Response: Responds to commands  LLE Sensation: Full sensation  Muscle Strength Left Leg: Fair Strength against Gravity but No Resistance  EENT (WDL):  WDL Except    Cardio/Pulmonary Assessment  Edema      Respiratory Breath Sounds  RUL Breath Sounds: Clear  RML Breath Sounds: Clear  RLL Breath Sounds: Clear  JEWEL Breath Sounds: Clear  LLL Breath Sounds: Clear  Cardiac Assessment   Cardiac (WDL):  Within Defined Limits

## 2023-08-08 NOTE — CARE PLAN
"The patient is Watcher - Medium risk of patient condition declining or worsening    Shift Goals  Clinical Goals: Safety  Patient Goals: Safety, Pain Control  Family Goals: Education    Progress made toward(s) clinical / shift goals:    Problem: Pain - Standard  Goal: Alleviation of pain or a reduction in pain to the patient’s comfort goal  Flowsheets (Taken 8/8/2023 0125)  OB Pain Intervention: Medication - See MAR  Note: Patient able to verbalize pain level and verbalize an acceptable level of pain.      Problem: Fall Risk - Rehab  Goal: Patient will remain free from falls  Note: Tonya Reed Fall risk Assessment Score: 16    High fall risk Interventions   - Alarming seatbelt  - Bed and strip alarm   - Yellow sign by the door   - Yellow wrist band \"Fall risk\"  - Room near to the nurse station  - Do not leave patient unattended in the bathroom  - Fall risk education provided             "

## 2023-08-08 NOTE — THERAPY
"Occupational Therapy  Daily Treatment     Patient Name: Ariana Drake  Age:  73 y.o., Sex:  female  Medical Record #: [id number]  Today's Date: 8/8/2023     Precautions  Precautions: (P) Fall Risk, Spinal / Back Precautions   Comments: (P) monitor BP- orthostatic, Lumbar         Subjective    \"   Which one are you?\"  Asking which  therapy  I represent.      Objective/Assessment       08/08/23 1231   OT Charge Group   OT Self Care / ADL (Units) 2   OT Therapy Activity (Units) 2   OT Total Time Spent   OT Individual Total Time Spent (Mins) 60   Precautions   Precautions Fall Risk;Spinal / Back Precautions    Comments monitor BP- orthostatic, Lumbar   Functional Level of Assist   Bed, Chair, Wheelchair Transfer Modified Independent  (4WW)   Tub / Shower Transfers Modified Independent  (dry step in shower  transfer    to and from a shower chair     demosntrated good safety  uses grab bars as needed)   IADL Treatments   Meal Preparation mod I performing light  kitchen stovetop task  ( making Jello)  demonstrated good safety , body mechanics and adherence to  back precautions   Bed Mobility    Supine to Sit Modified Independent   Interdisciplinary Plan of Care Collaboration   IDT Collaboration with  Other (See Comments)   Patient Position at End of Therapy Seated  (hand off to Priscilla in Xray)   Collaboration Comments updated Priscilla from Xray   regarding paitent current functional status for  transfer and mobilty at 4WW level       4WW functional mobility  x 450 feet   gym to  central supply closet  to room  then to Xray.     No assist   good safety         Reviewed /demonstrated  alternate ways of performing  tasks such as  getting clothes out of the dryer  . Sitting on 4WW  get clothes out and on top of dryer   use reacher to get clothes from back of dryer to the front  and on top  .  Suggested placing clothes in small basket or back  and or transfer  to the 4WW  to  then put them away    Verbalizes  she is anxious to  go " home.          Strengths: Able to follow instructions, Alert and oriented, Independent prior level of function, Motivated for self care and independence, Pleasant and cooperative, Supportive family, Willingly participates in therapeutic activities  Barriers: Decreased endurance, Fatigue, Hearing impairment, Orthostatic hypotension, Impaired activity tolerance, Impaired balance, Pain (decreased safety awareness/attention)    Plan  Assess for mod I in room, ADLs, IADLs, functional mobility with FWW (walking is patient's favorite occupation), standing tolerance/balance, endurance         Occupational Therapy Goals (Active)       Problem: OT Long Term Goals       Dates: Start:  08/03/23         Goal: LTG-By discharge, patient will complete basic self care tasks with mod I-supervision        Dates: Start:  08/03/23            Goal: LTG-By discharge, patient will perform bathroom transfers with mod I-supervision        Dates: Start:  08/03/23            Goal: LTG-By discharge, patient will complete basic home management with mod I-supervision        Dates: Start:  08/03/23

## 2023-08-08 NOTE — CARE PLAN
Problem: Mobility  Goal: STG-Within one week, patient will ambulate up/down a curb at [facility] or better c/ LRAD  Outcome: Progressing  Not yet assessed     Problem: Mobility Transfers  Goal: STG-Within one week, patient will transfer via logroll supine<>sit at S[facility] w/o VCs   Outcome: Met  Goal: STG-Within one week, patient will logroll to the L and R at SBA w/o VCs  Outcome: Met  Goal: STG-Within one week, patient will sit to stand c/ LRAD at [facility] or better  Outcome: Met

## 2023-08-08 NOTE — THERAPY
Physical Therapy   Daily Treatment     Patient Name: Ariana Drake  Age:  73 y.o., Sex:  female  Medical Record #: [id number]  Today's Date: 8/8/2023     Precautions  Precautions: Fall Risk, Spinal / Back Precautions   Comments: monitor BP- orthostatic, Lumbar    Subjective    Ariana was in bed and on the phone at start of therapy, agreeable to treatment. Notes she has felt like she has been improving c/ therapy.     Objective       08/08/23 0831   Precautions   Precautions Fall Risk;Spinal / Back Precautions    Comments monitor BP- orthostatic, Lumbar   Cognition    Level of Consciousness Alert   Gait Functional Level of Assist    Gait Level Of Assist Standby Assist   Assistive Device 4 Wheel Walker   Distance (Feet) 150   # of Times Distance was Traveled 1   Deviation Bradykinetic   Transfer Functional Level of Assist   Bed, Chair, Wheelchair Transfer Standby Assist   Bed Chair Wheelchair Transfer Description Adaptive equipment;Set-up of equipment;Supervision for safety;Verbal cueing  (4WW; VCs to avoid BUE push off 4WW to stand)   Bed Mobility    Supine to Sit Supervised   Sit to Supine Supervised   Sit to Stand Supervised   Scooting Supervised   Rolling Supervised   Interdisciplinary Plan of Care Collaboration   Patient Position at End of Therapy Seated;Chair Alarm On;Call Light within Reach;Tray Table within Reach;Phone within Reach     Standing hip flexion/extension/abduction c/ pink theraband; 2x10 bilaterally; 1 UE support at mat table, SBA    Swing phase parts practice/reactive balance training c/ single leg ball swing throughs, emphasis on active hip/knee flexion and active hip/knee extension c/ verbal cues, 2x10 bilaterally; 1 UE support at mat table. 2 LOB in M/L direction, MAXA for recovery. Intermittent reminders to distribute weight more evenly, good carryover but c/ increased sway/instability. CGA    Assessment  Ariana presents c/ decreased gait speed and insufficient foot clearance. Session consisted  of LE strengthening and gait part practice to target impairments contributing to poor foot clearance/instability. Tolerated session well c/ increased instability in R SLS c/ trendelenburg, knee buckling, and poor eccentric quad control.     Strengths: Able to follow instructions, Alert and oriented, Effective communication skills, Independent prior level of function, Motivated for self care and independence, Willingly participates in therapeutic activities  Barriers: Bladder retention, Generalized weakness, Hearing impairment, Orthostatic hypotension, Impaired activity tolerance, Impaired balance, Limited mobility, Pain, Pain poorly managed    Plan    Gait training c/ 4WW and emphasis on large amplitude stepping, sufficient heel strike/toe off  LE strengthening c/ shuttle, mini squats, step ups, hip flexion c/ theraband, standing marches  Dynamic balance activities via weight shifting, 4 way hip target taps, UE cone stacking c/ small WANDER  Core stability c/ block balance on head, large therapy ball sitting and reaching, postural holds c/ alternating perturbations  30 minutes of family training, car transfer training c/ fiance/daughter and FWW, HEP, spinal precautions, curb step training c/ 4WW/FWW    Passport items to be completed:  Get in/out of bed safely, in/out of a vehicle, safely use mobility device, walk or wheel around home/community, navigate up and down stairs, show how to get up/down from the ground, ensure home is accessible, demonstrate HEP, complete caregiver training    Physical Therapy Problems (Active)       Problem: Mobility       Dates: Start:  08/03/23         Goal: STG-Within one week, patient will ambulate up/down a curb at [facility] or better c/ LRAD       Dates: Start:  08/03/23               Problem: PT-Long Term Goals       Dates: Start:  08/03/23         Goal: LTG-By discharge, patient will ambulate 50ft c/ SPC at [facility]       Dates: Start:  08/03/23            Goal: LTG-By discharge, patient will  perform home exercise program for LE strengthening       Dates: Start:  08/03/23            Goal: LTG-By discharge, [facility]       Dates: Start:  08/03/23            Goal: LTG-By discharge, patient will improve normal gait speed to >0.8 m/s c/ LRAD       Dates: Start:  08/08/23

## 2023-08-08 NOTE — CARE PLAN
Problem: Pain - Standard  Goal: Alleviation of pain or a reduction in pain to the patient’s comfort goal  Outcome: Progressing     Problem: Fall Risk - Rehab  Goal: Patient will remain free from falls  Outcome: Progressing       The patient is Stable - Low risk of patient condition declining or worsening    Shift Goals  Clinical Goals: Safety  Patient Goals: Safety, Pain Control  Family Goals: Education       Spontaneous, unlabored and symmetrical

## 2023-08-08 NOTE — DISCHARGE PLANNING
Case Management/IDT follow up.   IDT continues to recommend IRF level of care as patient continue to make progress with all therapies.   Projected dc date set for 8/12/23.      DC needs:  Recommendations made for home health for PT/OT  Follow up with: PCP    Met with pt / family providing update from IDT and discussed plan of care.    Plan:  Continue to follow

## 2023-08-08 NOTE — PROGRESS NOTES
NURSING DAILY NOTE    Name: Ariana Drake   Date of Admission: 8/2/2023   Admitting Diagnosis: S/P lumbar fusion  Attending Physician: Farhat Dhillon M.d.  Allergies: Patient has no known allergies.    Safety  Patient Assist  SBA  Patient Precautions  Fall Risk, Spinal / Back Precautions   Precaution Comments  monitor BP- orthostatic, Lumbar  Bed Transfer Status  Supervised (fww level)  Toilet Transfer Status   Supervised (FWW level)  Assistive Devices  Rails, Walker - front wheel  Oxygen  None - Room Air  Diet/Therapeutic Dining  Current Diet Order   Procedures    Diet Order Diet: Regular     Pill Administration  whole  Agitated Behavioral Scale  14  ABS Level of Severity  No Agitation    Fall Risk  Has the patient had a fall this admission?   No  Tonya Reed Fall Risk Scoring  16, HIGH RISK  Fall Risk Safety Measures  bed alarm, chair alarm, poor balance, and low vision/ hearing    Vitals  Temperature: 36.6 °C (97.9 °F)  Temp src: Oral  Pulse: 77  Respiration: 18  Blood Pressure : 138/72  Blood Pressure MAP (Calculated): 94 MM HG  BP Location: Left, Upper Arm  Patient BP Position: Supine     Oxygen  Pulse Oximetry: 96 %  O2 (LPM): 0  O2 Delivery Device: None - Room Air  Incentive Spirometer Volume: 1000 mL    Bowel and Bladder  Last Bowel Movement  08/07/23  Stool Type  Type 6: Fluffy pieces with ragged edges, a mushy stool  Bowel Device  Bathroom  Continent  Bladder: Stress incontinence   Bowel: Continent movement  Bladder Function  Urine Void (mL):  (large)  Number of Times Voided: 1  Urine Color: Yellow  Wet Diaper Count: 1  Genitourinary Assessment   Bladder Assessment (WDL):  WDL Except  Brandt Catheter: Not Applicable  Urine Color: Yellow  Bladder Device: Bathroom  Bladder Scan: Post Void  $ Bladder Scan Results (mL): 98    Skin  Gerry Score   19  Sensory Interventions      Moisture Interventions         Pain  Pain Rating Scale  6 - Hard  to ignore, avoid usual activities  Pain Location  Hip, Leg, Neck  Pain Location Orientation  Right, Left  Pain Interventions   Heat Applied    ADLs    Bathing   Shower, Staff  Linen Change      Personal Hygiene  Change Darlyn Pads, Moist Darlyn Wipes  Chlorhexidine Bath      Oral Care  Brushed Teeth  Teeth/Dentures  Intact  Shave     Nutrition Percentage Eaten  Lunch, Between 50-75% Consumed  Environmental Precautions  Treaded Slipper Socks on Patient, Personal Belongings, Wastebasket, Call Bell etc. in Easy Reach, Bed in Low Position  Patient Turns/Positioning  Patient Turns Self from Side to Side  Patient Turns Assistance/Tolerance     Bed Positions  Bed Controls On, Bed Locked  Head of Bed Elevated  Self regulated      Psychosocial/Neurologic Assessment  Psychosocial Assessment  Psychosocial (WDL):  Within Defined Limits  Neurologic Assessment  Neuro (WDL): Exceptions to WDL  Level of Consciousness: Alert  Orientation Level: Oriented X4  Cognition: Appropriate judgement  Speech: Clear  Pupil Assesment: No  Motor Function/Sensation Assessment: Motor response  R Foot Dorsiflexion: Strong  L Foot Dorsiflexion: Strong  RUE Motor Response: Responds to commands  RUE Sensation: Full sensation  Muscle Strength Right Arm: Good Strength Against Gravity and Moderate Resistance  LUE Motor Response: Responds to commands  LUE Sensation: Full sensation  Muscle Strength Left Arm: Good Strength Against Gravity and Moderate Resistance  RLE Motor Response: Responds to commands  RLE Sensation: Full sensation  Muscle Strength Right Leg: Fair Strength against Gravity but No Resistance  LLE Motor Response: Responds to commands  LLE Sensation: Full sensation  Muscle Strength Left Leg: Fair Strength against Gravity but No Resistance  EENT (WDL):  WDL Except    Cardio/Pulmonary Assessment  Edema      Respiratory Breath Sounds  RUL Breath Sounds: Clear  RML Breath Sounds: Clear  RLL Breath Sounds: Clear  JEWEL Breath Sounds: Clear  LLL Breath  Sounds: Clear  Cardiac Assessment   Cardiac (WDL):  Within Defined Limits

## 2023-08-08 NOTE — THERAPY
Physical Therapy   Daily Treatment     Patient Name: Ariana Drake  Age:  73 y.o., Sex:  female  Medical Record #: [id number]  Today's Date: 8/8/2023     Precautions  Precautions: (P) Fall Risk, Spinal / Back Precautions   Comments: (P) monitor BP- orthostatic, Lumbar    Subjective    Patient in bed and agreeable to therapy, reporting neck pain present for past two days.     Objective       08/08/23 0701   PT Charge Group   PT Gait Training (Units) 1   PT Therapeutic Exercise (Units) 1   PT Therapeutic Activities (Units) 2   PT Total Time Spent   PT Individual Total Time Spent (Mins) 60   Precautions   Precautions Fall Risk;Spinal / Back Precautions    Comments monitor BP- orthostatic, Lumbar   Pain 0 - 10 Group   Location Neck   Location Orientation Posterior   Gait Functional Level of Assist    Gait Level Of Assist Standby Assist   Assistive Device Front Wheel Walker;4 Wheel Walker   Distance (Feet) 175   # of Times Distance was Traveled 2   Deviation   (slow marianne)   Transfer Functional Level of Assist   Bed, Chair, Wheelchair Transfer Supervised   Bed Chair Wheelchair Transfer Description Adaptive equipment;Increased time;Initial preparation for task;Supervision for safety  (stand step transfer with FWW vs. 4WW)   Bed Mobility    Supine to Sit Supervised   Sit to Supine Supervised   Sit to Stand Supervised   Scooting Supervised   Rolling Supervised   Interdisciplinary Plan of Care Collaboration   Patient Position at End of Therapy In Bed;Bed Alarm On;Call Light within Reach;Tray Table within Reach;Phone within Reach  (patient requesting to eat breakfast in bed)     Moist heat pack applied to posterior neck x25-30 minutes while performing exercises and tolerating soft tissue mobilizations to upper trapezius, scalenes, and rhomboids.   - Hooklying bent knee fall out with green theraband 2x10 each leg   - Hooklying hip ABD with bridge 2x15    Trialed 4WW with patient; patient reporting she uses at baseline.      Assessment    Patient tolerated session well, demonstrated good safety with 4WW with no LOBs noted. Continues with difficulty with ab motor recruitment during exercises requiring multimodal cuing.    Strengths: Able to follow instructions, Alert and oriented, Effective communication skills, Independent prior level of function, Motivated for self care and independence, Willingly participates in therapeutic activities  Barriers: Bladder retention, Generalized weakness, Hearing impairment, Orthostatic hypotension, Impaired activity tolerance, Impaired balance, Limited mobility, Pain, Pain poorly managed    Plan    Gait training c/ FWW and emphasis on large amplitude stepping, sufficient heel strike/toe off  LE strengthening c/ shuttle, mini squats, step ups, hip flexion c/ theraband, standing marches  Curb training c/ FWW  Dynamic balance activities via weight shifting, 4 way hip target taps, UE cone stacking c/ small WANDER  Core stability c/ block balance on head, large therapy ball sitting and reaching, postural holds c/ alternating perturbations     Passport items to be completed:  Get in/out of bed safely, in/out of a vehicle, safely use mobility device, walk or wheel around home/community, navigate up and down stairs, show how to get up/down from the ground, ensure home is accessible, demonstrate HEP, complete caregiver training    Physical Therapy Problems (Active)       Problem: Mobility       Dates: Start:  08/03/23         Goal: STG-Within one week, patient will ambulate up/down a curb at [facility] or better c/ LRAD       Dates: Start:  08/03/23               Problem: Mobility Transfers       Dates: Start:  08/03/23         Goal: STG-Within one week, patient will transfer via logroll supine<>sit at S[facility] w/o VCs        Dates: Start:  08/03/23            Goal: STG-Within one week, patient will logroll to the L and R at S[facility] w/o VCs       Dates: Start:  08/03/23            Goal: STG-Within one week, patient will sit to  stand c/ LRAD at A or better       Dates: Start:  08/03/23               Problem: PT-Long Term Goals       Dates: Start:  08/03/23         Goal: LTG-By discharge, patient will ambulate 50ft c/ SPC at [facility]       Dates: Start:  08/03/23            Goal: LTG-By discharge, patient will perform home exercise program for LE strengthening       Dates: Start:  08/03/23            Goal: LTG-By discharge, patient will transfer in/out of a car at [facility] or better c/ LRAD       Dates: Start:  08/03/23

## 2023-08-08 NOTE — PROGRESS NOTES
"  Physical Medicine & Rehabilitation Progress Note    Encounter Date: 8/8/2023    Chief Complaint: Decreased mobility, weakness    Interval Events (Subjective):  Patient sitting up in room. She reports occasional diarrhea which she thinks is medication related. Discussed would review medications. Otherwise continue K supplement. Discussed would have IDT later today.     _____________________________________  Interdisciplinary Team Conference   Most recent IDT on 8/8/2023    IFarhat M.D./Ph.D., was present and led the interdisciplinary team conference on 8/8/2023.  I led the IDT conference and agree with the IDT conference documentation and plan of care as noted below.     Nursing:  Diet Current Diet Order   Procedures    Diet Order Diet: Regular       Eating ADL Independent      % of Last Meal  Oral Nutrition: Breakfast, Between 50-75% Consumed   Sleep    Bowel Last BM: 08/07/23   Bladder    Barriers to Discharge Home:  Diarrhea    Physical Therapy:  Bed Mobility    Transfers Supervised  Adaptive equipment, Increased time, Set-up of equipment, Supervision for safety, Verbal cueing   Mobility Standby Assist   Stairs CGA   Barriers to Discharge Home:  Improving well  Working on4WW  Limited balance without upper extremity support    Family needs to come to town    Occupational Therapy:  Grooming Supervision, Standing   Bathing Supervision   UB Dressing Supervision   LB Dressing Standby Assist   Toileting Supervision   Shower & Transfer supervs   Barriers to Discharge Home:  fatigue    Respiratory Therapy:  O2 (LPM): 0  O2 Delivery Device: None - Room Air    Case Management:  Continues to work on disposition and DME needs.      Discharge Date/Disposition:  8/12/23  _____________________________________      Objective:  VITAL SIGNS: /61   Pulse 62   Temp 36.5 °C (97.7 °F) (Oral)   Resp 18   Ht 1.57 m (5' 1.81\")   Wt 55 kg (121 lb 4.1 oz)   SpO2 97%   BMI 22.31 kg/m²   Gen: NAD  Psych: " Mood and affect appropriate  CV: RRR, 0 edema  Resp: CTAB, no upper airway sounds  Abd: NTND  Neuro: AOx4, following commands  Unchanged from 8/7/23 including abdominal exam and BS+    Laboratory Values:  Recent Results (from the past 72 hour(s))   Basic Metabolic Panel    Collection Time: 08/07/23  5:32 AM   Result Value Ref Range    Sodium 136 135 - 145 mmol/L    Potassium 3.0 (L) 3.6 - 5.5 mmol/L    Chloride 97 96 - 112 mmol/L    Co2 27 20 - 33 mmol/L    Glucose 103 (H) 65 - 99 mg/dL    Bun 14 8 - 22 mg/dL    Creatinine 0.75 0.50 - 1.40 mg/dL    Calcium 9.0 8.5 - 10.5 mg/dL    Anion Gap 12.0 7.0 - 16.0   PHOSPHORUS    Collection Time: 08/07/23  5:32 AM   Result Value Ref Range    Phosphorus 3.2 2.5 - 4.5 mg/dL   ESTIMATED GFR    Collection Time: 08/07/23  5:32 AM   Result Value Ref Range    GFR (CKD-EPI) 83 >60 mL/min/1.73 m 2       Medications:  Scheduled Medications   Medication Dose Frequency    potassium chloride SA  40 mEq DAILY    gabapentin  300 mg TID    omeprazole  40 mg QAM    atorvastatin  10 mg Q EVENING     PRN medications: senna-docusate **AND** polyethylene glycol/lytes **AND** magnesium hydroxide **AND** bisacodyl, Respiratory Therapy Consult, oxyCODONE immediate-release, oxyCODONE immediate release, hydrALAZINE, carboxymethylcellulose, benzocaine-menthol, mag hydrox-al hydrox-simeth, ondansetron **OR** ondansetron, traZODone, sodium chloride, acetaminophen    Diet:  Current Diet Order   Procedures    Diet Order Diet: Regular       Medical Decision Making and Plan:  Adapted from Dr. Alvarez' A&P  Recurrent right L4 radiculopathy secondary to neuroforaminal stenosis s/p bilateral L4-5 partial laminectomy, right-sided total facetectomy, decompression of bilateral L5 and right L4 nerve roots with L4-5 fusion 7/31/2023 Dr. Cottrell  -PT and OT for mobility and ADLs. Per guidelines, 15 hours per week between PT, OT and/or SLP.  -Follow-up neurosurgery     Hypertension-continue hydrochlorothiazide 25 mg  daily -blood pressure stable, continue hydrochlorothiazide, though patient slightly dizzy, could be UTI, monitor BP. 8/3 more orthostatic with therapy. Reduce HCTZ to 12.5mg daily. 8/4 BP stable continue HCTZ 12.5mg. 8/4 stop hydrochlorothiazide. Continue off HCTZ.      Nausea and dizziness -likely secondary to orthostatic hypotension.  Repeat KUB 8/8 as having diarrhea     Hyperlipidemia-continue Lipitor 10 mg     Pain -Tylenol, gabapentin and oxycodone. Continue Gabapentin     Hyponatremia -Labs 8/3 -sodium normal at 136.     Hypokalemia -Labs 8/3 low at 3.1 -supplement x1 8/3.  Recheck BMP 8/7 3.0, start 40 mEq. Continue 40 mEq     Hypophosphatemia -start K-Phos x4 days.  Recheck phosphorus 8/7 -wnl. Continue off Phosph supplement     Anemia -Labs 8/3 -improved.     Dysuria, suprapubic pain -check urinalysis 8/3 - negative     Skin - Patient at risk for skin breakdown due to debility in areas including sacrum, achilles, elbows and head in addition to other sites. Nursing to assess skin daily.      GI Ppx - Patient on Prilosec for GERD prophylaxis. Patient on Senna-docusate for constipation prophylaxis made PRN due to large watery BM x2 8/3.      DVT Ppx -not on chemoprophylaxis.  Encourage out of bed.  ____________________________________    T. Gonzalez Dhillon MD/PhD  [facility]   [facility]   ____________________________________    Total time:  50 minutes. Time spent included pre-rounding review of vitals and tests, unit/floor time, face-to-face time with the patient including physical examination, care coordination, counseling of patient and/or family, ordering medications/procedures/tests, discussion with CM, PT, OT, SLP and/or other healthcare providers, and documentation in the electronic medical record. Topics discussed included discharge planning, diarrhea, KUB, continue K supplement, and Continue gabapentin. Patient was discussed separately in IDT today;  please see details above.

## 2023-08-08 NOTE — THERAPY
Physical Therapy   Daily Treatment     Patient Name: Ariana Drake  Age:  73 y.o., Sex:  female  Medical Record #: [id number]  Today's Date: 8/8/2023     Precautions  Precautions: Fall Risk, Spinal / Back Precautions   Comments: monitor BP- orthostatic, Lumbar    Subjective    Patient agreeable to session. Says she is quite tired from the busy morning.      Objective       08/08/23 0931   PT Charge Group   PT Gait Training (Units) 1   PT Therapeutic Exercise (Units) 2   PT Therapeutic Activities (Units) 1   PT Total Time Spent   PT Individual Total Time Spent (Mins) 60   Gait Functional Level of Assist    Gait Level Of Assist Standby Assist   Assistive Device 4 Wheel Walker   Distance (Feet) 250   # of Times Distance was Traveled 1   Deviation Bradykinetic   Transfer Functional Level of Assist   Bed, Chair, Wheelchair Transfer Supervised   Bed Chair Wheelchair Transfer Description Adaptive equipment;Increased time;Set-up of equipment;Supervision for safety;Verbal cueing   Sitting Lower Body Exercises   Hip Abduction 1 set of 10;Bilateral;Light Resistance Theraband   Comments LAQ w/ pink band around ankles x 10 B, cues to kick into abd as well. HS curls pink band around ankles x 10 BLEs, cues to keep midline.   Standing Lower Body Exercises   Other Exercises in parallel bars fwd/bwd x 1 each: high march walk, monster walk, sidestepping walk   Bed Mobility    Sit to Supine Supervised   Sit to Stand Supervised   Interdisciplinary Plan of Care Collaboration   Patient Position at End of Therapy In Bed;Bed Alarm On;Call Light within Reach;Tray Table within Reach;Phone within Reach     Switched to new 4ww, as pt reports previous difficulty to lock brakes.   Placed w/ hot packs at post neck for comfort end of session.       Assessment    Patient tolerates session fairly well, is limited by BLE weakness/fatigue primarily R > L. Needs frequent rest breaks during session. New 4ww patient is pleased with compared to previous.      Strengths: Able to follow instructions, Alert and oriented, Effective communication skills, Independent prior level of function, Motivated for self care and independence, Willingly participates in therapeutic activities  Barriers: Bladder retention, Generalized weakness, Hearing impairment, Orthostatic hypotension, Impaired activity tolerance, Impaired balance, Limited mobility, Pain, Pain poorly managed    Plan    Gait training c/ FWW and emphasis on large amplitude stepping, sufficient heel strike/toe off  LE strengthening c/ shuttle, mini squats, step ups, hip flexion c/ theraband, standing marches  Curb training c/ FWW  Dynamic balance activities via weight shifting, 4 way hip target taps, UE cone stacking c/ small WANDER  Core stability c/ block balance on head, large therapy ball sitting and reaching, postural holds c/ alternating perturbations     Passport items to be completed:  Get in/out of bed safely, in/out of a vehicle, safely use mobility device, walk or wheel around home/community, navigate up and down stairs, show how to get up/down from the ground, ensure home is accessible, demonstrate HEP, complete caregiver training    Physical Therapy Problems (Active)       Problem: Mobility       Dates: Start:  08/03/23         Goal: STG-Within one week, patient will ambulate up/down a curb at CGA or better c/ LRAD       Dates: Start:  08/03/23               Problem: PT-Long Term Goals       Dates: Start:  08/03/23         Goal: LTG-By discharge, patient will ambulate 50ft c/ SPC at SPV       Dates: Start:  08/03/23            Goal: LTG-By discharge, patient will perform home exercise program for LE strengthening       Dates: Start:  08/03/23            Goal: LTG-By discharge, patient will transfer in/out of a car at setupA or better c/ LRAD       Dates: Start:  08/03/23

## 2023-08-08 NOTE — CARE PLAN
Problem: Dressing  Goal: STG-Within one week, patient will dress LB with min a   Outcome: Met  Note: SBA/ supervision      Problem: Toileting  Goal: STG-Within one week, patient will complete toileting tasks with CGA   Outcome: Met  Note: Supervision /SBA     Problem: Functional Transfers  Goal: STG-Within one week, patient will transfer to toilet with CGA   Outcome: Met  Note: Supervision / SBA

## 2023-08-08 NOTE — PROGRESS NOTES
NURSING DAILY NOTE    Name: Ariana Drake   Date of Admission: 8/2/2023   Admitting Diagnosis: S/P lumbar fusion  Attending Physician: Farhat Dhillon M.d.  Allergies: Patient has no known allergies.    Safety  Patient Assist  SBA  Patient Precautions  Fall Risk, Spinal / Back Precautions   Precaution Comments  monitor BP- orthostatic, Lumbar  Bed Transfer Status  Supervised (fww level)  Toilet Transfer Status   Supervised (FWW level)  Assistive Devices  Walker - front wheel  Oxygen  None - Room Air  Diet/Therapeutic Dining  Current Diet Order   Procedures    Diet Order Diet: Regular     Pill Administration  whole  Agitated Behavioral Scale  14  ABS Level of Severity  No Agitation    Fall Risk  Has the patient had a fall this admission?   No  Tonya Reed Fall Risk Scoring  16, HIGH RISK  Fall Risk Safety Measures  bed alarm, chair alarm, poor balance, and low vision/ hearing    Vitals  Temperature: 37.4 °C (99.3 °F)  Temp src: Oral  Pulse: 77  Respiration: 18  Blood Pressure : 126/57  Blood Pressure MAP (Calculated): 80 MM HG  BP Location: Right, Upper Arm  Patient BP Position: Supine     Oxygen  Pulse Oximetry: 94 %  O2 (LPM): 0  O2 Delivery Device: None - Room Air  Incentive Spirometer Volume: 1000 mL    Bowel and Bladder  Last Bowel Movement  08/07/23  Stool Type  Type 6: Fluffy pieces with ragged edges, a mushy stool  Bowel Device  Bathroom  Continent  Bladder: Stress incontinence   Bowel: Continent movement  Bladder Function  Urine Void (mL):  (large)  Number of Times Voided: 1  Urine Color: Unable To Evaluate  Wet Diaper Count: 1  Genitourinary Assessment   Bladder Assessment (WDL):  WDL Except  Brandt Catheter: Not Applicable  Urine Color: Unable To Evaluate  Bladder Device: Bathroom  Bladder Scan: Post Void  $ Bladder Scan Results (mL): 98    Skin  Gerry Score   19  Sensory Interventions      Moisture Interventions         Pain  Pain  Rating Scale  0 - No Pain  Pain Location  Hip, Knee  Pain Location Orientation  Left  Pain Interventions   Medication (see MAR)    ADLs    Bathing   Shower, Staff  Linen Change      Personal Hygiene  Change Darlyn Pads, Moist Darlyn Wipes  Chlorhexidine Bath      Oral Care  Brushed Teeth  Teeth/Dentures  Intact  Shave     Nutrition Percentage Eaten  Lunch, Between 50-75% Consumed  Environmental Precautions  Treaded Slipper Socks on Patient, Personal Belongings, Wastebasket, Call Bell etc. in Easy Reach, Bed in Low Position  Patient Turns/Positioning  Patient Turns Self from Side to Side  Patient Turns Assistance/Tolerance     Bed Positions  Bed Controls On, Bed Locked  Head of Bed Elevated  Self regulated      Psychosocial/Neurologic Assessment  Psychosocial Assessment  Psychosocial (WDL):  Within Defined Limits  Neurologic Assessment  Neuro (WDL): Exceptions to WDL  Level of Consciousness: Alert  Orientation Level: Oriented X4  Cognition: Appropriate judgement  Speech: Clear  Pupil Assesment: No  Motor Function/Sensation Assessment: Motor response  R Foot Dorsiflexion: Strong  L Foot Dorsiflexion: Strong  RUE Motor Response: Responds to commands  RUE Sensation: Full sensation  Muscle Strength Right Arm: Good Strength Against Gravity and Moderate Resistance  LUE Motor Response: Responds to commands  LUE Sensation: Full sensation  Muscle Strength Left Arm: Good Strength Against Gravity and Moderate Resistance  RLE Motor Response: Responds to commands  RLE Sensation: Full sensation  Muscle Strength Right Leg: Fair Strength against Gravity but No Resistance  LLE Motor Response: Responds to commands  LLE Sensation: Full sensation  Muscle Strength Left Leg: Fair Strength against Gravity but No Resistance  EENT (WDL):  WDL Except    Cardio/Pulmonary Assessment  Edema      Respiratory Breath Sounds  RUL Breath Sounds: Clear  RML Breath Sounds: Clear  RLL Breath Sounds: Clear  JEWEL Breath Sounds: Clear  LLL Breath Sounds:  Clear  Cardiac Assessment   Cardiac (WDL):  Within Defined Limits

## 2023-08-09 ENCOUNTER — APPOINTMENT (OUTPATIENT)
Dept: PHYSICAL THERAPY | Facility: REHABILITATION | Age: 73
DRG: 560 | End: 2023-08-09
Attending: PHYSICAL MEDICINE & REHABILITATION
Payer: MEDICARE

## 2023-08-09 ENCOUNTER — APPOINTMENT (OUTPATIENT)
Dept: OCCUPATIONAL THERAPY | Facility: REHABILITATION | Age: 73
DRG: 560 | End: 2023-08-09
Attending: PHYSICAL MEDICINE & REHABILITATION
Payer: MEDICARE

## 2023-08-09 PROCEDURE — 97530 THERAPEUTIC ACTIVITIES: CPT | Mod: CO

## 2023-08-09 PROCEDURE — 97110 THERAPEUTIC EXERCISES: CPT | Mod: CO

## 2023-08-09 PROCEDURE — 770010 HCHG ROOM/CARE - REHAB SEMI PRIVAT*

## 2023-08-09 PROCEDURE — A9270 NON-COVERED ITEM OR SERVICE: HCPCS | Mod: JZ | Performed by: PHYSICAL MEDICINE & REHABILITATION

## 2023-08-09 PROCEDURE — 97535 SELF CARE MNGMENT TRAINING: CPT | Mod: CO

## 2023-08-09 PROCEDURE — 97530 THERAPEUTIC ACTIVITIES: CPT

## 2023-08-09 PROCEDURE — 700102 HCHG RX REV CODE 250 W/ 637 OVERRIDE(OP): Mod: JZ | Performed by: PHYSICAL MEDICINE & REHABILITATION

## 2023-08-09 PROCEDURE — 700102 HCHG RX REV CODE 250 W/ 637 OVERRIDE(OP): Performed by: PHYSICAL MEDICINE & REHABILITATION

## 2023-08-09 PROCEDURE — 97116 GAIT TRAINING THERAPY: CPT

## 2023-08-09 PROCEDURE — A9270 NON-COVERED ITEM OR SERVICE: HCPCS | Performed by: PHYSICAL MEDICINE & REHABILITATION

## 2023-08-09 PROCEDURE — 99233 SBSQ HOSP IP/OBS HIGH 50: CPT | Performed by: PHYSICAL MEDICINE & REHABILITATION

## 2023-08-09 PROCEDURE — 97112 NEUROMUSCULAR REEDUCATION: CPT

## 2023-08-09 PROCEDURE — 97110 THERAPEUTIC EXERCISES: CPT

## 2023-08-09 RX ORDER — SIMETHICONE 125 MG
125 TABLET,CHEWABLE ORAL 3 TIMES DAILY
Status: DISCONTINUED | OUTPATIENT
Start: 2023-08-09 | End: 2023-08-11

## 2023-08-09 RX ADMIN — POTASSIUM CHLORIDE 40 MEQ: 1500 TABLET, EXTENDED RELEASE ORAL at 08:03

## 2023-08-09 RX ADMIN — GABAPENTIN 300 MG: 300 CAPSULE ORAL at 14:21

## 2023-08-09 RX ADMIN — OXYCODONE HYDROCHLORIDE 10 MG: 10 TABLET ORAL at 13:52

## 2023-08-09 RX ADMIN — OXYCODONE HYDROCHLORIDE 10 MG: 10 TABLET ORAL at 05:19

## 2023-08-09 RX ADMIN — OMEPRAZOLE 40 MG: 20 CAPSULE, DELAYED RELEASE ORAL at 08:03

## 2023-08-09 RX ADMIN — ATORVASTATIN CALCIUM 10 MG: 10 TABLET, FILM COATED ORAL at 20:45

## 2023-08-09 RX ADMIN — GABAPENTIN 300 MG: 300 CAPSULE ORAL at 08:03

## 2023-08-09 RX ADMIN — SIMETHICONE 125 MG: 125 TABLET, CHEWABLE ORAL at 20:49

## 2023-08-09 RX ADMIN — OXYCODONE HYDROCHLORIDE 10 MG: 10 TABLET ORAL at 20:45

## 2023-08-09 RX ADMIN — GABAPENTIN 300 MG: 300 CAPSULE ORAL at 20:45

## 2023-08-09 RX ADMIN — SIMETHICONE 125 MG: 125 TABLET, CHEWABLE ORAL at 14:21

## 2023-08-09 ASSESSMENT — GAIT ASSESSMENTS
GAIT LEVEL OF ASSIST: SUPERVISED
ASSISTIVE DEVICE: 4 WHEEL WALKER
GAIT LEVEL OF ASSIST: STANDBY ASSIST
ASSISTIVE DEVICE: 4 WHEEL WALKER;QUAD CANE
DISTANCE (FEET): 500
DEVIATION: BRADYKINETIC
DEVIATION: BRADYKINETIC
DISTANCE (FEET): 170

## 2023-08-09 ASSESSMENT — ACTIVITIES OF DAILY LIVING (ADL)
BED_CHAIR_WHEELCHAIR_TRANSFER_DESCRIPTION: ADAPTIVE EQUIPMENT;INCREASED TIME;SUPERVISION FOR SAFETY
BED_CHAIR_WHEELCHAIR_TRANSFER_DESCRIPTION: ADAPTIVE EQUIPMENT;SET-UP OF EQUIPMENT
BED_CHAIR_WHEELCHAIR_TRANSFER_DESCRIPTION: ADAPTIVE EQUIPMENT;SET-UP OF EQUIPMENT;SUPERVISION FOR SAFETY

## 2023-08-09 NOTE — CARE PLAN
Problem: Pain - Standard  Goal: Alleviation of pain or a reduction in pain to the patient’s comfort goal  Outcome: Progressing     Problem: Fall Risk - Rehab  Goal: Patient will remain free from falls  Outcome: Progressing       The patient is Stable - Low risk of patient condition declining or worsening    Shift Goals  Clinical Goals: Safety  Patient Goals: Safety, Pain Control  Family Goals: Education

## 2023-08-09 NOTE — THERAPY
Physical Therapy   Daily Treatment     Patient Name: Ariana Drake  Age:  73 y.o., Sex:  female  Medical Record #: [id number]  Today's Date: 8/9/2023     Precautions  Precautions: Fall Risk, Spinal / Back Precautions   Comments: monitor BP- orthostatic, Lumbar    Subjective    Patient agreeable to session, reports she will use the 4ww most often at home, but would like to get back to just cane use (small 4 pronged cane). Says her daughter will get to town today and plans to visit daily. Declines eating in dining room for breakfast. 2nd session patient reports no complaints.      Objective       08/09/23 0701   PT Charge Group   PT Gait Training (Units) 2   PT Neuromuscular Re-Education / Balance (Units) 1   PT Therapeutic Activities (Units) 1   PT Total Time Spent   PT Individual Total Time Spent (Mins) 60   Gait Functional Level of Assist    Gait Level Of Assist Standby Assist  (min assist Quad cane for LOB)   Assistive Device 4 Wheel Walker;Quad Cane   Distance (Feet) 500  (combo indoors/outdoor w/ 4ww. over curb x 1, ramps, inclines, etc. 250 x 1 w/ SBQC in LUE. 20 ft x 1 in parallel bars w/ minimal UE support for warm up.)   Deviation Bradykinetic  (improved heel/toe pattern, occasion overstride w/ RLE cues to correct. minor LOB w/ quad cane needing correction. implemented head turns w/ quad cane to inc difficulty.)   Stairs Functional Level of Assist   Level of Assist with Stairs Standby Assist   # of Stairs Climbed 12   Stairs Description Extra time;Hand rails;Supervision for safety   Transfer Functional Level of Assist   Bed, Chair, Wheelchair Transfer Supervised   Bed Chair Wheelchair Transfer Description Adaptive equipment;Set-up of equipment;Supervision for safety   Bed Mobility    Supine to Sit Independent  (ADL bed and hospital bed)   Sit to Supine Independent  (ADL bed, good log roll technique)   Sit to Stand Supervised   Rolling Independent   Neuro-Muscular Treatments   Neuro-Muscular Treatments  "Postural Changes;Postural Facilitation;Verbal Cuing;Tactile Cuing   Comments seated on large gray physioball: static holds w/ BUE on knees > static holds against manual pertubations in all planes. progressed to arms accross chest static holds > then against pertubations in all planes. finger to nose taps w/ arms in 'T\" position x 10 B, palloff press x 10.   Interdisciplinary Plan of Care Collaboration   Patient Position at End of Therapy Seated;Call Light within Reach     Discussed potential for family training w/ dtr once appropriate, along with car transfer.   Encouraged progression to LRD as tolerated, and maintenance of spinal precautions and healing timelines.     Good sequencing of curb outdoors w/ 4ww, and able to STS from varying surfaces throughout session (outdoor bench).        08/09/23 0831   PT Charge Group   PT Therapeutic Exercise (Units) 2   PT Total Time Spent   PT Individual Total Time Spent (Mins) 30   Transfer Functional Level of Assist   Bed, Chair, Wheelchair Transfer Supervised   Bed Chair Wheelchair Transfer Description Adaptive equipment;Increased time;Supervision for safety  (SPT)   Supine Lower Body Exercise   Other Exercises shuttle BLE leg press 3 bands 2 x 15, 4 bands 2 x 15, RLE only 3 bands 2 x 10.   Bed Mobility    Sit to Supine Independent   Sit to Stand Independent   Interdisciplinary Plan of Care Collaboration   Patient Position at End of Therapy In Bed;Bed Alarm On;Call Light within Reach       Assessment    Patient tolerates session well, progressing towards mod I status in prep for discharge. Quad cane training w/ mild instability noted, but would benefit from continued practice.     Strengths: Able to follow instructions, Alert and oriented, Effective communication skills, Independent prior level of function, Motivated for self care and independence, Willingly participates in therapeutic activities  Barriers: Bladder retention, Generalized weakness, Hearing impairment, " Orthostatic hypotension, Impaired activity tolerance, Impaired balance, Limited mobility, Pain, Pain poorly managed    Plan    Gait training c/ FWW and emphasis on large amplitude stepping, sufficient heel strike/toe off  LE strengthening c/ shuttle, mini squats, step ups, hip flexion c/ theraband, standing marches  Curb training c/ FWW  Dynamic balance activities via weight shifting, 4 way hip target taps, UE cone stacking c/ small WANDER  Core stability c/ block balance on head, large therapy ball sitting and reaching, postural holds c/ alternating perturbations   30 minutes of family training, car transfer training c/ fiance/daughter and 4WW, HEP, spinal precautions, curb step training c/ 4WW/FWW    Passport items to be completed:  Get in/out of bed safely, in/out of a vehicle, safely use mobility device, walk or wheel around home/community, navigate up and down stairs, show how to get up/down from the ground, ensure home is accessible, demonstrate HEP, complete caregiver training    Physical Therapy Problems (Active)       Problem: Mobility       Dates: Start:  08/03/23         Goal: STG-Within one week, patient will ambulate up/down a curb at CGA or better c/ LRAD       Dates: Start:  08/03/23               Problem: PT-Long Term Goals       Dates: Start:  08/03/23         Goal: LTG-By discharge, patient will ambulate 50ft c/ SPC at SPV       Dates: Start:  08/03/23            Goal: LTG-By discharge, patient will perform home exercise program for LE strengthening       Dates: Start:  08/03/23            Goal: LTG-By discharge, patient will transfer in/out of a car at setupA or better c/ LRAD       Dates: Start:  08/03/23            Goal: LTG-By discharge, patient will improve normal gait speed to >0.8 m/s c/ LRAD       Dates: Start:  08/08/23

## 2023-08-09 NOTE — PROGRESS NOTES
"  Physical Medicine & Rehabilitation Progress Note    Encounter Date: 8/9/2023    Chief Complaint: Decreased mobility, weakness    Interval Events (Subjective):  Patient sitting up in room. She reports having multiple BMs. KUB from yesterday with large area of gas filled GI tract per my read. Will start simethicone, less likely ileus as tolerating food and having BMs.     _____________________________________  Interdisciplinary Team Conference   Most recent IDT on 8/8/2023    Discharge Date/Disposition:  8/12/23  _____________________________________      Objective:  VITAL SIGNS: /64   Pulse 77   Temp 36.8 °C (98.2 °F) (Oral)   Resp 18   Ht 1.57 m (5' 1.81\")   Wt 55 kg (121 lb 4.1 oz)   SpO2 95%   BMI 22.31 kg/m²   Gen: NAD  Psych: Mood and affect appropriate  CV: RRR, 0 edema  Resp: CTAB, no upper airway sounds  Abd: NTND, BS++  Neuro: AOx4, following commands    Laboratory Values:  Recent Results (from the past 72 hour(s))   Basic Metabolic Panel    Collection Time: 08/07/23  5:32 AM   Result Value Ref Range    Sodium 136 135 - 145 mmol/L    Potassium 3.0 (L) 3.6 - 5.5 mmol/L    Chloride 97 96 - 112 mmol/L    Co2 27 20 - 33 mmol/L    Glucose 103 (H) 65 - 99 mg/dL    Bun 14 8 - 22 mg/dL    Creatinine 0.75 0.50 - 1.40 mg/dL    Calcium 9.0 8.5 - 10.5 mg/dL    Anion Gap 12.0 7.0 - 16.0   PHOSPHORUS    Collection Time: 08/07/23  5:32 AM   Result Value Ref Range    Phosphorus 3.2 2.5 - 4.5 mg/dL   ESTIMATED GFR    Collection Time: 08/07/23  5:32 AM   Result Value Ref Range    GFR (CKD-EPI) 83 >60 mL/min/1.73 m 2       Medications:  Scheduled Medications   Medication Dose Frequency    potassium chloride SA  40 mEq DAILY    gabapentin  300 mg TID    omeprazole  40 mg QAM    atorvastatin  10 mg Q EVENING     PRN medications: loperamide, senna-docusate **AND** polyethylene glycol/lytes **AND** magnesium hydroxide **AND** bisacodyl, Respiratory Therapy Consult, oxyCODONE immediate-release, oxyCODONE immediate " release, hydrALAZINE, carboxymethylcellulose, benzocaine-menthol, mag hydrox-al hydrox-simeth, ondansetron **OR** ondansetron, traZODone, sodium chloride, acetaminophen    Diet:  Current Diet Order   Procedures    Diet Order Diet: Regular       Medical Decision Making and Plan:  Adapted from Dr. Alvarez' A&P  Recurrent right L4 radiculopathy secondary to neuroforaminal stenosis s/p bilateral L4-5 partial laminectomy, right-sided total facetectomy, decompression of bilateral L5 and right L4 nerve roots with L4-5 fusion 7/31/2023 Dr. Cottrell  -PT and OT for mobility and ADLs. Per guidelines, 15 hours per week between PT, OT and/or SLP.  -Follow-up neurosurgery  -Change [facility] Code / Diagnosis to Support: 0003.9 - Neurologic Conditions: Other Neurologic     Hypertension-continue hydrochlorothiazide 25 mg daily -blood pressure stable, continue hydrochlorothiazide, though patient slightly dizzy, could be UTI, monitor BP. 8/3 more orthostatic with therapy. Reduce HCTZ to 12.5mg daily. 8/4 BP stable continue HCTZ 12.5mg. 8/4 stop hydrochlorothiazide. Continue off HCTZ.      Nausea and dizziness -likely secondary to orthostatic hypotension.  Repeat KUB 8/8 as having diarrhea. Distended colon with gas filled areas, gaseous distention vs ileus. Will start simethicone. Is tolerated diet and having BMs.      Hyperlipidemia-continue Lipitor 10 mg     Pain -Tylenol, gabapentin and oxycodone. Continue Gabapentin     Hyponatremia -Labs 8/3 -sodium normal at 136.     Hypokalemia -Labs 8/3 low at 3.1 -supplement x1 8/3.  Recheck BMP 8/7 3.0, start 40 mEq. Continue 40 mEq      Hypophosphatemia -start K-Phos x4 days.  Recheck phosphorus 8/7 -wnl. Continue off Phosph supplement     Anemia -Labs 8/3 -improved.     Dysuria, suprapubic pain -check urinalysis 8/3 - negative     Skin - Patient at risk for skin breakdown due to debility in areas including sacrum, achilles, elbows and head in addition to other sites. Nursing to assess skin daily.       GI Ppx - Patient on Prilosec for GERD prophylaxis. Patient on Senna-docusate for constipation prophylaxis made PRN due to large watery BM x2 8/3.      DVT Ppx -not on chemoprophylaxis.  Encourage out of bed.  ____________________________________    T. Gonzalez Dhillon MD/PhD  [facility] - Physical Medicine & Rehabilitation   [facility] - Brain Injury Medicine   ____________________________________    Total time:  50 minutes. Time spent included pre-rounding review of vitals and tests, unit/floor time, face-to-face time with the patient including physical examination, care coordination, counseling of patient and/or family, ordering medications/procedures/tests, discussion with CM, PT, OT, SLP and/or other healthcare providers, and documentation in the electronic medical record. Topics discussed included GI complaints, KUB with gas, start simethicone, and monitor BMs.

## 2023-08-09 NOTE — CARE PLAN
"The patient is Stable - Low risk of patient condition declining or worsening    Shift Goals  Clinical Goals: Safety  Patient Goals: Safety, Pain Control  Family Goals: Education    Progress made toward(s) clinical / shift goals:    Problem: Pain - Standard  Goal: Alleviation of pain or a reduction in pain to the patient’s comfort goal  Outcome: Progressing  Flowsheets (Taken 8/9/2023 0526)  OB Pain Intervention: Medication - See MAR  Note: Patient able to verbalize pain level and verbalize an acceptable level of pain.      Problem: Fall Risk - Rehab  Goal: Patient will remain free from falls  Note: Tonya Reed Fall risk Assessment Score: 16    High fall risk Interventions   - Alarming seatbelt  - Bed and strip alarm   - Yellow sign by the door   - Yellow wrist band \"Fall risk\"  - Room near to the nurse station  - Do not leave patient unattended in the bathroom  - Fall risk education provided    "

## 2023-08-09 NOTE — THERAPY
"Occupational Therapy  Daily Treatment     Patient Name: Ariana Drake  Age:  73 y.o., Sex:  female  Medical Record #: [id number]  Today's Date: 8/9/2023     Precautions  Precautions: (P) Fall Risk, Spinal / Back Precautions   Comments: (P) monitor BP- orthostatic, Lumbar         Subjective    \" I can do these at home .\"   Referring to  UE ther ex     issued HEP for use at home     Objective/Assessment       08/09/23 1031   OT Charge Group   OT Self Care / ADL (Units) 1   OT Therapy Activity (Units) 1   OT Therapeutic Exercise (Units) 2   OT Total Time Spent   OT Individual Total Time Spent (Mins) 60   Precautions   Precautions Fall Risk;Spinal / Back Precautions    Comments monitor BP- orthostatic, Lumbar   Functional Level of Assist   Toileting Modified Independent   Bed, Chair, Wheelchair Transfer Modified Independent   Toilet Transfers Modified Independent  (4WW level)   Sitting Upper Body Exercises   Chest Press 2 sets of 10;Bilateral   Front Arm Raise 2 sets of 10;Bilateral   Shoulder Press 2 sets of 10;Bilateral   Internal Shoulder Rotation 1 set of 10;Right ;Left   External Shoulder Rotation 1 set of 10;Right ;Left   Bicep Curls 2 sets of 10;Right ;Left   Tricep Press 1 set of 10;Right ;Left   Pronation / Supination 1 set of 10;Right ;Left   Comments ther ex performed   from issued HEP   using 3lb weight   Balance   Standing Balance (Dynamic) Fair +   Comments standing along side raised table  reciprocal step and toss with ring toss activity   no LOB  one cue for  widening base of support with stand to sit  in armless chair she did have a slight loss of balance sitting on the very edge of the chair   Bed Mobility    Supine to Sit Independent   Sit to Supine Independent   Interdisciplinary Plan of Care Collaboration   Patient Position at End of Therapy Seated;Chair Alarm On;Call Light within Reach;Tray Table within Reach  (eating lunch)        4WW room <-> gym  supervision    Good carry over of instruction for "   abdominal bracing  with  UE ther ex           Strengths: Able to follow instructions, Alert and oriented, Independent prior level of function, Motivated for self care and independence, Pleasant and cooperative, Supportive family, Willingly participates in therapeutic activities  Barriers: Decreased endurance, Fatigue, Hearing impairment, Orthostatic hypotension, Impaired activity tolerance, Impaired balance, Pain (decreased safety awareness/attention)    Plan    Assess for mod I in room, ADLs, IADLs, functional mobility with FWW (walking is patient's favorite occupation), standing tolerance/balance, endurance   Family with daughter raining prior to d/c     Occupational Therapy Goals (Active)       Problem: OT Long Term Goals       Dates: Start:  08/03/23         Goal: LTG-By discharge, patient will complete basic self care tasks with mod I-supervision        Dates: Start:  08/03/23            Goal: LTG-By discharge, patient will perform bathroom transfers with mod I-supervision        Dates: Start:  08/03/23            Goal: LTG-By discharge, patient will complete basic home management with mod I-supervision        Dates: Start:  08/03/23

## 2023-08-09 NOTE — PROGRESS NOTES
NURSING DAILY NOTE    Name: Ariana Drake   Date of Admission: 8/2/2023   Admitting Diagnosis: S/P lumbar fusion  Attending Physician: Farhat Dhillon M.d.  Allergies: Patient has no known allergies.    Safety  Patient Assist  SBA  Patient Precautions  Fall Risk, Spinal / Back Precautions   Precaution Comments  monitor BP- orthostatic, Lumbar  Bed Transfer Status  Modified Independent (4WW)  Toilet Transfer Status   Supervised (FWW level)  Assistive Devices  Rails, Walker - front wheel  Oxygen  None - Room Air  Diet/Therapeutic Dining  Current Diet Order   Procedures    Diet Order Diet: Regular     Pill Administration  whole  Agitated Behavioral Scale  14  ABS Level of Severity  No Agitation    Fall Risk  Has the patient had a fall this admission?   No  Tonya Reed Fall Risk Scoring  16, HIGH RISK  Fall Risk Safety Measures  bed alarm and chair alarm    Vitals  Temperature: 36.8 °C (98.2 °F)  Temp src: Oral  Pulse: 74  Respiration: 18  Blood Pressure : 122/64  Blood Pressure MAP (Calculated): 83 MM HG  BP Location: Right, Upper Arm  Patient BP Position: Supine     Oxygen  Pulse Oximetry: 92 %  O2 (LPM): 0  O2 Delivery Device: None - Room Air  Incentive Spirometer Volume: 1000 mL    Bowel and Bladder  Last Bowel Movement  08/08/23  Stool Type  Type 6: Fluffy pieces with ragged edges, a mushy stool  Bowel Device  Bathroom  Continent  Bladder: Stress incontinence   Bowel: Continent movement  Bladder Function  Urine Void (mL):  (large)  Number of Times Voided: 1  Urine Color: Unable To Evaluate  Wet Diaper Count: 1  Genitourinary Assessment   Bladder Assessment (WDL):  WDL Except  Brandt Catheter: Not Applicable  Urine Color: Unable To Evaluate  Bladder Device: Bathroom  Bladder Scan: Post Void  $ Bladder Scan Results (mL): 98    Skin  Gerry Score   19  Sensory Interventions      Moisture Interventions         Pain  Pain Rating Scale  0 - No  Pain  Pain Location  Neck  Pain Location Orientation  Posterior  Pain Interventions   Medication (see MAR)    ADLs    Bathing    (OT Shower Tomorrow)  Linen Change      Personal Hygiene  Change Darlyn Pads, Moist Darlyn Wipes  Chlorhexidine Bath      Oral Care  Brushed Teeth  Teeth/Dentures  Intact  Shave     Nutrition Percentage Eaten  Lunch, Between % Consumed  Environmental Precautions  Treaded Slipper Socks on Patient  Patient Turns/Positioning  Patient Turns Self from Side to Side  Patient Turns Assistance/Tolerance     Bed Positions  Bed Controls On, Bed Locked  Head of Bed Elevated  Self regulated      Psychosocial/Neurologic Assessment  Psychosocial Assessment  Psychosocial (WDL):  Within Defined Limits  Neurologic Assessment  Neuro (WDL): Exceptions to WDL  Level of Consciousness: Alert  Orientation Level: Oriented X4  Cognition: Appropriate judgement  Speech: Clear  Pupil Assesment: No  Motor Function/Sensation Assessment: Motor response  R Foot Dorsiflexion: Strong  L Foot Dorsiflexion: Strong  RUE Motor Response: Responds to commands  RUE Sensation: Full sensation  Muscle Strength Right Arm: Good Strength Against Gravity and Moderate Resistance  LUE Motor Response: Responds to commands  LUE Sensation: Full sensation  Muscle Strength Left Arm: Good Strength Against Gravity and Moderate Resistance  RLE Motor Response: Responds to commands  RLE Sensation: Full sensation  Muscle Strength Right Leg: Fair Strength against Gravity but No Resistance  LLE Motor Response: Responds to commands  LLE Sensation: Full sensation  Muscle Strength Left Leg: Fair Strength against Gravity but No Resistance  EENT (WDL):  WDL Except    Cardio/Pulmonary Assessment  Edema      Respiratory Breath Sounds  RUL Breath Sounds: Clear  RML Breath Sounds: Clear  RLL Breath Sounds: Clear  JEWEL Breath Sounds: Clear  LLL Breath Sounds: Clear  Cardiac Assessment   Cardiac (WDL):  Within Defined Limits

## 2023-08-09 NOTE — THERAPY
Physical Therapy   Daily Treatment     Patient Name: Ariana Drake  Age:  73 y.o., Sex:  female  Medical Record #: [id number]  Today's Date: 8/9/2023     Precautions  Precautions: (P) Fall Risk, Spinal / Back Precautions   Comments: (P) monitor BP- orthostatic, Lumbar    Subjective    Ariana was eager to show progress c/ marches in 4WW at start of session. Agreeable to therapy.     Objective       08/09/23 1301   Precautions   Precautions Fall Risk;Spinal / Back Precautions    Comments monitor BP- orthostatic, Lumbar   Pain 0 - 10 Group   Location Back   Location Orientation Posterior;Lower   Gait Functional Level of Assist    Gait Level Of Assist Supervised   Assistive Device 4 Wheel Walker   Distance (Feet) 170   # of Times Distance was Traveled 2   Deviation Bradykinetic  (improved stride and heel/toe pattern)   Transfer Functional Level of Assist   Bed, Chair, Wheelchair Transfer Modified Independent   Bed Chair Wheelchair Transfer Description Adaptive equipment;Set-up of equipment   Standing Lower Body Exercises   Marching 1 set of 10  (c/ purple band assist>leg press; single UE support)   Bed Mobility    Supine to Sit Standby Assist  (Attempted long sit technique, resulted in LPB; required VCs to recall logroll technique)   Sit to Supine Independent   Sit to Stand Modified Independent  (4WW)   Scooting Independent   Rolling Independent   Neuro-Muscular Treatments   Neuro-Muscular Treatments Sequencing;Verbal Cuing;Tactile Cuing   Interdisciplinary Plan of Care Collaboration   IDT Collaboration with  Nurse Practitioner   Patient Position at End of Therapy In Bed;Bed Alarm On;Call Light within Reach;Tray Table within Reach;Phone within Reach   Collaboration Comments Request of pain medication post-therapy     Stepping strategy re-ed:  Perturbations c/ purple band around shoulders: forward/lateral/backward x5 each direction, discontinued d/t LBP  Rhythmic stabilization in all directions in // c/ TA activation x20;    Fwd/lateral/backward stepping x10 c/ TA activation x10 each way    Assessment  Ariana presents c/ impaired trunk motor control, core stability, and balance. LBP during session required modifications of exercises c/ emphasis on proper TA activation, reduced pain and improved carryover c/ verbal and manual cues.    Strengths: Able to follow instructions, Alert and oriented, Effective communication skills, Independent prior level of function, Motivated for self care and independence, Willingly participates in therapeutic activities  Barriers: Bladder retention, Generalized weakness, Hearing impairment, Orthostatic hypotension, Impaired activity tolerance, Impaired balance, Limited mobility, Pain, Pain poorly managed    Plan    Gait training c/ FWW and quad cane c/ emphasis on large amplitude stepping, sufficient heel strike/toe off  LE strengthening c/ shuttle, mini squats, step ups, hip flexion c/ theraband, standing marches  Curb training c/ FWW/quad cane  Dynamic balance activities via weight shifting, 4 way hip target taps, UE cone stacking c/ small WANDER  Core stability c/ block balance on head, large therapy ball sitting and reaching, postural holds c/ alternating perturbations  Family training on 8/10: car transfer training c/ fiance/daughter and 4WW, HEP, spinal precautions, curb step training c/ 4WW/FWW-Fiance Kei has 0 MIGUEL/SIH; Daughter in town indefinitely for support    Passport items to be completed:  Get in/out of bed safely, in/out of a vehicle, safely use mobility device, walk or wheel around home/community, navigate up and down stairs, show how to get up/down from the ground, ensure home is accessible, demonstrate HEP, complete caregiver training    Physical Therapy Problems (Active)       Problem: Mobility       Dates: Start:  08/03/23         Goal: STG-Within one week, patient will ambulate up/down a curb at [facility] or better c/ LRAD       Dates: Start:  08/03/23               Problem: PT-Long Term Goals        Dates: Start:  08/03/23         Goal: LTG-By discharge, patient will ambulate 50ft c/ SPC at [facility]       Dates: Start:  08/03/23            Goal: LTG-By discharge, patient will perform home exercise program for LE strengthening       Dates: Start:  08/03/23            Goal: LTG-By discharge, patient will transfer in/out of a car at [facility] or better c/ LRAD       Dates: Start:  08/03/23            Goal: LTG-By discharge, patient will improve normal gait speed to >0.8 m/s c/ LRAD       Dates: Start:  08/08/23

## 2023-08-09 NOTE — PROGRESS NOTES
NURSING DAILY NOTE    Name: Ariana Drake   Date of Admission: 8/2/2023   Admitting Diagnosis: S/P lumbar fusion  Attending Physician: Farhat Dhillon M.d.  Allergies: Patient has no known allergies.    Safety  Patient Assist  SBA  Patient Precautions  Fall Risk, Spinal / Back Precautions   Precaution Comments  monitor BP- orthostatic, Lumbar  Bed Transfer Status  Modified Independent  Toilet Transfer Status   Modified Independent (4WW level)  Assistive Devices  Rails, Walker - front wheel  Oxygen  None - Room Air  Diet/Therapeutic Dining  Current Diet Order   Procedures    Diet Order Diet: Regular     Pill Administration  whole  Agitated Behavioral Scale  14  ABS Level of Severity  No Agitation    Fall Risk  Has the patient had a fall this admission?   No  Tonya Reed Fall Risk Scoring  16, HIGH RISK  Fall Risk Safety Measures  bed alarm and chair alarm    Vitals  Temperature: 36.6 °C (97.9 °F)  Temp src: Oral  Pulse: 71  Respiration: 18  Blood Pressure : 126/65  Blood Pressure MAP (Calculated): 85 MM HG  BP Location: Right, Upper Arm  Patient BP Position: Supine     Oxygen  Pulse Oximetry: 98 %  O2 (LPM): 0  O2 Delivery Device: None - Room Air  Incentive Spirometer Volume: 1000 mL    Bowel and Bladder  Last Bowel Movement  08/08/23  Stool Type  Type 6: Fluffy pieces with ragged edges, a mushy stool  Bowel Device  Bathroom  Continent  Bladder: Stress incontinence   Bowel: Continent movement  Bladder Function  Urine Void (mL):  (large)  Number of Times Voided: 1  Urine Color: Unable To Evaluate  Wet Diaper Count: 1  Genitourinary Assessment   Bladder Assessment (WDL):  WDL Except  Brandt Catheter: Not Applicable  Urine Color: Unable To Evaluate  Bladder Device: Bathroom  Bladder Scan: Post Void  $ Bladder Scan Results (mL): 98    Skin  Gerry Score   19  Sensory Interventions      Moisture Interventions         Pain  Pain Rating Scale  7 - Focus of  attention, prevents doing daily activities  Pain Location  Back  Pain Location Orientation  Posterior, Lower  Pain Interventions   Medication (see MAR)    ADLs    Bathing    (OT Shower Tomorrow)  Linen Change      Personal Hygiene  Change Darlyn Pads, Moist Darlyn Wipes  Chlorhexidine Bath      Oral Care  Brushed Teeth  Teeth/Dentures  Intact  Shave     Nutrition Percentage Eaten  Lunch, Between 50-75% Consumed  Environmental Precautions  Treaded Slipper Socks on Patient  Patient Turns/Positioning  Patient Turns Self from Side to Side  Patient Turns Assistance/Tolerance     Bed Positions  Bed Controls On, Bed Locked  Head of Bed Elevated  Self regulated      Psychosocial/Neurologic Assessment  Psychosocial Assessment  Psychosocial (WDL):  Within Defined Limits  Neurologic Assessment  Neuro (WDL): Exceptions to WDL  Level of Consciousness: Alert  Orientation Level: Oriented X4  Cognition: Appropriate judgement  Speech: Clear  Pupil Assesment: No  Motor Function/Sensation Assessment: Motor response  R Foot Dorsiflexion: Strong  L Foot Dorsiflexion: Strong  RUE Motor Response: Responds to commands  RUE Sensation: Full sensation  Muscle Strength Right Arm: Good Strength Against Gravity and Moderate Resistance  LUE Motor Response: Responds to commands  LUE Sensation: Full sensation  Muscle Strength Left Arm: Good Strength Against Gravity and Moderate Resistance  RLE Motor Response: Responds to commands  RLE Sensation: Full sensation  Muscle Strength Right Leg: Fair Strength against Gravity but No Resistance  LLE Motor Response: Responds to commands  LLE Sensation: Full sensation  Muscle Strength Left Leg: Fair Strength against Gravity but No Resistance  EENT (WDL):  WDL Except    Cardio/Pulmonary Assessment  Edema      Respiratory Breath Sounds  RUL Breath Sounds: Clear  RML Breath Sounds: Clear  RLL Breath Sounds: Clear  JEWEL Breath Sounds: Clear  LLL Breath Sounds: Clear  Cardiac Assessment   Cardiac (WDL):  Within Defined  Limits

## 2023-08-10 ENCOUNTER — APPOINTMENT (OUTPATIENT)
Dept: OCCUPATIONAL THERAPY | Facility: REHABILITATION | Age: 73
DRG: 560 | End: 2023-08-10
Attending: PHYSICAL MEDICINE & REHABILITATION
Payer: MEDICARE

## 2023-08-10 ENCOUNTER — APPOINTMENT (OUTPATIENT)
Dept: PHYSICAL THERAPY | Facility: REHABILITATION | Age: 73
DRG: 560 | End: 2023-08-10
Attending: PHYSICAL MEDICINE & REHABILITATION
Payer: MEDICARE

## 2023-08-10 PROCEDURE — 97530 THERAPEUTIC ACTIVITIES: CPT

## 2023-08-10 PROCEDURE — 770010 HCHG ROOM/CARE - REHAB SEMI PRIVAT*

## 2023-08-10 PROCEDURE — 700102 HCHG RX REV CODE 250 W/ 637 OVERRIDE(OP): Performed by: PHYSICAL MEDICINE & REHABILITATION

## 2023-08-10 PROCEDURE — A9270 NON-COVERED ITEM OR SERVICE: HCPCS | Performed by: PHYSICAL MEDICINE & REHABILITATION

## 2023-08-10 PROCEDURE — 700102 HCHG RX REV CODE 250 W/ 637 OVERRIDE(OP): Mod: JZ | Performed by: PHYSICAL MEDICINE & REHABILITATION

## 2023-08-10 PROCEDURE — 97112 NEUROMUSCULAR REEDUCATION: CPT

## 2023-08-10 PROCEDURE — 97110 THERAPEUTIC EXERCISES: CPT

## 2023-08-10 PROCEDURE — A9270 NON-COVERED ITEM OR SERVICE: HCPCS | Mod: JZ | Performed by: PHYSICAL MEDICINE & REHABILITATION

## 2023-08-10 PROCEDURE — 99232 SBSQ HOSP IP/OBS MODERATE 35: CPT | Performed by: PHYSICAL MEDICINE & REHABILITATION

## 2023-08-10 PROCEDURE — 97530 THERAPEUTIC ACTIVITIES: CPT | Mod: CO

## 2023-08-10 PROCEDURE — 97110 THERAPEUTIC EXERCISES: CPT | Mod: CO

## 2023-08-10 RX ADMIN — GABAPENTIN 300 MG: 300 CAPSULE ORAL at 15:00

## 2023-08-10 RX ADMIN — SIMETHICONE 125 MG: 125 TABLET, CHEWABLE ORAL at 09:42

## 2023-08-10 RX ADMIN — SIMETHICONE 125 MG: 125 TABLET, CHEWABLE ORAL at 20:44

## 2023-08-10 RX ADMIN — POTASSIUM CHLORIDE 40 MEQ: 1500 TABLET, EXTENDED RELEASE ORAL at 09:42

## 2023-08-10 RX ADMIN — OMEPRAZOLE 40 MG: 20 CAPSULE, DELAYED RELEASE ORAL at 09:42

## 2023-08-10 RX ADMIN — ACETAMINOPHEN 500 MG: 500 TABLET ORAL at 23:29

## 2023-08-10 RX ADMIN — OXYCODONE HYDROCHLORIDE 10 MG: 10 TABLET ORAL at 05:10

## 2023-08-10 RX ADMIN — GABAPENTIN 300 MG: 300 CAPSULE ORAL at 20:46

## 2023-08-10 RX ADMIN — ATORVASTATIN CALCIUM 10 MG: 10 TABLET, FILM COATED ORAL at 20:45

## 2023-08-10 RX ADMIN — OXYCODONE HYDROCHLORIDE 10 MG: 10 TABLET ORAL at 20:44

## 2023-08-10 RX ADMIN — GABAPENTIN 300 MG: 300 CAPSULE ORAL at 09:42

## 2023-08-10 RX ADMIN — OXYCODONE HYDROCHLORIDE 10 MG: 10 TABLET ORAL at 09:54

## 2023-08-10 RX ADMIN — SIMETHICONE 125 MG: 125 TABLET, CHEWABLE ORAL at 15:00

## 2023-08-10 ASSESSMENT — ACTIVITIES OF DAILY LIVING (ADL)
BED_CHAIR_WHEELCHAIR_TRANSFER_DESCRIPTION: SET-UP OF EQUIPMENT
BED_CHAIR_WHEELCHAIR_TRANSFER_DESCRIPTION: SET-UP OF EQUIPMENT

## 2023-08-10 ASSESSMENT — GAIT ASSESSMENTS
DISTANCE (FEET): 170
ASSISTIVE DEVICE: SINGLE POINT CANE
DEVIATION: BRADYKINETIC;TRENDELENBERG
DEVIATION: BRADYKINETIC
GAIT LEVEL OF ASSIST: STANDBY ASSIST
DISTANCE (FEET): 250
ASSISTIVE DEVICE: 4 WHEEL WALKER
GAIT LEVEL OF ASSIST: MODIFIED INDEPENDENT

## 2023-08-10 ASSESSMENT — 10 METER WALK TEST (10METWT)
TRIAL 2: TIME TO WALK 10 METERS: 10.94
TRIAL 3: TIME TO WALK 10 METERS: 8.59
AVERAGE TIME - SECONDS: 10.02
AVERAGE VELOCITY - METERS PER SECOND: 0.6
TRIAL 1: TIME TO WALK 10 METERS: 10.54

## 2023-08-10 ASSESSMENT — PAIN DESCRIPTION - PAIN TYPE
TYPE: ACUTE PAIN
TYPE: ACUTE PAIN

## 2023-08-10 ASSESSMENT — PAIN SCALES - WONG BAKER: WONGBAKER_NUMERICALRESPONSE: HURTS A LITTLE MORE

## 2023-08-10 NOTE — PROGRESS NOTES
NURSING DAILY NOTE    Name: Ariana Drake   Date of Admission: 8/2/2023   Admitting Diagnosis: S/P lumbar fusion  Attending Physician: Farhat Dhillon M.d.  Allergies: Patient has no known allergies.    Safety  Patient Assist  SBA  Patient Precautions  Fall Risk, Spinal / Back Precautions   Precaution Comments  monitor BP- orthostatic, Lumbar  Bed Transfer Status  Modified Independent  Toilet Transfer Status   Modified Independent (4WW level)  Assistive Devices  Rails, Walker - front wheel  Oxygen  None - Room Air  Diet/Therapeutic Dining  Current Diet Order   Procedures    Diet Order Diet: Regular     Pill Administration  whole  Agitated Behavioral Scale  14  ABS Level of Severity  No Agitation    Fall Risk  Has the patient had a fall this admission?   No  Tonya Reed Fall Risk Scoring  16, HIGH RISK  Fall Risk Safety Measures  bed alarm, chair alarm, and poor balance    Vitals  Temperature: 36.9 °C (98.4 °F)  Temp src: Oral  Pulse: 80  Respiration: 18  Blood Pressure : 103/65  Blood Pressure MAP (Calculated): 78 MM HG  BP Location: Right, Upper Arm  Patient BP Position: Supine     Oxygen  Pulse Oximetry: 95 %  O2 (LPM): 0  O2 Delivery Device: None - Room Air  Incentive Spirometer Volume: 1000 mL    Bowel and Bladder  Last Bowel Movement  08/08/23  Stool Type  Type 6: Fluffy pieces with ragged edges, a mushy stool  Bowel Device  Bathroom  Continent  Bladder: Stress incontinence   Bowel: Continent movement  Bladder Function  Urine Void (mL):  (large)  Number of Times Voided: 1  Urine Color: Unable To Evaluate  Wet Diaper Count: 1  Genitourinary Assessment   Bladder Assessment (WDL):  WDL Except  Brandt Catheter: Not Applicable  Urine Color: Unable To Evaluate  Bladder Device: Bathroom  Bladder Scan: Post Void  $ Bladder Scan Results (mL): 98    Skin  Gerry Score   19  Sensory Interventions   Skin Preventative Measures: Pillows in Use for Support /  Positioning  Moisture Interventions         Pain  Pain Rating Scale  0 - No Pain  Pain Location  Back  Pain Location Orientation  Posterior, Lower  Pain Interventions   Medication (see MAR)    ADLs    Bathing    (OT Shower Tomorrow)  Linen Change      Personal Hygiene  Change Darlyn Pads, Moist Darlyn Wipes  Chlorhexidine Bath      Oral Care  Brushed Teeth  Teeth/Dentures  Intact  Shave     Nutrition Percentage Eaten  Lunch, Between 50-75% Consumed  Environmental Precautions  Treaded Slipper Socks on Patient  Patient Turns/Positioning  Patient Turns Self from Side to Side  Patient Turns Assistance/Tolerance     Bed Positions  Bed Controls On, Bed Locked  Head of Bed Elevated  Self regulated      Psychosocial/Neurologic Assessment  Psychosocial Assessment  Psychosocial (WDL):  Within Defined Limits  Neurologic Assessment  Neuro (WDL): Exceptions to WDL  Level of Consciousness: Alert  Orientation Level: Oriented X4  Cognition: Appropriate judgement  Speech: Clear  Pupil Assesment: No  Motor Function/Sensation Assessment: Motor response  R Foot Dorsiflexion: Strong  L Foot Dorsiflexion: Strong  RUE Motor Response: Responds to commands  RUE Sensation: Full sensation  Muscle Strength Right Arm: Good Strength Against Gravity and Moderate Resistance  LUE Motor Response: Responds to commands  LUE Sensation: Full sensation  Muscle Strength Left Arm: Good Strength Against Gravity and Moderate Resistance  RLE Motor Response: Responds to commands  RLE Sensation: Full sensation  Muscle Strength Right Leg: Fair Strength against Gravity but No Resistance  LLE Motor Response: Responds to commands  LLE Sensation: Full sensation  Muscle Strength Left Leg: Fair Strength against Gravity but No Resistance  EENT (WDL):  WDL Except    Cardio/Pulmonary Assessment  Edema      Respiratory Breath Sounds  RUL Breath Sounds: Clear  RML Breath Sounds: Clear  RLL Breath Sounds: Clear  JEWEL Breath Sounds: Clear  LLL Breath Sounds: Clear  Cardiac  Assessment   Cardiac (WDL):  Within Defined Limits

## 2023-08-10 NOTE — CARE PLAN
The patient is Stable - Low risk of patient condition declining or worsening    Shift Goals  Clinical Goals: Safety  Patient Goals: Safety, Pain Control  Family Goals: Education    Progress made toward(s) clinical / shift goals:    Problem: Pain - Standard  Goal: Alleviation of pain or a reduction in pain to the patient’s comfort goal  Flowsheets (Taken 8/9/2023 2622)  OB Pain Intervention: Medication - See MAR  Note: Patient able to verbalize pain level and verbalize an acceptable level of pain.      Problem: Fall Risk - Rehab  Goal: Patient will remain free from falls  Note: Pt uses call light consistently and appropriately. Waits for assistance does not attempt self transfer this shift. Able to verbalize needs.

## 2023-08-10 NOTE — THERAPY
Occupational Therapy  Daily Treatment     Patient Name: Ariana Drake  Age:  73 y.o., Sex:  female  Medical Record #: [id number]  Today's Date: 8/10/2023     Precautions  Precautions: (P) Fall Risk, Spinal / Back Precautions   Comments: (P) monitor BP- orthostatic, Lumbar         Subjective     Agreeable to tx activity presented this session          Discussed performing ADL routine in AM for d/c reassessment      Objective       08/10/23 1031   OT Charge Group   OT Therapy Activity (Units) 2   OT Therapeutic Exercise (Units) 2   OT Total Time Spent   OT Individual Total Time Spent (Mins) 60   Precautions   Precautions Fall Risk;Spinal / Back Precautions    Comments monitor BP- orthostatic, Lumbar   Functional Level of Assist   Bed, Chair, Wheelchair Transfer Modified Independent   Sitting Upper Body Exercises   Chest Press 1 set of 10;Bilateral   Front Arm Raise 1 set of 10;Bilateral   Shoulder Press 1 set of 10;Bilateral   Internal Shoulder Rotation 1 set of 10;Right ;Left   External Shoulder Rotation 1 set of 10;Right ;Left   Bicep Curls 1 set of 10;Right ;Left   Pronation / Supination 1 set of 10;Right ;Left   Other Exercise Nustep  x  10 minutes  workload  3    supervision transfer to and from [facility]   Comments 3lb weight for UE there ex  able to recall  4 of 7 exercises with out assist   Balance   Standing Balance (Dynamic) Good   Comments ladder ball   x  10 reps x 4   reciprocal step and toss no LOB      standing along raised table  did not need to use it for steady assist   Interdisciplinary Plan of Care Collaboration   Patient Position at End of Therapy Seated;Tray Table within Reach;Call Light within Reach         Assessment    Continues to make good gains        Strengths: Able to follow instructions, Alert and oriented, Independent prior level of function, Motivated for self care and independence, Pleasant and cooperative, Supportive family, Willingly participates in therapeutic activities  Barriers:  Decreased endurance, Fatigue, Hearing impairment, Orthostatic hypotension, Impaired activity tolerance, Impaired balance, Pain (decreased safety awareness/attention)    Plan  Assess for mod I in room, ADLs, IADLs, functional mobility with FWW (walking is patient's favorite occupation), standing tolerance/balance, endurance   Family with daughter raining prior to d/c          Occupational Therapy Goals (Active)       Problem: OT Long Term Goals       Dates: Start:  08/03/23         Goal: LTG-By discharge, patient will complete basic self care tasks with mod I-supervision        Dates: Start:  08/03/23            Goal: LTG-By discharge, patient will perform bathroom transfers with mod I-supervision        Dates: Start:  08/03/23            Goal: LTG-By discharge, patient will complete basic home management with mod I-supervision        Dates: Start:  08/03/23

## 2023-08-10 NOTE — PROGRESS NOTES
"  Physical Medicine & Rehabilitation Progress Note    Encounter Date: 8/10/2023    Chief Complaint: Decreased mobility, weakness    Interval Events (Subjective):  Patient sitting up in room. She reports therapy is going well. She reports diarrhea has improved. Denies NVD. Discussed will recheck AM labs    _____________________________________  Interdisciplinary Team Conference   Most recent IDT on 8/8/2023    Discharge Date/Disposition:  8/12/23  _____________________________________      Objective:  VITAL SIGNS: /50   Pulse 67   Temp 37.1 °C (98.8 °F) (Oral)   Resp 14   Ht 1.57 m (5' 1.81\")   Wt 55 kg (121 lb 4.1 oz)   SpO2 92%   BMI 22.31 kg/m²   Gen: NAD  Psych: Mood and affect appropriate  CV: RRR, 0 edema  Resp: CTAB, no upper airway sounds  Abd: NTND, BS++  Neuro: AOx4, following commands  Unchanged from 8/9/23    Laboratory Values:  No results found for this or any previous visit (from the past 72 hour(s)).      Medications:  Scheduled Medications   Medication Dose Frequency    simethicone  125 mg TID    potassium chloride SA  40 mEq DAILY    gabapentin  300 mg TID    omeprazole  40 mg QAM    atorvastatin  10 mg Q EVENING     PRN medications: loperamide, senna-docusate **AND** polyethylene glycol/lytes **AND** magnesium hydroxide **AND** bisacodyl, Respiratory Therapy Consult, oxyCODONE immediate-release, oxyCODONE immediate release, hydrALAZINE, carboxymethylcellulose, benzocaine-menthol, mag hydrox-al hydrox-simeth, ondansetron **OR** ondansetron, traZODone, sodium chloride, acetaminophen    Diet:  Current Diet Order   Procedures    Diet Order Diet: Regular       Medical Decision Making and Plan:  Adapted from Dr. Alvarez' A&P  Recurrent right L4 radiculopathy secondary to neuroforaminal stenosis s/p bilateral L4-5 partial laminectomy, right-sided total facetectomy, decompression of bilateral L5 and right L4 nerve roots with L4-5 fusion 7/31/2023 Dr. Cottrell  -PT and OT for mobility and ADLs. Per " guidelines, 15 hours per week between PT, OT and/or SLP.  -Follow-up neurosurgery  -Change [facility] Code / Diagnosis to Support: 0003.9 - Neurologic Conditions: Other Neurologic     Hypertension-continue hydrochlorothiazide 25 mg daily -blood pressure stable, continue hydrochlorothiazide, though patient slightly dizzy, could be UTI, monitor BP. 8/3 more orthostatic with therapy. Reduce HCTZ to 12.5mg daily. 8/4 BP stable continue HCTZ 12.5mg. 8/4 stop hydrochlorothiazide. Continue off HCTZ.      Nausea and dizziness -likely secondary to orthostatic hypotension.  Repeat KUB 8/8 as having diarrhea. Distended colon with gas filled areas, gaseous distention vs ileus. Will start simethicone. Is tolerated diet and having BMs. Continue Simethicone      Hyperlipidemia- Continue Lipitor 10 mg     Pain -Tylenol, gabapentin and oxycodone. Continue Gabapentin 300 mg TID     Hyponatremia -Labs 8/3 -sodium normal at 136.     Hypokalemia -Labs 8/3 low at 3.1 -supplement x1 8/3.  Recheck BMP 8/7 3.0, start 40 mEq. Continue 40 mEq . Recheck BMP/Mg     Hypophosphatemia -start K-Phos x4 days.  Recheck phosphorus 8/7 -wnl. Continue off Phosph supplement     Anemia -Labs 8/3 -improved. Will recheck 8/11     Dysuria, suprapubic pain -check urinalysis 8/3 - negative     Skin - Patient at risk for skin breakdown due to debility in areas including sacrum, achilles, elbows and head in addition to other sites. Nursing to assess skin daily.      GI Ppx - Patient on Prilosec for GERD prophylaxis. Patient on Senna-docusate for constipation prophylaxis made PRN due to large watery BM x2 8/3.      DVT Ppx -not on chemoprophylaxis.  Encourage out of bed.  ____________________________________    T. Gonzalez Dhillon MD/PhD  [facility] - Physical Medicine & Rehabilitation   [facility] - Brain Injury Medicine   ____________________________________

## 2023-08-10 NOTE — THERAPY
"Physical Therapy   Daily Treatment     Patient Name: Ariana Drake  Age:  73 y.o., Sex:  female  Medical Record #: [id number]  Today's Date: 8/10/2023     Precautions  Precautions: Fall Risk, Spinal / Back Precautions   Comments: monitor BP- orthostatic, Lumbar    Subjective    Ariana was resting in bed at start of sessions, agreeable to therapy. She was seen [phone] and [phone] today.     Objective     08/10/23 0901 08/10/23 1231   Precautions   Precautions Fall Risk;Spinal / Back Precautions  Fall Risk;Spinal / Back Precautions    Comments monitor BP- orthostatic, Lumbar  --    Gait Functional Level of Assist    Gait Level Of Assist Modified Independent Standby Assist   Assistive Device 4 Wheel Walker Single Point Cane  (Personal hurrycane)   Distance (Feet) 170 250  (indoor and outdoor terrain)   # of Times Distance was Traveled 2 1   Deviation Bradykinetic Bradykinetic  (Required instruction on two point gait sequencing, good carryover)   Transfer Functional Level of Assist   Bed, Chair, Wheelchair Transfer Modified Independent Modified Independent  (c/ 4WW)   Bed Chair Wheelchair Transfer Description Set-up of equipment Set-up of equipment   Supine Lower Body Exercise   Other Exercises core stability c/ quadruped single alternating UE lifts x10 bilaterally; supine SL BKFO x5, x10 orange theraband, HS curls c/ swiss ball x10  --    Sitting Lower Body Exercises   Sit to Stand 1 set of 10  (emphasis on abdominal bracing and anterior trunk lean)  --    Comments  --  Core stability/control exercises: Pelvic tilt x10; neutral spine perturbations 3x15\"; UE alternating raises to 90* x10; LE isometric marches 5x5\"   Standing Lower Body Exercises   Other Exercises Core stability in standing c/ bilateral UE swiss ball rollouts (fwd/lat) on mat table x10 each direction; x10 reactive rollouts to fwd/lat directions, swiss ball press c/ 3x10\" perturbations from all directions. Toe taps x10 each side on 7\" step, emphasis on " "core engagement  --    Bed Mobility    Supine to Sit Independent Independent   Sit to Supine Independent Independent   Sit to Stand Modified Independent  (4WW) Modified Independent  (4WW)   Scooting Independent Independent   Rolling Independent Independent   10 Meter Walk Test   Normal - Trial 1  --  10.54 seconds   Normal - Trial 2  --  10.94 seconds   Normal - Trial 3  --  8.59 seconds   Normal Average Time  --  10.02 seconds   Normal Average Velocity (m/s)  --  0.6   Interdisciplinary Plan of Care Collaboration   Patient Position at End of Therapy In Bed;Bed Alarm On;Tray Table within Reach;Call Light within Reach;Phone within Reach In Bed;Bed Alarm On;Call Light within Reach;Tray Table within Reach;Phone within Reach     AM session: focused on core stability c/ small amplitude UE/LE movements and gentle perturbations; verbal/manual/tactile cues for TA activation. Attempted quadruped fire hydrants, discontinued d/t L gluteal pain. Supine piriformis stretch 3x30\"/isometric hip ER contraction 3x7\" reduced pain and pt was able to resume c/ modification of exercise to supine SL BKFO.    PM session: Focus on gait training c/ quad cane; SBA for verbal cues and visual demonstration of two-point gait c/ good carryover of learning. Bradykinetic, trendelenburg c/ R LE stance. Kevyn for ambulating around obstacles.    Assessment  Ariana presents c/ impaired trunk motor control c/ short and long lever extremity movements and poor postural endurance d/t weakness, deconditioning, and lumbar fusion surgery on 7/31. Demonstrated improvement in 10MWT from 0.26 m/s on 8/3> 0.6 m/s on 8/10.     Strengths: Able to follow instructions, Alert and oriented, Effective communication skills, Independent prior level of function, Motivated for self care and independence, Willingly participates in therapeutic activities  Barriers: Bladder retention, Generalized weakness, Hearing impairment, Orthostatic hypotension, Impaired activity tolerance, " Impaired balance, Limited mobility, Pain, Pain poorly managed    Plan    Gait training c/ FWW and SPC c/ emphasis on large amplitude stepping, sufficient heel strike/toe off  LE strengthening c/ shuttle, mini squats, step ups, hip flexion c/ theraband, standing marches  Curb training c/ FWW/quad cane  Dynamic balance activities via weight shifting, 4 way hip target taps, UE cone stacking c/ small WANDER  Core stability c/ block balance on head, large therapy ball sitting and reaching, postural holds c/ alternating perturbations  Family training on 8/11: car transfer training c/ fiance/daughter and 4WW/SPC, HEP, spinal precautions, ramp/curb step training c/ 4WW/SPC-Fiance Kei has 0 MIGUEL/SIH; Daughter in town indefinitely for support    Passport items to be completed:  Get in/out of bed safely, in/out of a vehicle, safely use mobility device, walk or wheel around home/community, navigate up and down stairs, show how to get up/down from the ground, ensure home is accessible, demonstrate HEP, complete caregiver training    Physical Therapy Problems (Active)       Problem: Mobility       Dates: Start:  08/03/23         Goal: STG-Within one week, patient will ambulate up/down a curb at [facility] or better c/ LRAD       Dates: Start:  08/03/23               Problem: PT-Long Term Goals       Dates: Start:  08/03/23         Goal: LTG-By discharge, patient will ambulate 50ft c/ SPC at [facility]       Dates: Start:  08/03/23            Goal: LTG-By discharge, patient will perform home exercise program for LE strengthening       Dates: Start:  08/03/23            Goal: LTG-By discharge, patient will transfer in/out of a car at [facility] or better c/ LRAD       Dates: Start:  08/03/23            Goal: LTG-By discharge, patient will improve normal gait speed to >0.8 m/s c/ LRAD       Dates: Start:  08/08/23

## 2023-08-10 NOTE — CARE PLAN
"  Problem: Pain - Standard  Goal: Alleviation of pain or a reduction in pain to the patient’s comfort goal  Outcome: Progressing   Patient is able to rate pain on a scale of 1-10.         Problem: Fall Risk - Rehab  Goal: Patient will remain free from falls  Outcome: Progressing   Tonya Reed Fall risk Assessment Score: 16    High fall risk Interventions   - Alarming seatbelt  - Wander guard  - Bed and strip alarm   - Yellow sign by the door   - Yellow wrist band \"Fall risk\"  - Room near to the nurse station  - Do not leave patient unattended in the bathroom  - Fall risk education provided            "

## 2023-08-11 ENCOUNTER — APPOINTMENT (OUTPATIENT)
Dept: PHYSICAL THERAPY | Facility: REHABILITATION | Age: 73
DRG: 560 | End: 2023-08-11
Attending: PHYSICAL MEDICINE & REHABILITATION
Payer: MEDICARE

## 2023-08-11 ENCOUNTER — APPOINTMENT (OUTPATIENT)
Dept: OCCUPATIONAL THERAPY | Facility: REHABILITATION | Age: 73
DRG: 560 | End: 2023-08-11
Attending: PHYSICAL MEDICINE & REHABILITATION
Payer: MEDICARE

## 2023-08-11 ENCOUNTER — PHARMACY VISIT (OUTPATIENT)
Dept: PHARMACY | Facility: MEDICAL CENTER | Age: 73
End: 2023-08-11
Payer: MEDICARE

## 2023-08-11 PROBLEM — E87.1 HYPONATREMIA: Status: RESOLVED | Noted: 2023-02-07 | Resolved: 2023-08-11

## 2023-08-11 LAB
ANION GAP SERPL CALC-SCNC: 11 MMOL/L (ref 7–16)
BASOPHILS # BLD AUTO: 0.3 % (ref 0–1.8)
BASOPHILS # BLD: 0.02 K/UL (ref 0–0.12)
BUN SERPL-MCNC: 13 MG/DL (ref 8–22)
CALCIUM SERPL-MCNC: 9.3 MG/DL (ref 8.5–10.5)
CHLORIDE SERPL-SCNC: 105 MMOL/L (ref 96–112)
CO2 SERPL-SCNC: 23 MMOL/L (ref 20–33)
CREAT SERPL-MCNC: 0.73 MG/DL (ref 0.5–1.4)
EOSINOPHIL # BLD AUTO: 0.23 K/UL (ref 0–0.51)
EOSINOPHIL NFR BLD: 4 % (ref 0–6.9)
ERYTHROCYTE [DISTWIDTH] IN BLOOD BY AUTOMATED COUNT: 43.4 FL (ref 35.9–50)
GFR SERPLBLD CREATININE-BSD FMLA CKD-EPI: 86 ML/MIN/1.73 M 2
GLUCOSE SERPL-MCNC: 89 MG/DL (ref 65–99)
HCT VFR BLD AUTO: 33.1 % (ref 37–47)
HGB BLD-MCNC: 10.7 G/DL (ref 12–16)
IMM GRANULOCYTES # BLD AUTO: 0.02 K/UL (ref 0–0.11)
IMM GRANULOCYTES NFR BLD AUTO: 0.3 % (ref 0–0.9)
LYMPHOCYTES # BLD AUTO: 1.92 K/UL (ref 1–4.8)
LYMPHOCYTES NFR BLD: 33 % (ref 22–41)
MAGNESIUM SERPL-MCNC: 2.1 MG/DL (ref 1.5–2.5)
MCH RBC QN AUTO: 29.9 PG (ref 27–33)
MCHC RBC AUTO-ENTMCNC: 32.3 G/DL (ref 32.2–35.5)
MCV RBC AUTO: 92.5 FL (ref 81.4–97.8)
MONOCYTES # BLD AUTO: 0.55 K/UL (ref 0–0.85)
MONOCYTES NFR BLD AUTO: 9.5 % (ref 0–13.4)
NEUTROPHILS # BLD AUTO: 3.08 K/UL (ref 1.82–7.42)
NEUTROPHILS NFR BLD: 52.9 % (ref 44–72)
NRBC # BLD AUTO: 0 K/UL
NRBC BLD-RTO: 0 /100 WBC (ref 0–0.2)
PLATELET # BLD AUTO: 394 K/UL (ref 164–446)
PMV BLD AUTO: 9.4 FL (ref 9–12.9)
POTASSIUM SERPL-SCNC: 4.4 MMOL/L (ref 3.6–5.5)
RBC # BLD AUTO: 3.58 M/UL (ref 4.2–5.4)
SODIUM SERPL-SCNC: 139 MMOL/L (ref 135–145)
WBC # BLD AUTO: 5.8 K/UL (ref 4.8–10.8)

## 2023-08-11 PROCEDURE — A9270 NON-COVERED ITEM OR SERVICE: HCPCS | Mod: JZ | Performed by: PHYSICAL MEDICINE & REHABILITATION

## 2023-08-11 PROCEDURE — 97530 THERAPEUTIC ACTIVITIES: CPT | Mod: CO

## 2023-08-11 PROCEDURE — 97110 THERAPEUTIC EXERCISES: CPT

## 2023-08-11 PROCEDURE — 700102 HCHG RX REV CODE 250 W/ 637 OVERRIDE(OP): Mod: JZ | Performed by: PHYSICAL MEDICINE & REHABILITATION

## 2023-08-11 PROCEDURE — 80048 BASIC METABOLIC PNL TOTAL CA: CPT

## 2023-08-11 PROCEDURE — 36415 COLL VENOUS BLD VENIPUNCTURE: CPT

## 2023-08-11 PROCEDURE — 97535 SELF CARE MNGMENT TRAINING: CPT | Mod: CO

## 2023-08-11 PROCEDURE — 770010 HCHG ROOM/CARE - REHAB SEMI PRIVAT*

## 2023-08-11 PROCEDURE — 83735 ASSAY OF MAGNESIUM: CPT

## 2023-08-11 PROCEDURE — A9270 NON-COVERED ITEM OR SERVICE: HCPCS | Performed by: PHYSICAL MEDICINE & REHABILITATION

## 2023-08-11 PROCEDURE — 700102 HCHG RX REV CODE 250 W/ 637 OVERRIDE(OP): Performed by: PHYSICAL MEDICINE & REHABILITATION

## 2023-08-11 PROCEDURE — 97530 THERAPEUTIC ACTIVITIES: CPT

## 2023-08-11 PROCEDURE — 85025 COMPLETE CBC W/AUTO DIFF WBC: CPT

## 2023-08-11 PROCEDURE — 99233 SBSQ HOSP IP/OBS HIGH 50: CPT | Performed by: PHYSICAL MEDICINE & REHABILITATION

## 2023-08-11 PROCEDURE — RXMED WILLOW AMBULATORY MEDICATION CHARGE: Performed by: PHYSICAL MEDICINE & REHABILITATION

## 2023-08-11 PROCEDURE — 97112 NEUROMUSCULAR REEDUCATION: CPT

## 2023-08-11 RX ORDER — ATORVASTATIN CALCIUM 10 MG/1
10 TABLET, FILM COATED ORAL EVERY MORNING
Qty: 30 TABLET | Refills: 2 | Status: SHIPPED | OUTPATIENT
Start: 2023-08-11

## 2023-08-11 RX ORDER — OXYCODONE HYDROCHLORIDE 10 MG/1
10 TABLET ORAL EVERY 6 HOURS PRN
Qty: 28 TABLET | Refills: 0 | Status: SHIPPED | OUTPATIENT
Start: 2023-08-11 | End: 2023-08-25

## 2023-08-11 RX ORDER — OMEPRAZOLE 40 MG/1
40 CAPSULE, DELAYED RELEASE ORAL EVERY MORNING
Qty: 30 CAPSULE | Refills: 2 | Status: SHIPPED | OUTPATIENT
Start: 2023-08-11

## 2023-08-11 RX ORDER — GABAPENTIN 300 MG/1
300 CAPSULE ORAL 3 TIMES DAILY
Qty: 90 CAPSULE | Refills: 2 | Status: SHIPPED | OUTPATIENT
Start: 2023-08-11

## 2023-08-11 RX ADMIN — GABAPENTIN 300 MG: 300 CAPSULE ORAL at 20:10

## 2023-08-11 RX ADMIN — OXYCODONE HYDROCHLORIDE 5 MG: 5 TABLET ORAL at 12:14

## 2023-08-11 RX ADMIN — OMEPRAZOLE 40 MG: 20 CAPSULE, DELAYED RELEASE ORAL at 08:52

## 2023-08-11 RX ADMIN — ATORVASTATIN CALCIUM 10 MG: 10 TABLET, FILM COATED ORAL at 20:10

## 2023-08-11 RX ADMIN — GABAPENTIN 300 MG: 300 CAPSULE ORAL at 08:52

## 2023-08-11 RX ADMIN — GABAPENTIN 300 MG: 300 CAPSULE ORAL at 15:40

## 2023-08-11 RX ADMIN — SIMETHICONE 125 MG: 125 TABLET, CHEWABLE ORAL at 08:52

## 2023-08-11 RX ADMIN — POTASSIUM CHLORIDE 40 MEQ: 1500 TABLET, EXTENDED RELEASE ORAL at 08:52

## 2023-08-11 RX ADMIN — OXYCODONE HYDROCHLORIDE 5 MG: 5 TABLET ORAL at 06:31

## 2023-08-11 RX ADMIN — OXYCODONE HYDROCHLORIDE 10 MG: 10 TABLET ORAL at 20:09

## 2023-08-11 ASSESSMENT — BRIEF INTERVIEW FOR MENTAL STATUS (BIMS)
INITIAL REPETITION OF BED BLUE SOCK - FIRST ATTEMPT: 3
BIMS SUMMARY SCORE: 15
ASKED TO RECALL BED: YES, NO CUE REQUIRED
ASKED TO RECALL BLUE: YES, NO CUE REQUIRED
WHAT MONTH IS IT: ACCURATE WITHIN 5 DAYS
ASKED TO RECALL SOCK: YES, NO CUE REQUIRED
WHAT YEAR IS IT: CORRECT
WHAT DAY OF THE WEEK IS IT: CORRECT

## 2023-08-11 ASSESSMENT — GAIT ASSESSMENTS
ASSISTIVE DEVICE: 4 WHEEL WALKER
DEVIATION: ANTALGIC
GAIT LEVEL OF ASSIST: MODIFIED INDEPENDENT
DISTANCE (FEET): 250
ASSISTIVE DEVICE: 4 WHEEL WALKER
GAIT LEVEL OF ASSIST: MODIFIED INDEPENDENT
DISTANCE (FEET): 10
DEVIATION: ANTALGIC

## 2023-08-11 ASSESSMENT — ACTIVITIES OF DAILY LIVING (ADL)
TOILET_TRANSFER_LEVEL_OF_ASSIST: ABLE TO COMPLETE TOILET TRANSFER WITHOUT ASSIST
SHOWER_TRANSFER_LEVEL_OF_ASSIST: REQUIRES SUPERVISION WITH SHOWER TRANSFER
BED_CHAIR_WHEELCHAIR_TRANSFER_DESCRIPTION: INCREASED TIME;ADAPTIVE EQUIPMENT
BED_CHAIR_WHEELCHAIR_TRANSFER_DESCRIPTION: ADAPTIVE EQUIPMENT;INCREASED TIME
TOILETING_LEVEL_OF_ASSIST: ABLE TO COMPLETE TOILETING WITHOUT ASSIST

## 2023-08-11 ASSESSMENT — PAIN DESCRIPTION - PAIN TYPE: TYPE: ACUTE PAIN

## 2023-08-11 NOTE — CARE PLAN
Problem: PT-Long Term Goals  Goal: LTG-By discharge, patient will improve normal gait speed to >0.8 m/s c/ LRAD  Outcome: Not Met   Demonstrated improvement in 10MWT from 0.26 m/s on 8/3> 0.6 m/s on 8/10    Problem: PT-Long Term Goals  Goal: LTG-By discharge, patient will ambulate 50ft c/ SPC at [facility]  Outcome: Met  Goal: LTG-By discharge, patient will perform home exercise program for LE strengthening  Outcome: Met  Goal: LTG-By discharge, patient will transfer in/out of a car at setupA or better c/ LRAD  Outcome: Met

## 2023-08-11 NOTE — THERAPY
Physical Therapy   Discharge Summary     Patient Name: Ariana Drake  Age:  73 y.o., Sex:  female  Medical Record #: [id number]  Today's Date: 8/11/2023     Precautions  Precautions: Fall Risk, Spinal / Back Precautions   Comments: LBP    Subjective    Ariana was in room with daughter Gabby at start of session. Both agreeable to family training.     Objective     08/11/23 1301   Precautions   Precautions Fall Risk;Spinal / Back Precautions    Comments LBP   Gait Functional Level of Assist    Gait Level Of Assist Modified Independent   Assistive Device 4 Wheel Walker   Distance (Feet) 250  (200x1)   # of Times Distance was Traveled 1   Deviation Antalgic   Transfer Functional Level of Assist   Bed, Chair, Wheelchair Transfer Modified Independent   Bed Chair Wheelchair Transfer Description Adaptive equipment;Increased time   Supine Lower Body Exercise   Bridges 1 set of 10;Two Legged  (discontinued d/t peripheralization into L leg, prone lying c/ R lateral shift x1min and prone on elbows x5 centralized pain to thigh)   Comments Sciatic nerve glides x10;   Standing Lower Body Exercises   Comments Standing abdominal bracing against wall x5; x10 c/ UE alternating lifts to 90*   Bed Mobility    Supine to Sit Independent   Sit to Supine Independent   Sit to Stand Modified Independent  (c/ 4WW)   Scooting Independent   Rolling Independent   Interdisciplinary Plan of Care Collaboration   IDT Collaboration with  Family / Caregiver;Certified O.T. Assistant  (WINKLER)  (Daughter)   Patient Position at End of Therapy Seated  (on mat table, handoff to WINKLER)   Collaboration Comments family training as described in note; handoff   Roll Left and Right   Assistance Needed Independent   Physical Assistance Level No physical assistance   CARE Score - Roll Left and Right 6   Sit to Lying   Assistance Needed Independent   Physical Assistance Level No physical assistance   CARE Score - Sit to Lying 6   Lying to Sitting on Side of Bed    Assistance Needed Independent   Physical Assistance Level No physical assistance   CARE Score - Lying to Sitting on Side of Bed 6   Sit to Stand   Assistance Needed Adaptive equipment;Independent   Physical Assistance Level No physical assistance   CARE Score - Sit to Stand 6   Chair/Bed-to-Chair Transfer   Assistance Needed Independent   Physical Assistance Level No physical assistance   CARE Score - Chair/Bed-to-Chair Transfer 6   Toilet Transfer   Assistance Needed Independent   Physical Assistance Level No physical assistance   CARE Score - Toilet Transfer 6   Car Transfer   Reason if not Attempted Environmental limitations   CARE Score - Car Transfer 10   Walk 10 Feet   Assistance Needed Adaptive equipment;Independent   Physical Assistance Level No physical assistance   CARE Score - Walk 10 Feet 6   Walk 50 Feet with Two Turns   Assistance Needed Independent;Adaptive equipment   Physical Assistance Level No physical assistance   CARE Score - Walk 50 Feet with Two Turns 6   Walk 150 Feet   Assistance Needed Independent;Adaptive equipment   Physical Assistance Level No physical assistance   CARE Score - Walk 150 Feet 6   Walking 10 Feet on Uneven Surfaces   Assistance Needed Independent;Adaptive equipment   CARE Score - Walking 10 Feet on Uneven Surfaces 6   1 Step (Curb)   Reason if not Attempted Activity not applicable   CARE Score - 1 Step (Curb) 9   4 Steps   Reason if not Attempted Activity not applicable   CARE Score - 4 Steps 9   12 Steps   Reason if not Attempted Activity not applicable   CARE Score - 12 Steps 9   Picking Up Object   Assistance Needed Independent;Adaptive equipment   CARE Score - Picking Up Object 6   Wheel 50 Feet with Two Turns   Reason if not Attempted Activity not applicable   CARE Score - Wheel 50 Feet with Two Turns 9   Wheel 150 Feet   Reason if not Attempted Activity not applicable   CARE Score - Wheel 150 Feet 9   P.T. Discharge Summary   Discharge Location Home   Patient  "Discharging with Assist of Family   Level of Supervision Required Upon Discharge Intermittent Supervision   Recommended Equipment for Discharge 4-Wheeled Walker   Recommeded Services Upon Discharge Home Health Physical Therapy   Long Term Goals Met 3   Long Term Goals Not Met 1   Reason(s) for Goals Not Met Demonstrated improvement in 10MWT from 0.26 m/s on 8/3> 0.6 m/s on 8/10. Goal was 0.8 m/s   Criteria for Termination of Services Maximum Function Achieved for Inpatient Rehabilitation   Discharge Instructions to Patient   Level of Assist Required for Ambulation No Assist on Flat Surfaces;Supervision on Stairs;Supervision on Curbs   Distance Patient May Ambulate As long as tolerable   Device Recommended for Ambulation 4-Wheeled Walker   Level of Assist Required to Propel Wheelchair Requires No Assist   Level of Assist Required for Transfers Requires No Assist   Device Recommended for Transfers 4-Wheeled Walker   Home Exercise Program Refer to Home Exercise Program Handout for Details   Prosthesis / Orthosis Recommendation / Location No Prosthesis  or Orthosis Recommended     Family training session:  Car transfer modified to 25\" mat table d/t not having correct car on site, education on safe guarding c/ gait belt, hand and foot placement, sequencing. SPV  4WW outdoor mobility up/down 50' ramp @ 4% grade c/ caregiver providing SPV  4WW ambulation 25' on carpet-SPV  Floor<>sit transfer x2  CGA by therapist for mat>floor transfer: scoot>1/2 kneel>tall kneeling> quadruped on floor> prone> roll>supine  Floor>sit transfer: supine>logroll sidelying> prone> quadruped> pull up to tall kneeling>1/2 kneeling>pull to 1/2 stand>sit. CGA by therapist, verbal cues for roll to prone/supine and sequencing.  Second attempt at SPV level, good carryover of learning  Reviewed HEP and handout provided    Education provided on medication management for adequate pain relief at D/C  Recommend SPV by daughter during ambulation; Haley for " all bed mobility, toileting, and sit>stand transfers c/ 4WW  Continue skilled physical therapy c/ HHPT  Patient and daughter report understanding and agreement c/ discharge plan.      Assessment  Ariana is a 73 y.o. female postoperative from a right-sided L4-5 transpedicular excision of lateral disc 1/19/2023.  She previously had an L1-L4 fusion which they had done years ago. Today's session focused on improved pt and daughter's confidence and independence c/ mobility around her home and community. Patient and daughter demonstrated understanding of safety c/ transfers, mobility, and HEP. She has participated well in her inpatient rehabilitation program with functional gains as above in flowsheet. She is now appropriate for discharge to home with family support    Plan    Tentative discharge on 08/12/23.     Patient passport completed.

## 2023-08-11 NOTE — PROGRESS NOTES
Received shift report from day RN Karen and assumed care of patient.  Patient awake, calm and stable, currently positioned in bed for comfort and safety; call light within reach.  Denies pain or discomfort at this time.  Will continue to monitor.

## 2023-08-11 NOTE — DISCHARGE SUMMARY
Physical Medicine & Rehabilitation Discharge Summary    Admission Date: 8/2/2023    Discharge Date: 8/12/2023     Attending Provider: Farhat Dhillon MD/PhD    Admission Diagnosis:   Active Hospital Problems    Diagnosis     *S/P lumbar fusion     Hyponatremia     Hypertension     Dyslipidemia     GERD (gastroesophageal reflux disease)        Discharge Diagnosis:  Active Hospital Problems    Diagnosis     *S/P lumbar fusion     Hyponatremia     Hypertension     Dyslipidemia     GERD (gastroesophageal reflux disease)        HPI per Admission History & Physical:  Records reviewed: Neurosurgery notes.  Mrs. Drake is postoperative from a right-sided L4-5 transpedicular excision of lateral disc 1/19/2023.  She previously had an L1-L4 fusion which they had done years ago.  After her surgery in January she started to have some bothersome sensations in her right leg.  She was also getting symptoms in the left leg.  Additional surgical intervention was recommended.       Patient was admitted for elective surgery 7/31/2023 where she underwent L4-L5 fusion.  Postoperative course was relatively uneventful, however, functionally she lives on her own and needs additional acute rehab to discharge home independently.  She was admitted to [facility] rehab 8/2/2023.    Patient was admitted to [facility] on 8/2/2023.     Hospital Course by Problem List:  Recurrent right L4 radiculopathy secondary to neuroforaminal stenosis s/p bilateral L4-5 partial laminectomy, right-sided total facetectomy, decompression of bilateral L5 and right L4 nerve roots with L4-5 fusion 7/31/2023 Dr. Cottrell  -PT and OT for mobility and ADLs. Per guidelines, 15 hours per week between PT, OT and/or SLP.  -Follow-up neurosurgery  -Change [facility] Code / Diagnosis to Support: 0003.9 - Neurologic Conditions: Other Neurologic      Hypertension-continue hydrochlorothiazide 25 mg daily -blood pressure stable, though patient slightly dizzy, could  be UTI, monitor BP. 8/3 more orthostatic with therapy. Reduce HCTZ to 12.5mg daily. 8/4 BP stable continue HCTZ 12.5mg. 8/4 stop hydrochlorothiazide due to ongoing dizziness. Continue off HCTZ.      Nausea and dizziness -likely secondary to orthostatic hypotension.  Repeat KUB 8/8 as having diarrhea. Distended colon with gas filled areas, gaseous distention vs ileus. Will start simethicone. Is tolerated diet and having BMs. Discontinue Simethicone     Hyperlipidemia- Continue Lipitor 10 mg     Pain -Tylenol, gabapentin and oxycodone. Continue Gabapentin, prescription for Oxycodone  I discussed the risks and benefits of using opiate medications for pain control.  I discussed the risk of addiction, potential for overdose, and respiratory depression (and the potential need for opiate antagonist therapy if this occurs).  I encouraged the patient to take this medication sparingly with the expressed goal of weaning off the medication as soon as is clinically appropriate.  I informed the patient that we are only able to provide up to a 14 day supply of these medications at discharge and that they will be responsible for requesting any refills needed from their primary care provider or their surgeon.  We discussed the need to safely secure these medications to prevent theft, inadvertent ingestion, or misuse.  Any unused medication should be immediately disposed of through a sanctioned medication disposal program.  We discussed adjunctive pain medications and conservative therapies at length.I answered the patient's questions regarding this treatment, and the patient indicated understanding and willingness to proceed.     Hyponatremia -Labs 8/3 -sodium normal at 136.     Hypokalemia -Labs 8/3 low at 3.1 -supplement x1 8/3.  Recheck BMP 8/7 3.0, start 40 mEq. Continue 40 mEq . Recheck BMP/Mg     Hypophosphatemia -start K-Phos x4 days.  Recheck phosphorus 8/7 -wnl. Continue off Phosph supplement     Anemia -Labs 8/3 -improved.  Will recheck 8/11     Dysuria, suprapubic pain -check urinalysis 8/3 - negative     Skin - Patient at risk for skin breakdown due to debility in areas including sacrum, achilles, elbows and head in addition to other sites. Nursing to assess skin daily.      GI Ppx - Patient on Prilosec for GERD prophylaxis. Patient on Senna-docusate for constipation prophylaxis made PRN due to large watery BM x2 8/3.      DVT Ppx -not on chemoprophylaxis.  Encourage out of bed.    Functional Status at Discharge  Eating:  Independent  Eating Description:     Grooming:  Independent  Grooming Description:  Standing at sink  Bathing:  Modified Independent  Bathing Description:  Grab bar, Hand held shower, Tub bench  Upper Body Dressing:  Modified Independent  Upper Body Dressing Description:  Set-up of equipment  Lower Body Dressing:  Modified Independent  Lower Body Dressing Description:  Set-up of equipment, Supervision for safety (setup/sba to don/doff pullups, pants, don socks)  Discharge Location : Home  Patient Discharging with Assist of: Family ;Spouse / Significant Other  Level of Supervision Required: No Supervision  Recommended Equipment for Discharge: None (DME needs in place)  Recommended Services Upon Discharge: Home Health Occupational Therapy  Long Term Goals Met: 3 of 3   supervision to mod I  1  basic self care 2  bathroom transfers  3   home management/ kitchen tasks  Criteria for Termination of Services: Maximum Function Achieved for Inpatient Rehabilitation  Walk:  (P) Modified Independent  Distance Walked:  (P) 10  Number of Times Distance Was Traveled:  (P) 2  Assistive Device:  (P) 4 Wheel Walker  Gait Deviation:  (P) Antalgic  Wheelchair:     Distance Propelled:      Wheelchair Description:     Stairs Standby Assist  Stairs Description Extra time, Hand rails, Supervision for safety          IFarhat M.D., personally performed a complete drug regimen review and no potential clinically  significant medication issues were identified.   Discharge Medication:     Medication List        START taking these medications        Instructions   oxyCODONE immediate release 10 MG immediate release tablet  Commonly known as: Roxicodone   Take 1 Tablet by mouth every 6 hours as needed for Severe Pain for up to 14 days.  Dose: 10 mg            CHANGE how you take these medications        Instructions   gabapentin 300 MG Caps  What changed: when to take this  Commonly known as: Neurontin   Take 1 Capsule by mouth 3 times a day.  Dose: 300 mg            CONTINUE taking these medications        Instructions   alendronate 70 MG Tabs  Commonly known as: Fosamax   Take 70 mg by mouth every 7 days.  Dose: 70 mg     atorvastatin 10 MG Tabs  Commonly known as: Lipitor   Take 1 Tablet by mouth every morning.  Dose: 10 mg     omeprazole 40 MG delayed-release capsule  Commonly known as: PriLOSEC   Take 1 Capsule by mouth every morning.  Dose: 40 mg            STOP taking these medications      hydroCHLOROthiazide 25 MG Tabs  Commonly known as: Hydrodiuril              Discharge Diet:  Current Diet Order   Procedures    Diet Order Diet: Regular       Discharge Activity:  As tolerated     Disposition:  Patient to discharge home with family support and community resources.    Equipment:  FWW    Follow-up & Discharge Instructions:  Follow up with your primary care provider (PCP) within 7-10 days of discharge to review your medications and take over your care.     If you develop chest pain, fever, chills, change in neurologic function (weakness, sensation changes, vision changes), or other concerning sxs, seek immediate medical attention or call 911.      Future Appointments   Date Time Provider Department Center   8/11/2023  1:00 PM Maryanne Reed PT [facility] None   8/11/2023  2:00 PM PHILLIP Delgado [facility] None       Condition on Discharge:  Good    More than 31 minutes was spent on discharging this patient, including  face-to-face time, prescription management, and the dictation of this note.    Farhat Dhillon M.D.    Date of Service: 8/12/2023

## 2023-08-11 NOTE — THERAPY
"Physical Therapy   Daily Treatment     Patient Name: Ariana Drake  Age:  73 y.o., Sex:  female  Medical Record #: [id number]  Today's Date: 8/11/2023     Precautions  Precautions: Fall Risk, Spinal / Back Precautions   Comments: monitor BP- orthostatic, Lumbar    Subjective    \"I am having a lot of pain in my left leg-- it was really bad last night.\" Pt in bed at arrival, agreeable to PT tx.      Objective       08/11/23 1101   PT Charge Group   PT Therapeutic Exercise (Units) 1   PT Neuromuscular Re-Education / Balance (Units) 1   PT Total Time Spent   PT Individual Total Time Spent (Mins) 30   Gait Functional Level of Assist    Gait Level Of Assist Modified Independent   Assistive Device 4 Wheel Walker   Distance (Feet) 10   # of Times Distance was Traveled 2   Deviation Antalgic   Transfer Functional Level of Assist   Bed, Chair, Wheelchair Transfer Modified Independent   Bed Chair Wheelchair Transfer Description Increased time;Adaptive equipment   Supine Lower Body Exercise   Other Exercises sidelying femoral nerve glides 1 x 10 LLE c/ AAROM   Standing Lower Body Exercises   Other Exercises TA activation and core stabilization c/ wall support; see note   Bed Mobility    Supine to Sit Modified Independent   Sit to Supine Modified Independent   Sit to Stand Modified Independent   Scooting Modified Independent   Rolling Modified Independent   Neuro-Muscular Treatments   Neuro-Muscular Treatments Anterior weight shift;Postural Changes;Postural Facilitation;Tactile Cuing;Verbal Cuing;Co-Contraction   Comments c/ standing TA activation   Interdisciplinary Plan of Care Collaboration   IDT Collaboration with  Certified O.T. Assistant  (WINKLER)   Patient Position at End of Therapy In Bed;Call Light within Reach;Tray Table within Reach;Phone within Reach   Collaboration Comments Kevyn trial in room     LLE pain assessment:  Quality: slicing, sharp from groin to top of knee  Neurodynamic testing: + for femoral n.   Lumbar " ROM: L thigh pain increases c/ lumbar ext, L lateral flx, no change c/ rotation R/L   Seated L knee ext: + for familiar pain c/ return to knee flexion, full ROM     Education: likely femoral n. irritation 2/2 lumbar surgery, increase in activity c/ concurrent poor core endurance; rationale for improving functional core endurance, posture, and safe progression of activity. Offered resources for community level activities for postural training: pilates, [employer]. Pt reports understanding.     Femoral n. Glides as above in R sidelying position.    Standing TA activation and postural control c/ wall support: segemental correction of posture feet>head, then added AROM UE flex/ext, gentle perturbations, and A-P WS away from wall with same AROM + pertubations. Pt coached to use teach back of concepts and return demo segmental corrections to support learning.     Pt declining down to cafeteria for lunch, left in bed c/ heat pack for sacrum. All needs met.     Assessment    Ariana able to tolerate femoral n. Glide without increase in LLE pain. Increased gait antalgia today and slowed speed c/ 4WW. Receptive to postural education, but would benefit from handout/review for carryover.     Strengths: Able to follow instructions, Alert and oriented, Effective communication skills, Independent prior level of function, Motivated for self care and independence, Willingly participates in therapeutic activities  Barriers: Bladder retention, Generalized weakness, Hearing impairment, Orthostatic hypotension, Impaired activity tolerance, Impaired balance, Limited mobility, Pain, Pain poorly managed    Plan    Gait training c/ FWW and SPC c/ emphasis on large amplitude stepping, sufficient heel strike/toe off  LE strengthening c/ shuttle, mini squats, step ups, hip flexion c/ theraband, standing marches  Curb training c/ FWW/quad cane  Dynamic balance activities via weight shifting, 4 way hip target taps, UE cone stacking c/ small WANDER  Core  stability c/ block balance on head, large therapy ball sitting and reaching, postural holds c/ alternating perturbations  Family training on 8/11: car transfer training c/ fiance/daughter and 4WW/SPC, HEP, spinal precautions, ramp/curb step training c/ 4WW/SPC-Jimmy Choudhury has 0 MIGUEL/SIH; Daughter in town indefinitely for support     Passport items to be completed:  Get in/out of bed safely, in/out of a vehicle, safely use mobility device, walk or wheel around home/community, navigate up and down stairs, show how to get up/down from the ground, ensure home is accessible, demonstrate HEP, complete caregiver training    Physical Therapy Problems (Active)       Problem: Mobility       Dates: Start:  08/03/23         Goal: STG-Within one week, patient will ambulate up/down a curb at CGA or better c/ LRAD       Dates: Start:  08/03/23               Problem: PT-Long Term Goals       Dates: Start:  08/03/23         Goal: LTG-By discharge, patient will ambulate 50ft c/ SPC at SPV       Dates: Start:  08/03/23            Goal: LTG-By discharge, patient will perform home exercise program for LE strengthening       Dates: Start:  08/03/23            Goal: LTG-By discharge, patient will transfer in/out of a car at setupA or better c/ LRAD       Dates: Start:  08/03/23            Goal: LTG-By discharge, patient will improve normal gait speed to >0.8 m/s c/ LRAD       Dates: Start:  08/08/23

## 2023-08-11 NOTE — PROGRESS NOTES
"  Physical Medicine & Rehabilitation Progress Note    Encounter Date: 8/11/2023    Chief Complaint: Decreased mobility, weakness    Interval Events (Subjective):  Patient sitting up in room. She reports therapy is going well. She reports family will be in tomorrow. She did have a rough night as she had delay in her timing of opiates but pain is doing better now. Discussed would send prescription today.     _____________________________________  Interdisciplinary Team Conference   Most recent IDT on 8/8/2023    Discharge Date/Disposition:  8/12/23  _____________________________________      Objective:  VITAL SIGNS: /58   Pulse 66   Temp 36.7 °C (98.1 °F) (Oral)   Resp 18   Ht 1.57 m (5' 1.81\")   Wt 55 kg (121 lb 4.1 oz)   SpO2 98%   BMI 22.31 kg/m²   Gen: NAD  Psych: Mood and affect appropriate  CV: RRR, 0 edema  Resp: CTAB, no upper airway sounds  Abd: NTND  Neuro: AOx4, following commands    Laboratory Values:  Recent Results (from the past 72 hour(s))   CBC WITH DIFFERENTIAL    Collection Time: 08/11/23  5:15 AM   Result Value Ref Range    WBC 5.8 4.8 - 10.8 K/uL    RBC 3.58 (L) 4.20 - 5.40 M/uL    Hemoglobin 10.7 (L) 12.0 - 16.0 g/dL    Hematocrit 33.1 (L) 37.0 - 47.0 %    MCV 92.5 81.4 - 97.8 fL    MCH 29.9 27.0 - 33.0 pg    MCHC 32.3 32.2 - 35.5 g/dL    RDW 43.4 35.9 - 50.0 fL    Platelet Count 394 164 - 446 K/uL    MPV 9.4 9.0 - 12.9 fL    Neutrophils-Polys 52.90 44.00 - 72.00 %    Lymphocytes 33.00 22.00 - 41.00 %    Monocytes 9.50 0.00 - 13.40 %    Eosinophils 4.00 0.00 - 6.90 %    Basophils 0.30 0.00 - 1.80 %    Immature Granulocytes 0.30 0.00 - 0.90 %    Nucleated RBC 0.00 0.00 - 0.20 /100 WBC    Neutrophils (Absolute) 3.08 1.82 - 7.42 K/uL    Lymphs (Absolute) 1.92 1.00 - 4.80 K/uL    Monos (Absolute) 0.55 0.00 - 0.85 K/uL    Eos (Absolute) 0.23 0.00 - 0.51 K/uL    Baso (Absolute) 0.02 0.00 - 0.12 K/uL    Immature Granulocytes (abs) 0.02 0.00 - 0.11 K/uL    NRBC (Absolute) 0.00 K/uL   Basic " Metabolic Panel    Collection Time: 08/11/23  5:15 AM   Result Value Ref Range    Sodium 139 135 - 145 mmol/L    Potassium 4.4 3.6 - 5.5 mmol/L    Chloride 105 96 - 112 mmol/L    Co2 23 20 - 33 mmol/L    Glucose 89 65 - 99 mg/dL    Bun 13 8 - 22 mg/dL    Creatinine 0.73 0.50 - 1.40 mg/dL    Calcium 9.3 8.5 - 10.5 mg/dL    Anion Gap 11.0 7.0 - 16.0   MAGNESIUM    Collection Time: 08/11/23  5:15 AM   Result Value Ref Range    Magnesium 2.1 1.5 - 2.5 mg/dL   ESTIMATED GFR    Collection Time: 08/11/23  5:15 AM   Result Value Ref Range    GFR (CKD-EPI) 86 >60 mL/min/1.73 m 2         Medications:  Scheduled Medications   Medication Dose Frequency    simethicone  125 mg TID    potassium chloride SA  40 mEq DAILY    gabapentin  300 mg TID    omeprazole  40 mg QAM    atorvastatin  10 mg Q EVENING     PRN medications: loperamide, senna-docusate **AND** polyethylene glycol/lytes **AND** magnesium hydroxide **AND** bisacodyl, Respiratory Therapy Consult, oxyCODONE immediate-release, oxyCODONE immediate release, hydrALAZINE, carboxymethylcellulose, benzocaine-menthol, mag hydrox-al hydrox-simeth, ondansetron **OR** ondansetron, traZODone, sodium chloride, acetaminophen    Diet:  Current Diet Order   Procedures    Diet Order Diet: Regular       Medical Decision Making and Plan:  Adapted from Dr. Alvarez' A&P  Recurrent right L4 radiculopathy secondary to neuroforaminal stenosis s/p bilateral L4-5 partial laminectomy, right-sided total facetectomy, decompression of bilateral L5 and right L4 nerve roots with L4-5 fusion 7/31/2023 Dr. Cottrell  -PT and OT for mobility and ADLs. Per guidelines, 15 hours per week between PT, OT and/or SLP.  -Follow-up neurosurgery  -Change [facility] Code / Diagnosis to Support: 0003.9 - Neurologic Conditions: Other Neurologic     Hypertension-continue hydrochlorothiazide 25 mg daily -blood pressure stable, though patient slightly dizzy, could be UTI, monitor BP. 8/3 more orthostatic with therapy. Reduce HCTZ to  12.5mg daily. 8/4 BP stable continue HCTZ 12.5mg. 8/4 stop hydrochlorothiazide due to ongoing dizziness. Continue off HCTZ.      Nausea and dizziness -likely secondary to orthostatic hypotension.  Repeat KUB 8/8 as having diarrhea. Distended colon with gas filled areas, gaseous distention vs ileus. Will start simethicone. Is tolerated diet and having BMs. Discontinue Simethicone     Hyperlipidemia- Continue Lipitor 10 mg     Pain -Tylenol, gabapentin and oxycodone. Continue Gabapentin, prescription for Oxycodone  I discussed the risks and benefits of using opiate medications for pain control.  I discussed the risk of addiction, potential for overdose, and respiratory depression (and the potential need for opiate antagonist therapy if this occurs).  I encouraged the patient to take this medication sparingly with the expressed goal of weaning off the medication as soon as is clinically appropriate.  I informed the patient that we are only able to provide up to a 14 day supply of these medications at discharge and that they will be responsible for requesting any refills needed from their primary care provider or their surgeon.  We discussed the need to safely secure these medications to prevent theft, inadvertent ingestion, or misuse.  Any unused medication should be immediately disposed of through a sanctioned medication disposal program.  We discussed adjunctive pain medications and conservative therapies at length.I answered the patient's questions regarding this treatment, and the patient indicated understanding and willingness to proceed.     Hyponatremia -Labs 8/3 -sodium normal at 136.     Hypokalemia -Labs 8/3 low at 3.1 -supplement x1 8/3.  Recheck BMP 8/7 3.0, start 40 mEq. Continue 40 mEq . Recheck BMP/Mg     Hypophosphatemia -start K-Phos x4 days.  Recheck phosphorus 8/7 -wnl. Continue off Phosph supplement     Anemia -Labs 8/3 -improved. Will recheck 8/11     Dysuria, suprapubic pain -check urinalysis 8/3  - negative     Skin - Patient at risk for skin breakdown due to debility in areas including sacrum, achilles, elbows and head in addition to other sites. Nursing to assess skin daily.      GI Ppx - Patient on Prilosec for GERD prophylaxis. Patient on Senna-docusate for constipation prophylaxis made PRN due to large watery BM x2 8/3.      DVT Ppx -not on chemoprophylaxis.  Encourage out of bed.  ____________________________________    T. Gonzalez Dhillon MD/PhD  [facility] - Physical Medicine & Rehabilitation   [facility] - Brain Injury Medicine   ____________________________________    Total time:  51 minutes. Time spent included pre-rounding review of vitals and tests, unit/floor time, face-to-face time with the patient including physical examination, care coordination, counseling of patient and/or family, ordering medications/procedures/tests, discussion with CM, PT, OT, SLP and/or other healthcare providers, and documentation in the electronic medical record. Topics discussed included discharge planning, pain control, opiate counseling, and discontinue simethicone.

## 2023-08-11 NOTE — DISCHARGE INSTRUCTIONS
NURSING INSTRUCTIONS    Incision Care, Adult  An incision is a cut that a doctor makes in your skin for surgery. Most times, these cuts are closed after surgery. Your cut from surgery may be closed with:  Stitches (sutures).  Staples.  Skin glue.  Skin tape (adhesive) strips.  You may need to go back to your doctor to have stitches or staples taken out. This may happen many days or many weeks after your surgery. You need to take good care of your cut so it does not get infected. Follow instructions from your doctor about how to care for your cut.  Supplies needed:  Soap and water.  A clean hand towel.  Wound cleanser.  A clean bandage (dressing), if needed.  Cream or ointment, if told by your doctor.  Clean gauze.  How to care for your cut from surgery  Cleaning your cut  Ask your doctor how to clean your cut. You may need to:  Wear medical gloves.  Use mild soap and water, or a wound cleanser.  Use a clean gauze to pat your cut dry after you clean it.  Changing your bandage  Wash your hands with soap and water for at least 20 seconds before and after you change your bandage. If you cannot use soap and water, use hand .  Do not usedisinfectants or antiseptics, such as rubbing alcohol, to clean your wound unless told by your doctor.  Change your bandage as told by your doctor.  Leavestitches or skin glue in place for at least 2 weeks.  Leave tape strips alone unless you are told to take them off. You may trim the edges of the tape strips if they curl up.  Put a cream or ointment on your cut. Do this only as told.  Cover your cut with a clean bandage.  Ask your doctor when you can leave your cut uncovered.  Checking for infection  Check your cut area every day for signs of infection. Check for:  More redness, swelling, or pain.  More fluid or blood.  New warmth.  Hardness or a new rash around the  incision.  Pus or a bad smell.    Follow these instructions at home  Medicines  Take over-the-counter and prescription medicines only as told by your doctor.  If you were prescribed an antibiotic medicine, cream, or ointment, use it as told by your doctor. Do not stop using the antibiotic even if you start to feel better.  Eating and drinking  Eat foods that have a lot of certain nutrients, such as protein, vitamin A, and vitamin C. These foods help your cut heal.  Foods rich in protein include meat, fish, eggs, dairy, beans, nuts, and protein drinks.  Foods rich in vitamin A include carrots and dark green, leafy vegetables.  Foods rich in vitamin C include citrus fruits, tomatoes, broccoli, and peppers.  Drink enough fluid to keep your pee (urine) pale yellow.  General instructions    Do not take baths, swim, or use a hot tub. Ask your doctor about taking showers or sponge baths.  Limit movement around your cut. This helps with healing.  Try not to strain, lift, or exercise for the first 2 weeks, or for as long as told by your doctor.  Return to your normal activities as told by your doctor. Ask your doctor what activities are safe for you.  Do not scratch, scrub, or pick at your cut. Keep it covered as told by your doctor.  Protect your cut from the sun when you are outside for the first 6 months, or for as long as told by your doctor. Cover up the scar area or put on sunscreen that has an SPF of at least 30.  Do not use any products that contain nicotine or tobacco, such as cigarettes, e-cigarettes, and chewing tobacco. These can delay cut healing. If you need help quitting, ask your doctor.  Keep all follow-up visits.  Contact a doctor if:  You have any of these signs of infection around your cut:  More redness, swelling, or pain.  More fluid or blood.  New warmth or hardness.  Pus or a bad smell.  A new rash.  You have a fever.  You feel like you may vomit (nauseous).  You vomit.  You are dizzy.  Your stitches,  staples, skin glue, or tape strips come undone.  Your cut gets bigger.  You have a fever.  Get help right away if:  Your cut bleeds through your bandage, and bleeding does not stop with gentle pressure.  Your cut opens up and comes apart.  These symptoms may be an emergency. Do not wait to see if the symptoms will go away. Get medical help right away. Call your local emergency services (911 in the [place] Do not drive yourself to the hospital.  Summary  Follow instructions from your doctor about how to care for your cut.  Wash your hands with soap and water for at least 20 seconds before and after you change your bandage. If you cannot use soap and water, use hand .  Check your cut area every day for signs of infection.  Keep all follow-up visits.  This information is not intended to replace advice given to you by your health care provider. Make sure you discuss any questions you have with your health care provider.  Document Revised: 03/21/2022 Document Reviewed: 03/21/2022  [employer] Patient Education © 2023 [email or web address]                        Fall Prevention in Hospitals, Adult  Being a patient in the hospital puts you at risk for falling. Falls can cause serious injury and harm, but they can be prevented. It is important to understand what puts you at risk for falling and what you and your health care team can do to prevent you from falling. If you or a loved one falls in the hospital, it is important to tell the hospital staff about it.  What increases my risk?  Certain conditions and treatments may increase your risk of falling in the hospital. These include:  Being in an unfamiliar environment, especially when using the bathroom at night.  Having surgery.  Being on bed rest.  Taking many medicines or certain types of medicines, such as sleeping pills.  Having tubes in place, such as IV lines or catheters.  Other risk factors for falls in a hospital include:  Having difficulty with hearing or  vision.  Having a change in thinking or behavior, such as confusion.  Having depression.  Having trouble with balance or feeling dizzy.  Needing to use the toilet frequently.  Having fallen during the past 3 months.  Having low blood pressure.  What are some strategies for preventing falls?  If you or a loved one has to stay in the hospital:  Ask about which fall prevention strategies will be in place. Speak up if the fall prevention plan changes.  Ask for help moving around, especially after surgery or when not feeling well.  If you have been asked to call for help when getting up, do not get up by yourself. Asking for help to get up is for your safety, and the staff is there to help you.  Wear nonskid footwear.  Get up slowly, and sit at the side of the bed for a few minutes before standing up.  Keep items you need close to you, such as the nurse call button or a phone, so that you do not need to reach for them.  Wear eyeglasses or hearing aids if they have been prescribed.  Have someone stay in the hospital with you or your loved one.  Ask if sleeping pills or other medicines that can cause confusion are necessary.  What does the hospital staff do to help prevent falls?         Hospitals have systems in place to prevent falls and accidents, which may involve:  Discussing your fall risks and making a personalized fall prevention plan.  Checking in regularly to see if you need help.  Placing an arm band on your wrist or a sign near your room to alert other staff of your needs.  Using an alarm on your hospital bed. This is an alarm that goes off if you get out of bed and forget to call for help.  Keeping the bed in a low and locked position.  Keeping the area around the bed and bathroom well-lit and free from clutter.  Keeping your room quiet, so that you can sleep and be well rested.  Using safety equipment, such as:  A belt around your waist.  Walkers, crutches, and other devices for support.  Safety beds, such as  low beds, or cushions on the floor next to the bed.  Having a staff person stay with you (one-on-one observation), even when you are using the bathroom. This is for your safety.  Using video monitoring. This allows a staff member to come to you if you need help.  What other actions can I take to lower my risk of falls?  Check in regularly with your health care provider or pharmacist to review all medicines that you take.  Make sure that you have a regular exercise program to stay fit. This will help you maintain your balance.  Talk with a physical therapist or  if recommended by your health care provider. He or she can help you improve your strength, balance, and endurance.  If you are over age 65:  Ask your health care provider if you need a calcium or vitamin D supplement.  Have your eyes and hearing checked every year.  Have your feet checked every year.  Where to find more information  Centers for Disease Control and Prevention: www.cdc.gov  Summary  Being in an unfamiliar environment, such as the hospital, increases your risk for falling.  If you have been asked to call for help when getting up, do not get up by yourself. Asking for help to get up is for your safety, and the staff is there to help you.  Ask about which fall prevention strategies will be in place. Speak up if the fall prevention plan changes.  If you or a loved one falls, tell the hospital staff. This is important.  This information is not intended to replace advice given to you by your health care provider. Make sure you discuss any questions you have with your health care provider.  Document Revised: 07/21/2021 Document Reviewed: 07/21/2021  [employer]vier Patient Education © 2023 [employer]                    Occupational Therapy Discharge Instructions for Ariana Drake    8/11/2023    Level of Assist Required for Eating: Able to Complete Eating without Assist  Level of Assist Required for Grooming: Able to Complete Grooming without  Assist  Level of Assist Required for Dressing: Able to Complete Dressing without Assist  Level of Assist Required for Toileting: Able to Complete Toileting without Assist  Level of Assist Required for Toilet Transfer: Able to Complete Toilet Transfer without Assist  Level of Assist Required for Bathing: Able to Complete Bathing without Assist  Equipment for Bathing: Shower Chair, Grab Bars in Tub / Shower  Level of Assist Required for Shower Transfer: Requires Supervision with Shower Transfer   Have someone with you the first few times getting  in / out of the shower  Level of Assist Required for Home Mgmt: Requires Supervision with Home Management  Level of Assist Required for Meal Prep: Requires Supervision with Meal Preparation   Occasional reminders for back precautions/ body mechanics  Driving: Please Contact Physician Prior to Driving  Home Exercise Program: Refer to Home Exercise Program Handout for Details      [address]!    I enjoyed working with you !  Jason SHEPARD     Physical Therapy Discharge Instructions for Ariana Drake    8/11/2023    Level of Assist Required for Ambulation: No Assist on Flat Surfaces, Supervision on Stairs, Supervision on Curbs  Distance Patient May Ambulate: As long as tolerable  Device Recommended for Ambulation: 4-Wheeled Walker  Level of Assist Required to Propel Wheelchair: Requires No Assist  Level of Assist Required for Transfers: Requires No Assist  Device Recommended for Transfers: 4-Wheeled Walker  Home Exercise Program: Refer to Home Exercise Program Handout for Details  Prosthesis / Orthosis Recommendation / Location: No Prosthesis  or Orthosis Recommended  Keep up the good work! You were a pleasure to work with :) Anderson

## 2023-08-11 NOTE — THERAPY
"Occupational Therapy  Daily Treatment     Patient Name: Ariana Drake  Age:  73 y.o., Sex:  female  Medical Record #: [id number]  Today's Date: 8/11/2023     Precautions  Precautions: Fall Risk, Spinal / Back Precautions   Comments: monitor BP- orthostatic, Lumbar         Subjective    \"  I had a bad night last night\" reporting increased LE pain  last night     Did not hinder  tx activity  had pain med  30 minutes prior      Objective       08/11/23 0702   OT Charge Group   OT Self Care / ADL (Units) 4   OT Total Time Spent   OT Individual Total Time Spent (Mins) 60   Precautions   Precautions Fall Risk;Spinal / Back Precautions    Functional Level of Assist   Eating Independent   Grooming Independent   Grooming Description Standing at sink   Bathing Modified Independent   Bathing Description Grab bar;Hand held shower;Tub bench   Upper Body Dressing Modified Independent   Lower Body Dressing Modified Independent   Toileting Independent   Bed, Chair, Wheelchair Transfer Modified Independent   Toilet Transfers Modified Independent   Tub / Shower Transfers Modified Independent   Interdisciplinary Plan of Care Collaboration   IDT Collaboration with  Nursing;Physical Therapist   Patient Position at End of Therapy In Bed;Call Light within Reach;Tray Table within Reach   Collaboration Comments Mod I trial in room at 4WW level         Assessment     Appropriate for mod I trial in room  daytime only   Call for assist at night      Demonstrated good safety and independence  with ADL routine and related mobility/ transfer   Strengths: Able to follow instructions, Alert and oriented, Independent prior level of function, Motivated for self care and independence, Pleasant and cooperative, Supportive family, Willingly participates in therapeutic activities  Barriers: Decreased endurance, Fatigue, Hearing impairment, Orthostatic hypotension, Impaired activity tolerance, Impaired balance, Pain (decreased safety " awareness/attention)    Plan   Family training this afternoon  prep for transition home 8-12-23    Occupational Therapy Goals (Active)       There are no active problems.

## 2023-08-11 NOTE — CARE PLAN
The patient is Stable - Low risk of patient condition declining or worsening    Shift Goals  Clinical Goals: safety, pain control, rest  Patient Goals: pain control, rest  Family Goals: Education    Progress made toward(s) clinical / shift goals:     Problem: Knowledge Deficit - Standard  Goal: Patient and family/care givers will demonstrate understanding of plan of care, disease process/condition, diagnostic tests and medications  Outcome: Progressing  Note: Pt education reinforced regarding plan of care with emphasis on adequate hydration, pt again shows better understanding with improved follow through, pt again reports understanding how this can effect constipation, will continue to reinforce education and continue to monitor.       Problem: Fall Risk - Rehab  Goal: Patient will remain free from falls  Outcome: Progressing  Note: Pt is A&O x 4 bed & WC alarms in place  Pt remains free from falls.   Reinforce use of call light when needs to get out of bed/WC.

## 2023-08-11 NOTE — THERAPY
"Occupational Therapy  Daily Treatment     Patient Name: Ariana Drake  Age:  73 y.o., Sex:  female  Medical Record #: [id number]  Today's Date: 8/11/2023     Precautions  Precautions: (P) Fall Risk, Spinal / Back Precautions   Comments: LBP         Subjective    \" I think I am ready to go home.\"     Objective/Assessment       08/11/23 1402   OT Charge Group   OT Therapy Activity (Units) 2   OT Total Time Spent   OT Individual Total Time Spent (Mins) 30   Precautions   Precautions Fall Risk;Spinal / Back Precautions    Functional Level of Assist   Tub / Shower Transfers Modified Independent  (dry transfer in / out of  step in   shower with  grab bar and shower chair)   Interdisciplinary Plan of Care Collaboration   IDT Collaboration with  Family / Caregiver   Patient Position at End of Therapy In Bed;Call Light within Reach;Tray Table within Reach   Collaboration Comments Daughter Gabby present for training  provided vebal update  regarding current functional status       occasional reminders for safety with 4WW  and to not reach out of base of support    otherwise  Ariana demonstrates good safety and independence with ADL  IADL  and related mobility at 4WW level.     Gabby verbalizes  ability to provide  needed assist.  She was done so with prior back surgeries.               Strengths: Able to follow instructions, Alert and oriented, Independent prior level of function, Motivated for self care and independence, Pleasant and cooperative, Supportive family, Willingly participates in therapeutic activities  Barriers: Decreased endurance, Fatigue, Hearing impairment, Orthostatic hypotension, Impaired activity tolerance, Impaired balance, Pain (decreased safety awareness/attention)    Plan    D/c home with family 8-12-23    Occupational Therapy Goals (Active)       There are no active problems.            "

## 2023-08-11 NOTE — CARE PLAN
Problem: OT Long Term Goals  Goal: LTG-By discharge, patient will complete basic self care tasks with mod I-supervision   Outcome: Met  Goal: LTG-By discharge, patient will perform bathroom transfers with mod I-supervision   Outcome: Met  Goal: LTG-By discharge, patient will complete basic home management with mod I-supervision   Outcome: Met

## 2023-08-11 NOTE — PROGRESS NOTES
NURSING DAILY NOTE    Name: Ariana Drake   Date of Admission: 8/2/2023   Admitting Diagnosis: S/P lumbar fusion  Attending Physician: Farhat Dhillon M.d.  Allergies: Patient has no known allergies.    Safety  Patient Assist  Stand By Assist  Patient Precautions  Fall Risk, Spinal / Back Precautions   Precaution Comments  monitor BP- orthostatic, Lumbar  Bed Transfer Status  Modified Independent (c/ 4WW)  Toilet Transfer Status   Modified Independent (4WW level)  Assistive Devices  Gait Belt, Rails, Wheelchair  Oxygen  None - Room Air  Diet/Therapeutic Dining  Current Diet Order   Procedures    Diet Order Diet: Regular     Pill Administration  whole  Agitated Behavioral Scale  14  ABS Level of Severity  No Agitation    Fall Risk  Has the patient had a fall this admission?   No  Tonya Reed Fall Risk Scoring  16, HIGH RISK  Fall Risk Safety Measures  bed alarm    Vitals  Temperature: 36.7 °C (98.1 °F)  Temp src: Oral  Pulse: 66  Respiration: 18  Blood Pressure : 103/58  Blood Pressure MAP (Calculated): 73 MM HG  BP Location: Right, Upper Arm  Patient BP Position: Supine     Oxygen  Pulse Oximetry: 98 %  O2 (LPM): 0  O2 Delivery Device: None - Room Air  Incentive Spirometer Volume: 1000 mL    Bowel and Bladder  Last Bowel Movement  08/08/23  Stool Type  Type 6: Fluffy pieces with ragged edges, a mushy stool  Bowel Device  Bathroom  Continent  Bladder: Stress incontinence   Bowel: Continent movement  Bladder Function  Urine Void (mL):  (large)  Number of Times Voided: 1  Urine Color: Yellow  Wet Diaper Count: 1  Genitourinary Assessment   Bladder Assessment (WDL):  WDL Except  Brandt Catheter: Not Applicable  Urine Color: Yellow  Bladder Device: Bathroom  Bladder Scan: Post Void  $ Bladder Scan Results (mL): 98    Skin  Gerry Score   19  Sensory Interventions   Skin Preventative Measures: Pillows in Use for Support / Positioning  Moisture  Interventions         Pain  Pain Rating Scale  6 - Hard to ignore, avoid usual activities  Pain Location  Hip, Other (Comments) (Thigh)  Pain Location Orientation  Left  Pain Interventions   Heat Applied    ADLs    Bathing    (OT Shower Tomorrow)  Linen Change      Personal Hygiene  Change Darlyn Pads, Moist Darlyn Wipes  Chlorhexidine Bath      Oral Care  Brushed Teeth  Teeth/Dentures  Intact  Shave     Nutrition Percentage Eaten  Lunch, 0% Consumed  Environmental Precautions  Treaded Slipper Socks on Patient, Personal Belongings, Wastebasket, Call Bell etc. in Easy Reach, Transferred to Stronger Side, Bed in Low Position  Patient Turns/Positioning  Patient Turns Self from Side to Side  Patient Turns Assistance/Tolerance     Bed Positions  Bed Controls On, Bed Locked  Head of Bed Elevated  Self regulated      Psychosocial/Neurologic Assessment  Psychosocial Assessment  Psychosocial (WDL):  Within Defined Limits  Neurologic Assessment  Neuro (WDL): Exceptions to WDL  Level of Consciousness: Alert  Orientation Level: Oriented X4  Cognition: Appropriate judgement, Appropriate safety awareness, Appropriate attention/concentration, Appropriate for developmental age, Follows commands  Speech: Clear  Pupil Assesment: Yes  R Pupil Size (mm): 3  R Pupil Shape / Description: Round  R Pupil Reaction: Brisk  L Pupil Size (mm): 3  L Pupil Shape / Description: Round  L Pupil Reaction: Brisk  Motor Function/Sensation Assessment: Motor response  R Foot Dorsiflexion: Strong  L Foot Dorsiflexion: Strong  RUE Motor Response: Responds to commands  RUE Sensation: Full sensation  Muscle Strength Right Arm: Good Strength Against Gravity and Moderate Resistance  LUE Motor Response: Responds to commands  LUE Sensation: Full sensation  Muscle Strength Left Arm: Good Strength Against Gravity and Moderate Resistance  RLE Motor Response: Responds to commands  RLE Sensation: Full sensation  Muscle Strength Right Leg: Fair Strength against Gravity  but No Resistance  LLE Motor Response: Responds to commands  LLE Sensation: Full sensation  Muscle Strength Left Leg: Fair Strength against Gravity but No Resistance  EENT (WDL):  WDL Except    Cardio/Pulmonary Assessment  Edema      Respiratory Breath Sounds  RUL Breath Sounds: Clear  RML Breath Sounds: Clear  RLL Breath Sounds: Clear  JEWEL Breath Sounds: Clear  LLL Breath Sounds: Clear  Cardiac Assessment   Cardiac (WDL):  WDL Except (HX: HTN)

## 2023-08-12 VITALS
RESPIRATION RATE: 16 BRPM | TEMPERATURE: 97.1 F | HEART RATE: 68 BPM | DIASTOLIC BLOOD PRESSURE: 59 MMHG | WEIGHT: 121.25 LBS | BODY MASS INDEX: 22.31 KG/M2 | SYSTOLIC BLOOD PRESSURE: 100 MMHG | HEIGHT: 62 IN | OXYGEN SATURATION: 96 %

## 2023-08-12 PROCEDURE — A9270 NON-COVERED ITEM OR SERVICE: HCPCS | Performed by: PHYSICAL MEDICINE & REHABILITATION

## 2023-08-12 PROCEDURE — 99239 HOSP IP/OBS DSCHRG MGMT >30: CPT | Performed by: PHYSICAL MEDICINE & REHABILITATION

## 2023-08-12 PROCEDURE — 700102 HCHG RX REV CODE 250 W/ 637 OVERRIDE(OP): Performed by: PHYSICAL MEDICINE & REHABILITATION

## 2023-08-12 RX ADMIN — MAGNESIUM HYDROXIDE 30 ML: 1200 LIQUID ORAL at 04:05

## 2023-08-12 RX ADMIN — OXYCODONE HYDROCHLORIDE 5 MG: 5 TABLET ORAL at 08:51

## 2023-08-12 RX ADMIN — OMEPRAZOLE 40 MG: 20 CAPSULE, DELAYED RELEASE ORAL at 08:51

## 2023-08-12 RX ADMIN — GABAPENTIN 300 MG: 300 CAPSULE ORAL at 08:51

## 2023-08-12 RX ADMIN — OXYCODONE HYDROCHLORIDE 10 MG: 10 TABLET ORAL at 04:06

## 2023-08-12 ASSESSMENT — PATIENT HEALTH QUESTIONNAIRE - PHQ9
SUM OF ALL RESPONSES TO PHQ9 QUESTIONS 1 AND 2: 0
1. LITTLE INTEREST OR PLEASURE IN DOING THINGS: NOT AT ALL
2. FEELING DOWN, DEPRESSED, IRRITABLE, OR HOPELESS: NOT AT ALL

## 2023-08-12 NOTE — PROGRESS NOTES
Patient discharged to home per order.  Discharge instructions reviewed with patient and daughter; they verbalize understanding and signed copies placed in chart.  Patient has all belongings; signed copy of form in chart.  Patient left facility at 1115 via wheelchair accompanied by rehab staff and daughter.  Have enjoyed working with this pleasant patient.

## 2023-08-12 NOTE — CARE PLAN
The patient is Stable - Low risk of patient condition declining or worsening    Shift Goals  Clinical Goals: safety, pain control, rest  Patient Goals: pain control, rest  Family Goals: Education    Progress made toward(s) clinical / shift goals:  Problem: Pain - Standard  Goal: Alleviation of pain or a reduction in pain to the patient’s comfort goal  Outcome: Progressing  Note: Patient able to verbalize pain level and verbalize an acceptable level of pain.      Problem: Fall Risk - Rehab  Goal: Patient will remain free from falls  Outcome: Progressing  Note: Pt is A&O x 4 bed & WC alarms in place  Pt remains free from falls.   Reinforce use of call light when needs to get out of bed/WC.

## 2023-09-01 ENCOUNTER — APPOINTMENT (OUTPATIENT)
Dept: ADMISSIONS | Facility: MEDICAL CENTER | Age: 73
DRG: 030 | End: 2023-09-01
Attending: NEUROLOGICAL SURGERY
Payer: MEDICARE

## 2023-09-06 ENCOUNTER — HOSPITAL ENCOUNTER (OUTPATIENT)
Dept: RADIOLOGY | Facility: MEDICAL CENTER | Age: 73
End: 2023-09-06
Attending: NEUROLOGICAL SURGERY
Payer: MEDICARE

## 2023-09-06 DIAGNOSIS — M48.062 SPINAL STENOSIS, LUMBAR REGION, WITH NEUROGENIC CLAUDICATION: ICD-10-CM

## 2023-09-06 PROCEDURE — 72131 CT LUMBAR SPINE W/O DYE: CPT

## 2023-11-29 ENCOUNTER — PATIENT MESSAGE (OUTPATIENT)
Dept: HEALTH INFORMATION MANAGEMENT | Facility: OTHER | Age: 73
End: 2023-11-29

## 2024-02-02 NOTE — PROCEDURE: LIQUID NITROGEN
54.4
Show Applicator Variable?: Yes
Post-Care Instructions: I reviewed with the patient in detail post-care instructions. Patient is to wear sunprotection, and avoid picking at any of the treated lesions. Pt may apply Vaseline to crusted or scabbing areas.
Detail Level: Detailed
Include Z78.9 (Other Specified Conditions Influencing Health Status) As An Associated Diagnosis?: No
Spray Paint Text: The liquid nitrogen was applied to the skin utilizing a spray paint frosting technique.
Consent: The patient's consent was obtained including but not limited to risks of crusting, scabbing, blistering, scarring, darker or lighter pigmentary change, recurrence, incomplete removal and infection.
Medical Necessity Clause: This procedure was medically necessary because the lesions that were treated were:
Medical Necessity Information: It is in your best interest to select a reason for this procedure from the list below. All of these items fulfill various CMS LCD requirements except the new and changing color options.

## 2024-10-25 NOTE — HPI: RASH
"Called patient to make her aware of appointment information, she cut me off and stated \" I do work, do you not know this. I can't just keep taking off of work\". I advised that I was about to provider her with the infusion phone number so she can reschedule if needed to accommodate her work schedule. She stated \" ill call you back\" and hung up the phone.       If patient calls back, she can contact infusion center to reschedule at [phone].  "
What Type Of Note Output Would You Prefer (Optional)?: Standard Output
Is The Patient Presenting As Previously Scheduled?: Yes
How Severe Is Your Rash?: moderate
Is This A New Presentation, Or A Follow-Up?: Rash

## (undated) DEVICE — SUTURE 0 VICRYL PLUS CT-1 - 8 X 18 INCH (12/BX)

## (undated) DEVICE — CHLORAPREP 26 ML APPLICATOR - ORANGE TINT(25/CA)

## (undated) DEVICE — SENSOR SPO2 NEO LNCS ADHESIVE (20/BX) SEE USER NOTES

## (undated) DEVICE — GLOVE BIOGEL ECLIPSE  PF LATEX SIZE 6.5 (50PR/BX)

## (undated) DEVICE — KIT ANESTHESIA W/CIRCUIT & 3/LT BAG W/FILTER (20EA/CA)

## (undated) DEVICE — ROBOTIC SURGERY SERVICES

## (undated) DEVICE — SODIUM CHL IRRIGATION 0.9% 1000ML (12EA/CA)

## (undated) DEVICE — SET LEADWIRE 5 LEAD BEDSIDE DISPOSABLE ECG (1SET OF 5/EA)

## (undated) DEVICE — GLOVE BIOGEL SZ 8 SURGICAL PF LTX - (50PR/BX 4BX/CA)

## (undated) DEVICE — LACTATED RINGERS INJ. 500 ML - (24EA/CA)

## (undated) DEVICE — SUTURE 2-0 VICRYL PLUS CT-1 - 8 X 18 INCH(12/BX)

## (undated) DEVICE — GLOVE BIOGEL INDICATOR SZ 7SURGICAL PF LTX - (50/BX 4BX/CA)

## (undated) DEVICE — ELECTRODE DUAL RETURN W/ CORD - (50/PK)

## (undated) DEVICE — COVER MAYO STAND X-LG - (22EA/CA)

## (undated) DEVICE — SPONGE GAUZESTER 4 X 4 4PLY - (128PK/CA)

## (undated) DEVICE — CLOSURE WOUND 1/4 X 4 (STERI - STRIP) (50/BX 4BX/CA)

## (undated) DEVICE — BONE PRESS SPINAL EDITION HENSLER (10EA/CA)

## (undated) DEVICE — TOWEL STOP TIMEOUT SAFETY FLAG (40EA/CA)

## (undated) DEVICE — STERI STRIP COMPOUND BENZOIN - TINCTURE 0.6ML WITH APPLICATOR (40EA/BX)

## (undated) DEVICE — LACTATED RINGERS INJ 1000 ML - (14EA/CA 60CA/PF)

## (undated) DEVICE — SUTURE 1 VICRYL PLUS CTX - 8 X 18 INCH (12/BX)

## (undated) DEVICE — BOVIE BLADE SHORT - (150EA/CT)

## (undated) DEVICE — GOWN SURGEONS X-LARGE - DISP. (30/CA)

## (undated) DEVICE — CANISTER SUCTION 3000ML MECHANICAL FILTER AUTO SHUTOFF MEDI-VAC NONSTERILE LF DISP  (40EA/CA)

## (undated) DEVICE — DRAPE 36X28IN RAD CARM BND BG - (25/CA) O

## (undated) DEVICE — GLOVE BIOGEL PI ORTHO SZ 7.5 PF LF (40PR/BX)

## (undated) DEVICE — TRAY CATHETER FOLEY URINE METER W/STATLOCK 350ML (10EA/CA)

## (undated) DEVICE — TOOL DISSECTING BIT MR8 OD3MM L14CM (1EA)

## (undated) DEVICE — SPHERE NAVIGATION STEALTH (5EA/TY 12TY/PK)

## (undated) DEVICE — KIT SURGIFLO W/OUT THROMBIN - (6EA/CA)

## (undated) DEVICE — HEADREST PRONEVIEW LARGE - (10/CA)

## (undated) DEVICE — SUTURE GENERAL

## (undated) DEVICE — GLOVE SIZE 6.5  SURGEON ACCELERATOR FREE GREEN (50PR/BX)

## (undated) DEVICE — TOOL MR8 14CM MATCH HD SYM-TRI 3MM DIAMETER (1/EA)

## (undated) DEVICE — SUCTION INSTRUMENT YANKAUER BULBOUS TIP W/O VENT (50EA/CA)

## (undated) DEVICE — DRAPE LARGE 3 QUARTER - (20/CA)

## (undated) DEVICE — PACK NEURO - (2EA/CA)

## (undated) DEVICE — TOWELS CLOTH SURGICAL - (4/PK 20PK/CA)

## (undated) DEVICE — BONE MILL BM210

## (undated) DEVICE — BLADE SURGICAL CLIPPER - (50EA/CA)

## (undated) DEVICE — BAG SPONGE COUNT 10.25 X 32 - BLUE (250/CA)

## (undated) DEVICE — TUBING CLEARLINK DUO-VENT - C-FLO (48EA/CA)

## (undated) DEVICE — TUBING C&T SET FLYING LEADS DRAIN TUBING (10EA/BX)

## (undated) DEVICE — GLOVE SIZE 7.0 SURGEON ACCELERATOR FREE GREEN (50PR/BX 4BX/CA)

## (undated) DEVICE — DRAPE C ARMOR (12EA/CA)

## (undated) DEVICE — SUTURE 3-0 VICRYL PLUS RB-1 - 8 X 18 INCH (12/BX)

## (undated) DEVICE — SET EXTENSION WITH 2 PORTS (48EA/CA) ***PART #2C8610 IS A SUBSTITUTE*****

## (undated) DEVICE — DRAPE STRLE REG TOWEL 18X24 - (10/BX 4BX/CA)"

## (undated) DEVICE — SYRINGE NON SAFETY 10 CC 20 GA X 1-1/2 IN (100/BX 4BX/CA)

## (undated) DEVICE — DRAPE LAPAROTOMY T SHEET - (12EA/CA)

## (undated) DEVICE — DRILL TOOL MR8-31TD3030 MR8 TWST 3MMX30MM (1/EA)

## (undated) DEVICE — SLEEVE, VASO, THIGH, MED

## (undated) DEVICE — ARMREST CRADLE FOAM - (2PR/PK 12PR/CA)

## (undated) DEVICE — SENSOR OXIMETER ADULT SPO2 RD SET (20EA/BX)

## (undated) DEVICE — PROTECTOR ULNA NERVE - (36PR/CA)

## (undated) DEVICE — CLEANER ELECTRO-SURGICAL TIP - (25/BX 4BX/CA)

## (undated) DEVICE — ELECTRODE 850 FOAM ADHESIVE - HYDROGEL RADIOTRNSPRNT (50/PK)

## (undated) DEVICE — TOOL DISSECT MATCH HEAD

## (undated) DEVICE — KIT SURGICAL MAZOR X SPINE DISPOSABLE (1EA)

## (undated) DEVICE — MASK ANESTHESIA ADULT  - (100/CA)

## (undated) DEVICE — COVER LIGHT HANDLE ALC PLUS DISP (18EA/BX)

## (undated) DEVICE — MIDAS LUBRICATOR DIFFUSER PACK (4EA/CA)

## (undated) DEVICE — KIT EVACUATER 3 SPRING PVC LF 1/8 DRAIN SIZE (10EA/CA)"

## (undated) DEVICE — DRAPE MICROSCOPE ARMATEC 120IN X 46IN (10EA/CA)

## (undated) DEVICE — DRAPE CHEST/BREAST - (12EA/CA)

## (undated) DEVICE — Device

## (undated) DEVICE — GOWN WARMING STANDARD FLEX - (30/CA)

## (undated) DEVICE — TUBE CONNECT SUCTION CLEAR 120 X 1/4" (50EA/CA)"

## (undated) DEVICE — NEPTUNE 4 PORT MANIFOLD - (20/PK)